# Patient Record
Sex: MALE | Race: BLACK OR AFRICAN AMERICAN | NOT HISPANIC OR LATINO | Employment: OTHER | URBAN - METROPOLITAN AREA
[De-identification: names, ages, dates, MRNs, and addresses within clinical notes are randomized per-mention and may not be internally consistent; named-entity substitution may affect disease eponyms.]

---

## 2017-01-27 ENCOUNTER — HOSPITAL ENCOUNTER (EMERGENCY)
Facility: HOSPITAL | Age: 62
Discharge: HOME | End: 2017-01-27
Attending: EMERGENCY MEDICINE
Payer: COMMERCIAL

## 2017-01-27 VITALS
HEIGHT: 71 IN | HEART RATE: 73 BPM | WEIGHT: 185 LBS | RESPIRATION RATE: 17 BRPM | DIASTOLIC BLOOD PRESSURE: 87 MMHG | TEMPERATURE: 99 F | SYSTOLIC BLOOD PRESSURE: 124 MMHG | OXYGEN SATURATION: 97 % | BODY MASS INDEX: 25.9 KG/M2

## 2017-01-27 DIAGNOSIS — M25.562 PAIN IN BOTH KNEES, UNSPECIFIED CHRONICITY: Primary | ICD-10-CM

## 2017-01-27 DIAGNOSIS — M25.561 PAIN IN BOTH KNEES, UNSPECIFIED CHRONICITY: Primary | ICD-10-CM

## 2017-01-27 PROCEDURE — 25000003 PHARM REV CODE 250: Performed by: NURSE PRACTITIONER

## 2017-01-27 PROCEDURE — 99283 EMERGENCY DEPT VISIT LOW MDM: CPT

## 2017-01-27 RX ORDER — MELOXICAM 7.5 MG/1
7.5 TABLET ORAL DAILY
Status: DISCONTINUED | OUTPATIENT
Start: 2017-01-27 | End: 2017-01-27 | Stop reason: HOSPADM

## 2017-01-27 RX ORDER — MELOXICAM 7.5 MG/1
7.5 TABLET ORAL DAILY
Qty: 5 TABLET | Refills: 0 | Status: SHIPPED | OUTPATIENT
Start: 2017-01-27 | End: 2018-01-24

## 2017-01-27 RX ADMIN — MELOXICAM 7.5 MG: 7.5 TABLET ORAL at 12:01

## 2017-01-27 NOTE — ED AVS SNAPSHOT
[facility]  [place] [address]               Carlos A Ruff   2017 12:18 PM   ED    Description:  Male : 1955   Department:  [facility]           Your Care was Coordinated By:     Provider Role From To    Speedy Simpson MD Attending Provider 17 1222 --    JUVE Chavez Nurse Practitioner 17 1222 --      Reason for Visit     Knee Pain           Diagnoses this Visit        Comments    Pain in both knees, unspecified chronicity    -  Primary       ED Disposition     ED Disposition Condition Comment    Discharge             To Do List           Follow-up Information     Schedule an appointment as soon as possible for a visit with Debi Solis MD.    Specialty:  Family Medicine    Why:  Next Week, For Follow-Up    Contact information:    [facility] [place] [address]  [phone]          Go to [facility].    Specialty:  Emergency Medicine    Why:  If symptoms worsen    Contact information:    [address] [place] [id number] [phone]       These Medications        Disp Refills Start End    meloxicam (MOBIC) 7.5 MG tablet 5 tablet 0 2017     Take 1 tablet (7.5 mg total) by mouth once daily. - Oral      [facility]     [facility] Care [facility] -  Assistance  Registered nurses in the [facility] provide clinical advisement, health education, appointment booking, and other advisory services.  Call for this free service at [phone].             Medications           Message regarding Medications     Verify the changes and/or additions to your medication regime listed below are the same as discussed with your clinician today.  If any of these changes or additions are incorrect, please notify your healthcare provider.        START taking these NEW medications        Refills    meloxicam (MOBIC) 7.5 MG tablet 0    Sig: Take 1 tablet  "(7.5 mg total) by mouth once daily.    Class: Print    Route: Oral      These medications were administered today        Dose Freq    meloxicam tablet 7.5 mg 7.5 mg Daily    Sig: Take 1 tablet (7.5 mg total) by mouth once daily.    Class: Normal    Route: Oral           Verify that the below list of medications is an accurate representation of the medications you are currently taking.  If none reported, the list may be blank. If incorrect, please contact your healthcare provider. Carry this list with you in case of emergency.           Current Medications     meloxicam (MOBIC) 7.5 MG tablet Take 1 tablet (7.5 mg total) by mouth once daily.    meloxicam tablet 7.5 mg Take 1 tablet (7.5 mg total) by mouth once daily.           Clinical Reference Information           Your Vitals Were     BP Pulse Temp Resp Height Weight    124/87 (BP Location: Right arm, Patient Position: Sitting) 73 99 °F (37.2 °C) (Oral) 17 5' 11" (1.803 m) 83.9 kg (185 lb)    SpO2 BMI             97% 25.8 kg/m2         Allergies as of 1/27/2017     No Known Allergies      Immunizations Administered on Date of Encounter - 1/27/2017     None      ED Micro, Lab, POCT     None      ED Imaging Orders     None        Discharge Instructions       Please return to the Emergency Department for any new or worsening symptoms including: worsening knee pain, swelling in the knee, redness in the knee, fever, chest pain, shortness of breath, loss of consciousness, dizziness, weakness, or any other concerns. Please follow up with your Primary Care Provider within in the week. If you do not have a Primary Care Provider, you may contact the one listed on your discharge paperwork or you may also call the [facility] Appointment Desk at [phone] to schedule an appointment with a Primary Care Provider.     Please take all medication as prescribed. You have been prescribed Naproxen for pain. This is an Non-Steroidal Anti-Inflammatory (NSAID) Medication. Please " do not take any additional NSAIDs while you are taking this medication including (Advil, Aleve, Motrin, Ibuprofen, Mobic\meloxicam, Naprosyn, etc.). Please stop taking this medication if you experience: weakness, itching, yellow skin or eyes, joint pains, vomiting blood, blood or black stools, unusual weight gain, or swelling in your arms, legs, hands, or feet.     Discharge References/Attachments     KNEE PAIN (ENGLISH)      [facility] Sign-Up     Activating your [facility] account is as easy as 1-2-3!     1) Visit [email or web address], select Sign Up Now, enter this activation code and your date of birth, then select Next.  [email or web address]  Expires: 3/13/2017 12:28 PM      2) Create a username and password to use when you visit [facility] in the future and select a security question in case you lose your password and select Next.    3) Enter your e-mail address and click Sign Up!    Additional Information  If you have questions, please e-mail [email or web address] or call [phone] to talk to our [facility] staff. Remember, [facility] is NOT to be used for urgent needs. For medical emergencies, dial 911.         Smoking Cessation     If you would like to quit smoking:   You may be eligible for free services if you are a [place] resident and started smoking cigarettes before September 1, 1988.  Call the Smoking Cessation Trust ([facility]) toll free at ([phone] [phone] or [phone] [phone].   Call -800-QUIT-NOW if you do not meet the above criteria.             [facility] complies with applicable Federal civil rights laws and does not discriminate on the basis of race, color, national origin, age, disability, or sex.        Language Assistance Services     ATTENTION: Language assistance services are available, free of charge. Please call [phone].      ATENCIÓN: Si habla español, tiene a ervin disposición servicios gratuitos de asistencia lingüística. Llame al [phone].     CHÚ Ý: N?u b?n nói Ti?ng  Vi?t, có các d?ch v? h? tr? ngôn ng? mi?n phí dành cho b?n. G?i s? [phone].

## 2017-01-27 NOTE — DISCHARGE INSTRUCTIONS
Please return to the Emergency Department for any new or worsening symptoms including: worsening knee pain, swelling in the knee, redness in the knee, fever, chest pain, shortness of breath, loss of consciousness, dizziness, weakness, or any other concerns. Please follow up with your Primary Care Provider within in the week. If you do not have a Primary Care Provider, you may contact the one listed on your discharge paperwork or you may also call the [facility] Appointment Desk at [phone] to schedule an appointment with a Primary Care Provider.     Please take all medication as prescribed. You have been prescribed Naproxen for pain. This is an Non-Steroidal Anti-Inflammatory (NSAID) Medication. Please do not take any additional NSAIDs while you are taking this medication including (Advil, Aleve, Motrin, Ibuprofen, Mobic\meloxicam, Naprosyn, etc.). Please stop taking this medication if you experience: weakness, itching, yellow skin or eyes, joint pains, vomiting blood, blood or black stools, unusual weight gain, or swelling in your arms, legs, hands, or feet.

## 2017-01-27 NOTE — ED PROVIDER NOTES
"Encounter Date: 1/27/2017    SCRIBE #1 NOTE: I, Dana Valle, am scribing for, and in the presence of,  ISHA Wheatley. I have scribed the following portions of the note - Other sections scribed: HPI/ROS.       History     Chief Complaint   Patient presents with    Knee Pain     Pt. presents with knee pain that has been ongoing for the past month. Pt. reports sensation of "pop" in both knees that will come and go.     Review of patient's allergies indicates:  No Known Allergies  HPI Comments: CC: Knee Pain    HPI: 61 y.o. M with a PMHx of CAD w/ cardiac stents and blood clots presents to the ED c/o moderate, "pressure-like" bilateral knee pain (R>L) with swelling to the R knee. Symptoms have been ongoing for the past 1 month and worsening. Pain only comes with walking. Pt reports of the intermittent sensation of a "pop" in the knees. No prior tx attempted. Pt does not have established care with a PCP. Pt works as a . Pt denies trauma, hx of arthritis, and other symptoms.      The history is provided by the patient.     Past Medical History   Diagnosis Date    Coronary artery disease      No past medical history pertinent negatives.  Past Surgical History   Procedure Laterality Date    Cardiac stents      Blood clots       History reviewed. No pertinent family history.  Social History   Substance Use Topics    Smoking status: Current Some Day Smoker    Smokeless tobacco: None    Alcohol use Yes     Review of Systems   Constitutional: Negative for chills and fever.   HENT: Negative for ear pain, nosebleeds, rhinorrhea and sore throat.    Eyes: Negative for photophobia, pain and visual disturbance.   Respiratory: Negative for cough, chest tightness, shortness of breath and wheezing.    Cardiovascular: Negative for chest pain, palpitations and leg swelling.   Gastrointestinal: Negative for abdominal pain, nausea and vomiting.   Endocrine: Negative for polydipsia and polyuria.   Genitourinary: Negative " for dysuria and frequency.   Musculoskeletal: Positive for arthralgias (bilateral knees, only when walking) and joint swelling (Right). Negative for back pain, myalgias and neck pain.   Skin: Negative for rash and wound.   Neurological: Negative for dizziness, syncope, weakness and numbness.   Psychiatric/Behavioral: Negative for confusion. The patient is not nervous/anxious.        Physical Exam   Initial Vitals   BP Pulse Resp Temp SpO2   01/27/17 1156 01/27/17 1156 01/27/17 1156 01/27/17 1156 01/27/17 1156   124/87 73 17 99 °F (37.2 °C) 97 %     Physical Exam    Nursing note and vitals reviewed.  Constitutional: He appears well-developed and well-nourished. He is not diaphoretic. He is cooperative.  Non-toxic appearance. No distress.   HENT:   Head: Normocephalic and atraumatic.   Mouth/Throat: Oropharynx is clear and moist.   Eyes: Conjunctivae and EOM are normal.   Neck: Normal range of motion.   Cardiovascular: Normal rate, regular rhythm, normal heart sounds and intact distal pulses. Exam reveals no friction rub.    No murmur heard.  Pulmonary/Chest: Breath sounds normal. No respiratory distress. He has no wheezes. He has no rhonchi. He has no rales.   Abdominal: Soft. Bowel sounds are normal. He exhibits no distension and no mass. There is no tenderness. There is no rebound and no guarding.   Musculoskeletal:        Right hip: Normal.        Left hip: Normal.        Right knee: He exhibits swelling (mild). He exhibits normal range of motion, no ecchymosis, no deformity, no laceration, no erythema, normal alignment, no LCL laxity, normal patellar mobility, no bony tenderness and no MCL laxity. No tenderness found.        Left knee: He exhibits normal range of motion, no swelling, no ecchymosis, no deformity, no laceration, no erythema, normal alignment, no LCL laxity, normal patellar mobility, no bony tenderness and no MCL laxity. No tenderness found.        Right ankle: Normal.        Left ankle: Normal.    Palpation over the left or right bony knee.  Full range of motion of bilateral knees.  No wound drainage, erythema, or area of increased warmth over the knees.  Mild swelling and the right knee compared to left.  No crepitus with range of motion.  No tenderness palpation over the proximal leg or distal leg.  Calf compartment soft.  No popliteal tenderness bilaterally.   Neurological: He is alert and oriented to person, place, and time. He has normal strength. No sensory deficit. GCS eye subscore is 4. GCS verbal subscore is 5. GCS motor subscore is 6.   Skin: Skin is warm and dry. No rash noted.   Psychiatric: He has a normal mood and affect. His behavior is normal. Judgment and thought content normal.         ED Course   Procedures  Labs Reviewed - No data to display                APC / Resident Notes:   This is an evaluation of a 61-year-old male who presents emergency department for bilateral knee pain.  He denies any known traumatic event.  He works as a .  He only reports pain with ambulation.  No pain at rest. The patient is a non-toxic, afebrile, and well appearing male. On physical exam, his breath sounds are clear and equal to auscultation bilaterally.  Heart regular rhythm with a rate of 70 bpm.  He was also extremities without difficulty.  Equal motor strength to upper and lower extremities bilaterally.  He ambulates with a steady and even gait.  There is no tenderness palpation over the bilateral knees.  There is mild swelling in the right compared to left.  There is no joint laxity.  There is no surrounding erythema, area of increased warmth, or bruising noted over bilateral knees.  There are no open wounds.  Calf compartments are soft.  Vital Signs Are Reassuring.    Given the above findings, my overall impression is knee pain-likely related to arthritic changes. Given the above findings, I do not think the patient has fracture, dislocation, septic joint, cellulitis.    ED Treatments: Mobic.  D/C Meds: Mobic. The diagnosis, treatment plan, instructions for follow-up and reevaluation with a PCP as well as ED return precautions have been discussed and he has verbalized an understanding of the information. All questions or concerns have been addressed. This case was discussed with and the patient has been examined by Dr. Simpson who is in agreement with my assessment and plan. ALEKSEY Tillman, BLAYNEP-C       Scribe Attestation:   Scribe #1: I performed the above scribed service and the documentation accurately describes the services I performed. I attest to the accuracy of the note.    Attending Attestation:     Physician Attestation Statement for NP/PA:   I have conducted a face to face encounter with this patient in addition to the NP/PA, due to Medical Complexity    Other NP/PA Attestation Additions:    History of Present Illness: Bilateral knee pain    Medical Decision Making: Agree with assessment and management of osteoarthritis.       Physician Attestation for Scribe:  Physician Attestation Statement for Scribe #1: I, Kevin Cohen, NP-C, reviewed documentation, as scribed by Dana Valle in my presence, and it is both accurate and complete.                 ED Course     Clinical Impression:   The encounter diagnosis was Pain in both knees, unspecified chronicity.    Disposition:   Disposition: Discharged  Condition: Stable       JUVE Chavez  01/27/17 1857       Speedy Simpson MD  01/27/17 7045

## 2017-04-26 ENCOUNTER — HOSPITAL ENCOUNTER (EMERGENCY)
Facility: HOSPITAL | Age: 62
Discharge: HOME | End: 2017-04-26
Attending: EMERGENCY MEDICINE
Payer: COMMERCIAL

## 2017-04-26 VITALS
HEART RATE: 64 BPM | SYSTOLIC BLOOD PRESSURE: 110 MMHG | WEIGHT: 185 LBS | TEMPERATURE: 98 F | OXYGEN SATURATION: 100 % | HEIGHT: 71 IN | DIASTOLIC BLOOD PRESSURE: 76 MMHG | BODY MASS INDEX: 25.9 KG/M2 | RESPIRATION RATE: 18 BRPM

## 2017-04-26 DIAGNOSIS — R19.7 DIARRHEA, UNSPECIFIED TYPE: Primary | ICD-10-CM

## 2017-04-26 LAB
ALBUMIN SERPL BCP-MCNC: 4.3 G/DL
ALP SERPL-CCNC: 73 U/L
ALT SERPL W/O P-5'-P-CCNC: 37 U/L
ANION GAP SERPL CALC-SCNC: 11 MMOL/L
AST SERPL-CCNC: 41 U/L
BASOPHILS # BLD AUTO: 0.03 K/UL
BASOPHILS NFR BLD: 0.5 %
BILIRUB SERPL-MCNC: 0.6 MG/DL
BILIRUB UR QL STRIP: NEGATIVE
BUN SERPL-MCNC: 13 MG/DL
CALCIUM SERPL-MCNC: 9.3 MG/DL
CHLORIDE SERPL-SCNC: 109 MMOL/L
CLARITY UR: CLEAR
CO2 SERPL-SCNC: 19 MMOL/L
COLOR UR: YELLOW
CREAT SERPL-MCNC: 1 MG/DL
DIFFERENTIAL METHOD: NORMAL
EOSINOPHIL # BLD AUTO: 0 K/UL
EOSINOPHIL NFR BLD: 0.7 %
ERYTHROCYTE [DISTWIDTH] IN BLOOD BY AUTOMATED COUNT: 13.5 %
EST. GFR  (AFRICAN AMERICAN): >60 ML/MIN/1.73 M^2
EST. GFR  (NON AFRICAN AMERICAN): >60 ML/MIN/1.73 M^2
GLUCOSE SERPL-MCNC: 92 MG/DL
GLUCOSE UR QL STRIP: NEGATIVE
HCT VFR BLD AUTO: 46.3 %
HGB BLD-MCNC: 15.4 G/DL
HGB UR QL STRIP: NEGATIVE
KETONES UR QL STRIP: NEGATIVE
LEUKOCYTE ESTERASE UR QL STRIP: NEGATIVE
LIPASE SERPL-CCNC: 66 U/L
LYMPHOCYTES # BLD AUTO: 1.1 K/UL
LYMPHOCYTES NFR BLD: 19.1 %
MCH RBC QN AUTO: 31 PG
MCHC RBC AUTO-ENTMCNC: 33.3 %
MCV RBC AUTO: 93 FL
MICROSCOPIC COMMENT: NORMAL
MONOCYTES # BLD AUTO: 0.7 K/UL
MONOCYTES NFR BLD: 11.9 %
NEUTROPHILS # BLD AUTO: 3.8 K/UL
NEUTROPHILS NFR BLD: 67.6 %
NITRITE UR QL STRIP: NEGATIVE
PH UR STRIP: 6 [PH] (ref 5–8)
PLATELET # BLD AUTO: 224 K/UL
PMV BLD AUTO: 10.9 FL
POTASSIUM SERPL-SCNC: 3.9 MMOL/L
PROT SERPL-MCNC: 8.1 G/DL
PROT UR QL STRIP: NEGATIVE
RBC # BLD AUTO: 4.97 M/UL
SODIUM SERPL-SCNC: 139 MMOL/L
SP GR UR STRIP: 1.02 (ref 1–1.03)
URN SPEC COLLECT METH UR: NORMAL
UROBILINOGEN UR STRIP-ACNC: NEGATIVE EU/DL
WBC # BLD AUTO: 5.56 K/UL
WBC #/AREA URNS HPF: 1 /HPF (ref 0–5)

## 2017-04-26 PROCEDURE — 96375 TX/PRO/DX INJ NEW DRUG ADDON: CPT

## 2017-04-26 PROCEDURE — 99284 EMERGENCY DEPT VISIT MOD MDM: CPT | Mod: 25

## 2017-04-26 PROCEDURE — 85025 COMPLETE CBC W/AUTO DIFF WBC: CPT

## 2017-04-26 PROCEDURE — 96374 THER/PROPH/DIAG INJ IV PUSH: CPT

## 2017-04-26 PROCEDURE — 63600175 PHARM REV CODE 636 W HCPCS: Performed by: NURSE PRACTITIONER

## 2017-04-26 PROCEDURE — 96372 THER/PROPH/DIAG INJ SC/IM: CPT

## 2017-04-26 PROCEDURE — 96361 HYDRATE IV INFUSION ADD-ON: CPT

## 2017-04-26 PROCEDURE — 81000 URINALYSIS NONAUTO W/SCOPE: CPT

## 2017-04-26 PROCEDURE — 25000003 PHARM REV CODE 250: Performed by: NURSE PRACTITIONER

## 2017-04-26 PROCEDURE — 80053 COMPREHEN METABOLIC PANEL: CPT

## 2017-04-26 PROCEDURE — 83690 ASSAY OF LIPASE: CPT

## 2017-04-26 RX ORDER — DICYCLOMINE HYDROCHLORIDE 20 MG/1
20 TABLET ORAL 3 TIMES DAILY
Qty: 20 TABLET | Refills: 0 | Status: SHIPPED | OUTPATIENT
Start: 2017-04-26 | End: 2017-05-26

## 2017-04-26 RX ORDER — CLOPIDOGREL BISULFATE 75 MG/1
75 TABLET ORAL DAILY
COMMUNITY
End: 2017-12-11 | Stop reason: SDUPTHER

## 2017-04-26 RX ORDER — PANTOPRAZOLE SODIUM 20 MG/1
20 TABLET, DELAYED RELEASE ORAL
Qty: 30 TABLET | Refills: 0 | Status: SHIPPED | OUTPATIENT
Start: 2017-04-26 | End: 2018-01-24

## 2017-04-26 RX ORDER — ONDANSETRON 2 MG/ML
4 INJECTION INTRAMUSCULAR; INTRAVENOUS
Status: COMPLETED | OUTPATIENT
Start: 2017-04-26 | End: 2017-04-26

## 2017-04-26 RX ORDER — DICYCLOMINE HYDROCHLORIDE 10 MG/ML
20 INJECTION INTRAMUSCULAR
Status: COMPLETED | OUTPATIENT
Start: 2017-04-26 | End: 2017-04-26

## 2017-04-26 RX ORDER — FAMOTIDINE 10 MG/ML
20 INJECTION INTRAVENOUS
Status: COMPLETED | OUTPATIENT
Start: 2017-04-26 | End: 2017-04-26

## 2017-04-26 RX ORDER — ACETAMINOPHEN 500 MG
1000 TABLET ORAL
Status: COMPLETED | OUTPATIENT
Start: 2017-04-26 | End: 2017-04-26

## 2017-04-26 RX ADMIN — DICYCLOMINE HYDROCHLORIDE 20 MG: 10 INJECTION INTRAMUSCULAR at 11:04

## 2017-04-26 RX ADMIN — ACETAMINOPHEN 1000 MG: 500 TABLET ORAL at 11:04

## 2017-04-26 RX ADMIN — FAMOTIDINE 20 MG: 10 INJECTION, SOLUTION INTRAVENOUS at 11:04

## 2017-04-26 RX ADMIN — SODIUM CHLORIDE 1000 ML: 0.9 INJECTION, SOLUTION INTRAVENOUS at 11:04

## 2017-04-26 RX ADMIN — ONDANSETRON 4 MG: 2 INJECTION INTRAMUSCULAR; INTRAVENOUS at 11:04

## 2017-04-26 NOTE — ED TRIAGE NOTES
Nausea and diarrhea since yesterday with periumbilical pain also pressure to top of head felt weak

## 2017-04-26 NOTE — DISCHARGE INSTRUCTIONS
Please return to the ED for any new or worsening symptoms: severe abdominal pain, chest pain, shortness of breath, loss of consciousness or any other concerns. Please follow up with primary care within in the week. You may also call [phone] for the [facility] same day appointment line.

## 2017-04-26 NOTE — ED PROVIDER NOTES
"Encounter Date: 4/26/2017    SCRIBE #1 NOTE: I, Lucie Barton, am scribing for, and in the presence of,  Frieda Hernandez NP. I have scribed the following portions of the note - Other sections scribed: HPI, ROS.       History     Chief Complaint   Patient presents with    Abdominal Pain     jalen-umbilical pain and NVD since yesterday     Review of patient's allergies indicates:  No Known Allergies  HPI Comments: CC: Abdominal Pain    HPI: 61 year old male, smoker with a PMHx of coronary artery disease, cardiac stents, blood clots, and hypertension presents to the ED c/o acute, severe (8/10), "cramping" periumbilical abdominal pain. Patient states 2 nights ago he had 1 beer and some egg rolls, and overnight felt like the "food was choking me". Patient reports associated chills, generalized body weakness, nausea, and 10-20 watery diarrhea episodes in 24 hours. Patient states he is most concerned about his current headache due to his hypertension. Patient reports treating with Pepto and Priya [place] with temporary relief. Patient does not have a PCP. Patient otherwise denies vomiting, blood in stool, fever, chest pain, shortness of breath, dysuria, syncope and other symptoms.          The history is provided by the patient. No  was used.     Past Medical History:   Diagnosis Date    Coronary artery disease      Past Surgical History:   Procedure Laterality Date    blood clots      cardiac stents       History reviewed. No pertinent family history.  Social History   Substance Use Topics    Smoking status: Current Some Day Smoker    Smokeless tobacco: None    Alcohol use Yes     Review of Systems   Constitutional: Positive for chills. Negative for fever.        (+) Generalized Body Weakness   HENT: Negative for ear pain, rhinorrhea and sore throat.    Eyes: Negative for pain.   Respiratory: Negative for cough and shortness of breath.    Cardiovascular: Negative for chest pain. "   Gastrointestinal: Positive for abdominal pain (periumbilical), diarrhea and nausea. Negative for blood in stool and vomiting.   Genitourinary: Negative for dysuria.   Musculoskeletal: Negative for back pain and neck pain.   Skin: Negative for rash.   Neurological: Positive for headaches. Negative for syncope.       Physical Exam   Initial Vitals   BP Pulse Resp Temp SpO2   04/26/17 0954 04/26/17 0954 04/26/17 0954 04/26/17 0954 04/26/17 0954   131/75 80 19 98.4 °F (36.9 °C) 96 %     Physical Exam    Constitutional: Vital signs are normal. He appears well-developed and well-nourished.  Non-toxic appearance.   Eyes: EOM are normal.   Neck: Full passive range of motion without pain. Neck supple. No rigidity.   Cardiovascular: Normal rate, S1 normal, S2 normal and normal heart sounds. Exam reveals no gallop.    No murmur heard.  Pulmonary/Chest: Effort normal and breath sounds normal. No tachypnea. He has no decreased breath sounds. He has no wheezes. He has no rhonchi. He has no rales.   Abdominal: Soft. Normal appearance. There is no tenderness. There is no CVA tenderness, no tenderness at McBurney's point and negative Saenz's sign.   Neurological: He is alert and oriented to person, place, and time. GCS eye subscore is 4. GCS verbal subscore is 5. GCS motor subscore is 6.   Skin: Skin is warm, dry and intact. No rash noted.   Psychiatric: He has a normal mood and affect.         ED Course   Procedures  Labs Reviewed   COMPREHENSIVE METABOLIC PANEL - Abnormal; Notable for the following:        Result Value    CO2 19 (*)     AST 41 (*)     All other components within normal limits   LIPASE - Abnormal; Notable for the following:     Lipase 66 (*)     All other components within normal limits   CBC W/ AUTO DIFFERENTIAL   URINALYSIS   URINALYSIS MICROSCOPIC             Medical Decision Making:   ED Management:  This is a 61-year-old male who presents to the ED with complaints of crampy abdominal pain, diarrhea and a  headache.  He is afebrile and well-appearing.  On exam, his abdomen is soft and nontender.  His vital signs normal.  Laboratory evaluation and urinalysis grossly unremarkable.  Lipase is slightly elevated.  After receiving IV fluids, Zofran, Pepcid, Bentyl and Tylenol he reports feeling much better.  I considered but suspect a low probability for acute pancreatitis, cholecystitis, bowel obstruction, acute appendicitis. Discharged home with prescriptions for Bentyl and Zofran.  Instructions given for supportive care and follow-up.  Return precautions given.  Patient's case was discussed with Dr. Valenzuela, who agrees with his plan of care.            Scribe Attestation:   Scribe #1: I performed the above scribed service and the documentation accurately describes the services I performed. I attest to the accuracy of the note.    Attending Attestation:           Physician Attestation for Scribe:  Physician Attestation Statement for Scribe #1: I, Frieda Hernandez NP, reviewed documentation, as scribed by Lucie Barton in my presence, and it is both accurate and complete.                 ED Course     Clinical Impression:   The encounter diagnosis was Diarrhea, unspecified type.    Disposition:   Disposition: Discharged  Condition: Stable       Frieda Hernandez NP  04/26/17 1956

## 2017-04-26 NOTE — ED AVS SNAPSHOT
[facility]  [address] [place] [address]               Carlos A Ruff   2017 10:12 AM   ED    Description:  Male : 1955   Department:  [facility]           Your Care was Coordinated By:     Provider Role From To    Roberto Carlos Valenzuela MD Attending Provider 17 1039 --    Frieda Hernandez NP Nurse Practitioner 17 1017 --      Reason for Visit     Abdominal Pain           Diagnoses this Visit        Comments    Diarrhea, unspecified type    -  Primary       ED Disposition     None           To Do List           Follow-up Information     Schedule an appointment as soon as possible for a visit with Ene Sumner MD.    Specialty:  Internal Medicine    Why:  Primary Care    Contact information:    [address] [place] [address]  [phone]         These Medications        Disp Refills Start End    dicyclomine (BENTYL) 20 mg tablet 20 tablet 0 2017    Take 1 tablet (20 mg total) by mouth 3 (three) times daily. - Oral    pantoprazole (PROTONIX) 20 MG tablet 30 tablet 0 2017    Take 1 tablet (20 mg total) by mouth 2 (two) times daily before meals. - Oral      [facility]     [facility] Care [facility] -  Assistance  Unless otherwise directed by your provider, please contact [facility], our nurse care line that is available for  assistance.     Registered nurses in the [facility] provide: appointment scheduling, clinical advisement, health education, and other advisory services.  Call: [phone] (toll free)               Medications           Message regarding Medications     Verify the changes and/or additions to your medication regime listed below are the same as discussed with your clinician today.  If any of these changes or additions are incorrect, please notify your healthcare provider.        START taking these NEW medications        Refills     "dicyclomine (BENTYL) 20 mg tablet 0    Sig: Take 1 tablet (20 mg total) by mouth 3 (three) times daily.    Class: Print    Route: Oral    pantoprazole (PROTONIX) 20 MG tablet 0    Sig: Take 1 tablet (20 mg total) by mouth 2 (two) times daily before meals.    Class: Print    Route: Oral      These medications were administered today        Dose Freq    sodium chloride 0.9% bolus 1,000 mL 1,000 mL Once    Sig: Inject 1,000 mLs into the vein once.    Class: Normal    Route: Intravenous    ondansetron injection 4 mg 4 mg ED 1 Time    Sig: Inject 4 mg into the vein ED 1 Time.    Class: Normal    Route: Intravenous    famotidine (PF) 20 mg/2 mL injection 20 mg 20 mg ED 1 Time    Sig: Inject 2 mLs (20 mg total) into the vein ED 1 Time.    Class: Normal    Route: Intravenous    dicyclomine injection 20 mg 20 mg ED 1 Time    Sig: Inject 2 mLs (20 mg total) into the muscle ED 1 Time.    Class: Normal    Route: Intramuscular    acetaminophen tablet 1,000 mg 1,000 mg ED 1 Time    Sig: Take 2 tablets (1,000 mg total) by mouth ED 1 Time.    Class: Normal    Route: Oral           Verify that the below list of medications is an accurate representation of the medications you are currently taking.  If none reported, the list may be blank. If incorrect, please contact your healthcare provider. Carry this list with you in case of emergency.           Current Medications     clopidogrel (PLAVIX) 75 mg tablet Take 75 mg by mouth once daily.    dicyclomine (BENTYL) 20 mg tablet Take 1 tablet (20 mg total) by mouth 3 (three) times daily.    meloxicam (MOBIC) 7.5 MG tablet Take 1 tablet (7.5 mg total) by mouth once daily.    pantoprazole (PROTONIX) 20 MG tablet Take 1 tablet (20 mg total) by mouth 2 (two) times daily before meals.           Clinical Reference Information           Your Vitals Were     BP Pulse Temp Resp Height Weight    105/72 (BP Location: Left arm, BP Method: Automatic) 60 98.7 °F (37.1 °C) (Oral) 20 5' 11" (1.803 m) 83.9 " kg (185 lb)    SpO2 BMI             99% 25.8 kg/m2         Allergies as of 4/26/2017     No Known Allergies      Immunizations Administered on Date of Encounter - 4/26/2017     None      ED Micro, Lab, POCT     Start Ordered       Status Ordering Provider    04/26/17 1038 04/26/17 1038  CBC W/ AUTO DIFFERENTIAL  Once      Final result     04/26/17 1038 04/26/17 1038  Comp. Metabolic Panel  STAT      Final result     04/26/17 1038 04/26/17 1038  Lipase  STAT      Final result     04/26/17 1038 04/26/17 1038  Urinalysis - Clean Catch  STAT      In process     04/26/17 1038 04/26/17 1038  Urinalysis Microscopic  Once      In process       ED Imaging Orders     None        Discharge Instructions       Please return to the ED for any new or worsening symptoms: severe abdominal pain, chest pain, shortness of breath, loss of consciousness or any other concerns. Please follow up with primary care within in the week. You may also call [phone] for the [facility] same day appointment line.    Discharge References/Attachments     VOMITING OR DIARRHEA (ADULT), DIET FOR (ENGLISH)      [facility] Sign-Up     Activating your [facility] account is as easy as 1-2-3!     1) Visit [email or web address], select Sign Up Now, enter this activation code and your date of birth, then select Next.  [email or web address]  Expires: 6/10/2017 12:24 PM      2) Create a username and password to use when you visit [facility] in the future and select a security question in case you lose your password and select Next.    3) Enter your e-mail address and click Sign Up!    Additional Information  If you have questions, please e-mail [email or web address] or call [phone] to talk to our [facility] staff. Remember, [facility] is NOT to be used for urgent needs. For medical emergencies, dial 911.         Smoking Cessation     If you would like to quit smoking:   You may be eligible for free services if you are a [place] resident and started smoking  cigarettes before September 1, 1988.  Call the Smoking Cessation Trust (SCT) toll free at (198) 464-0945 or (610) 593-2962.   Call 1-800-QUIT-NOW if you do not meet the above criteria.   Contact us via email: [email or web address]   View our website for more information: [email or web address]         [facility] complies with applicable Federal civil rights laws and does not discriminate on the basis of race, color, national origin, age, disability, or sex.        Language Assistance Services     ATTENTION: Language assistance services are available, free of charge. Please call [phone].      ATENCIÓN: Si habla español, tiene a ervin disposición servicios gratuitos de asistencia lingüística. Llame al [phone].     CHÚ Ý: N?u b?n nói Ti?ng Vi?t, có các d?ch v? h? tr? ngôn ng? mi?n phí dành cho b?n. G?i s? [phone].

## 2017-11-29 ENCOUNTER — LAB VISIT (OUTPATIENT)
Dept: LAB | Facility: HOSPITAL | Age: 62
End: 2017-11-29
Attending: FAMILY MEDICINE
Payer: COMMERCIAL

## 2017-11-29 ENCOUNTER — OFFICE VISIT (OUTPATIENT)
Dept: FAMILY MEDICINE | Facility: CLINIC | Age: 62
End: 2017-11-29
Payer: COMMERCIAL

## 2017-11-29 VITALS
SYSTOLIC BLOOD PRESSURE: 110 MMHG | DIASTOLIC BLOOD PRESSURE: 72 MMHG | WEIGHT: 176.38 LBS | TEMPERATURE: 98 F | OXYGEN SATURATION: 96 % | HEART RATE: 65 BPM | BODY MASS INDEX: 24.69 KG/M2 | RESPIRATION RATE: 14 BRPM | HEIGHT: 71 IN

## 2017-11-29 DIAGNOSIS — Z00.00 VISIT FOR WELL MAN HEALTH CHECK: ICD-10-CM

## 2017-11-29 DIAGNOSIS — Z00.00 VISIT FOR WELL MAN HEALTH CHECK: Primary | ICD-10-CM

## 2017-11-29 DIAGNOSIS — R22.42 MASS OF LEFT FOOT: ICD-10-CM

## 2017-11-29 DIAGNOSIS — Z86.718 HISTORY OF DEEP VEIN THROMBOSIS (DVT) OF LOWER EXTREMITY: ICD-10-CM

## 2017-11-29 DIAGNOSIS — I25.10 CORONARY ARTERY DISEASE INVOLVING NATIVE CORONARY ARTERY OF NATIVE HEART WITHOUT ANGINA PECTORIS: ICD-10-CM

## 2017-11-29 DIAGNOSIS — Z72.0 TOBACCO ABUSE: ICD-10-CM

## 2017-11-29 LAB
ALBUMIN SERPL BCP-MCNC: 3.8 G/DL
ALP SERPL-CCNC: 69 U/L
ALT SERPL W/O P-5'-P-CCNC: 29 U/L
ANION GAP SERPL CALC-SCNC: 6 MMOL/L
AST SERPL-CCNC: 36 U/L
BASOPHILS # BLD AUTO: 0.09 K/UL
BASOPHILS NFR BLD: 1.2 %
BILIRUB SERPL-MCNC: 0.4 MG/DL
BUN SERPL-MCNC: 18 MG/DL
CALCIUM SERPL-MCNC: 9.4 MG/DL
CHLORIDE SERPL-SCNC: 109 MMOL/L
CHOLEST SERPL-MCNC: 197 MG/DL
CHOLEST/HDLC SERPL: 2.4 {RATIO}
CO2 SERPL-SCNC: 28 MMOL/L
COMPLEXED PSA SERPL-MCNC: 0.67 NG/ML
CREAT SERPL-MCNC: 1 MG/DL
DIFFERENTIAL METHOD: ABNORMAL
EOSINOPHIL # BLD AUTO: 0.3 K/UL
EOSINOPHIL NFR BLD: 3.7 %
ERYTHROCYTE [DISTWIDTH] IN BLOOD BY AUTOMATED COUNT: 13.4 %
EST. GFR  (AFRICAN AMERICAN): >60 ML/MIN/1.73 M^2
EST. GFR  (NON AFRICAN AMERICAN): >60 ML/MIN/1.73 M^2
ESTIMATED AVG GLUCOSE: 100 MG/DL
GLUCOSE SERPL-MCNC: 96 MG/DL
HBA1C MFR BLD HPLC: 5.1 %
HCT VFR BLD AUTO: 42.3 %
HDLC SERPL-MCNC: 82 MG/DL
HDLC SERPL: 41.6 %
HGB BLD-MCNC: 14.1 G/DL
LDLC SERPL CALC-MCNC: 106.6 MG/DL
LYMPHOCYTES # BLD AUTO: 1.7 K/UL
LYMPHOCYTES NFR BLD: 23 %
MCH RBC QN AUTO: 32 PG
MCHC RBC AUTO-ENTMCNC: 33.3 G/DL
MCV RBC AUTO: 96 FL
MONOCYTES # BLD AUTO: 0.6 K/UL
MONOCYTES NFR BLD: 7.3 %
NEUTROPHILS # BLD AUTO: 4.9 K/UL
NEUTROPHILS NFR BLD: 64.8 %
NONHDLC SERPL-MCNC: 115 MG/DL
PLATELET # BLD AUTO: 257 K/UL
PMV BLD AUTO: 10.9 FL
POTASSIUM SERPL-SCNC: 4.2 MMOL/L
PROT SERPL-MCNC: 7.5 G/DL
RBC # BLD AUTO: 4.41 M/UL
SODIUM SERPL-SCNC: 143 MMOL/L
TRIGL SERPL-MCNC: 42 MG/DL
WBC # BLD AUTO: 7.56 K/UL

## 2017-11-29 PROCEDURE — 99386 PREV VISIT NEW AGE 40-64: CPT | Mod: 25,S$GLB,, | Performed by: FAMILY MEDICINE

## 2017-11-29 PROCEDURE — 80053 COMPREHEN METABOLIC PANEL: CPT

## 2017-11-29 PROCEDURE — 84153 ASSAY OF PSA TOTAL: CPT

## 2017-11-29 PROCEDURE — 83036 HEMOGLOBIN GLYCOSYLATED A1C: CPT

## 2017-11-29 PROCEDURE — 80061 LIPID PANEL: CPT

## 2017-11-29 PROCEDURE — 90471 IMMUNIZATION ADMIN: CPT | Mod: S$GLB,,, | Performed by: FAMILY MEDICINE

## 2017-11-29 PROCEDURE — 99999 PR PBB SHADOW E&M-EST. PATIENT-LVL IV: CPT | Mod: PBBFAC,,, | Performed by: FAMILY MEDICINE

## 2017-11-29 PROCEDURE — 85025 COMPLETE CBC W/AUTO DIFF WBC: CPT

## 2017-11-29 PROCEDURE — 90686 IIV4 VACC NO PRSV 0.5 ML IM: CPT | Mod: S$GLB,,, | Performed by: FAMILY MEDICINE

## 2017-11-29 PROCEDURE — 36415 COLL VENOUS BLD VENIPUNCTURE: CPT | Mod: PN

## 2017-11-29 RX ORDER — ATORVASTATIN CALCIUM 40 MG/1
40 TABLET, FILM COATED ORAL DAILY
COMMUNITY
End: 2017-12-11 | Stop reason: SDUPTHER

## 2017-11-29 RX ORDER — ERGOCALCIFEROL 1.25 MG/1
50000 CAPSULE ORAL
COMMUNITY

## 2017-11-29 RX ORDER — MULTIVITAMIN
1 TABLET ORAL DAILY
COMMUNITY
End: 2018-08-28

## 2017-11-29 NOTE — PROGRESS NOTES
(9:20 AM) - Influenza vaccine was given IM in the left deltoid. Tolerated well. Instructed to remain in the clinic for 15 mins after admin for observation.

## 2017-11-29 NOTE — PROGRESS NOTES
"Well Man VISIT      CHIEF COMPLAINT  Chief Complaint   Patient presents with    Annual Exam     physical        HPI  Carlos A Ruff is a 62 y.o. male who presents for physical.     Social Factors  Tobacco use: Yes only at night  Ready to Quit: Yes   Alcohol: Yes on weekends  Intimate partner violence screening  "Do you feel safe in your current relationship?" Yes   "Have you ever been in a relationship in which your partner frightened you or hurt you?" No  Living Will/POA: No  Regular Exercise: No    Depression  Over the past two weeks, have you felt down, depressed, or hopeless? No  Over the past two weeks, have you felt little interest or pleasure in doing things? No    Reproductive Health  STD screening in last year: decliend  HIV screening: declined    Screen for Chronic Disease  CHD Risk Factors: male gender and smoking/ tobacco exposure  Estimated body mass index is 24.6 kg/m² as calculated from the following:    Height as of this encounter: 5' 11" (1.803 m).    Weight as of this encounter: 80 kg (176 lb 5.9 oz).  Dyslipidemia screening needed: order 11/29/17  T2DM screening needed: order 11/29/17  Colonoscopy needed: order 11/29/17  PSA needed: order 11/29/17  Screen men 35 years and older, and men 20 to 34 years of age who have cardiovascular risk factors for dyslipidemia  Begin screening colonoscopies at 50 years of age in men of average risk, and continue until 75 years of age; offer fecal occult blood testing every year, flexible sigmoidoscopy every five years combined with fecal occult blood testing every three years, or colonoscopy every 10 years   The American Urological Association recommends offering PSA testing and digital rectal examination to well-informed men beginning at 40 years of age and continuing until life expectancy is less than 10 years  Screen once with ultrasonography in men 65 to 75 years of age if they have a family history or have smoked at [id number] cigarettes in their " "lifetime  Screen men with a sustained blood pressure greater than 135/80 mm Hg for T2DM      Immunizations  delayed    ALLERGIES and MEDS were verified.   PMHx, PSHx, FHx, SOCIALHx were updated as pertinent.    REVIEW OF SYSTEMS  Review of Systems   Constitutional: Negative.    HENT: Negative.    Eyes: Negative.    Respiratory: Negative.    Cardiovascular: Negative.    Gastrointestinal: Negative.    Genitourinary: Negative.    Skin: Negative.    Neurological: Negative.          PHYSICAL EXAM  VITAL SIGNS: /72 (BP Location: Right arm, Patient Position: Sitting, BP Method: Medium (Manual))   Pulse 65   Temp 98.1 °F (36.7 °C) (Oral)   Resp 14   Ht 5' 11" (1.803 m)   Wt 80 kg (176 lb 5.9 oz)   SpO2 96%   BMI 24.60 kg/m²   GEN: Well developed, Well nourished, No acute distress.  HENT: Normocephalic, Atraumatic, Bilateral external ears normal, Nose normal, Oropharynx moist, No oral exudates.   Eyes: PERRLA, EOMI, Conjunctiva normal, No discharge.   Neck: Supple, No tenderness.  Lymphatic: No cervical or supraclavicular lymphadenopathy noted.   Cardiovascular: Normal heart rate, Normal rhythm, No murmurs, No rubs, No gallops.   Thorax & Lungs: Normal breath sounds, No respiratory distress, No wheezing.  Abdomen: Soft, No tenderness, Bowel sounds normal.  Genital: deferred  Skin: Warm, Dry, No erythema, No rash.   Extremities: No edema, No tenderness.       ASSESSMENT/PLAN    Carlos A was seen today for annual exam.    Diagnoses and all orders for this visit:    Visit for [facility] health check  -     Comprehensive metabolic panel; Future  -     Lipid panel; Future  -     CBC auto differential; Future  -     Hemoglobin A1c; Future  -     PSA, Screening; Future  -     Urinalysis; Future  -     Influenza - Quadrivalent (3 years & older) (PF)  -     Case request GI: COLONOSCOPY    Coronary artery disease involving native coronary artery of native heart without angina pectoris    History of deep vein thrombosis (DVT) of " lower extremity    Mass of left foot  -     Ambulatory referral to Podiatry    Tobacco abuse         FOLLOW UP: 3 months or sooner if needed    Khalif Hassan MD

## 2017-11-30 ENCOUNTER — TELEPHONE (OUTPATIENT)
Dept: FAMILY MEDICINE | Facility: CLINIC | Age: 62
End: 2017-11-30

## 2017-12-01 ENCOUNTER — TELEPHONE (OUTPATIENT)
Dept: FAMILY MEDICINE | Facility: CLINIC | Age: 62
End: 2017-12-01

## 2017-12-01 NOTE — TELEPHONE ENCOUNTER
Informed pt that results are normal. Pt verbally understood    ----- Message from Khalif Hassan MD sent at 11/30/2017  7:53 AM CST -----  Please let patient know that all of his labs are back and all of them look great.      Thanks,  Dr. Hassan

## 2017-12-11 RX ORDER — ATORVASTATIN CALCIUM 40 MG/1
40 TABLET, FILM COATED ORAL DAILY
Qty: 90 TABLET | Refills: 1 | Status: SHIPPED | OUTPATIENT
Start: 2017-12-11 | End: 2018-11-19

## 2017-12-11 RX ORDER — CLOPIDOGREL BISULFATE 75 MG/1
75 TABLET ORAL DAILY
Qty: 90 TABLET | Refills: 1 | Status: SHIPPED | OUTPATIENT
Start: 2017-12-11 | End: 2017-12-12 | Stop reason: SDUPTHER

## 2017-12-12 RX ORDER — CLOPIDOGREL BISULFATE 75 MG/1
75 TABLET ORAL DAILY
Qty: 90 TABLET | Refills: 1 | Status: SHIPPED | OUTPATIENT
Start: 2017-12-12 | End: 2018-08-28 | Stop reason: ALTCHOICE

## 2017-12-12 NOTE — TELEPHONE ENCOUNTER
----- Message from Andres Shah sent at 12/12/2017  2:20 PM CST -----  Contact: PT /[phone]  Calling to get refill on Plavix script. [facility]  [address]

## 2017-12-13 ENCOUNTER — OFFICE VISIT (OUTPATIENT)
Dept: PODIATRY | Facility: CLINIC | Age: 62
End: 2017-12-13
Payer: COMMERCIAL

## 2017-12-13 VITALS
DIASTOLIC BLOOD PRESSURE: 71 MMHG | SYSTOLIC BLOOD PRESSURE: 123 MMHG | WEIGHT: 176 LBS | BODY MASS INDEX: 24.64 KG/M2 | HEIGHT: 71 IN | HEART RATE: 54 BPM

## 2017-12-13 DIAGNOSIS — M20.41 HAMMER TOES OF BOTH FEET: ICD-10-CM

## 2017-12-13 DIAGNOSIS — L84 CORN OR CALLUS: ICD-10-CM

## 2017-12-13 DIAGNOSIS — M20.42 HAMMER TOES OF BOTH FEET: ICD-10-CM

## 2017-12-13 DIAGNOSIS — M79.672 FOOT PAIN, LEFT: Primary | ICD-10-CM

## 2017-12-13 DIAGNOSIS — M20.5X2 HALLUX LIMITUS, ACQUIRED, LEFT: ICD-10-CM

## 2017-12-13 DIAGNOSIS — M20.5X1 HALLUX LIMITUS, ACQUIRED, RIGHT: ICD-10-CM

## 2017-12-13 PROCEDURE — 99999 PR PBB SHADOW E&M-EST. PATIENT-LVL III: CPT | Mod: PBBFAC,,, | Performed by: PODIATRIST

## 2017-12-13 PROCEDURE — 99203 OFFICE O/P NEW LOW 30 MIN: CPT | Mod: S$GLB,,, | Performed by: PODIATRIST

## 2017-12-13 NOTE — PATIENT INSTRUCTIONS
Recommend lotions: eucerin, aquaphor, A&D ointment, gold bond for diabetics, sween; urea 40 with aloe (found on amazon.com)    Shoe recommendations: (try 6pm.com, zappos.com , nordstromrack.PingStamp, or shoes.com for discounted prices) you can visit DSW shoes in [place] as well    Asics (GT 2000 or gel foundations), new balance, saucony (stabil c3),  Garcia (GTS or Beast or transcend), vionic, propet (tennis shoe)    sofft brand, clarks, crocs, aerosoles, naturalizers, SAS, ecco, sera, josafat moyer, rockports (dress shoes)    Vionic, burkenstocks, fitflops, propet (sandals)    Nike comfort thong sandals, crocs, propet (house shoes)    Nail Home remedy:  Vicks Vapor rub to nails for easier managability    Occasional soaks for 15-20 mins in luke warm water with 1 cup of listerine and 1 cup of apple cider vinegar are ok You may add several drops of oil of oregano or tea tree oil as well            What Are Corns and Calluses?    Corns and calluses are your bodys response to friction or pressure against the skin. If your foot rubs the inside of your shoe, the affected area of skin thickens. Or, if a bone is not in the normal position, skin caught between bone and shoe or bone and ground builds up. In either case, the outer layer of skin thickens to protect the foot from unusual pressure. In many cases, corns and calluses look bad but are not harmful. However, more severe corns and calluses may become infected, destroy healthy tissue, or affect foot movement.    Corns  Corns usually grow on top of the foot, often at the toe joint. Corns can range from a slight thickening of skin to a painful soft or hard bump. They often form on top of buckled toe joints (hammer toes). If your toes curl under, corns may grow on the tips of the toes. You may also get a corn on the end of a toe if it rubs against your shoe. Corns can also grow between toes, often between the first and second toes.    Calluses  Calluses grow on the bottom of the  foot or on the outer edge of a toe or heel. A callus may spread across the ball of your foot. This type of callus is usually due to a problem with a metatarsal (the long bone at the base of a toe, near the ball of the foot). A pinch callus may grow along the outer edge of the heel or the big toe. Some calluses press up into the foot instead of spreading on the outside. A callus may form a central core or plug of tissue where pressure is greatest.  Date Last Reviewed: 9/21/2015 © 2000-2017 [employer]. [address] [place] [id number]. All rights reserved. This information is not intended as a substitute for professional medical care. Always follow your healthcare professional's instructions.        Treating Corns and Calluses  If your corns or calluses are mild, reducing friction may help. Different shoes, moleskin patches, or soft pads may be all the treatment you need. In more severe cases, treating tissue buildup may require your doctors care. Sometimes custom-made shoe inserts (orthotics) or special pads are prescribed to reduce friction and pressure.    Change shoes  If you have corns, your doctor may suggest wearing shoes that have more toe room. This way, buckled joints are less likely to be pinched against the top of the shoe. If you have calluses, wearing a cushioned insole, arch support, or heel counter can help reduce friction.  Visit your doctor  In some cases, your doctor may trim away the outer layers of skin that make up the corn or callus. For a painful corn, medicine may be injected beneath the built-up tissue.  Wear orthotics  Orthotics are specially made to meet the needs of your feet. They cushion calluses or divert pressure away from these problem areas. Worn as directed, orthotics help limit existing problems and prevent new ones from forming.  If you need surgery  If a bone or joint is out of place, certain parts of your foot may be under too much pressure. This can  cause severe corns and calluses. In such cases, surgery may be the best way to correct the problem.  Outpatient procedures  In most cases, surgery to improve bone position is an outpatient procedure. Your doctor may cut away excess bone, reposition prominent bones, or even fuse joints. Sometimes tendons or ligaments are cut to reduce tension on a bone or joint. Your doctor will talk with you about the procedure that is best suited to your needs.  Date Last Reviewed: 7/1/2016 © 2000-2017 [employer]. [address] [place] [address]. All rights reserved. This information is not intended as a substitute for professional medical care. Always follow your healthcare professional's instructions.

## 2017-12-13 NOTE — LETTER
December 18, 2017      Khalif Hassan MD  [address]  [place] [address]           [facility]  [address]  [place] [address]  Phone: [phone]          Patient: Carlos A Ruff   MR Number: [id number]   YOB: 1955   Date of Visit: 12/13/2017       Dear Dr. Khalif RIVERA Page:    Thank you for referring Carlos A Ruff to me for evaluation. Attached you will find relevant portions of my assessment and plan of care.    If you have questions, please do not hesitate to call me. I look forward to following Carlos A Ruff along with you.    Sincerely,    Alycia Wallace, [place]    Enclosure  CC:  No Recipients    If you would like to receive this communication electronically, please contact [email or web address] or [phone] to request more information on [facility] Link access.    For providers and/or their staff who would like to refer a patient to [facility], please contact us through our one-stop-shop provider referral line, Annalise Torrez, at [phone].    If you feel you have received this communication in error or would no longer like to receive these types of communications, please e-mail [email or web address]

## 2017-12-13 NOTE — PROGRESS NOTES
Subjective:      Patient ID: Carlos A Ruff is a 62 y.o. male.    Chief Complaint: Foot Problem (left foot pcp Dr. Hassan 11/29/17)    Carlos A is a 62 y.o. male who presents to the podiatry clinic  with complaint of  left foot pain. Onset of the symptoms was several months ago. Precipitating event: none known. Current symptoms include: ability to bear weight, but with some pain. Aggravating factors: standing and walking. Symptoms have progressed to a point and plateaued. Patient has had prior foot problems. Evaluation to date: none. Treatment to date: none. Patients rates pain 4/10 on pain scale.    Current shoe gear:  Tennis shoes      Patient Active Problem List   Diagnosis    Coronary artery disease involving native coronary artery of native heart without angina pectoris    History of deep vein thrombosis (DVT) of lower extremity    Mass of left foot    Tobacco abuse       Current Outpatient Prescriptions on File Prior to Visit   Medication Sig Dispense Refill    ascorbic acid, vitamin C, (VITAMIN C) 100 MG tablet Take 100 mg by mouth once daily.      atorvastatin (LIPITOR) 40 MG tablet Take 1 tablet (40 mg total) by mouth once daily. 90 tablet 1    clopidogrel (PLAVIX) 75 mg tablet Take 1 tablet (75 mg total) by mouth once daily. 90 tablet 1    ergocalciferol (VITAMIN D2) 50,000 unit Cap Take 50,000 Units by mouth every 7 days.      meloxicam (MOBIC) 7.5 MG tablet Take 1 tablet (7.5 mg total) by mouth once daily. 5 tablet 0    multivitamin (THERAGRAN) per tablet Take 1 tablet by mouth once daily.      pantoprazole (PROTONIX) 20 MG tablet Take 1 tablet (20 mg total) by mouth 2 (two) times daily before meals. 30 tablet 0     No current facility-administered medications on file prior to visit.        Review of patient's allergies indicates:  No Known Allergies    Past Surgical History:   Procedure Laterality Date    blood clots      cardiac stents         History reviewed. No pertinent family history.    Social  "History     Social History    Marital status: Single     Spouse name: N/A    Number of children: N/A    Years of education: N/A     Occupational History    Not on file.     Social History Main Topics    Smoking status: Current Some Day Smoker    Smokeless tobacco: Current User    Alcohol use Yes    Drug use: No    Sexual activity: Not on file     Other Topics Concern    Not on file     Social History Narrative    No narrative on file       Review of Systems   Constitution: Negative for chills, fever and weakness.   Cardiovascular: Positive for leg swelling. Negative for claudication.   Respiratory: Negative for cough and shortness of breath.    Skin: Positive for dry skin and nail changes. Negative for itching and rash.   Musculoskeletal: Positive for back pain. Negative for falls, joint pain, joint swelling and muscle weakness.   Gastrointestinal: Negative for diarrhea, nausea and vomiting.   Neurological: Negative for numbness, paresthesias and tremors.   Psychiatric/Behavioral: Negative for altered mental status and hallucinations.           Objective:      Vitals:    12/13/17 0721   BP: 123/71   Pulse: (!) 54   Weight: 79.8 kg (176 lb)   Height: 5' 11" (1.803 m)   PainSc: 0-No pain       Physical Exam   Constitutional:  Non-toxic appearance. He does not have a sickly appearance. No distress.   Cardiovascular:   Pulses:       Dorsalis pedis pulses are 2+ on the right side, and 2+ on the left side.        Posterior tibial pulses are 2+ on the right side, and 2+ on the left side.   Pulmonary/Chest: No respiratory distress.   Musculoskeletal:        Right ankle: He exhibits decreased range of motion. No tenderness. No lateral malleolus, no medial malleolus, no AITFL, no CF ligament and no posterior TFL tenderness found. Achilles tendon exhibits no pain, no defect and normal Franco's test results.        Left ankle: He exhibits decreased range of motion. No tenderness. No lateral malleolus, no medial " malleolus, no AITFL, no CF ligament and no posterior TFL tenderness found. Achilles tendon exhibits no pain, no defect and normal Franco's test results.        Right foot: There is no bony tenderness.        Left foot: There is tenderness. There is no bony tenderness.   Biomechanical exam: Pain on palpation left plantar callus.  There is equinus deformity bilateral with decreased dorsiflexion at the ankle joint bilateral.    Decreased stride, station of gait.  apropulsive toe off.  Increased angle and base of gait.    Patient has hammertoes of digits 2-5 bilateral partially reducible without symptom today.    Decreased first MPJ range of motion both weightbearing and nonweightbearing, no crepitus observed the first MP joint, + dorsal flag sign.Mild  bunion deformity is observed .     Skin: Skin is warm, dry and intact. No abrasion, no bruising, no burn, no ecchymosis, no laceration and no rash noted. He is not diaphoretic. No cyanosis or erythema. No pallor. Nails show no clubbing.   Focal hyperkeratotic lesion with painful central core consisting entirely of hyperkeratotic tissue without open skin, drainage, pus, fluctuance, malodor, or signs of infection:  sub 3rd MTPJ of the left foot        Psychiatric: His mood appears not anxious. His affect is not inappropriate. His speech is not slurred. He is not combative. He is communicative. He is attentive.   Nursing note reviewed.            Assessment:       Encounter Diagnoses   Name Primary?    Foot pain, left Yes    Corn or callus     Hammer toes of both feet     Hallux limitus, acquired, right     Hallux limitus, acquired, left          Plan:       Carlos A was seen today for foot problem.    Diagnoses and all orders for this visit:    Foot pain, left    Corn or callus    Hammer toes of both feet    Hallux limitus, acquired, right    Hallux limitus, acquired, left      I counseled the patient on his conditions, their implications and medical management.    --  Conservative treatments for hammer digit discussed include padding, hammertoe crest  Pads, extra-depth shoes; Surgical treatments discussed, including hammertoe correction and generic post-op course. Patient opting to begin with conservative options first.     -- Patient was advised on supportive shoe gear, offloading the area in shoe gear, use of a pumice stone, and skin emollients to slow the progression of callus formation.     -- I did  the patient in detail regarding surgical and conservative treatment measures for hallux limitus. I informed the  patient that the majority of pain is secondary to an arthritic joint with decreased joint spaces. Informed patient that outside of surgical intervention the main goal of therapy is to decreased the  range of motion at the first MPJ joint. This can be done so by utilizing either and extremely hard soled nonflexible shoe or a rocker bottom shoe such as a sketchers shape up     I gave written and verbal instructions on stretching exercises. Patient expressed understanding. Discussed icing the affected area as needed and also wearing appropriate shoe gear and avoiding flats, slippers, sandals, and going barefoot. My recommendation for OTC supports is [employer]. Patient instructed on adequate icing techniques. Patient should ice the affected area at least once per day x 10 minutes for 10 days I advised the patient that extra icing would also be beneficial to ensure adequate anti inflammatory effect. \    RTC PRN

## 2018-01-24 ENCOUNTER — HOSPITAL ENCOUNTER (OUTPATIENT)
Facility: HOSPITAL | Age: 63
Discharge: HOME | End: 2018-01-25
Attending: EMERGENCY MEDICINE | Admitting: EMERGENCY MEDICINE
Payer: COMMERCIAL

## 2018-01-24 DIAGNOSIS — I25.10 CORONARY ARTERY DISEASE INVOLVING NATIVE CORONARY ARTERY OF NATIVE HEART WITHOUT ANGINA PECTORIS: ICD-10-CM

## 2018-01-24 DIAGNOSIS — Z72.0 TOBACCO ABUSE: ICD-10-CM

## 2018-01-24 DIAGNOSIS — R79.89 ELEVATED TROPONIN: Primary | ICD-10-CM

## 2018-01-24 DIAGNOSIS — R07.9 CHEST PAIN: ICD-10-CM

## 2018-01-24 DIAGNOSIS — I25.110 CORONARY ARTERY DISEASE INVOLVING NATIVE HEART WITH UNSTABLE ANGINA PECTORIS, UNSPECIFIED VESSEL OR LESION TYPE: ICD-10-CM

## 2018-01-24 LAB
ALBUMIN SERPL BCP-MCNC: 4.1 G/DL
ALP SERPL-CCNC: 59 U/L
ALT SERPL W/O P-5'-P-CCNC: 33 U/L
ANION GAP SERPL CALC-SCNC: 11 MMOL/L
AST SERPL-CCNC: 38 U/L
BASOPHILS # BLD AUTO: 0.12 K/UL
BASOPHILS NFR BLD: 1.7 %
BILIRUB SERPL-MCNC: 0.6 MG/DL
BNP SERPL-MCNC: 37 PG/ML
BUN SERPL-MCNC: 13 MG/DL
CALCIUM SERPL-MCNC: 9 MG/DL
CHLORIDE SERPL-SCNC: 113 MMOL/L
CHOLEST SERPL-MCNC: 173 MG/DL
CHOLEST/HDLC SERPL: 2 {RATIO}
CO2 SERPL-SCNC: 22 MMOL/L
CREAT SERPL-MCNC: 0.8 MG/DL
D DIMER PPP IA.FEU-MCNC: 0.73 MG/L FEU
DIASTOLIC DYSFUNCTION: NO
DIFFERENTIAL METHOD: ABNORMAL
EOSINOPHIL # BLD AUTO: 0.3 K/UL
EOSINOPHIL NFR BLD: 3.6 %
ERYTHROCYTE [DISTWIDTH] IN BLOOD BY AUTOMATED COUNT: 13.2 %
EST. GFR  (AFRICAN AMERICAN): >60 ML/MIN/1.73 M^2
EST. GFR  (NON AFRICAN AMERICAN): >60 ML/MIN/1.73 M^2
ESTIMATED PA SYSTOLIC PRESSURE: 19.65
GLUCOSE SERPL-MCNC: 55 MG/DL
HCT VFR BLD AUTO: 42.2 %
HDLC SERPL-MCNC: 88 MG/DL
HDLC SERPL: 50.9 %
HGB BLD-MCNC: 14.1 G/DL
LDLC SERPL CALC-MCNC: 66.4 MG/DL
LYMPHOCYTES # BLD AUTO: 2.4 K/UL
LYMPHOCYTES NFR BLD: 35.6 %
MCH RBC QN AUTO: 31.8 PG
MCHC RBC AUTO-ENTMCNC: 33.4 G/DL
MCV RBC AUTO: 95 FL
MITRAL VALVE REGURGITATION: NORMAL
MONOCYTES # BLD AUTO: 0.3 K/UL
MONOCYTES NFR BLD: 4.4 %
NEUTROPHILS # BLD AUTO: 3.7 K/UL
NEUTROPHILS NFR BLD: 54.6 %
NONHDLC SERPL-MCNC: 85 MG/DL
PLATELET # BLD AUTO: 208 K/UL
PMV BLD AUTO: 10.9 FL
POCT GLUCOSE: 151 MG/DL (ref 70–110)
POTASSIUM SERPL-SCNC: 3.8 MMOL/L
PROT SERPL-MCNC: 7.1 G/DL
RBC # BLD AUTO: 4.44 M/UL
RETIRED EF AND QEF - SEE NOTES: 60 (ref 55–65)
SODIUM SERPL-SCNC: 146 MMOL/L
TRICUSPID VALVE REGURGITATION: NORMAL
TRIGL SERPL-MCNC: 93 MG/DL
TROPONIN I SERPL DL<=0.01 NG/ML-MCNC: 0.05 NG/ML
TROPONIN I SERPL DL<=0.01 NG/ML-MCNC: 0.05 NG/ML
TROPONIN I SERPL DL<=0.01 NG/ML-MCNC: 0.06 NG/ML
TROPONIN I SERPL DL<=0.01 NG/ML-MCNC: 0.08 NG/ML
TSH SERPL DL<=0.005 MIU/L-ACNC: 1.15 UIU/ML
WBC # BLD AUTO: 6.86 K/UL

## 2018-01-24 PROCEDURE — 93005 ELECTROCARDIOGRAM TRACING: CPT

## 2018-01-24 PROCEDURE — 80061 LIPID PANEL: CPT

## 2018-01-24 PROCEDURE — 63600175 PHARM REV CODE 636 W HCPCS: Performed by: EMERGENCY MEDICINE

## 2018-01-24 PROCEDURE — 25500020 PHARM REV CODE 255: Performed by: EMERGENCY MEDICINE

## 2018-01-24 PROCEDURE — S4991 NICOTINE PATCH NONLEGEND: HCPCS | Performed by: PHYSICIAN ASSISTANT

## 2018-01-24 PROCEDURE — G0378 HOSPITAL OBSERVATION PER HR: HCPCS

## 2018-01-24 PROCEDURE — 85379 FIBRIN DEGRADATION QUANT: CPT

## 2018-01-24 PROCEDURE — 82962 GLUCOSE BLOOD TEST: CPT

## 2018-01-24 PROCEDURE — 93306 TTE W/DOPPLER COMPLETE: CPT | Mod: 26,,, | Performed by: INTERNAL MEDICINE

## 2018-01-24 PROCEDURE — 83880 ASSAY OF NATRIURETIC PEPTIDE: CPT

## 2018-01-24 PROCEDURE — 96372 THER/PROPH/DIAG INJ SC/IM: CPT

## 2018-01-24 PROCEDURE — 25000003 PHARM REV CODE 250: Performed by: PHYSICIAN ASSISTANT

## 2018-01-24 PROCEDURE — 84443 ASSAY THYROID STIM HORMONE: CPT

## 2018-01-24 PROCEDURE — 99285 EMERGENCY DEPT VISIT HI MDM: CPT | Mod: 25

## 2018-01-24 PROCEDURE — 36415 COLL VENOUS BLD VENIPUNCTURE: CPT

## 2018-01-24 PROCEDURE — 84484 ASSAY OF TROPONIN QUANT: CPT | Mod: 91

## 2018-01-24 PROCEDURE — 80053 COMPREHEN METABOLIC PANEL: CPT

## 2018-01-24 PROCEDURE — 85025 COMPLETE CBC W/AUTO DIFF WBC: CPT

## 2018-01-24 PROCEDURE — 93306 TTE W/DOPPLER COMPLETE: CPT

## 2018-01-24 PROCEDURE — 99204 OFFICE O/P NEW MOD 45 MIN: CPT | Mod: ,,, | Performed by: INTERNAL MEDICINE

## 2018-01-24 PROCEDURE — 93010 ELECTROCARDIOGRAM REPORT: CPT | Mod: ,,, | Performed by: INTERNAL MEDICINE

## 2018-01-24 PROCEDURE — 25000003 PHARM REV CODE 250: Performed by: EMERGENCY MEDICINE

## 2018-01-24 RX ORDER — CLOPIDOGREL BISULFATE 75 MG/1
75 TABLET ORAL DAILY
Status: DISCONTINUED | OUTPATIENT
Start: 2018-01-25 | End: 2018-01-25 | Stop reason: HOSPADM

## 2018-01-24 RX ORDER — ASPIRIN 325 MG
325 TABLET ORAL
Status: COMPLETED | OUTPATIENT
Start: 2018-01-24 | End: 2018-01-24

## 2018-01-24 RX ORDER — ACETAMINOPHEN 325 MG/1
650 TABLET ORAL EVERY 4 HOURS PRN
Status: DISCONTINUED | OUTPATIENT
Start: 2018-01-24 | End: 2018-01-25 | Stop reason: HOSPADM

## 2018-01-24 RX ORDER — ATORVASTATIN CALCIUM 40 MG/1
40 TABLET, FILM COATED ORAL DAILY
Status: DISCONTINUED | OUTPATIENT
Start: 2018-01-25 | End: 2018-01-25 | Stop reason: HOSPADM

## 2018-01-24 RX ORDER — MORPHINE SULFATE 10 MG/ML
2 INJECTION, SOLUTION INTRAMUSCULAR; INTRAVENOUS EVERY 4 HOURS PRN
Status: DISCONTINUED | OUTPATIENT
Start: 2018-01-24 | End: 2018-01-24

## 2018-01-24 RX ORDER — HYDROMORPHONE HYDROCHLORIDE 2 MG/ML
0.25 INJECTION, SOLUTION INTRAMUSCULAR; INTRAVENOUS; SUBCUTANEOUS EVERY 6 HOURS PRN
Status: DISCONTINUED | OUTPATIENT
Start: 2018-01-24 | End: 2018-01-25

## 2018-01-24 RX ORDER — ENOXAPARIN SODIUM 100 MG/ML
1 INJECTION SUBCUTANEOUS ONCE
Status: COMPLETED | OUTPATIENT
Start: 2018-01-24 | End: 2018-01-24

## 2018-01-24 RX ORDER — SODIUM CHLORIDE 0.9 % (FLUSH) 0.9 %
5 SYRINGE (ML) INJECTION
Status: DISCONTINUED | OUTPATIENT
Start: 2018-01-24 | End: 2018-01-25 | Stop reason: HOSPADM

## 2018-01-24 RX ORDER — IBUPROFEN 200 MG
1 TABLET ORAL DAILY
Status: DISCONTINUED | OUTPATIENT
Start: 2018-01-24 | End: 2018-01-25 | Stop reason: HOSPADM

## 2018-01-24 RX ORDER — ENOXAPARIN SODIUM 100 MG/ML
40 INJECTION SUBCUTANEOUS EVERY 24 HOURS
Status: DISCONTINUED | OUTPATIENT
Start: 2018-01-25 | End: 2018-01-25 | Stop reason: HOSPADM

## 2018-01-24 RX ADMIN — ASPIRIN 325 MG ORAL TABLET 325 MG: 325 PILL ORAL at 11:01

## 2018-01-24 RX ADMIN — IOHEXOL 75 ML: 350 INJECTION, SOLUTION INTRAVENOUS at 12:01

## 2018-01-24 RX ADMIN — ENOXAPARIN SODIUM 90 MG: 100 INJECTION SUBCUTANEOUS at 02:01

## 2018-01-24 RX ADMIN — NICOTINE 1 PATCH: 21 PATCH, EXTENDED RELEASE TRANSDERMAL at 05:01

## 2018-01-24 NOTE — SUBJECTIVE & OBJECTIVE
Interval History:     Review of Systems   Constitution: Negative for decreased appetite.   HENT: Negative for ear discharge.    Eyes: Negative for blurred vision.   Endocrine: Negative for polyphagia.   Skin: Negative for nail changes.   Neurological: Negative for aphonia.   Psychiatric/Behavioral: Negative for hallucinations.     Objective:     Vital Signs (Most Recent):  Temp: 98.3 °F (36.8 °C) (01/24/18 1002)  Pulse: 74 (01/24/18 1429)  Resp: 16 (01/24/18 1429)  BP: (!) 155/80 (01/24/18 1429)  SpO2: 98 % (01/24/18 1429) Vital Signs (24h Range):  Temp:  [98.3 °F (36.8 °C)] 98.3 °F (36.8 °C)  Pulse:  [52-74] 74  Resp:  [14-19] 16  SpO2:  [97 %-100 %] 98 %  BP: (120-155)/(65-89) 155/80     Weight: 88.5 kg (195 lb)  Body mass index is 27.2 kg/m².     SpO2: 98 %  O2 Device (Oxygen Therapy): room air    No intake or output data in the 24 hours ending 01/24/18 1529    Lines/Drains/Airways     Peripheral Intravenous Line                 Peripheral IV - Single Lumen 01/24/18 1050 Right Antecubital less than 1 day                Physical Exam   Constitutional: He is oriented to person, place, and time. He appears well-developed and well-nourished.   HENT:   Head: Normocephalic and atraumatic.   Eyes: Conjunctivae are normal. Pupils are equal, round, and reactive to light.   Neck: Normal range of motion. Neck supple.   Cardiovascular: Normal rate, normal heart sounds and intact distal pulses.    Pulmonary/Chest: Effort normal and breath sounds normal.   Abdominal: Soft. Bowel sounds are normal.   Musculoskeletal: Normal range of motion.   Neurological: He is alert and oriented to person, place, and time.   Skin: Skin is warm and dry.       Significant Labs: All pertinent lab results from the last 24 hours have been reviewed.    Significant Imaging: X-Ray: CXR: X-Ray Chest 1 View (CXR): No results found for this visit on 01/24/18.

## 2018-01-24 NOTE — ED TRIAGE NOTES
Pt arrived to ED via personal transport with c/o of left sided chest pain x 2 weeks. Pt describes the pain as constant, with no alleviating or aggravating factors. Pt denies SOB, N/V/D, he is AAO x 4, in no apparent distress at this time.

## 2018-01-24 NOTE — ED NOTES
Vital signs stable, no pain at this time.  Medicated as ordered.  All blood sent to lab as ordered by Lucille SHELTON

## 2018-01-24 NOTE — SUBJECTIVE & OBJECTIVE
Past Medical History:   Diagnosis Date    Coronary artery disease     Hypercholesteremia        Past Surgical History:   Procedure Laterality Date    blood clots      cardiac stents         Review of patient's allergies indicates:  No Known Allergies    No current facility-administered medications on file prior to encounter.      Current Outpatient Prescriptions on File Prior to Encounter   Medication Sig    ascorbic acid, vitamin C, (VITAMIN C) 100 MG tablet Take 100 mg by mouth once daily.    atorvastatin (LIPITOR) 40 MG tablet Take 1 tablet (40 mg total) by mouth once daily.    clopidogrel (PLAVIX) 75 mg tablet Take 1 tablet (75 mg total) by mouth once daily.    ergocalciferol (VITAMIN D2) 50,000 unit Cap Take 50,000 Units by mouth every 7 days.    multivitamin (THERAGRAN) per tablet Take 1 tablet by mouth once daily.    [DISCONTINUED] meloxicam (MOBIC) 7.5 MG tablet Take 1 tablet (7.5 mg total) by mouth once daily.    [DISCONTINUED] pantoprazole (PROTONIX) 20 MG tablet Take 1 tablet (20 mg total) by mouth 2 (two) times daily before meals.     Family History     None        Social History Main Topics    Smoking status: Current Some Day Smoker    Smokeless tobacco: Current User    Alcohol use Yes      Comment: occasionally     Drug use: No    Sexual activity: Not on file     Review of Systems   Constitutional: Negative for chills and fever.   HENT: Negative for congestion and sore throat.    Respiratory: Negative for cough and shortness of breath.    Cardiovascular: Positive for chest pain. Negative for palpitations.        L neck and arm pain   Gastrointestinal: Negative for abdominal pain and nausea.   Endocrine: Negative for cold intolerance and heat intolerance.   Genitourinary: Negative for dysuria and frequency.   Musculoskeletal: Negative for arthralgias and myalgias.   Skin: Negative for color change and rash.   Neurological: Negative for dizziness and headaches.   Psychiatric/Behavioral:  Negative for behavioral problems and confusion.     Objective:     Vital Signs (Most Recent):  Temp: 98.3 °F (36.8 °C) (01/24/18 1002)  Pulse: 74 (01/24/18 1429)  Resp: 16 (01/24/18 1429)  BP: (!) 155/80 (01/24/18 1429)  SpO2: 98 % (01/24/18 1429) Vital Signs (24h Range):  Temp:  [98.3 °F (36.8 °C)] 98.3 °F (36.8 °C)  Pulse:  [52-74] 74  Resp:  [14-19] 16  SpO2:  [97 %-100 %] 98 %  BP: (120-155)/(65-89) 155/80     Weight: 80.5 kg (177 lb 7.5 oz)  Body mass index is 24.75 kg/m².    Physical Exam   Constitutional: He is oriented to person, place, and time. He appears well-developed and well-nourished. No distress.   HENT:   Head: Normocephalic and atraumatic.   Eyes: EOM are normal. Pupils are equal, round, and reactive to light.   Neck: Normal range of motion. Neck supple.   Cardiovascular: Normal rate and regular rhythm.    No murmur heard.  Pulmonary/Chest: Effort normal and breath sounds normal.   Abdominal: Soft. Bowel sounds are normal. He exhibits no distension.   Musculoskeletal: Normal range of motion.   Neurological: He is alert and oriented to person, place, and time.   Skin: Skin is warm and dry. No rash noted.   Psychiatric: He has a normal mood and affect. His behavior is normal.         CRANIAL NERVES     CN III, IV, VI   Pupils are equal, round, and reactive to light.  Extraocular motions are normal.        Significant Labs:   CBC:   Recent Labs  Lab 01/24/18  1057   WBC 6.86   HGB 14.1   HCT 42.2        CMP:   Recent Labs  Lab 01/24/18  1057   *   K 3.8   *   CO2 22*   GLU 55*   BUN 13   CREATININE 0.8   CALCIUM 9.0   PROT 7.1   ALBUMIN 4.1   BILITOT 0.6   ALKPHOS 59   AST 38   ALT 33   ANIONGAP 11   EGFRNONAA >60     Troponin:   Recent Labs  Lab 01/24/18  1057 01/24/18  1504   TROPONINI 0.050* 0.046*       Significant Imaging:   Imaging Results          CTA Chest Non-Coronary (PE Study) (Final result)  Result time 01/24/18 12:37:55    Final result by Rudy Lund MD (01/24/18  12:37:55)                 Impression:        1. No definite signs for pulmonary embolism.  2. Calcinosis of the coronary arteries especially involving the LAD.  3. Healing left eighth rib fracture.  4. No acute pulmonary processes.      Electronically signed by: BIA SANTA MD  Date:     01/24/18  Time:    12:37              Narrative:    History: Chest pain.    Procedure: Axial CT scans of the chest are performed using PE protocol.    Findings:    No filling defects in the pulmonary arteries bilaterally to suggest pulmonary embolism. There are flow artifacts in the superior vena cava.    There is no aneurysmal dilatation or dissection  involving the ascending aorta or the descending thoracic aorta. The origins of the great vessels of the aorta arch are widely patent without stenotic disease or dissections.    There is no axillary, cervical or or mediastinal or hilar adenopathy. There is coronary artery calcifications from atherosclerosis. Heart size is within normal limits the    The visualized lung parenchyma demonstrates no pulmonary consolidations or thorax or pleural effusion or pulmonary nodules or atelectasis. There is no effusions. The visualized trachea and the bronchi are unremarkable.    No gross areas of the bony thorax. There are no acute rib fractures. A healing left eighth rib fracture laterally is noted.                             X-Ray Chest PA And Lateral (Final result)  Result time 01/24/18 11:25:55    Final result by Debby Davis MD (01/24/18 11:25:55)                 Impression:     No evidence acute cardiac or pulmonary disease.      Electronically signed by: DEBBY DAVIS MD  Date:     01/24/18  Time:    11:25              Narrative:    Views: PA and lateral    There is no pneumothorax, pleural effusion or focal consolidation.  The cardiac silhouette is within normal limits. The trachea is midline.  The osseous structures are unremarkable.

## 2018-01-24 NOTE — HPI
HPI: This 62 y.o. male with a past medical history of CAD; MI, stent placement; Hypercholesteremia, presents to the ED complaining of an acute onset of intermittent non radiating sharp chest pain for last 2 weeks. Pain has progressively worsened in last couple of days. He rates his current chest pain as 7/10. No exacerbating or alleviating factors. Denies N/V/D, SOB, abdominal pain or any other associated sx. He denies any new leg or joint swelling. He is a current smoker. Occasional alcohol use. No prior medical intervention. No family hx of heart problems at young age.      EKG Sinus bradycardia - No acute STT changes

## 2018-01-24 NOTE — H&P
"[facility] Medicine  History & Physical    Patient Name: Carlos A Ruff  MRN: [id number]  Admission Date: 1/24/2018  Attending Physician: Lucie Gotti MD   Primary Care Provider: Khalif Hassan MD         Patient information was obtained from patient and ER records.     Subjective:     Principal Problem:Elevated troponin    Chief Complaint:   Chief Complaint   Patient presents with    Chest Pain     left sided chest pain that come and go (x2 weeks) but has been more constant x 2 days.  denies n/v/d or fever.  +stent placement x 7 years.        HPI: Patient is 61 yo male smoker with HTN, HLD, CAD s/p PCI who presents to ED with complaint of CP. Per patient has been with intermittent chest pain over the last 2 weeks mostly with exertion but occasionally at rest. He describes pain as "tight, stabbing" and reports lasts about a minute or so before resolving. He endorses associated diaphoresis and radiation to L arm and L neck. He reports pain worse this am thus causing presentation. Had cardiac stent placed about 2 years ago and no issues since then. He has been with a cold recently but otherwise denies fever/chills, SOB, abdominal pain, N/V, LE swelling and pain. On presentation patient with mildly elevated troponin at .05. Non ischemic EKG and CXR unremarkable. CTA obtained to r/o PE in ED which noted calcinosis to LAD. Placed in observation for ACS r/o.     Past Medical History:   Diagnosis Date    Coronary artery disease     Hypercholesteremia        Past Surgical History:   Procedure Laterality Date    blood clots      cardiac stents         Review of patient's allergies indicates:  No Known Allergies    No current facility-administered medications on file prior to encounter.      Current Outpatient Prescriptions on File Prior to Encounter   Medication Sig    ascorbic acid, vitamin C, (VITAMIN C) 100 MG tablet Take 100 mg by mouth once daily.    atorvastatin (LIPITOR) 40 MG " tablet Take 1 tablet (40 mg total) by mouth once daily.    clopidogrel (PLAVIX) 75 mg tablet Take 1 tablet (75 mg total) by mouth once daily.    ergocalciferol (VITAMIN D2) 50,000 unit Cap Take 50,000 Units by mouth every 7 days.    multivitamin (THERAGRAN) per tablet Take 1 tablet by mouth once daily.    [DISCONTINUED] meloxicam (MOBIC) 7.5 MG tablet Take 1 tablet (7.5 mg total) by mouth once daily.    [DISCONTINUED] pantoprazole (PROTONIX) 20 MG tablet Take 1 tablet (20 mg total) by mouth 2 (two) times daily before meals.     Family History     None        Social History Main Topics    Smoking status: Current Some Day Smoker    Smokeless tobacco: Current User    Alcohol use Yes      Comment: occasionally     Drug use: No    Sexual activity: Not on file     Review of Systems   Constitutional: Negative for chills and fever.   HENT: Negative for congestion and sore throat.    Respiratory: Negative for cough and shortness of breath.    Cardiovascular: Positive for chest pain. Negative for palpitations.        L neck and arm pain   Gastrointestinal: Negative for abdominal pain and nausea.   Endocrine: Negative for cold intolerance and heat intolerance.   Genitourinary: Negative for dysuria and frequency.   Musculoskeletal: Negative for arthralgias and myalgias.   Skin: Negative for color change and rash.   Neurological: Negative for dizziness and headaches.   Psychiatric/Behavioral: Negative for behavioral problems and confusion.     Objective:     Vital Signs (Most Recent):  Temp: 98.3 °F (36.8 °C) (01/24/18 1002)  Pulse: 74 (01/24/18 1429)  Resp: 16 (01/24/18 1429)  BP: (!) 155/80 (01/24/18 1429)  SpO2: 98 % (01/24/18 1429) Vital Signs (24h Range):  Temp:  [98.3 °F (36.8 °C)] 98.3 °F (36.8 °C)  Pulse:  [52-74] 74  Resp:  [14-19] 16  SpO2:  [97 %-100 %] 98 %  BP: (120-155)/(65-89) 155/80     Weight: 80.5 kg (177 lb 7.5 oz)  Body mass index is 24.75 kg/m².    Physical Exam   Constitutional: He is oriented to  person, place, and time. He appears well-developed and well-nourished. No distress.   HENT:   Head: Normocephalic and atraumatic.   Eyes: EOM are normal. Pupils are equal, round, and reactive to light.   Neck: Normal range of motion. Neck supple.   Cardiovascular: Normal rate and regular rhythm.    No murmur heard.  Pulmonary/Chest: Effort normal and breath sounds normal.   Abdominal: Soft. Bowel sounds are normal. He exhibits no distension.   Musculoskeletal: Normal range of motion.   Neurological: He is alert and oriented to person, place, and time.   Skin: Skin is warm and dry. No rash noted.   Psychiatric: He has a normal mood and affect. His behavior is normal.         CRANIAL NERVES     CN III, IV, VI   Pupils are equal, round, and reactive to light.  Extraocular motions are normal.        Significant Labs:   CBC:   Recent Labs  Lab 01/24/18  1057   WBC 6.86   HGB 14.1   HCT 42.2        CMP:   Recent Labs  Lab 01/24/18  1057   *   K 3.8   *   CO2 22*   GLU 55*   BUN 13   CREATININE 0.8   CALCIUM 9.0   PROT 7.1   ALBUMIN 4.1   BILITOT 0.6   ALKPHOS 59   AST 38   ALT 33   ANIONGAP 11   EGFRNONAA >60     Troponin:   Recent Labs  Lab 01/24/18  1057 01/24/18  1504   TROPONINI 0.050* 0.046*       Significant Imaging:   Imaging Results          CTA Chest Non-Coronary (PE Study) (Final result)  Result time 01/24/18 12:37:55    Final result by Rudy Santa MD (01/24/18 12:37:55)                 Impression:        1. No definite signs for pulmonary embolism.  2. Calcinosis of the coronary arteries especially involving the LAD.  3. Healing left eighth rib fracture.  4. No acute pulmonary processes.      Electronically signed by: RUDY SANTA MD  Date:     01/24/18  Time:    12:37              Narrative:    History: Chest pain.    Procedure: Axial CT scans of the chest are performed using PE protocol.    Findings:    No filling defects in the pulmonary arteries bilaterally to suggest pulmonary  embolism. There are flow artifacts in the superior vena cava.    There is no aneurysmal dilatation or dissection  involving the ascending aorta or the descending thoracic aorta. The origins of the great vessels of the aorta arch are widely patent without stenotic disease or dissections.    There is no axillary, cervical or or mediastinal or hilar adenopathy. There is coronary artery calcifications from atherosclerosis. Heart size is within normal limits the    The visualized lung parenchyma demonstrates no pulmonary consolidations or thorax or pleural effusion or pulmonary nodules or atelectasis. There is no effusions. The visualized trachea and the bronchi are unremarkable.    No gross areas of the bony thorax. There are no acute rib fractures. A healing left eighth rib fracture laterally is noted.                             X-Ray Chest PA And Lateral (Final result)  Result time 01/24/18 11:25:55    Final result by Debby Davis MD (01/24/18 11:25:55)                 Impression:     No evidence acute cardiac or pulmonary disease.      Electronically signed by: DEBBY DAVIS MD  Date:     01/24/18  Time:    11:25              Narrative:    Views: PA and lateral    There is no pneumothorax, pleural effusion or focal consolidation.  The cardiac silhouette is within normal limits. The trachea is midline.  The osseous structures are unremarkable.                                Assessment/Plan:     * Elevated troponin    History of CAD and recurrent CP recently. Elevated trop at .05. CTA was obtained to r/o PE and subsequently noted calcinosis of LAD.   -cardiac monitoring  -serial troponins  -ASA along with PRN NG  -2D echo  -TSH and lipid panel  -cardiology consult              Tobacco abuse    Patient counseled on health risks associated with tobacco use and urged toward cessation. Will provide nicotine patch while in hospital. Further counseling upon discharge.             Coronary artery disease involving  native coronary artery of native heart without angina pectoris    Currently ruling out for ACS as above.            VTE Risk Mitigation         Ordered     enoxaparin injection 40 mg  Daily     Route:  Subcutaneous        01/24/18 7239             Cherry Helm PA-C  Hospitalist-Department of Hospital Medicine  [address] [place] [address]  Office [phone] Pager [phone]

## 2018-01-24 NOTE — CONSULTS
[facility]  Cardiology  Consult Note    Patient Name: Carlos A Ruff  MRN: [id number]  Admission Date: 1/24/2018  Hospital Length of Stay: 0 days  Code Status: No Order   Attending Provider: Lucie Gotti MD   Consulting Provider: Alfred Cordero MD  Primary Care Physician: Khalif Hassan MD  Principal Problem:Elevated troponin    Patient information was obtained from patient and ER records.     Consults  Subjective:     Chief Complaint:  CP     HPI:   HPI: This 62 y.o. male with a past medical history of CAD; MI, stent placement; Hypercholesteremia, presents to the ED complaining of an acute onset of intermittent non radiating sharp chest pain for last 2 weeks. Pain has progressively worsened in last couple of days. He rates his current chest pain as 7/10. No exacerbating or alleviating factors. Denies N/V/D, SOB, abdominal pain or any other associated sx. He denies any new leg or joint swelling. He is a current smoker. Occasional alcohol use. No prior medical intervention. No family hx of heart problems at young age.      EKG Sinus bradycardia - No acute STT changes    Interval History:     Review of Systems   Constitution: Negative for decreased appetite.   HENT: Negative for ear discharge.    Eyes: Negative for blurred vision.   Endocrine: Negative for polyphagia.   Skin: Negative for nail changes.   Neurological: Negative for aphonia.   Psychiatric/Behavioral: Negative for hallucinations.     Objective:     Vital Signs (Most Recent):  Temp: 98.3 °F (36.8 °C) (01/24/18 1002)  Pulse: 74 (01/24/18 1429)  Resp: 16 (01/24/18 1429)  BP: (!) 155/80 (01/24/18 1429)  SpO2: 98 % (01/24/18 1429) Vital Signs (24h Range):  Temp:  [98.3 °F (36.8 °C)] 98.3 °F (36.8 °C)  Pulse:  [52-74] 74  Resp:  [14-19] 16  SpO2:  [97 %-100 %] 98 %  BP: (120-155)/(65-89) 155/80     Weight: 88.5 kg (195 lb)  Body mass index is 27.2 kg/m².     SpO2: 98 %  O2 Device (Oxygen Therapy): room air    No intake or output data in  the 24 hours ending 01/24/18 1529    Lines/Drains/Airways     Peripheral Intravenous Line                 Peripheral IV - Single Lumen 01/24/18 1050 Right Antecubital less than 1 day                Physical Exam   Constitutional: He is oriented to person, place, and time. He appears well-developed and well-nourished.   HENT:   Head: Normocephalic and atraumatic.   Eyes: Conjunctivae are normal. Pupils are equal, round, and reactive to light.   Neck: Normal range of motion. Neck supple.   Cardiovascular: Normal rate, normal heart sounds and intact distal pulses.    Pulmonary/Chest: Effort normal and breath sounds normal.   Abdominal: Soft. Bowel sounds are normal.   Musculoskeletal: Normal range of motion.   Neurological: He is alert and oriented to person, place, and time.   Skin: Skin is warm and dry.       Significant Labs: All pertinent lab results from the last 24 hours have been reviewed.    Significant Imaging: X-Ray: CXR: X-Ray Chest 1 View (CXR): No results found for this visit on 01/24/18.    Assessment and Plan:     Tobacco abuse             Coronary artery disease involving native coronary artery of native heart without angina pectoris    Ruling out for MI. Echo and stress test in AM            VTE Risk Mitigation     None          Thank you for your consult. I will follow-up with patient. Please contact us if you have any additional questions.    Alfred Cordero MD  Cardiology   [facility]

## 2018-01-24 NOTE — HPI
"Patient is 63 yo male smoker with HTN, HLD, CAD s/p PCI who presents to ED with complaint of CP. Per patient has been with intermittent chest pain over the last 2 weeks mostly with exertion but occasionally at rest. He describes pain as "tight, stabbing" and reports lasts about a minute or so before resolving. He endorses associated diaphoresis and radiation to L arm and L neck. He reports pain worse this am thus causing presentation. Had cardiac stent placed about 2 years ago and no issues since then. He has been with a cold recently but otherwise denies fever/chills, SOB, abdominal pain, N/V, LE swelling and pain. On presentation patient with mildly elevated troponin at .05. Non ischemic EKG and CXR unremarkable. CTA obtained to r/o PE in ED which noted calcinosis to LAD. Placed in observation for ACS r/o.   "

## 2018-01-24 NOTE — ED PROVIDER NOTES
Encounter Date: 1/24/2018    SCRIBE #1 NOTE: I, Yudi Castro, am scribing for, and in the presence of,  Jonathon Alvarado MD. I have scribed the following portions of the note - Other sections scribed: ROS and HPI.       History     Chief Complaint   Patient presents with    Chest Pain     left sided chest pain that come and go (x2 weeks) but has been more constant x 2 days.  denies n/v/d or fever.  +stent placement x 7 years.      CC: Chest Pain  HPI: This 62 y.o. male with a past medical history of CAD; MI, stent placement; Hypercholesteremia, presents to the ED complaining of an acute onset of intermittent non radiating sharp chest pain for last 2 weeks. Pain has progressively worsened in last couple of days. He rates his current chest pain as 7/10. No exacerbating or alleviating factors. Denies N/V/D, SOB, abdominal pain or any other associated sx. He denies any new leg or joint swelling. He is a current smoker. Occasional alcohol use. No prior medical intervention. No family hx of heart problems at young age.        The history is provided by the patient. No  was used.     Review of patient's allergies indicates:  No Known Allergies  Past Medical History:   Diagnosis Date    Coronary artery disease     Hypercholesteremia      Past Surgical History:   Procedure Laterality Date    blood clots      cardiac stents       History reviewed. No pertinent family history.  Social History   Substance Use Topics    Smoking status: Current Some Day Smoker    Smokeless tobacco: Current User    Alcohol use Yes      Comment: occasionally      Review of Systems   Constitutional: Negative for chills and fever.   HENT: Negative for rhinorrhea and sore throat.    Eyes: Negative for redness.   Respiratory: Negative for cough.    Cardiovascular: Positive for chest pain (left sided, non radiating).   Gastrointestinal: Negative for abdominal pain, diarrhea, nausea and vomiting.   Genitourinary: Negative for  dysuria.   Musculoskeletal: Negative for back pain.   Skin: Negative for color change.   Neurological: Negative for syncope and weakness.   Psychiatric/Behavioral: The patient is not nervous/anxious.        Physical Exam     Initial Vitals [01/24/18 1002]   BP Pulse Resp Temp SpO2   (!) 153/89 (!) 53 14 98.3 °F (36.8 °C) 97 %      MAP       110.33         Physical Exam    Nursing note and vitals reviewed.  Constitutional: He appears well-developed and well-nourished. He is not diaphoretic. No distress.   HENT:   Head: Normocephalic and atraumatic.   Eyes: Conjunctivae and EOM are normal. Pupils are equal, round, and reactive to light. No scleral icterus.   Neck: Normal range of motion. Neck supple.   Cardiovascular: Normal rate, regular rhythm, normal heart sounds and intact distal pulses. Exam reveals no gallop and no friction rub.    No murmur heard.  Pulmonary/Chest: Breath sounds normal. No stridor. No respiratory distress. He has no wheezes. He has no rhonchi. He has no rales.   Abdominal: Soft. Bowel sounds are normal. He exhibits no distension. There is no tenderness. There is no rebound and no guarding.   Musculoskeletal: Normal range of motion. He exhibits no edema or tenderness.   Neurological: He is alert and oriented to person, place, and time. He has normal strength. No cranial nerve deficit.   Skin: Skin is warm and dry. No rash noted.   Psychiatric: He has a normal mood and affect. His behavior is normal.         ED Course   Procedures  Labs Reviewed   CBC W/ AUTO DIFFERENTIAL - Abnormal; Notable for the following:        Result Value    RBC 4.44 (*)     MCH 31.8 (*)     All other components within normal limits   COMPREHENSIVE METABOLIC PANEL - Abnormal; Notable for the following:     Sodium 146 (*)     Chloride 113 (*)     CO2 22 (*)     Glucose 55 (*)     All other components within normal limits   TROPONIN I - Abnormal; Notable for the following:     Troponin I 0.050 (*)     All other components  within normal limits   TROPONIN I - Abnormal; Notable for the following:     Troponin I 0.046 (*)     All other components within normal limits   D DIMER, QUANTITATIVE - Abnormal; Notable for the following:     D-Dimer 0.73 (*)     All other components within normal limits   POCT GLUCOSE - Abnormal; Notable for the following:     POCT Glucose 151 (*)     All other components within normal limits   B-TYPE NATRIURETIC PEPTIDE   TSH             Medical Decision Making:   Initial Assessment:   62-year-old male with a history of MI and stent placement presenting with intermittent left-sided chest pain for the last 2 weeks, worsening the last couple days.  Mild shortness of breath, no nausea or vomiting or diaphoresis.  States it has similar qualities to his prior heart attack.  No palpitations dizziness.  Has a history of blood clot in his right leg.  No lower extremity swelling aside from chronic knee swelling and pain that is unchanged recently.  No hemoptysis.  Exam normal, vitals within normal limits besides a mild bradycardia in the 50s.  EKG shows sinus bradycardia with flattened T waves in 1 and aVL but without significant ischemic pattern.  Differential Diagnosis:   ACS, PE, pneumonia.  Low concern for dissection, unlikely pneumothorax  ED Management:  Given aspirin.  Drawing labs.  No prior EKGs to compare to.  Dimer elevated.  CTA chest without significant PE, though they do note significant calcinosis in LAD.  Initial troponin elevated.  We'll admit for further evaluation and ACS rule out.  History with elevated troponin somewhat concerning.  May benefit from stress test in the house.  Discussed with observation service.  Aspirin and Lovenox ordered.            Scribe Attestation:   Scribe #1: I performed the above scribed service and the documentation accurately describes the services I performed. I attest to the accuracy of the note.    Attending Attestation:           Physician Attestation for  Scribe:  Physician Attestation Statement for Scribe #1: I, Jonathon Alvarado MD, reviewed documentation, as scribed by Yudi Castro in my presence, and it is both accurate and complete.                    Clinical Impression:   The primary encounter diagnosis was Elevated troponin. Diagnoses of Chest pain, Coronary artery disease involving native heart with unstable angina pectoris, unspecified vessel or lesion type, Coronary artery disease involving native coronary artery of native heart without angina pectoris, and Tobacco abuse were also pertinent to this visit.    Disposition:   Disposition: Placed in Observation  Condition: Stable                        Jonathon Alvarado MD  01/24/18 1477       Jonathon Alvarado MD  02/24/18 6434

## 2018-01-24 NOTE — ASSESSMENT & PLAN NOTE
History of CAD and recurrent CP recently. Elevated trop at .05. CTA was obtained to r/o PE and subsequently noted calcinosis of LAD.   -cardiac monitoring  -serial troponins  -ASA along with PRN NG  -2D echo  -TSH and lipid panel  -cardiology consult

## 2018-01-24 NOTE — ASSESSMENT & PLAN NOTE
Patient counseled on health risks associated with tobacco use and urged toward cessation. Will provide nicotine patch while in hospital. Further counseling upon discharge.

## 2018-01-25 VITALS
DIASTOLIC BLOOD PRESSURE: 90 MMHG | OXYGEN SATURATION: 98 % | WEIGHT: 175.06 LBS | BODY MASS INDEX: 24.51 KG/M2 | SYSTOLIC BLOOD PRESSURE: 152 MMHG | RESPIRATION RATE: 20 BRPM | TEMPERATURE: 97 F | HEART RATE: 53 BPM | HEIGHT: 71 IN

## 2018-01-25 LAB
ALBUMIN SERPL BCP-MCNC: 3.5 G/DL
ALP SERPL-CCNC: 57 U/L
ALT SERPL W/O P-5'-P-CCNC: 31 U/L
ANION GAP SERPL CALC-SCNC: 5 MMOL/L
AST SERPL-CCNC: 33 U/L
BASOPHILS # BLD AUTO: 0.08 K/UL
BASOPHILS NFR BLD: 1.2 %
BILIRUB SERPL-MCNC: 0.7 MG/DL
BUN SERPL-MCNC: 13 MG/DL
CALCIUM SERPL-MCNC: 8.8 MG/DL
CHLORIDE SERPL-SCNC: 109 MMOL/L
CO2 SERPL-SCNC: 27 MMOL/L
CREAT SERPL-MCNC: 0.9 MG/DL
DIASTOLIC DYSFUNCTION: NO
DIFFERENTIAL METHOD: ABNORMAL
EOSINOPHIL # BLD AUTO: 0.2 K/UL
EOSINOPHIL NFR BLD: 3.7 %
ERYTHROCYTE [DISTWIDTH] IN BLOOD BY AUTOMATED COUNT: 12.8 %
EST. GFR  (AFRICAN AMERICAN): >60 ML/MIN/1.73 M^2
EST. GFR  (NON AFRICAN AMERICAN): >60 ML/MIN/1.73 M^2
GLUCOSE SERPL-MCNC: 93 MG/DL
HCT VFR BLD AUTO: 38.8 %
HGB BLD-MCNC: 13.2 G/DL
LYMPHOCYTES # BLD AUTO: 2.1 K/UL
LYMPHOCYTES NFR BLD: 31.3 %
MCH RBC QN AUTO: 32.2 PG
MCHC RBC AUTO-ENTMCNC: 34 G/DL
MCV RBC AUTO: 95 FL
MONOCYTES # BLD AUTO: 0.5 K/UL
MONOCYTES NFR BLD: 8.1 %
NEUTROPHILS # BLD AUTO: 3.6 K/UL
NEUTROPHILS NFR BLD: 55.4 %
PLATELET # BLD AUTO: 212 K/UL
PMV BLD AUTO: 11.2 FL
POTASSIUM SERPL-SCNC: 3.9 MMOL/L
PROT SERPL-MCNC: 6 G/DL
RBC # BLD AUTO: 4.1 M/UL
SODIUM SERPL-SCNC: 141 MMOL/L
TROPONIN I SERPL DL<=0.01 NG/ML-MCNC: 0.06 NG/ML
WBC # BLD AUTO: 6.54 K/UL

## 2018-01-25 PROCEDURE — 93016 CV STRESS TEST SUPVJ ONLY: CPT | Mod: ,,, | Performed by: INTERNAL MEDICINE

## 2018-01-25 PROCEDURE — 85025 COMPLETE CBC W/AUTO DIFF WBC: CPT

## 2018-01-25 PROCEDURE — 36415 COLL VENOUS BLD VENIPUNCTURE: CPT

## 2018-01-25 PROCEDURE — 93018 CV STRESS TEST I&R ONLY: CPT | Mod: ,,, | Performed by: INTERNAL MEDICINE

## 2018-01-25 PROCEDURE — 93017 CV STRESS TEST TRACING ONLY: CPT

## 2018-01-25 PROCEDURE — 84484 ASSAY OF TROPONIN QUANT: CPT

## 2018-01-25 PROCEDURE — G0378 HOSPITAL OBSERVATION PER HR: HCPCS

## 2018-01-25 PROCEDURE — 63600175 PHARM REV CODE 636 W HCPCS

## 2018-01-25 PROCEDURE — 99213 OFFICE O/P EST LOW 20 MIN: CPT | Mod: 25,,, | Performed by: INTERNAL MEDICINE

## 2018-01-25 PROCEDURE — 25000003 PHARM REV CODE 250: Performed by: EMERGENCY MEDICINE

## 2018-01-25 PROCEDURE — S4991 NICOTINE PATCH NONLEGEND: HCPCS | Performed by: PHYSICIAN ASSISTANT

## 2018-01-25 PROCEDURE — 25000003 PHARM REV CODE 250: Performed by: PHYSICIAN ASSISTANT

## 2018-01-25 PROCEDURE — 78452 HT MUSCLE IMAGE SPECT MULT: CPT | Mod: 26,,, | Performed by: INTERNAL MEDICINE

## 2018-01-25 PROCEDURE — 80053 COMPREHEN METABOLIC PANEL: CPT

## 2018-01-25 RX ORDER — REGADENOSON 0.08 MG/ML
INJECTION, SOLUTION INTRAVENOUS
Status: DISCONTINUED
Start: 2018-01-25 | End: 2018-01-25 | Stop reason: HOSPADM

## 2018-01-25 RX ADMIN — NICOTINE 1 PATCH: 21 PATCH, EXTENDED RELEASE TRANSDERMAL at 08:01

## 2018-01-25 RX ADMIN — ATORVASTATIN CALCIUM 40 MG: 40 TABLET, FILM COATED ORAL at 08:01

## 2018-01-25 RX ADMIN — CLOPIDOGREL BISULFATE 75 MG: 75 TABLET ORAL at 08:01

## 2018-01-25 NOTE — PROGRESS NOTES
Patient returned to unit from scheduled testing. Patient awake, alert, oriented. Patient without c/o discomfort. Tele monitoring in progress. No apparent distress noted.

## 2018-01-25 NOTE — PLAN OF CARE
"TN completed discharge needs assessment. TN provided and reviewed with patient "Blue My Health Packet" , "Help At Home" and "Discharge Planning Begins on Admission" handouts. TN discussed with patient the things the patient is responsible for to manage patient's  healthcare at home. Patient verbalized understanding & teachback implemented. Patient prefers morning doctor appointments.     01/25/18 0916   Discharge Assessment   Assessment Type Discharge Planning Assessment   Confirmed/corrected address and phone number on facesheet? Yes   Assessment information obtained from? Patient   Communicated expected length of stay with patient/caregiver no   Prior to hospitilization cognitive status: Alert/Oriented;No Deficits   Prior to hospitalization functional status: Independent   Current cognitive status: Alert/Oriented;No Deficits   Current Functional Status: Independent   Lives With child(jeniffer), adult   Able to Return to Prior Arrangements yes   Is patient able to care for self after discharge? Yes   Who are your caregiver(s) and their phone number(s)? (DtBonnie villarreal [phone])   Patient's perception of discharge disposition home or selfcare   Readmission Within The Last 30 Days no previous admission in last 30 days   Patient currently being followed by outpatient case management? No   Patient currently receives any other outside agency services? No   Equipment Currently Used at Home none   Do you have any problems affording any of your prescribed medications? No   Is the patient taking medications as prescribed? yes   Does the patient have transportation home? Yes   Transportation Available car;family or friend will provide   Does the patient receive services at the [facility]? No   Discharge Plan A Home with family   Discharge Plan B Home with family   Patient/Family In Agreement With Plan yes   Help at home from Carlos A jorgensen Jr.  [phone].    [address]  [address] [facility] [address]  [place] [address]  Phone: [phone] Fax: [phone]

## 2018-01-25 NOTE — HOSPITAL COURSE
1/25 Denies CP or SOB    Echo 1/24/18    1 - Normal left ventricular systolic function (EF 60-65%).     2 - Concentric hypertrophy.     3 - Trivial tricuspid regurgitation.     4 - Trivial to mild mitral regurgitation.

## 2018-01-25 NOTE — PROGRESS NOTES
[facility]  Cardiology  Progress Note    Patient Name: Carlos A Ruff  MRN: [id number]  Admission Date: 1/24/2018  Hospital Length of Stay: 0 days  Code Status: Full Code   Attending Physician: Lucie Gotti MD   Primary Care Physician: Khalif Hassan MD  Expected Discharge Date:   Principal Problem:Elevated troponin    Subjective:     Hospital Course:   1/25 Denies CP or SOB    Echo 1/24/18    1 - Normal left ventricular systolic function (EF 60-65%).     2 - Concentric hypertrophy.     3 - Trivial tricuspid regurgitation.     4 - Trivial to mild mitral regurgitation.     Interval History:     Review of Systems   Constitution: Negative for decreased appetite.   HENT: Negative for ear discharge.    Eyes: Negative for blurred vision.   Endocrine: Negative for polyphagia.   Skin: Negative for nail changes.   Neurological: Negative for aphonia.   Psychiatric/Behavioral: Negative for hallucinations.     Objective:     Vital Signs (Most Recent):  Temp: 97.4 °F (36.3 °C) (01/25/18 0728)  Pulse: (!) 53 (01/25/18 0728)  Resp: 20 (01/25/18 0728)  BP: (!) 152/90 (01/25/18 0728)  SpO2: 98 % (01/25/18 0728) Vital Signs (24h Range):  Temp:  [97.4 °F (36.3 °C)-98.9 °F (37.2 °C)] 97.4 °F (36.3 °C)  Pulse:  [52-74] 53  Resp:  [14-20] 20  SpO2:  [97 %-100 %] 98 %  BP: (120-155)/(65-90) 152/90     Weight: 79.4 kg (175 lb 0.7 oz)  Body mass index is 24.41 kg/m².     SpO2: 98 %  O2 Device (Oxygen Therapy): room air    No intake or output data in the 24 hours ending 01/25/18 1006    Lines/Drains/Airways     Peripheral Intravenous Line                 Peripheral IV - Single Lumen 01/24/18 1050 Right Antecubital less than 1 day                Physical Exam   Constitutional: He is oriented to person, place, and time. He appears well-developed and well-nourished.   HENT:   Head: Normocephalic and atraumatic.   Eyes: Conjunctivae are normal. Pupils are equal, round, and reactive to light.   Neck: Normal range of motion.  Neck supple.   Cardiovascular: Normal rate, normal heart sounds and intact distal pulses.    Pulmonary/Chest: Effort normal and breath sounds normal.   Abdominal: Soft. Bowel sounds are normal.   Musculoskeletal: Normal range of motion.   Neurological: He is alert and oriented to person, place, and time.   Skin: Skin is warm and dry.       Significant Labs: All pertinent lab results from the last 24 hours have been reviewed.    Significant Imaging: Echocardiogram:   2D echo with color flow doppler:   Results for orders placed or performed during the hospital encounter of 01/24/18   2D echo with color flow doppler   Result Value Ref Range    EF 60 55 - 65    Mitral Valve Regurgitation TRIVIAL TO MILD     Diastolic Dysfunction No     Est. PA Systolic Pressure 19.65     Tricuspid Valve Regurgitation TRIVIAL      Assessment and Plan:     Brief HPI:     Tobacco abuse             Coronary artery disease involving native coronary artery of native heart without angina pectoris    Ruling out for MI. Echo with good EF. Stress test today            VTE Risk Mitigation         Ordered     enoxaparin injection 40 mg  Daily     Route:  Subcutaneous        01/24/18 3813          Alfred Cordero MD  Cardiology  [facility]

## 2018-01-25 NOTE — PROGRESS NOTES
WRITTEN DISCHARGE INFORMATION:     Follow-up Information     hKalif Hassan MD On 1/31/2018.    Specialty:  Family Medicine  Why:  out patient services: 8:00 a.m. follow up from the hospital  Contact information:  [address] [place] [address]  [phone]             Alfred Cordero MD On 2/8/2018.    Specialty:  Cardiology  Why:  out patient services:  follow up from the hospital at 10:15 AM  Contact information:  [address]  [address]  [place] [address]  [phone]                 Things that YOU are responsible for to Manage Your Care At Home:  1. Getting your prescriptions filled.  2. Taking you medications as directed. DO NOT MISS ANY DOSES!  3. Going to your follow-up doctor appointments. This is important because it allows the doctor to monitor your progress and to determine if any changes need to be made to your treatment plan.                                                         Help at Home  After discharge for assistance [facility] 24/7 assistance  [phone]   Thank you for choosing [facility] for your care.  Please answer any calls you may receive from [facility] we want to continue to support you as you manage your healthcare needs.  Sincerely, Your [facility] Healthcare Manager is,  Deepika Coleman RN [facility] [phone]

## 2018-01-25 NOTE — SUBJECTIVE & OBJECTIVE
Interval History:     Review of Systems   Constitution: Negative for decreased appetite.   HENT: Negative for ear discharge.    Eyes: Negative for blurred vision.   Endocrine: Negative for polyphagia.   Skin: Negative for nail changes.   Neurological: Negative for aphonia.   Psychiatric/Behavioral: Negative for hallucinations.     Objective:     Vital Signs (Most Recent):  Temp: 97.4 °F (36.3 °C) (01/25/18 0728)  Pulse: (!) 53 (01/25/18 0728)  Resp: 20 (01/25/18 0728)  BP: (!) 152/90 (01/25/18 0728)  SpO2: 98 % (01/25/18 0728) Vital Signs (24h Range):  Temp:  [97.4 °F (36.3 °C)-98.9 °F (37.2 °C)] 97.4 °F (36.3 °C)  Pulse:  [52-74] 53  Resp:  [14-20] 20  SpO2:  [97 %-100 %] 98 %  BP: (120-155)/(65-90) 152/90     Weight: 79.4 kg (175 lb 0.7 oz)  Body mass index is 24.41 kg/m².     SpO2: 98 %  O2 Device (Oxygen Therapy): room air    No intake or output data in the 24 hours ending 01/25/18 1006    Lines/Drains/Airways     Peripheral Intravenous Line                 Peripheral IV - Single Lumen 01/24/18 1050 Right Antecubital less than 1 day                Physical Exam   Constitutional: He is oriented to person, place, and time. He appears well-developed and well-nourished.   HENT:   Head: Normocephalic and atraumatic.   Eyes: Conjunctivae are normal. Pupils are equal, round, and reactive to light.   Neck: Normal range of motion. Neck supple.   Cardiovascular: Normal rate, normal heart sounds and intact distal pulses.    Pulmonary/Chest: Effort normal and breath sounds normal.   Abdominal: Soft. Bowel sounds are normal.   Musculoskeletal: Normal range of motion.   Neurological: He is alert and oriented to person, place, and time.   Skin: Skin is warm and dry.       Significant Labs: All pertinent lab results from the last 24 hours have been reviewed.    Significant Imaging: Echocardiogram:   2D echo with color flow doppler:   Results for orders placed or performed during the hospital encounter of 01/24/18   2D echo with  color flow doppler   Result Value Ref Range    EF 60 55 - 65    Mitral Valve Regurgitation TRIVIAL TO MILD     Diastolic Dysfunction No     Est. PA Systolic Pressure 19.65     Tricuspid Valve Regurgitation TRIVIAL

## 2018-01-25 NOTE — PLAN OF CARE
Problem: Fall Risk (Adult)  Goal: Identify Related Risk Factors and Signs and Symptoms  Related risk factors and signs and symptoms are identified upon initiation of Human Response Clinical Practice Guideline (CPG)   Outcome: Ongoing (interventions implemented as appropriate)   01/24/18 2358   Fall Risk   Related Risk Factors (Fall Risk) environment unfamiliar   Signs and Symptoms (Fall Risk) presence of risk factors     Goal: Absence of Falls  Patient will demonstrate the desired outcomes by discharge/transition of care.   Outcome: Ongoing (interventions implemented as appropriate)   01/24/18 2358   Fall Risk (Adult)   Absence of Falls making progress toward outcome       Problem: Patient Care Overview  Goal: Plan of Care Review  Outcome: Ongoing (interventions implemented as appropriate)   01/24/18 2358   Coping/Psychosocial   Plan Of Care Reviewed With patient       Problem: Cardiac: ACS (Acute Coronary Syndrome) (Adult)  Goal: Signs and Symptoms of Listed Potential Problems Will be Absent, Minimized or Managed (Cardiac: ACS)  Signs and symptoms of listed potential problems will be absent, minimized or managed by discharge/transition of care (reference Cardiac: ACS (Acute Coronary Syndrome) (Adult) CPG).   Outcome: Ongoing (interventions implemented as appropriate)   01/24/18 2358   Cardiac: ACS (Acute Coronary Syndrome)   Problems Assessed (Acute Coronary Syndrome) all   Problems Present (Acute Coronary Syndrome) none

## 2018-01-25 NOTE — PLAN OF CARE
Problem: Fall Risk (Adult)  Goal: Absence of Falls  Patient will demonstrate the desired outcomes by discharge/transition of care.   Outcome: Ongoing (interventions implemented as appropriate)   01/24/18 1814   Fall Risk (Adult)   Absence of Falls making progress toward outcome       Problem: Patient Care Overview  Goal: Plan of Care Review  Outcome: Ongoing (interventions implemented as appropriate)   01/24/18 1814   Coping/Psychosocial   Plan Of Care Reviewed With patient;family       Problem: Cardiac: ACS (Acute Coronary Syndrome) (Adult)  Goal: Signs and Symptoms of Listed Potential Problems Will be Absent, Minimized or Managed (Cardiac: ACS)  Signs and symptoms of listed potential problems will be absent, minimized or managed by discharge/transition of care (reference Cardiac: ACS (Acute Coronary Syndrome) (Adult) CPG).  Outcome: Ongoing (interventions implemented as appropriate)   01/24/18 1814   Cardiac: ACS (Acute Coronary Syndrome)   Problems Assessed (Acute Coronary Syndrome) all   Problems Present (Acute Coronary Syndrome) none

## 2018-02-02 ENCOUNTER — OFFICE VISIT (OUTPATIENT)
Dept: FAMILY MEDICINE | Facility: CLINIC | Age: 63
End: 2018-02-02
Payer: COMMERCIAL

## 2018-02-02 VITALS
DIASTOLIC BLOOD PRESSURE: 84 MMHG | OXYGEN SATURATION: 98 % | SYSTOLIC BLOOD PRESSURE: 130 MMHG | TEMPERATURE: 98 F | BODY MASS INDEX: 25 KG/M2 | RESPIRATION RATE: 12 BRPM | HEIGHT: 71 IN | HEART RATE: 58 BPM | WEIGHT: 178.56 LBS

## 2018-02-02 DIAGNOSIS — R07.89 CHEST WALL PAIN: Primary | ICD-10-CM

## 2018-02-02 DIAGNOSIS — Z72.0 TOBACCO ABUSE: ICD-10-CM

## 2018-02-02 PROCEDURE — 3008F BODY MASS INDEX DOCD: CPT | Mod: S$GLB,,, | Performed by: FAMILY MEDICINE

## 2018-02-02 PROCEDURE — 99999 PR PBB SHADOW E&M-EST. PATIENT-LVL III: CPT | Mod: PBBFAC,,, | Performed by: FAMILY MEDICINE

## 2018-02-02 PROCEDURE — 99214 OFFICE O/P EST MOD 30 MIN: CPT | Mod: S$GLB,,, | Performed by: FAMILY MEDICINE

## 2018-02-02 RX ORDER — TIZANIDINE 4 MG/1
4 TABLET ORAL NIGHTLY
Qty: 30 TABLET | Refills: 0 | Status: SHIPPED | OUTPATIENT
Start: 2018-02-02 | End: 2018-02-12

## 2018-02-02 NOTE — PROGRESS NOTES
Routine Office Visit    Patient Name: Carlos A Ruff    : 1955  MRN: [id number]    Subjective:  Carlos A is a 62 y.o. male who presents today for:    1. Chest pain  Patient presenting today for left sided chest pain that he was admitted for last week.  He describes the pain as a pinching feeling.  It comes and goes with no known exacerbating or modifying factors.   He has a history of tobacco abuse and CAD.  He has had a stent placed in the past.  Patient wants to know if Nugenix would help boost his immune system to help keep things like this from happening.  He denies any shortness of breath or chest pressure.     Past Medical History  Past Medical History:   Diagnosis Date    Coronary artery disease     Hypercholesteremia        Past Surgical History  Past Surgical History:   Procedure Laterality Date    blood clots      cardiac stents         Family History  History reviewed. No pertinent family history.    Social History  Social History     Social History    Marital status: Single     Spouse name: N/A    Number of children: N/A    Years of education: N/A     Occupational History    Not on file.     Social History Main Topics    Smoking status: Current Some Day Smoker    Smokeless tobacco: Current User    Alcohol use Yes      Comment: occasionally     Drug use: No    Sexual activity: Not on file     Other Topics Concern    Not on file     Social History Narrative    No narrative on file       Current Medications  Current Outpatient Prescriptions on File Prior to Visit   Medication Sig Dispense Refill    ascorbic acid, vitamin C, (VITAMIN C) 100 MG tablet Take 100 mg by mouth once daily.      atorvastatin (LIPITOR) 40 MG tablet Take 1 tablet (40 mg total) by mouth once daily. 90 tablet 1    clopidogrel (PLAVIX) 75 mg tablet Take 1 tablet (75 mg total) by mouth once daily. 90 tablet 1    ergocalciferol (VITAMIN D2) 50,000 unit Cap Take 50,000 Units by mouth every 7 days.      multivitamin  "(THERAGRAN) per tablet Take 1 tablet by mouth once daily.       No current facility-administered medications on file prior to visit.        Allergies   Review of patient's allergies indicates:  No Known Allergies    Review of Systems (Pertinent positives)   Review of Systems   Constitutional: Negative.    HENT: Negative.    Eyes: Negative.    Respiratory: Negative.    Cardiovascular: Positive for chest pain.   Gastrointestinal: Negative.    Musculoskeletal: Positive for myalgias.   Skin: Negative.    Neurological: Negative.          /84 (BP Location: Left arm, Patient Position: Sitting, BP Method: Medium (Manual))   Pulse (!) 58   Temp 98.1 °F (36.7 °C) (Oral)   Resp 12   Ht 5' 11" (1.803 m)   Wt 81 kg (178 lb 9.2 oz)   SpO2 98%   BMI 24.91 kg/m²     GENERAL APPEARANCE: in no apparent distress and well developed and well nourished  HEENT: PERRL, EOMI, Sclera clear, anicteric, Oropharynx clear, no lesions, Neck supple with midline trachea  NECK: normal, supple, no adenopathy, thyroid normal in size  RESPIRATORY: appears well, vitals normal, no respiratory distress, acyanotic, normal RR, chest clear, no wheezing, crepitations, rhonchi, normal symmetric air entry  HEART: regular rate and rhythm, S1, S2 normal, no murmur, click, rub or gallop.    ABDOMEN: abdomen is soft without tenderness, no masses, no hernias, no organomegaly, no rebound, no guarding. Suprapubic tenderness absent. No CVA tenderness.  MS:  Pain on palpation of left pectoral muscle  SKIN: no rashes, no wounds, no other lesions  PSYCH: Alert, oriented x 3, thought content appropriate, speech normal, pleasant and cooperative, good eye contact, well groomed    Assessment/Plan:  Carlo sA Ruff is a 62 y.o. male who presents today for :    Carlos A was seen today for hospital followup.    Diagnoses and all orders for this visit:    Chest wall pain  -     tiZANidine (ZANAFLEX) 4 MG tablet; Take 1 tablet (4 mg total) by mouth every evening.    Tobacco " abuse      1.  zanaflex for chest wall muscle spasm  2.  Follow up as needed  3.  He was encouraged to quit smoking  4.  Call with any concerns  5.  He is to go to the ED for any chest pressure, diaphoresis, weakness, or numbness      Khalif Hassan MD

## 2018-02-06 NOTE — DISCHARGE SUMMARY
"[facility] Hospital Medicine  Discharge Summary      Patient Name: Carlos A Ruff  MRN: [id number]  Admission Date: 1/24/2018  Hospital Length of Stay: 0 days  Discharge Date and Time: 1/25/2018  3:37 PM  Attending Physician: No att. providers found   Discharging Provider: JUVE Crockett  Primary Care Provider: Khalif Hassan MD      HPI:   Patient is 63 yo male smoker with HTN, HLD, CAD s/p PCI who presents to ED with complaint of CP. Per patient has been with intermittent chest pain over the last 2 weeks mostly with exertion but occasionally at rest. He describes pain as "tight, stabbing" and reports lasts about a minute or so before resolving. He endorses associated diaphoresis and radiation to L arm and L neck. He reports pain worse this am thus causing presentation. Had cardiac stent placed about 2 years ago and no issues since then. He has been with a cold recently but otherwise denies fever/chills, SOB, abdominal pain, N/V, LE swelling and pain. On presentation patient with mildly elevated troponin at .05. Non ischemic EKG and CXR unremarkable. CTA obtained to r/o PE in ED which noted calcinosis to LAD. Placed in observation for ACS r/o.     * No surgery found *      Hospital Course:   Patient admitted with chest pain and ruled out for acute coronary syndrome. EKG is non ischemic. Serial troponin levels mildly elevated but flat with downward trend. BNP normal. CXR without acute cardiothoracic processes. Echocardiogram performed and did not show evidence of systolic/diastolic/or valvular dysfunction. NST performed and is without evidence of myocardial ischemia. Cardiology consulted and after evaluation has cleared the patient for discharge from a CV standpoint. Chest pain likely atypical in nature. Patient is stable and will discharge home at this time with instructions to follow up with PCP and cardiology in one week.        Consults:     No new Assessment & Plan notes have been " filed under this hospital service since the last note was generated.  Service: Hospital Medicine    Final Active Diagnoses:    Diagnosis Date Noted POA    PRINCIPAL PROBLEM:  Elevated troponin [R74.8] 01/24/2018 Yes    Coronary artery disease involving native coronary artery of native heart without angina pectoris [I25.10] 11/29/2017 Yes    Tobacco abuse [Z72.0] 11/29/2017 Yes      Problems Resolved During this Admission:    Diagnosis Date Noted Date Resolved POA       Discharged Condition: good    Disposition: Home or Self Care    Follow Up:  Follow-up Information     Khalif Hassan MD On 1/31/2018.    Specialty:  Family Medicine  Why:  out patient services: 8:00 a.m. follow up from the hospital  Contact information:  [address]  [place] [address]  [phone]             Alfred Cordero MD On 2/8/2018.    Specialty:  Cardiology  Why:  out patient services:  follow up from the hospital at 10:15 AM  Contact information:  [address]  [address]  [place] [address]  [phone]                 Patient Instructions:     Diet diabetic     Diet Cardiac     Activity as tolerated     Notify your health care provider if you experience any of the following:  persistent nausea and vomiting or diarrhea     Notify your health care provider if you experience any of the following:  difficulty breathing or increased cough     Notify your health care provider if you experience any of the following:  persistent dizziness, light-headedness, or visual disturbances     Notify your health care provider if you experience any of the following:  increased confusion or weakness     Notify your health care provider if you experience any of the following:  severe persistent headache         Significant Diagnostic Studies: Labs: All labs within the past 24 hours have been reviewed    Pending Diagnostic Studies:     Procedure Component Value Units Date/Time    NM Myocardial Perfusion Spect Multi Pharmacologic [[id number]] Resulted:   01/25/18 0918    Order Status:  Sent Lab Status:  In process Updated:  01/25/18 1221         Medications:  Reconciled Home Medications:   Discharge Medication List as of 1/25/2018  3:18 PM      CONTINUE these medications which have NOT CHANGED    Details   ascorbic acid, vitamin C, (VITAMIN C) 100 MG tablet Take 100 mg by mouth once daily., Historical Med      atorvastatin (LIPITOR) 40 MG tablet Take 1 tablet (40 mg total) by mouth once daily., Starting Mon 12/11/2017, Normal      clopidogrel (PLAVIX) 75 mg tablet Take 1 tablet (75 mg total) by mouth once daily., Starting Tue 12/12/2017, Normal      ergocalciferol (VITAMIN D2) 50,000 unit Cap Take 50,000 Units by mouth every 7 days., Historical Med      multivitamin (THERAGRAN) per tablet Take 1 tablet by mouth once daily., Historical Med         STOP taking these medications       meloxicam (MOBIC) 7.5 MG tablet Comments:   Reason for Stopping:         pantoprazole (PROTONIX) 20 MG tablet Comments:   Reason for Stopping:               Indwelling Lines/Drains at time of discharge:   Lines/Drains/Airways          No matching active lines, drains, or airways          Time spent on the discharge of patient: 30 minutes  Patient was seen and examined on the date of discharge and determined to be suitable for discharge.         JUVE Crockett  Department of Hospital Medicine  [facility]

## 2018-02-06 NOTE — HOSPITAL COURSE
Patient admitted with chest pain and ruled out for acute coronary syndrome. EKG is non ischemic. Serial troponin levels mildly elevated but flat with downward trend. BNP normal. CXR without acute cardiothoracic processes. Echocardiogram performed and did not show evidence of systolic/diastolic/or valvular dysfunction. NST performed and is without evidence of myocardial ischemia. Cardiology consulted and after evaluation has cleared the patient for discharge from a CV standpoint. Chest pain likely atypical in nature. Patient is stable and will discharge home at this time with instructions to follow up with PCP and cardiology in one week.

## 2018-02-12 ENCOUNTER — OFFICE VISIT (OUTPATIENT)
Dept: CARDIOLOGY | Facility: CLINIC | Age: 63
End: 2018-02-12
Payer: COMMERCIAL

## 2018-02-12 VITALS
BODY MASS INDEX: 25.28 KG/M2 | HEIGHT: 71 IN | HEART RATE: 62 BPM | DIASTOLIC BLOOD PRESSURE: 79 MMHG | SYSTOLIC BLOOD PRESSURE: 145 MMHG | WEIGHT: 180.56 LBS | OXYGEN SATURATION: 98 %

## 2018-02-12 DIAGNOSIS — I25.10 CORONARY ARTERY DISEASE INVOLVING NATIVE CORONARY ARTERY OF NATIVE HEART WITHOUT ANGINA PECTORIS: Primary | ICD-10-CM

## 2018-02-12 DIAGNOSIS — R79.89 ELEVATED TROPONIN: ICD-10-CM

## 2018-02-12 PROCEDURE — 99999 PR PBB SHADOW E&M-EST. PATIENT-LVL III: CPT | Mod: PBBFAC,,, | Performed by: INTERNAL MEDICINE

## 2018-02-12 PROCEDURE — 3008F BODY MASS INDEX DOCD: CPT | Mod: S$GLB,,, | Performed by: INTERNAL MEDICINE

## 2018-02-12 PROCEDURE — 99214 OFFICE O/P EST MOD 30 MIN: CPT | Mod: S$GLB,,, | Performed by: INTERNAL MEDICINE

## 2018-02-12 NOTE — PROGRESS NOTES
"Subjective:    Patient ID:  Carlos A Ruff is a 62 y.o. male who presents for follow-up of Chest Pain      HPI     Admitted 1/24/18  Patient is 63 yo male smoker with HTN, HLD, CAD s/p PCI who presents to ED with complaint of CP. Per patient has been with intermittent chest pain over the last 2 weeks mostly with exertion but occasionally at rest. He describes pain as "tight, stabbing" and reports lasts about a minute or so before resolving. He endorses associated diaphoresis and radiation to L arm and L neck. He reports pain worse this am thus causing presentation. Had cardiac stent placed about 2 years ago and no issues since then. He has been with a cold recently but otherwise denies fever/chills, SOB, abdominal pain, N/V, LE swelling and pain. On presentation patient with mildly elevated troponin at .05. Non ischemic EKG and CXR unremarkable. CTA obtained to r/o PE in ED which noted calcinosis to LAD. Placed in observation for ACS r/o.      * No surgery found *       Hospital Course:   Patient admitted with chest pain and ruled out for acute coronary syndrome. EKG is non ischemic. Serial troponin levels mildly elevated but flat with downward trend. BNP normal. CXR without acute cardiothoracic processes. Echocardiogram performed and did not show evidence of systolic/diastolic/or valvular dysfunction. NST performed and is without evidence of myocardial ischemia. Cardiology consulted and after evaluation has cleared the patient for discharge from a CV standpoint. Chest pain likely atypical in nature. Patient is stable and will discharge home at this time with instructions to follow up with PCP and cardiology in one week.      Echo 1/24/18    1 - Normal left ventricular systolic function (EF 60-65%).     2 - Concentric hypertrophy.     3 - Trivial tricuspid regurgitation.     4 - Trivial to mild mitral regurgitation.     Stress test 1/25/18  LVEF: 50 %  Impression: NORMAL MYOCARDIAL PERFUSION  1. The perfusion scan is " free of evidence for myocardial ischemia or injury.   2. There is a mild intensity fixed defect in the inferior wall of the left ventricle, secondary to diaphragm attenuation.   3. Resting wall motion is physiologic.   4. Resting LV function is normal.     Since discharge he has had some sharp left sided CP - feels like a pinch - brief - different from previous angina    Review of Systems   Constitution: Negative for decreased appetite.   HENT: Negative for ear discharge.    Eyes: Negative for blurred vision.   Endocrine: Negative for polyphagia.   Skin: Negative for nail changes.   Neurological: Negative for aphonia.   Psychiatric/Behavioral: Negative for hallucinations.        Objective:    Physical Exam   Constitutional: He is oriented to person, place, and time. He appears well-developed and well-nourished.   HENT:   Head: Normocephalic and atraumatic.   Eyes: Conjunctivae are normal. Pupils are equal, round, and reactive to light.   Neck: Normal range of motion. Neck supple.   Cardiovascular: Normal rate, normal heart sounds and intact distal pulses.    Pulmonary/Chest: Effort normal and breath sounds normal.   Abdominal: Soft. Bowel sounds are normal.   Musculoskeletal: Normal range of motion.   Neurological: He is alert and oriented to person, place, and time.   Skin: Skin is warm and dry.         Assessment:       1. Coronary artery disease involving native coronary artery of native heart without angina pectoris    2. Elevated troponin         Plan:       CP is atypical but known CAD  Not on a B-blocker due to bradycardia  OV 2 weeks - will discuss LHC if symptoms progress

## 2018-03-23 ENCOUNTER — HOSPITAL ENCOUNTER (EMERGENCY)
Facility: HOSPITAL | Age: 63
Discharge: HOME | End: 2018-03-23
Attending: EMERGENCY MEDICINE
Payer: COMMERCIAL

## 2018-03-23 ENCOUNTER — OFFICE VISIT (OUTPATIENT)
Dept: FAMILY MEDICINE | Facility: CLINIC | Age: 63
End: 2018-03-23
Payer: COMMERCIAL

## 2018-03-23 VITALS
WEIGHT: 180.56 LBS | SYSTOLIC BLOOD PRESSURE: 170 MMHG | BODY MASS INDEX: 25.28 KG/M2 | DIASTOLIC BLOOD PRESSURE: 102 MMHG | RESPIRATION RATE: 14 BRPM | OXYGEN SATURATION: 99 % | HEART RATE: 59 BPM | HEIGHT: 71 IN | TEMPERATURE: 98 F

## 2018-03-23 VITALS
OXYGEN SATURATION: 99 % | HEART RATE: 54 BPM | RESPIRATION RATE: 18 BRPM | SYSTOLIC BLOOD PRESSURE: 152 MMHG | BODY MASS INDEX: 25.9 KG/M2 | DIASTOLIC BLOOD PRESSURE: 98 MMHG | WEIGHT: 185 LBS | TEMPERATURE: 98 F | HEIGHT: 71 IN

## 2018-03-23 DIAGNOSIS — I16.1 HYPERTENSIVE EMERGENCY: Primary | ICD-10-CM

## 2018-03-23 DIAGNOSIS — R07.9 LEFT SIDED CHEST PAIN: ICD-10-CM

## 2018-03-23 DIAGNOSIS — R07.89 CHEST PAIN, NON-CARDIAC: Primary | ICD-10-CM

## 2018-03-23 LAB
ALBUMIN SERPL BCP-MCNC: 4 G/DL
ALP SERPL-CCNC: 64 U/L
ALT SERPL W/O P-5'-P-CCNC: 28 U/L
ANION GAP SERPL CALC-SCNC: 7 MMOL/L
APTT BLDCRRT: 26.2 SEC
AST SERPL-CCNC: 28 U/L
BASOPHILS # BLD AUTO: 0.09 K/UL
BASOPHILS NFR BLD: 1.4 %
BILIRUB SERPL-MCNC: 0.8 MG/DL
BNP SERPL-MCNC: 55 PG/ML
BUN SERPL-MCNC: 14 MG/DL
CALCIUM SERPL-MCNC: 9 MG/DL
CHLORIDE SERPL-SCNC: 111 MMOL/L
CO2 SERPL-SCNC: 23 MMOL/L
CREAT SERPL-MCNC: 0.8 MG/DL
DIFFERENTIAL METHOD: ABNORMAL
EOSINOPHIL # BLD AUTO: 0.2 K/UL
EOSINOPHIL NFR BLD: 2.3 %
ERYTHROCYTE [DISTWIDTH] IN BLOOD BY AUTOMATED COUNT: 12.9 %
EST. GFR  (AFRICAN AMERICAN): >60 ML/MIN/1.73 M^2
EST. GFR  (NON AFRICAN AMERICAN): >60 ML/MIN/1.73 M^2
GLUCOSE SERPL-MCNC: 95 MG/DL
HCT VFR BLD AUTO: 39.9 %
HGB BLD-MCNC: 13.5 G/DL
INR PPP: 1
LYMPHOCYTES # BLD AUTO: 1.7 K/UL
LYMPHOCYTES NFR BLD: 26.1 %
MAGNESIUM SERPL-MCNC: 1.7 MG/DL
MCH RBC QN AUTO: 32.5 PG
MCHC RBC AUTO-ENTMCNC: 33.8 G/DL
MCV RBC AUTO: 96 FL
MONOCYTES # BLD AUTO: 0.5 K/UL
MONOCYTES NFR BLD: 7.7 %
NEUTROPHILS # BLD AUTO: 4.1 K/UL
NEUTROPHILS NFR BLD: 62.2 %
PLATELET # BLD AUTO: 210 K/UL
PMV BLD AUTO: 11.1 FL
POTASSIUM SERPL-SCNC: 4 MMOL/L
PROT SERPL-MCNC: 6.7 G/DL
PROTHROMBIN TIME: 10.3 SEC
RBC # BLD AUTO: 4.16 M/UL
SODIUM SERPL-SCNC: 141 MMOL/L
TROPONIN I SERPL DL<=0.01 NG/ML-MCNC: 0.04 NG/ML
WBC # BLD AUTO: 6.52 K/UL

## 2018-03-23 PROCEDURE — 99999 PR PBB SHADOW E&M-EST. PATIENT-LVL III: CPT | Mod: PBBFAC,,, | Performed by: FAMILY MEDICINE

## 2018-03-23 PROCEDURE — 83735 ASSAY OF MAGNESIUM: CPT

## 2018-03-23 PROCEDURE — 93005 ELECTROCARDIOGRAM TRACING: CPT

## 2018-03-23 PROCEDURE — 85025 COMPLETE CBC W/AUTO DIFF WBC: CPT

## 2018-03-23 PROCEDURE — 85610 PROTHROMBIN TIME: CPT

## 2018-03-23 PROCEDURE — 99215 OFFICE O/P EST HI 40 MIN: CPT | Mod: S$GLB,,, | Performed by: FAMILY MEDICINE

## 2018-03-23 PROCEDURE — 3080F DIAST BP >= 90 MM HG: CPT | Mod: CPTII,S$GLB,, | Performed by: FAMILY MEDICINE

## 2018-03-23 PROCEDURE — 85730 THROMBOPLASTIN TIME PARTIAL: CPT

## 2018-03-23 PROCEDURE — 3077F SYST BP >= 140 MM HG: CPT | Mod: CPTII,S$GLB,, | Performed by: FAMILY MEDICINE

## 2018-03-23 PROCEDURE — 93010 ELECTROCARDIOGRAM REPORT: CPT | Mod: S$GLB,,, | Performed by: INTERNAL MEDICINE

## 2018-03-23 PROCEDURE — 84484 ASSAY OF TROPONIN QUANT: CPT

## 2018-03-23 PROCEDURE — 99284 EMERGENCY DEPT VISIT MOD MDM: CPT

## 2018-03-23 PROCEDURE — 83880 ASSAY OF NATRIURETIC PEPTIDE: CPT

## 2018-03-23 PROCEDURE — 93010 ELECTROCARDIOGRAM REPORT: CPT | Mod: ,,, | Performed by: INTERNAL MEDICINE

## 2018-03-23 PROCEDURE — 80053 COMPREHEN METABOLIC PANEL: CPT

## 2018-03-23 RX ORDER — LOSARTAN POTASSIUM AND HYDROCHLOROTHIAZIDE 12.5; 5 MG/1; MG/1
1 TABLET ORAL DAILY
Qty: 90 TABLET | Refills: 3 | Status: SHIPPED | OUTPATIENT
Start: 2018-03-23 | End: 2018-11-19

## 2018-03-23 RX ORDER — ASPIRIN 81 MG/1
81 TABLET ORAL DAILY
COMMUNITY
End: 2018-08-28

## 2018-03-23 RX ORDER — METHOCARBAMOL 500 MG/1
1000 TABLET, FILM COATED ORAL 3 TIMES DAILY
Qty: 30 TABLET | Refills: 0 | Status: SHIPPED | OUTPATIENT
Start: 2018-03-23 | End: 2018-03-28

## 2018-03-23 NOTE — PROGRESS NOTES
Routine Office Visit    Patient Name: Carlos A Ruff    : 1955  MRN: [id number]    Subjective:  Carlos A is a 62 y.o. male who presents today for:    1. Headaches and chest pain  Patient presenting today with recurring left sided chest pain and headaches.  States he told his cardiologist about this pain and due to him having a childhood history of chest wall spasm, was told this likely wasn't cardiac in nature.  He states that [facility] took him off of blood pressure medications, so he has been off for months.  He recently consumed spam meat and since then has noticed edema in bilateral lower extremities, headaches, and increased frequency of left sided chest pain.  There has been no shortness of breath, weakness, numbness or blurred vision.  He had a stress test done 2 months ago that didn't show any new abnormalities.  He does have a history of CAD with stents and hyperlipidemia.  He does smoke daily.  Patient states he was told by Dr. Cordero to take Aleve for the pain and he has, but no relief.      Past Medical History  Past Medical History:   Diagnosis Date    Coronary artery disease     Hypercholesteremia        Past Surgical History  Past Surgical History:   Procedure Laterality Date    blood clots      cardiac stents         Family History  History reviewed. No pertinent family history.    Social History  Social History     Social History    Marital status: Single     Spouse name: N/A    Number of children: N/A    Years of education: N/A     Occupational History    Not on file.     Social History Main Topics    Smoking status: Current Some Day Smoker    Smokeless tobacco: Current User    Alcohol use Yes      Comment: occasionally     Drug use: No    Sexual activity: Not on file     Other Topics Concern    Not on file     Social History Narrative    No narrative on file       Current Medications  Current Outpatient Prescriptions on File Prior to Visit   Medication Sig Dispense Refill     "ascorbic acid, vitamin C, (VITAMIN C) 100 MG tablet Take 100 mg by mouth once daily.      atorvastatin (LIPITOR) 40 MG tablet Take 1 tablet (40 mg total) by mouth once daily. 90 tablet 1    clopidogrel (PLAVIX) 75 mg tablet Take 1 tablet (75 mg total) by mouth once daily. 90 tablet 1    ergocalciferol (VITAMIN D2) 50,000 unit Cap Take 50,000 Units by mouth every 7 days.      multivitamin (THERAGRAN) per tablet Take 1 tablet by mouth once daily.       No current facility-administered medications on file prior to visit.        Allergies   Review of patient's allergies indicates:  No Known Allergies    Review of Systems (Pertinent positives)  Review of Systems   Constitutional: Negative.    HENT: Negative.    Eyes: Negative for blurred vision.   Respiratory: Negative for shortness of breath.    Cardiovascular: Positive for chest pain and leg swelling. Negative for palpitations.   Gastrointestinal: Negative.    Genitourinary: Negative.    Musculoskeletal: Negative.    Skin: Negative.    Neurological: Positive for headaches. Negative for tingling, tremors and sensory change.         BP (!) 170/102 (BP Location: Left arm, Patient Position: Sitting, BP Method: Medium (Manual))   Pulse (!) 59   Temp 98.2 °F (36.8 °C) (Oral)   Resp 14   Ht 5' 11" (1.803 m)   Wt 81.9 kg (180 lb 8.9 oz)   SpO2 99%   BMI 25.18 kg/m²     GENERAL APPEARANCE: in no apparent distress and well developed and well nourished  HEENT: PERRL, EOMI, Sclera clear, anicteric, Oropharynx clear, no lesions, Neck supple with midline trachea  NECK: normal, supple, no adenopathy, thyroid normal in size  RESPIRATORY: appears well, vitals normal, no respiratory distress, acyanotic, normal RR, chest clear, no wheezing, crepitations, rhonchi, normal symmetric air entry  HEART: regular rate and rhythm, S1, S2 normal, no murmur, click, rub or gallop.    ABDOMEN: abdomen is soft without tenderness, no masses, no hernias, no organomegaly, no rebound, no " guarding. Suprapubic tenderness absent. No CVA tenderness.  NEUROLOGIC: normal without focal findings, CN II-XII are intact.    Extremities: warm/well perfused.  No abnormal hair patterns.  Bilateral knee effusions.    SKIN: no rashes, no wounds, no other lesions  PSYCH: Alert, oriented x 3, thought content appropriate, speech normal, pleasant and cooperative, good eye contact, well groomed    Assessment/Plan:  Carlos A Ruff is a 62 y.o. male who presents today for :    Diagnoses and all orders for this visit:    Hypertensive emergency  -     losartan-hydrochlorothiazide 50-12.5 mg (HYZAAR) 50-12.5 mg per tablet; Take 1 tablet by mouth once daily.    Left sided chest pain  -     EKG 12-lead      1.  EKG was attempted in office, but machine malfunctioned and unable get  2.  Patients blood pressure elevated with increased frequency of left sided chest pain and now headaches, so called ED and sent patient for eval given history of CAD with stents  3.  He is to follow up with cardiology ASAP  4.  Will need to start back on ARB and add diuretic  5.  Spoke with Dr. Raygoza and notified him patient is on his way to the ED      Khalif Hassan MD

## 2018-03-23 NOTE — ED TRIAGE NOTES
Pt arrived via personal transportation with c/o cp that resides on the left side and does not radiate; pt denies sob, n/v, fever, chills, diarrhea at this time; pt states the cp comes and goes and that he has a history of MI and stent placement

## 2018-03-23 NOTE — ED PROVIDER NOTES
"Encounter Date: 3/23/2018    SCRIBE #1 NOTE: I, Daryl Zepeda, am scribing for, and in the presence of,  Quinten Raygoza MD. I have scribed the following portions of the note - Other sections scribed: HPI and ROS.       History     Chief Complaint   Patient presents with    Chest Pain     intermittent left side stabbing/shooting pain x 2 wks; states was on BP meds a yr ago but they took me off it     CC: Chest Pain     HPI: This 62 y.o M smoker with CAD, hypercholesteremia and MI presents to the ED c/o acute onset of intermittent and moderate (7/10) "sharp' non-radiating left pectoral chest pain x2 weeks. The pt states his episodes of chest pain occur every 1.5 hours, lasting 8-10 seconds. The pt also reports bilateral knee swelling which he noticed last week. Pt's knee swelling is worse with walking and standing, but resolves after elevation. The pt also reports a "light" frontal headache over the R eye which began after eating spam yesterday. The pt was seen in this ED 1/24/18 and admitted for elevated troponin. Additionally, the pt reports intermittent difficulty beginning his urine stream and pressure when urinating. The pt denies fever, chills, headaches, eye pain, otalgia, sore throat, cough, abdominal pain and N/V/D. No drug use. The pt's BP medications were discontinued by his PCP.     Cardiologist- Alfred Cordero MD    Sx: cardiac stents and blood clots.            Review of patient's allergies indicates:  No Known Allergies  Past Medical History:   Diagnosis Date    Coronary artery disease     Hypercholesteremia     MI (myocardial infarction)      Past Surgical History:   Procedure Laterality Date    blood clots      cardiac stents       History reviewed. No pertinent family history.  Social History   Substance Use Topics    Smoking status: Current Some Day Smoker     Types: Cigarettes    Smokeless tobacco: Current User    Alcohol use Yes      Comment: occasionally      Review of Systems "   Constitutional: Negative for chills and fever.   HENT: Negative for congestion, ear pain, rhinorrhea and sore throat.    Eyes: Negative for pain and visual disturbance.   Respiratory: Negative for cough and shortness of breath.    Cardiovascular: Positive for chest pain (left pectoral) and leg swelling (bilateral knees).   Gastrointestinal: Negative for abdominal pain, diarrhea, nausea and vomiting.   Genitourinary: Positive for difficulty urinating. Negative for dysuria.   Musculoskeletal: Negative for back pain and neck pain.   Skin: Negative for rash.   Neurological: Positive for headaches.       Physical Exam     Initial Vitals [03/23/18 1053]   BP Pulse Resp Temp SpO2   (!) 160/98 (!) 56 20 98.9 °F (37.2 °C) 98 %      MAP       118.67         Physical Exam  The patient was examined specifically for the following:   General:No significant distress, Good color, Warm and dry. Head and neck:Scalp atraumatic, Neck supple. Neurological:Appropriate conversation, Gross motor deficits. Eyes:Conjugate gaze, Clear corneas. ENT: No epistaxis. Cardiac: Regular rate and rhythm, Grossly normal heart tones. Pulmonary: Wheezing, Rales. Gastrointestinal: Abdominal tenderness, Abdominal distention. Musculoskeletal: Extremity deformity, Apparent pain with range of motion of the joints. Skin: Rash.   The findings on examination were normal.  The lungs are clear.  The heart tones are normal.  The neck is supple.  Mental status examination, cranial nerves, motor and sensory examination are normal.  The patient has no significant swelling or tenderness of his knees.  There is no pedal edema.  ED Course   Procedures  Labs Reviewed   COMPREHENSIVE METABOLIC PANEL - Abnormal; Notable for the following:        Result Value    Chloride 111 (*)     Anion Gap 7 (*)     All other components within normal limits   CBC W/ AUTO DIFFERENTIAL - Abnormal; Notable for the following:     RBC 4.16 (*)     Hemoglobin 13.5 (*)     Hematocrit 39.9 (*)      MCH 32.5 (*)     All other components within normal limits   TROPONIN I - Abnormal; Notable for the following:     Troponin I 0.036 (*)     All other components within normal limits   PROTIME-INR   APTT   MAGNESIUM   B-TYPE NATRIURETIC PEPTIDE     EKG Readings: (Independently Interpreted)   This patient is in a sinus bradycardia with a heart rate of 50.  The TX QRS and QT intervals are normal.  There are nonspecific ST segment and T-wave changes.  This EKG is stable from a previous study was done 25 January of this year.       X-Rays:   Independently Interpreted Readings:   Other Readings:  Chest x-ray fails to reveal pneumothorax pneumonia pleural effusion    Medical decision making: Given the above, this patient presents with left pectoral chest pain that is sharp and comes and goes in episodes lasting 810 seconds over the course the last 2 weeks.  The resolves spontaneously.  It is very brief.  I reviewed the case with Dr. Cordero he believes the patient be discharged outpatient evaluation and treatment in the office.  The EKG fails to reveal any significant changes the troponin is stable but slightly elevated.  Chest x-ray is normal.  I doubt pneumothorax pneumonia pleural effusion pulmonary embolus.  I will discharge this patient outpatient evaluation and treatment as directed by cardiology.               Scribe Attestation:   Scribe #1: I performed the above scribed service and the documentation accurately describes the services I performed. I attest to the accuracy of the note.    Attending Attestation:           Physician Attestation for Scribe:  Physician Attestation Statement for Scribe #1: I, Quinten Raygoza MD, reviewed documentation, as scribed by Daryl Zepeda in my presence, and it is both accurate and complete.                    Clinical Impression:   The encounter diagnosis was Chest pain, non-cardiac.                           Quinten Raygoza MD  03/23/18 [id number]

## 2018-03-23 NOTE — DISCHARGE INSTRUCTIONS
Please follow-up with your cardiologist next week.  Robaxin as directed.  Return immediately if you get worse or if new problems develop.  Rest.

## 2018-04-11 ENCOUNTER — OFFICE VISIT (OUTPATIENT)
Dept: CARDIOLOGY | Facility: CLINIC | Age: 63
End: 2018-04-11
Payer: COMMERCIAL

## 2018-04-11 VITALS
BODY MASS INDEX: 25.16 KG/M2 | OXYGEN SATURATION: 99 % | HEIGHT: 71 IN | RESPIRATION RATE: 15 BRPM | SYSTOLIC BLOOD PRESSURE: 129 MMHG | HEART RATE: 64 BPM | DIASTOLIC BLOOD PRESSURE: 85 MMHG | WEIGHT: 179.69 LBS

## 2018-04-11 DIAGNOSIS — R07.89 CHEST PAIN, NON-CARDIAC: ICD-10-CM

## 2018-04-11 DIAGNOSIS — I25.10 CORONARY ARTERY DISEASE INVOLVING NATIVE CORONARY ARTERY OF NATIVE HEART WITHOUT ANGINA PECTORIS: Primary | ICD-10-CM

## 2018-04-11 DIAGNOSIS — Z86.718 HISTORY OF DEEP VEIN THROMBOSIS (DVT) OF LOWER EXTREMITY: ICD-10-CM

## 2018-04-11 PROCEDURE — 99214 OFFICE O/P EST MOD 30 MIN: CPT | Mod: S$GLB,,, | Performed by: INTERNAL MEDICINE

## 2018-04-11 PROCEDURE — 3074F SYST BP LT 130 MM HG: CPT | Mod: CPTII,S$GLB,, | Performed by: INTERNAL MEDICINE

## 2018-04-11 PROCEDURE — 3079F DIAST BP 80-89 MM HG: CPT | Mod: CPTII,S$GLB,, | Performed by: INTERNAL MEDICINE

## 2018-04-11 PROCEDURE — 99999 PR PBB SHADOW E&M-EST. PATIENT-LVL III: CPT | Mod: PBBFAC,,, | Performed by: INTERNAL MEDICINE

## 2018-04-11 RX ORDER — METOPROLOL SUCCINATE 25 MG/1
25 TABLET, EXTENDED RELEASE ORAL DAILY
Qty: 30 TABLET | Refills: 11 | Status: SHIPPED | OUTPATIENT
Start: 2018-04-11 | End: 2018-08-28 | Stop reason: ALTCHOICE

## 2018-04-11 RX ORDER — ISOSORBIDE MONONITRATE 30 MG/1
30 TABLET, EXTENDED RELEASE ORAL DAILY
Qty: 30 TABLET | Refills: 11 | Status: SHIPPED | OUTPATIENT
Start: 2018-04-11 | End: 2019-06-10 | Stop reason: SDUPTHER

## 2018-04-11 NOTE — PROGRESS NOTES
"Subjective:    Patient ID:  Carlos A Ruff is a 62 y.o. male who presents for follow-up of Chest Pain      HPI     Admitted 1/24/18  Patient is 63 yo male smoker with HTN, HLD, CAD s/p PCI who presents to ED with complaint of CP. Per patient has been with intermittent chest pain over the last 2 weeks mostly with exertion but occasionally at rest. He describes pain as "tight, stabbing" and reports lasts about a minute or so before resolving. He endorses associated diaphoresis and radiation to L arm and L neck. He reports pain worse this am thus causing presentation. Had cardiac stent placed about 2 years ago and no issues since then. He has been with a cold recently but otherwise denies fever/chills, SOB, abdominal pain, N/V, LE swelling and pain. On presentation patient with mildly elevated troponin at .05. Non ischemic EKG and CXR unremarkable. CTA obtained to r/o PE in ED which noted calcinosis to LAD. Placed in observation for ACS r/o.      * No surgery found *       Hospital Course:   Patient admitted with chest pain and ruled out for acute coronary syndrome. EKG is non ischemic. Serial troponin levels mildly elevated but flat with downward trend. BNP normal. CXR without acute cardiothoracic processes. Echocardiogram performed and did not show evidence of systolic/diastolic/or valvular dysfunction. NST performed and is without evidence of myocardial ischemia. Cardiology consulted and after evaluation has cleared the patient for discharge from a CV standpoint. Chest pain likely atypical in nature. Patient is stable and will discharge home at this time with instructions to follow up with PCP and cardiology in one week.      Echo 1/24/18    1 - Normal left ventricular systolic function (EF 60-65%).     2 - Concentric hypertrophy.     3 - Trivial tricuspid regurgitation.     4 - Trivial to mild mitral regurgitation.      Stress test 1/25/18  LVEF: 50 %  Impression: NORMAL MYOCARDIAL PERFUSION  1. The perfusion scan is " "free of evidence for myocardial ischemia or injury.   2. There is a mild intensity fixed defect in the inferior wall of the left ventricle, secondary to diaphragm attenuation.   3. Resting wall motion is physiologic.   4. Resting LV function is normal.     Went to the ER 3/23/18     HPI: This 62 y.o M smoker with CAD, hypercholesteremia and MI presents to the ED c/o acute onset of intermittent and moderate (7/10) "sharp' non-radiating left pectoral chest pain x2 weeks. The pt states his episodes of chest pain occur every 1.5 hours, lasting 8-10 seconds. The pt also reports bilateral knee swelling which he noticed last week. Pt's knee swelling is worse with walking and standing, but resolves after elevation. The pt also reports a "light" frontal headache over the R eye which began after eating spam yesterday. The pt was seen in this ED 1/24/18 and admitted for elevated troponin. Additionally, the pt reports intermittent difficulty beginning his urine stream and pressure when urinating. The pt denies fever, chills, headaches, eye pain, otalgia, sore throat, cough, abdominal pain and N/V/D. No drug use. The pt's BP medications were discontinued by his PCP.     He continues to get brief sharp CP       Review of Systems   Constitution: Negative for decreased appetite.   HENT: Negative for ear discharge.    Eyes: Negative for blurred vision.   Endocrine: Negative for polyphagia.   Skin: Negative for nail changes.   Neurological: Negative for aphonia.   Psychiatric/Behavioral: Negative for hallucinations.        Objective:    Physical Exam   Constitutional: He is oriented to person, place, and time. He appears well-developed and well-nourished.   HENT:   Head: Normocephalic and atraumatic.   Eyes: Conjunctivae are normal. Pupils are equal, round, and reactive to light.   Neck: Normal range of motion. Neck supple.   Cardiovascular: Normal rate, normal heart sounds and intact distal pulses.    Pulmonary/Chest: Effort normal " and breath sounds normal.   Abdominal: Soft. Bowel sounds are normal.   Musculoskeletal: Normal range of motion.   Neurological: He is alert and oriented to person, place, and time.   Skin: Skin is warm and dry.         Assessment:       1. Coronary artery disease involving native coronary artery of native heart without angina pectoris    2. History of deep vein thrombosis (DVT) of lower extremity    3. Chest pain, non-cardiac         Plan:       I discussed a [facility] for recurrent CP - he is agreeable but wants to wait until school breaks for the summer  Will add toprol XL 25 qd and imdur 30 qd  OV 1 month to schedule [facility]

## 2018-05-14 ENCOUNTER — OFFICE VISIT (OUTPATIENT)
Dept: CARDIOLOGY | Facility: CLINIC | Age: 63
End: 2018-05-14
Payer: COMMERCIAL

## 2018-05-14 VITALS
HEART RATE: 68 BPM | DIASTOLIC BLOOD PRESSURE: 82 MMHG | HEIGHT: 71 IN | BODY MASS INDEX: 25.4 KG/M2 | WEIGHT: 181.44 LBS | OXYGEN SATURATION: 98 % | SYSTOLIC BLOOD PRESSURE: 139 MMHG

## 2018-05-14 DIAGNOSIS — R79.89 ELEVATED TROPONIN: ICD-10-CM

## 2018-05-14 DIAGNOSIS — I25.10 CORONARY ARTERY DISEASE INVOLVING NATIVE CORONARY ARTERY OF NATIVE HEART WITHOUT ANGINA PECTORIS: ICD-10-CM

## 2018-05-14 DIAGNOSIS — R07.89 CHEST PAIN, NON-CARDIAC: Primary | ICD-10-CM

## 2018-05-14 DIAGNOSIS — Z86.718 HISTORY OF DEEP VEIN THROMBOSIS (DVT) OF LOWER EXTREMITY: ICD-10-CM

## 2018-05-14 PROCEDURE — 3075F SYST BP GE 130 - 139MM HG: CPT | Mod: CPTII,S$GLB,, | Performed by: INTERNAL MEDICINE

## 2018-05-14 PROCEDURE — 99999 PR PBB SHADOW E&M-EST. PATIENT-LVL IV: CPT | Mod: PBBFAC,,, | Performed by: INTERNAL MEDICINE

## 2018-05-14 PROCEDURE — 3079F DIAST BP 80-89 MM HG: CPT | Mod: CPTII,S$GLB,, | Performed by: INTERNAL MEDICINE

## 2018-05-14 PROCEDURE — 3008F BODY MASS INDEX DOCD: CPT | Mod: CPTII,S$GLB,, | Performed by: INTERNAL MEDICINE

## 2018-05-14 PROCEDURE — 99214 OFFICE O/P EST MOD 30 MIN: CPT | Mod: S$GLB,,, | Performed by: INTERNAL MEDICINE

## 2018-05-14 RX ORDER — SODIUM CHLORIDE 9 MG/ML
INJECTION, SOLUTION INTRAVENOUS CONTINUOUS
Status: CANCELLED | OUTPATIENT
Start: 2018-05-14

## 2018-05-14 NOTE — H&P
"[facility] - Cardiology  History & Physical    Subjective:      Chief Complaint/Reason for Admission: [facility]    Carlos A Ruff is a 63 y.o. male.    Admitted 1/24/18  Patient is 63 yo male smoker with HTN, HLD, CAD s/p PCI who presents to ED with complaint of CP. Per patient has been with intermittent chest pain over the last 2 weeks mostly with exertion but occasionally at rest. He describes pain as "tight, stabbing" and reports lasts about a minute or so before resolving. He endorses associated diaphoresis and radiation to L arm and L neck. He reports pain worse this am thus causing presentation. Had cardiac stent placed about 2 years ago and no issues since then. He has been with a cold recently but otherwise denies fever/chills, SOB, abdominal pain, N/V, LE swelling and pain. On presentation patient with mildly elevated troponin at .05. Non ischemic EKG and CXR unremarkable. CTA obtained to r/o PE in ED which noted calcinosis to LAD. Placed in observation for ACS r/o.         Hospital Course:   Patient admitted with chest pain and ruled out for acute coronary syndrome. EKG is non ischemic. Serial troponin levels mildly elevated but flat with downward trend. BNP normal. CXR without acute cardiothoracic processes. Echocardiogram performed and did not show evidence of systolic/diastolic/or valvular dysfunction. NST performed and is without evidence of myocardial ischemia. Cardiology consulted and after evaluation has cleared the patient for discharge from a CV standpoint. Chest pain likely atypical in nature. Patient is stable and will discharge home at this time with instructions to follow up with PCP and cardiology in one week.      Echo 1/24/18    1 - Normal left ventricular systolic function (EF 60-65%).     2 - Concentric hypertrophy.     3 - Trivial tricuspid regurgitation.     4 - Trivial to mild mitral regurgitation.      Stress test 1/25/18  LVEF: 50 %  Impression: NORMAL MYOCARDIAL PERFUSION  1. The perfusion " "scan is free of evidence for myocardial ischemia or injury.   2. There is a mild intensity fixed defect in the inferior wall of the left ventricle, secondary to diaphragm attenuation.   3. Resting wall motion is physiologic.   4. Resting LV function is normal.      Went to the ER 3/23/18     HPI: This 62 y.o M smoker with CAD, hypercholesteremia and MI presents to the ED c/o acute onset of intermittent and moderate (7/10) "sharp' non-radiating left pectoral chest pain x2 weeks. The pt states his episodes of chest pain occur every 1.5 hours, lasting 8-10 seconds. The pt also reports bilateral knee swelling which he noticed last week. Pt's knee swelling is worse with walking and standing, but resolves after elevation. The pt also reports a "light" frontal headache over the R eye which began after eating spam yesterday. The pt was seen in this ED 1/24/18 and admitted for elevated troponin. Additionally, the pt reports intermittent difficulty beginning his urine stream and pressure when urinating. The pt denies fever, chills, headaches, eye pain, otalgia, sore throat, cough, abdominal pain and N/V/D. No drug use. The pt's BP medications were discontinued by his PCP.      He continues to get brief sharp CP    4/11/18  I discussed a [facility] for recurrent CP - he is agreeable but wants to wait until school breaks for the summer  Will add toprol XL 25 qd and imdur 30 qd    5/14/18  Still with CP - now agreeable to C      Past Medical History:   Diagnosis Date    Coronary artery disease     Hypercholesteremia     MI (myocardial infarction)      Past Surgical History:   Procedure Laterality Date    blood clots      cardiac stents       No family history on file.  Social History   Substance Use Topics    Smoking status: Current Some Day Smoker     Types: Cigarettes    Smokeless tobacco: Current User    Alcohol use Yes      Comment: occasionally          (Not in a hospital admission)  Review of patient's allergies " indicates:  No Known Allergies     Review of Systems   All other systems reviewed and are negative.      Objective:      Vital Signs (Most Recent)  Pulse: 68 (05/14/18 0926)  BP: 139/82 (05/14/18 0926)  SpO2: 98 % (05/14/18 0926)    Vital Signs Range (Last 24H):  [unfilled]    Physical Exam   Constitutional: He is oriented to person, place, and time. He appears well-developed and well-nourished.   HENT:   Head: Normocephalic and atraumatic.   Eyes: EOM are normal. Pupils are equal, round, and reactive to light.   Neck: Normal range of motion. Neck supple.   Cardiovascular: Normal rate and regular rhythm.    Pulmonary/Chest: Effort normal and breath sounds normal.   Abdominal: Soft. Bowel sounds are normal.   Musculoskeletal: Normal range of motion.   Neurological: He is alert and oriented to person, place, and time.   Skin: Skin is warm and dry.       Data Review:    CBC:   Lab Results   Component Value Date    WBC 6.52 03/23/2018    RBC 4.16 (L) 03/23/2018    HGB 13.5 (L) 03/23/2018    HCT 39.9 (L) 03/23/2018     03/23/2018     BMP:   Lab Results   Component Value Date    GLU 95 03/23/2018     03/23/2018    K 4.0 03/23/2018     (H) 03/23/2018    CO2 23 03/23/2018    BUN 14 03/23/2018    CREATININE 0.8 03/23/2018    CALCIUM 9.0 03/23/2018      ECG: NSR NSSTT changes.     Assessment:      There are no hospital problems to display for this patient.    CP - on ASA/plavix and 2 anti-anginal medications    Plan:      [facility]

## 2018-05-14 NOTE — PROGRESS NOTES
"Subjective:    Patient ID:  Carlos A Ruff is a 63 y.o. male who presents for follow-up of Coronary Artery Disease      HPI     Admitted 1/24/18  Patient is 61 yo male smoker with HTN, HLD, CAD s/p PCI who presents to ED with complaint of CP. Per patient has been with intermittent chest pain over the last 2 weeks mostly with exertion but occasionally at rest. He describes pain as "tight, stabbing" and reports lasts about a minute or so before resolving. He endorses associated diaphoresis and radiation to L arm and L neck. He reports pain worse this am thus causing presentation. Had cardiac stent placed about 2 years ago and no issues since then. He has been with a cold recently but otherwise denies fever/chills, SOB, abdominal pain, N/V, LE swelling and pain. On presentation patient with mildly elevated troponin at .05. Non ischemic EKG and CXR unremarkable. CTA obtained to r/o PE in ED which noted calcinosis to LAD. Placed in observation for ACS r/o.         Hospital Course:   Patient admitted with chest pain and ruled out for acute coronary syndrome. EKG is non ischemic. Serial troponin levels mildly elevated but flat with downward trend. BNP normal. CXR without acute cardiothoracic processes. Echocardiogram performed and did not show evidence of systolic/diastolic/or valvular dysfunction. NST performed and is without evidence of myocardial ischemia. Cardiology consulted and after evaluation has cleared the patient for discharge from a CV standpoint. Chest pain likely atypical in nature. Patient is stable and will discharge home at this time with instructions to follow up with PCP and cardiology in one week.      Echo 1/24/18    1 - Normal left ventricular systolic function (EF 60-65%).     2 - Concentric hypertrophy.     3 - Trivial tricuspid regurgitation.     4 - Trivial to mild mitral regurgitation.      Stress test 1/25/18  LVEF: 50 %  Impression: NORMAL MYOCARDIAL PERFUSION  1. The perfusion scan is free of " "evidence for myocardial ischemia or injury.   2. There is a mild intensity fixed defect in the inferior wall of the left ventricle, secondary to diaphragm attenuation.   3. Resting wall motion is physiologic.   4. Resting LV function is normal.      Went to the ER 3/23/18     HPI: This 62 y.o M smoker with CAD, hypercholesteremia and MI presents to the ED c/o acute onset of intermittent and moderate (7/10) "sharp' non-radiating left pectoral chest pain x2 weeks. The pt states his episodes of chest pain occur every 1.5 hours, lasting 8-10 seconds. The pt also reports bilateral knee swelling which he noticed last week. Pt's knee swelling is worse with walking and standing, but resolves after elevation. The pt also reports a "light" frontal headache over the R eye which began after eating spam yesterday. The pt was seen in this ED 1/24/18 and admitted for elevated troponin. Additionally, the pt reports intermittent difficulty beginning his urine stream and pressure when urinating. The pt denies fever, chills, headaches, eye pain, otalgia, sore throat, cough, abdominal pain and N/V/D. No drug use. The pt's BP medications were discontinued by his PCP.      He continues to get brief sharp CP    4/11/18  I discussed a [facility] for recurrent CP - he is agreeable but wants to wait until school breaks for the summer  Will add toprol XL 25 qd and imdur 30 qd    5/14/18  Still with CP - now agreeable to C    Review of Systems   Constitution: Negative for decreased appetite.   HENT: Negative for ear discharge.    Eyes: Negative for blurred vision.   Endocrine: Negative for polyphagia.   Skin: Negative for nail changes.   Neurological: Negative for aphonia.   Psychiatric/Behavioral: Negative for hallucinations.        Objective:    Physical Exam   Constitutional: He is oriented to person, place, and time. He appears well-developed and well-nourished.   HENT:   Head: Normocephalic and atraumatic.   Eyes: Conjunctivae are normal. " Pupils are equal, round, and reactive to light.   Neck: Normal range of motion. Neck supple.   Cardiovascular: Normal rate, normal heart sounds and intact distal pulses.    Pulmonary/Chest: Effort normal and breath sounds normal.   Abdominal: Soft. Bowel sounds are normal.   Musculoskeletal: Normal range of motion.   Neurological: He is alert and oriented to person, place, and time.   Skin: Skin is warm and dry.         Assessment:       1. Chest pain, non-cardiac    2. Coronary artery disease involving native coronary artery of native heart without angina pectoris    3. Elevated troponin    4. History of deep vein thrombosis (DVT) of lower extremity         Plan:       [facility] - risks/benefits explained and he agrees to proceed

## 2018-05-16 ENCOUNTER — HOSPITAL ENCOUNTER (OUTPATIENT)
Dept: PREADMISSION TESTING | Facility: HOSPITAL | Age: 63
Discharge: HOME | End: 2018-05-16
Attending: INTERNAL MEDICINE
Payer: COMMERCIAL

## 2018-05-16 VITALS
HEIGHT: 71 IN | OXYGEN SATURATION: 97 % | BODY MASS INDEX: 25.58 KG/M2 | WEIGHT: 182.75 LBS | DIASTOLIC BLOOD PRESSURE: 79 MMHG | HEART RATE: 55 BPM | SYSTOLIC BLOOD PRESSURE: 126 MMHG | TEMPERATURE: 98 F | RESPIRATION RATE: 16 BRPM

## 2018-05-16 DIAGNOSIS — I25.10 CORONARY ARTERY DISEASE INVOLVING NATIVE CORONARY ARTERY OF NATIVE HEART WITHOUT ANGINA PECTORIS: ICD-10-CM

## 2018-05-16 DIAGNOSIS — R79.89 ELEVATED TROPONIN: ICD-10-CM

## 2018-05-16 DIAGNOSIS — Z86.718 HISTORY OF DEEP VEIN THROMBOSIS (DVT) OF LOWER EXTREMITY: ICD-10-CM

## 2018-05-16 DIAGNOSIS — R07.89 CHEST PAIN, NON-CARDIAC: ICD-10-CM

## 2018-05-16 LAB
ANION GAP SERPL CALC-SCNC: 8 MMOL/L
APTT BLDCRRT: 23.6 SEC
BUN SERPL-MCNC: 14 MG/DL
CALCIUM SERPL-MCNC: 9.3 MG/DL
CHLORIDE SERPL-SCNC: 112 MMOL/L
CO2 SERPL-SCNC: 24 MMOL/L
CREAT SERPL-MCNC: 0.8 MG/DL
ERYTHROCYTE [DISTWIDTH] IN BLOOD BY AUTOMATED COUNT: 13 %
EST. GFR  (AFRICAN AMERICAN): >60 ML/MIN/1.73 M^2
EST. GFR  (NON AFRICAN AMERICAN): >60 ML/MIN/1.73 M^2
GLUCOSE SERPL-MCNC: 85 MG/DL
HCT VFR BLD AUTO: 39 %
HGB BLD-MCNC: 12.9 G/DL
INR PPP: 0.9
MCH RBC QN AUTO: 31.5 PG
MCHC RBC AUTO-ENTMCNC: 33.1 G/DL
MCV RBC AUTO: 95 FL
PLATELET # BLD AUTO: 241 K/UL
PMV BLD AUTO: 11 FL
POTASSIUM SERPL-SCNC: 3.8 MMOL/L
PROTHROMBIN TIME: 9.4 SEC
RBC # BLD AUTO: 4.09 M/UL
SODIUM SERPL-SCNC: 144 MMOL/L
WBC # BLD AUTO: 5.94 K/UL

## 2018-05-16 PROCEDURE — 85610 PROTHROMBIN TIME: CPT

## 2018-05-16 PROCEDURE — 85730 THROMBOPLASTIN TIME PARTIAL: CPT

## 2018-05-16 PROCEDURE — 36415 COLL VENOUS BLD VENIPUNCTURE: CPT

## 2018-05-16 PROCEDURE — 80048 BASIC METABOLIC PNL TOTAL CA: CPT

## 2018-05-16 PROCEDURE — 85027 COMPLETE CBC AUTOMATED: CPT

## 2018-05-16 NOTE — DISCHARGE INSTRUCTIONS
Your surgery is scheduled for _Thuraday May 17, 2018___________________.      Please report to SAME DAY SURGERY UNIT on the 2nd FLOOR at _6:00_ a.m.  Use front door entrance. The doors open at 0530 am.          INSTRUCTIONS IMPORTANT!!!  ¨ Do not eat or drink after 12 midnight-including water. OK to brush teeth, no   gum, candy or mints!    ¨ Take only these medicines with a small swallow of water-morning of surgery.  Take Imdur, Losartan, Losartan, Metoprolol, Aspirin, and Plavix Thursday with swallow of water.      _x___  Prep instructions:    SHOWER     _x___  Please shower using Hibiclens soap the night before AND  the morning of your surgery/procedure. Do not use Hibiclens on your face or genitals                . Rinse hibiclens off completely.  _x___  No shaving of procedural area at least 4-5 days before surgery due to increased risk of skin irritation and/or possible infection.  ____  Do not wear makeup, including mascara. WEARING EYE MAKEUP MAY LEAD TO SERIOUS EYE INJURY during surgery.  _x___  No powder, lotions or creams to your body.  _x___  You may wear only deodorant on the day of surgery.  _x___  Please remove all jewelry, including piercings and leave at home.  ____  No money or valuables needed. Please leave at home.  You may bring your  cell phone.  ____  Please bring any documents given by your doctor.  _x___  If going home the same day, arrange for a ride home. You will not be able to   drive if Anesthesia was used.  ____  Children under 18 years require a parent / guardian present the entire time   they are in surgery / recovery.  _x___  Wear loose fitting clothing. Allow for dressings, bandages.  ____  Stop Aspirin, Ibuprofen, Motrin and Aleve at least 3-5 days before  surgery, unless otherwise instructed by your doctor, or the nurse.              You MAY use Tylenol/acetaminophen until day of surgery.  ____  If you take diabetic medication, do not take am of surgery unless instructed by  Doctor.  _x___  Call MD for temperature above 101 degrees.        ____ Stop taking any Fish Oil supplement or any Vitamins that contain Vitamin  E at least 5 days prior to surgery.              I have read or had read and explained to me, and understand the above information.  Additional comments or instructions:Please call   [phone] if you have any questions regarding the instructions above.

## 2018-05-16 NOTE — PRE-PROCEDURE INSTRUCTIONS
Pre-operative instructions, medication directives and pain scales reviewed with patient.  Instructed to wash with Hibiclens prior to procedure.  All questions the patient had  were answered. Re-assurance about surgical procedure and day of surgery routine given as needed. The patient verbalized understanding of the pre-op instructions.

## 2018-05-17 ENCOUNTER — SURGERY (OUTPATIENT)
Age: 63
End: 2018-05-17

## 2018-05-17 ENCOUNTER — HOSPITAL ENCOUNTER (OUTPATIENT)
Facility: HOSPITAL | Age: 63
Discharge: HOME | End: 2018-05-17
Attending: INTERNAL MEDICINE | Admitting: INTERNAL MEDICINE
Payer: COMMERCIAL

## 2018-05-17 VITALS
BODY MASS INDEX: 25.58 KG/M2 | RESPIRATION RATE: 20 BRPM | TEMPERATURE: 99 F | HEIGHT: 71 IN | OXYGEN SATURATION: 100 % | SYSTOLIC BLOOD PRESSURE: 133 MMHG | DIASTOLIC BLOOD PRESSURE: 81 MMHG | WEIGHT: 182.75 LBS | HEART RATE: 53 BPM

## 2018-05-17 DIAGNOSIS — Z86.718 HISTORY OF DEEP VEIN THROMBOSIS (DVT) OF LOWER EXTREMITY: ICD-10-CM

## 2018-05-17 DIAGNOSIS — R79.89 ELEVATED TROPONIN: ICD-10-CM

## 2018-05-17 DIAGNOSIS — R22.42 MASS OF LEFT FOOT: Primary | ICD-10-CM

## 2018-05-17 DIAGNOSIS — R07.89 CHEST PAIN, NON-CARDIAC: ICD-10-CM

## 2018-05-17 DIAGNOSIS — Z72.0 TOBACCO ABUSE: ICD-10-CM

## 2018-05-17 DIAGNOSIS — I25.10 CORONARY ARTERY DISEASE INVOLVING NATIVE CORONARY ARTERY OF NATIVE HEART WITHOUT ANGINA PECTORIS: ICD-10-CM

## 2018-05-17 PROCEDURE — 63600175 PHARM REV CODE 636 W HCPCS

## 2018-05-17 PROCEDURE — 25000003 PHARM REV CODE 250

## 2018-05-17 PROCEDURE — 93458 L HRT ARTERY/VENTRICLE ANGIO: CPT | Mod: 26,,, | Performed by: INTERNAL MEDICINE

## 2018-05-17 PROCEDURE — 93005 ELECTROCARDIOGRAM TRACING: CPT

## 2018-05-17 PROCEDURE — 93458 L HRT ARTERY/VENTRICLE ANGIO: CPT

## 2018-05-17 PROCEDURE — 93010 ELECTROCARDIOGRAM REPORT: CPT | Mod: ,,, | Performed by: INTERNAL MEDICINE

## 2018-05-17 PROCEDURE — 99152 MOD SED SAME PHYS/QHP 5/>YRS: CPT

## 2018-05-17 PROCEDURE — 99152 MOD SED SAME PHYS/QHP 5/>YRS: CPT | Mod: ,,, | Performed by: INTERNAL MEDICINE

## 2018-05-17 RX ORDER — SODIUM CHLORIDE 9 MG/ML
INJECTION, SOLUTION INTRAVENOUS CONTINUOUS
Status: DISCONTINUED | OUTPATIENT
Start: 2018-05-17 | End: 2018-05-17 | Stop reason: HOSPADM

## 2018-05-17 RX ORDER — HYDROCODONE BITARTRATE AND ACETAMINOPHEN 5; 325 MG/1; MG/1
1 TABLET ORAL EVERY 4 HOURS PRN
Status: DISCONTINUED | OUTPATIENT
Start: 2018-05-17 | End: 2018-05-17 | Stop reason: HOSPADM

## 2018-05-17 RX ORDER — NITROGLYCERIN 0.4 MG/1
0.4 TABLET SUBLINGUAL EVERY 5 MIN PRN
COMMUNITY

## 2018-05-17 RX ORDER — NITROGLYCERIN 0.4 MG/1
0.4 TABLET SUBLINGUAL EVERY 5 MIN PRN
Status: DISCONTINUED | OUTPATIENT
Start: 2018-05-17 | End: 2018-05-17 | Stop reason: HOSPADM

## 2018-05-17 RX ORDER — SODIUM CHLORIDE 9 MG/ML
INJECTION, SOLUTION INTRAVENOUS CONTINUOUS
Status: ACTIVE | OUTPATIENT
Start: 2018-05-17 | End: 2018-05-17

## 2018-05-17 RX ORDER — ACETAMINOPHEN 325 MG/1
650 TABLET ORAL EVERY 4 HOURS PRN
Status: DISCONTINUED | OUTPATIENT
Start: 2018-05-17 | End: 2018-05-17 | Stop reason: HOSPADM

## 2018-05-17 RX ADMIN — SODIUM CHLORIDE: 9 INJECTION, SOLUTION INTRAVENOUS at 06:05

## 2018-05-17 NOTE — PROCEDURES
"Carlos A Ruff is a 63 y.o. male patient.    Temp: 97.8 °F (36.6 °C) (05/17/18 0637)  Pulse: (!) 57 (05/17/18 0637)  Resp: 20 (05/17/18 0637)  BP: 131/85 (05/17/18 0637)  Weight: 82.9 kg (182 lb 12 oz) (05/16/18 1310)  Height: 5' 11" (180.3 cm) (05/16/18 1310)       Procedures     [facility]   Dr Cordero  Pre-op Dx CAD  Post-op Dx same  Specimen none  EBL < 50 cc    5/17/18 [facility] - EDP 15, LAD stent widely patent, normal vessels otherwise    Medical Rx  Home today    Alfred Cordero  5/17/2018  "

## 2018-05-17 NOTE — HOSPITAL COURSE
5/17/18 [facility] - EDP 15, LAD stent widely patent, normal vessels otherwise    Medical Rx  Home today

## 2018-05-17 NOTE — PROCEDURES
Procedures     Pre-sedation Assessment:    1. ASA Score: ASA 2 - Patient with mild systemic disease with no functional limitations  2. Mallampati Class: II (hard and soft palate, upper portion of tonsils anduvula visible)  3. Patient or family history of any reaction to anesthesia or sedation: No  4. Plan for Sedation: Moderate  5. H&P within 30 days of the procedure and updated within 24 hrs of admission or registration: Yes

## 2018-05-17 NOTE — DISCHARGE SUMMARY
"  [facility]  Cardiology  Discharge Summary      Patient Name: Carlos A Ruff  MRN: [id number]  Admission Date: 5/17/2018  Hospital Length of Stay: 0 days  Discharge Date and Time:  05/17/2018 7:59 AM  Attending Physician: Alfred Cordero MD    Discharging Provider: Alfred Cordero MD  Primary Care Physician: Khalif Hassan MD    HPI:   Admitted 1/24/18  Patient is 61 yo male smoker with HTN, HLD, CAD s/p PCI who presents to ED with complaint of CP. Per patient has been with intermittent chest pain over the last 2 weeks mostly with exertion but occasionally at rest. He describes pain as "tight, stabbing" and reports lasts about a minute or so before resolving. He endorses associated diaphoresis and radiation to L arm and L neck. He reports pain worse this am thus causing presentation. Had cardiac stent placed about 2 years ago and no issues since then. He has been with a cold recently but otherwise denies fever/chills, SOB, abdominal pain, N/V, LE swelling and pain. On presentation patient with mildly elevated troponin at .05. Non ischemic EKG and CXR unremarkable. CTA obtained to r/o PE in ED which noted calcinosis to LAD. Placed in observation for ACS r/o.         Hospital Course:   Patient admitted with chest pain and ruled out for acute coronary syndrome. EKG is non ischemic. Serial troponin levels mildly elevated but flat with downward trend. BNP normal. CXR without acute cardiothoracic processes. Echocardiogram performed and did not show evidence of systolic/diastolic/or valvular dysfunction. NST performed and is without evidence of myocardial ischemia. Cardiology consulted and after evaluation has cleared the patient for discharge from a CV standpoint. Chest pain likely atypical in nature. Patient is stable and will discharge home at this time with instructions to follow up with PCP and cardiology in one week.      Echo 1/24/18    1 - Normal left ventricular systolic function (EF 60-65%). " "    2 - Concentric hypertrophy.     3 - Trivial tricuspid regurgitation.     4 - Trivial to mild mitral regurgitation.      Stress test 1/25/18  LVEF: 50 %  Impression: NORMAL MYOCARDIAL PERFUSION  1. The perfusion scan is free of evidence for myocardial ischemia or injury.   2. There is a mild intensity fixed defect in the inferior wall of the left ventricle, secondary to diaphragm attenuation.   3. Resting wall motion is physiologic.   4. Resting LV function is normal.      Went to the ER 3/23/18     HPI: This 62 y.o M smoker with CAD, hypercholesteremia and MI presents to the ED c/o acute onset of intermittent and moderate (7/10) "sharp' non-radiating left pectoral chest pain x2 weeks. The pt states his episodes of chest pain occur every 1.5 hours, lasting 8-10 seconds. The pt also reports bilateral knee swelling which he noticed last week. Pt's knee swelling is worse with walking and standing, but resolves after elevation. The pt also reports a "light" frontal headache over the R eye which began after eating spam yesterday. The pt was seen in this ED 1/24/18 and admitted for elevated troponin. Additionally, the pt reports intermittent difficulty beginning his urine stream and pressure when urinating. The pt denies fever, chills, headaches, eye pain, otalgia, sore throat, cough, abdominal pain and N/V/D. No drug use. The pt's BP medications were discontinued by his PCP.      He continues to get brief sharp CP     4/11/18  I discussed a [facility] for recurrent CP - he is agreeable but wants to wait until school breaks for the summer  Will add toprol XL 25 qd and imdur 30 qd     5/14/18  Still with CP - now agreeable to [facility]    Procedure(s) (LRB):  Left heart cath (Left)     Indwelling Lines/Drains at time of discharge:  Lines/Drains/Airways          No matching active lines, drains, or airways          Hospital Course:  5/17/18 [facility] - EDP 15, LAD stent widely patent, normal vessels otherwise    Medical Rx  Home " today    Consults:     Significant Diagnostic Studies: Labs:   BMP:   Recent Labs  Lab 05/16/18  1129   GLU 85      K 3.8   *   CO2 24   BUN 14   CREATININE 0.8   CALCIUM 9.3    and CMP   Recent Labs  Lab 05/16/18  1129      K 3.8   *   CO2 24   GLU 85   BUN 14   CREATININE 0.8   CALCIUM 9.3   ANIONGAP 8   ESTGFRAFRICA >60   EGFRNONAA >60       Pending Diagnostic Studies:     None          Final Active Diagnoses:    Diagnosis Date Noted POA    PRINCIPAL PROBLEM:  Chest pain, non-cardiac [R07.89]  Yes    Coronary artery disease involving native coronary artery of native heart without angina pectoris [I25.10] 11/29/2017 Yes    Tobacco abuse [Z72.0] 11/29/2017 Yes      Problems Resolved During this Admission:    Diagnosis Date Noted Date Resolved POA     Tobacco abuse    counseled        Coronary artery disease involving native coronary artery of native heart without angina pectoris    LAD stent patent - vessels otherwise normal            Discharged Condition: good    Disposition: Home    Follow Up: Dr Cordero 1 week    Patient Instructions:     Diet general     Call MD for:  temperature >100.4     Call MD for:  severe uncontrolled pain     Call MD for:  difficulty breathing, headache or visual disturbances     Call MD for:  redness, tenderness, or signs of infection (pain, swelling, redness, odor or green/yellow discharge around incision site)     Call MD for:  hives       Medications:  Reconciled Home Medications:      Medication List      CONTINUE taking these medications    aspirin 81 MG EC tablet  Commonly known as:  ECOTRIN  Take 81 mg by mouth once daily.     atorvastatin 40 MG tablet  Commonly known as:  LIPITOR  Take 1 tablet (40 mg total) by mouth once daily.     clopidogrel 75 mg tablet  Commonly known as:  PLAVIX  Take 1 tablet (75 mg total) by mouth once daily.     isosorbide mononitrate 30 MG 24 hr tablet  Commonly known as:  IMDUR  Take 1 tablet (30 mg total) by mouth once  daily.     losartan-hydrochlorothiazide 50-12.5 mg 50-12.5 mg per tablet  Commonly known as:  HYZAAR  Take 1 tablet by mouth once daily.     metoprolol succinate 25 MG 24 hr tablet  Commonly known as:  TOPROL-XL  Take 1 tablet (25 mg total) by mouth once daily.     multivitamin per tablet  Commonly known as:  THERAGRAN  Take 1 tablet by mouth once daily.     nitroGLYCERIN 0.4 MG SL tablet  Commonly known as:  NITROSTAT  Place 0.4 mg under the tongue every 5 (five) minutes as needed for Chest pain.     VITAMIN C 100 MG tablet  Generic drug:  ascorbic acid (vitamin C)  Take 100 mg by mouth once daily.     VITAMIN D2 50,000 unit Cap  Generic drug:  ergocalciferol  Take 50,000 Units by mouth every 7 days.            Time spent on the discharge of patient: 20 minutes    Alfred Cordero MD  Cardiology  [facility]

## 2018-05-17 NOTE — DISCHARGE INSTRUCTIONS
DIET: You may resume your home diet. If nausea is present, increase your diet gradually with fluids and bland foods    ACTIVITY LEVEL: You have received sedation or an anesthetic, you may feel sleepy for several hours. Rest until you are more awake. Gradually resume your normal activities    Medications: Pain medication should be taken only if needed and as directed. If antibiotics are prescribed, the medication should be taken until completed. You will be given an updated list of you medications.    No driving, alcoholic beverages or signing legal documents for next 24 hours or while taking pain medication.       CALL THE DOCTOR:    For any obvious bleeding (some dried blood over the incision is normal).      Redness, swelling, foul smell around incision or fever over 101.   Shortness of breath, Coughing up Bloody sputum, Pains or Swelling in your Calves .   Persistent pain or nausea not relieved by medication.    If any unusual problems or difficulties occur contact your doctor. If you cannot contact your doctor but feel your signs and symptoms warrant a physicians attention return to the emergency room.        Post angiogram     -Re apply a clean, dry band aid and every day for five days or until a scab has formed at the site.  Change the band aid as needed  -Keep the site dry and clean.  -You may shower 24 hours after the procedure, but do not bathe or use a pool until the wound had completely closed.  -Gently clean your puncture site with soap and warm water.  -After showering , gently pat-dry the site with a clean towel; then let the site air dry before covering with a band aid  -Limit tight fitting clothes or underwear that my irritate the puncture site until the site has healed.    Daily Activities    Do not drive, drink alcohol, or sign legal documents for 24 hours, or if taking narcotic pain medication.    Day of discharge  -No driving  Modify activity for 3-5 days  -No heavy lifting of anything over  5 pounds  (equivalent to a 1/2 gallon of milk)  -No pushing or pulling.  -No vigorous activity or straining  -Avoid stairs unless necessary : if necessary, take them slowly.  -Coughing, sneezing, or straining for a bowel movement:  Support your groin by pressing with your palm on top of the dressing/bandage.  -Sexual activity : check with your doctor  -No strenuous exercise.  -Avoid driving unless necessary.  Talk to your doctor about returning back to work, which depends on your type of work., your procedure and any medication you might be taking.    Fall Prevention  Millions of people fall every year and injure themselves. You may have had anesthesia or sedation which may increase your risk of falling. You may have health issues that put you at an increased risk of falling.     Here are ways to reduce your risk of falling.  ·   · Make your home safe by keeping walkways clear of objects you may trip over.  · Use non-slip pads under rugs. Do not use area rugs or small throw rugs.  · Use non-slip mats in bathtubs and showers.  · Install handrails and lights on staircases.  · Do not walk in poorly lit areas.  · Do not stand on chairs or wobbly ladders.  · Use caution when reaching overhead or looking upward. This position can cause a loss of balance.  · Be sure your shoes fit properly, have non-slip bottoms and are in good condition.   · Wear shoes both inside and out. Avoid going barefoot or wearing slippers.  · Be cautious when going up and down stairs, curbs, and when walking on uneven sidewalks.  · If your balance is poor, consider using a cane or walker.  · If your fall was related to alcohol use, stop or limit alcohol intake.   · If your fall was related to use of sleeping medicines, talk to your doctor about this. You may need to reduce your dosage at bedtime if you awaken during the night to go to the bathroom.    · To reduce the need for nighttime bathroom trips:  ¨ Avoid drinking fluids for several hours  before going to bed  ¨ Empty your bladder before going to bed  ¨ Men can keep a urinal at the bedside  · Stay as active as you can. Balance, flexibility, strength, and endurance all come from exercise. They all play a role in preventing falls. Ask your healthcare provider which types of activity are right for you.  · Get your vision checked on a regular basis.  · If you have pets, know where they are before you stand up or walk so you don't trip over them.  · Use night lights.

## 2018-05-17 NOTE — H&P
"[facility] - Cardiology  History & Physical    Subjective:      Chief Complaint/Reason for Admission: [facility]    Carlos A Ruff is a 63 y.o. male.    Admitted 1/24/18  Patient is 61 yo male smoker with HTN, HLD, CAD s/p PCI who presents to ED with complaint of CP. Per patient has been with intermittent chest pain over the last 2 weeks mostly with exertion but occasionally at rest. He describes pain as "tight, stabbing" and reports lasts about a minute or so before resolving. He endorses associated diaphoresis and radiation to L arm and L neck. He reports pain worse this am thus causing presentation. Had cardiac stent placed about 2 years ago and no issues since then. He has been with a cold recently but otherwise denies fever/chills, SOB, abdominal pain, N/V, LE swelling and pain. On presentation patient with mildly elevated troponin at .05. Non ischemic EKG and CXR unremarkable. CTA obtained to r/o PE in ED which noted calcinosis to LAD. Placed in observation for ACS r/o.         Hospital Course:   Patient admitted with chest pain and ruled out for acute coronary syndrome. EKG is non ischemic. Serial troponin levels mildly elevated but flat with downward trend. BNP normal. CXR without acute cardiothoracic processes. Echocardiogram performed and did not show evidence of systolic/diastolic/or valvular dysfunction. NST performed and is without evidence of myocardial ischemia. Cardiology consulted and after evaluation has cleared the patient for discharge from a CV standpoint. Chest pain likely atypical in nature. Patient is stable and will discharge home at this time with instructions to follow up with PCP and cardiology in one week.      Echo 1/24/18    1 - Normal left ventricular systolic function (EF 60-65%).     2 - Concentric hypertrophy.     3 - Trivial tricuspid regurgitation.     4 - Trivial to mild mitral regurgitation.      Stress test 1/25/18  LVEF: 50 %  Impression: NORMAL MYOCARDIAL PERFUSION  1. The perfusion " "scan is free of evidence for myocardial ischemia or injury.   2. There is a mild intensity fixed defect in the inferior wall of the left ventricle, secondary to diaphragm attenuation.   3. Resting wall motion is physiologic.   4. Resting LV function is normal.      Went to the ER 3/23/18     HPI: This 62 y.o M smoker with CAD, hypercholesteremia and MI presents to the ED c/o acute onset of intermittent and moderate (7/10) "sharp' non-radiating left pectoral chest pain x2 weeks. The pt states his episodes of chest pain occur every 1.5 hours, lasting 8-10 seconds. The pt also reports bilateral knee swelling which he noticed last week. Pt's knee swelling is worse with walking and standing, but resolves after elevation. The pt also reports a "light" frontal headache over the R eye which began after eating spam yesterday. The pt was seen in this ED 1/24/18 and admitted for elevated troponin. Additionally, the pt reports intermittent difficulty beginning his urine stream and pressure when urinating. The pt denies fever, chills, headaches, eye pain, otalgia, sore throat, cough, abdominal pain and N/V/D. No drug use. The pt's BP medications were discontinued by his PCP.      He continues to get brief sharp CP    4/11/18  I discussed a [facility] for recurrent CP - he is agreeable but wants to wait until school breaks for the summer  Will add toprol XL 25 qd and imdur 30 qd    5/14/18  Still with CP - now agreeable to C      Past Medical History:   Diagnosis Date    Anticoagulant long-term use     Coronary artery disease     Hypercholesteremia     Hypertension     MI (myocardial infarction)      Past Surgical History:   Procedure Laterality Date    blood clots      cardiac stents       No family history on file.  Social History   Substance Use Topics    Smoking status: Current Some Day Smoker     Packs/day: 0.25     Types: Cigarettes    Smokeless tobacco: Never Used    Alcohol use Yes      Comment: occasionally  "       PTA Medications   Medication Sig    ascorbic acid, vitamin C, (VITAMIN C) 100 MG tablet Take 100 mg by mouth once daily.    aspirin (ECOTRIN) 81 MG EC tablet Take 81 mg by mouth once daily.    atorvastatin (LIPITOR) 40 MG tablet Take 1 tablet (40 mg total) by mouth once daily.    clopidogrel (PLAVIX) 75 mg tablet Take 1 tablet (75 mg total) by mouth once daily.    ergocalciferol (VITAMIN D2) 50,000 unit Cap Take 50,000 Units by mouth every 7 days.    isosorbide mononitrate (IMDUR) 30 MG 24 hr tablet Take 1 tablet (30 mg total) by mouth once daily.    losartan-hydrochlorothiazide 50-12.5 mg (HYZAAR) 50-12.5 mg per tablet Take 1 tablet by mouth once daily.    metoprolol succinate (TOPROL-XL) 25 MG 24 hr tablet Take 1 tablet (25 mg total) by mouth once daily.    multivitamin (THERAGRAN) per tablet Take 1 tablet by mouth once daily.     Review of patient's allergies indicates:  No Known Allergies     Review of Systems   All other systems reviewed and are negative.      Objective:      Vital Signs (Most Recent)       Vital Signs Range (Last 24H):  [unfilled]    Physical Exam   Constitutional: He is oriented to person, place, and time. He appears well-developed and well-nourished.   HENT:   Head: Normocephalic and atraumatic.   Eyes: EOM are normal. Pupils are equal, round, and reactive to light.   Neck: Normal range of motion. Neck supple.   Cardiovascular: Normal rate and regular rhythm.    Pulmonary/Chest: Effort normal and breath sounds normal.   Abdominal: Soft. Bowel sounds are normal.   Musculoskeletal: Normal range of motion.   Neurological: He is alert and oriented to person, place, and time.   Skin: Skin is warm and dry.       Data Review:    CBC:   Lab Results   Component Value Date    WBC 5.94 05/16/2018    RBC 4.09 (L) 05/16/2018    HGB 12.9 (L) 05/16/2018    HCT 39.0 (L) 05/16/2018     05/16/2018     BMP:   Lab Results   Component Value Date    GLU 85 05/16/2018      05/16/2018    K 3.8 05/16/2018     (H) 05/16/2018    CO2 24 05/16/2018    BUN 14 05/16/2018    CREATININE 0.8 05/16/2018    CALCIUM 9.3 05/16/2018      ECG: NSR NSSTT changes.     Assessment:      There are no hospital problems to display for this patient.    CP - on ASA/plavix and 2 anti-anginal medications    Plan:      [facility]

## 2018-05-17 NOTE — HPI
"Admitted 1/24/18  Patient is 61 yo male smoker with HTN, HLD, CAD s/p PCI who presents to ED with complaint of CP. Per patient has been with intermittent chest pain over the last 2 weeks mostly with exertion but occasionally at rest. He describes pain as "tight, stabbing" and reports lasts about a minute or so before resolving. He endorses associated diaphoresis and radiation to L arm and L neck. He reports pain worse this am thus causing presentation. Had cardiac stent placed about 2 years ago and no issues since then. He has been with a cold recently but otherwise denies fever/chills, SOB, abdominal pain, N/V, LE swelling and pain. On presentation patient with mildly elevated troponin at .05. Non ischemic EKG and CXR unremarkable. CTA obtained to r/o PE in ED which noted calcinosis to LAD. Placed in observation for ACS r/o.         Hospital Course:   Patient admitted with chest pain and ruled out for acute coronary syndrome. EKG is non ischemic. Serial troponin levels mildly elevated but flat with downward trend. BNP normal. CXR without acute cardiothoracic processes. Echocardiogram performed and did not show evidence of systolic/diastolic/or valvular dysfunction. NST performed and is without evidence of myocardial ischemia. Cardiology consulted and after evaluation has cleared the patient for discharge from a CV standpoint. Chest pain likely atypical in nature. Patient is stable and will discharge home at this time with instructions to follow up with PCP and cardiology in one week.      Echo 1/24/18    1 - Normal left ventricular systolic function (EF 60-65%).     2 - Concentric hypertrophy.     3 - Trivial tricuspid regurgitation.     4 - Trivial to mild mitral regurgitation.      Stress test 1/25/18  LVEF: 50 %  Impression: NORMAL MYOCARDIAL PERFUSION  1. The perfusion scan is free of evidence for myocardial ischemia or injury.   2. There is a mild intensity fixed defect in the inferior wall of the left " "ventricle, secondary to diaphragm attenuation.   3. Resting wall motion is physiologic.   4. Resting LV function is normal.      Went to the ER 3/23/18     HPI: This 62 y.o M smoker with CAD, hypercholesteremia and MI presents to the ED c/o acute onset of intermittent and moderate (7/10) "sharp' non-radiating left pectoral chest pain x2 weeks. The pt states his episodes of chest pain occur every 1.5 hours, lasting 8-10 seconds. The pt also reports bilateral knee swelling which he noticed last week. Pt's knee swelling is worse with walking and standing, but resolves after elevation. The pt also reports a "light" frontal headache over the R eye which began after eating spam yesterday. The pt was seen in this ED 1/24/18 and admitted for elevated troponin. Additionally, the pt reports intermittent difficulty beginning his urine stream and pressure when urinating. The pt denies fever, chills, headaches, eye pain, otalgia, sore throat, cough, abdominal pain and N/V/D. No drug use. The pt's BP medications were discontinued by his PCP.      He continues to get brief sharp CP     4/11/18  I discussed a [facility] for recurrent CP - he is agreeable but wants to wait until school breaks for the summer  Will add toprol XL 25 qd and imdur 30 qd     5/14/18  Still with CP - now agreeable to LHC  "

## 2018-05-25 ENCOUNTER — OFFICE VISIT (OUTPATIENT)
Dept: FAMILY MEDICINE | Facility: CLINIC | Age: 63
End: 2018-05-25
Payer: COMMERCIAL

## 2018-05-25 VITALS
DIASTOLIC BLOOD PRESSURE: 82 MMHG | BODY MASS INDEX: 25.58 KG/M2 | HEART RATE: 84 BPM | HEIGHT: 71 IN | RESPIRATION RATE: 12 BRPM | TEMPERATURE: 98 F | OXYGEN SATURATION: 96 % | SYSTOLIC BLOOD PRESSURE: 132 MMHG | WEIGHT: 182.75 LBS

## 2018-05-25 DIAGNOSIS — M62.838 MUSCLE SPASM OF LEFT SHOULDER: ICD-10-CM

## 2018-05-25 DIAGNOSIS — R60.0 BILATERAL LEG EDEMA: ICD-10-CM

## 2018-05-25 DIAGNOSIS — R39.15 URINARY URGENCY: Primary | ICD-10-CM

## 2018-05-25 PROCEDURE — 3079F DIAST BP 80-89 MM HG: CPT | Mod: CPTII,S$GLB,, | Performed by: FAMILY MEDICINE

## 2018-05-25 PROCEDURE — 99999 PR PBB SHADOW E&M-EST. PATIENT-LVL IV: CPT | Mod: PBBFAC,,, | Performed by: FAMILY MEDICINE

## 2018-05-25 PROCEDURE — 99214 OFFICE O/P EST MOD 30 MIN: CPT | Mod: S$GLB,,, | Performed by: FAMILY MEDICINE

## 2018-05-25 PROCEDURE — 3075F SYST BP GE 130 - 139MM HG: CPT | Mod: CPTII,S$GLB,, | Performed by: FAMILY MEDICINE

## 2018-05-25 PROCEDURE — 3008F BODY MASS INDEX DOCD: CPT | Mod: CPTII,S$GLB,, | Performed by: FAMILY MEDICINE

## 2018-05-25 RX ORDER — TIZANIDINE 4 MG/1
4 TABLET ORAL EVERY 8 HOURS PRN
Qty: 30 TABLET | Refills: 0 | Status: SHIPPED | OUTPATIENT
Start: 2018-05-25 | End: 2018-06-04

## 2018-05-25 RX ORDER — TAMSULOSIN HYDROCHLORIDE 0.4 MG/1
0.4 CAPSULE ORAL DAILY
Qty: 30 CAPSULE | Refills: 11 | Status: SHIPPED | OUTPATIENT
Start: 2018-05-25 | End: 2018-12-14 | Stop reason: SDUPTHER

## 2018-05-25 NOTE — PROGRESS NOTES
"Routine Office Visit    Patient Name: Carlos A Ruff    : 1955  MRN: [id number]    Subjective:  Carlos A is a 63 y.o. male who presents today for:    1. Knee swelling  Patient presenting today with 2.5 months of swelling in knees and lower extremities after standing for prolonged periods of time and resolves with rest and elevation.  There is no redness with the swelling.  He states that it causes him to walk "weird".  He states the swelling resolves over night and then recurs by the end of the day.  There has been no shortness of breath.  He has been having left sided chest pain.  He had an angiogram last week (Thursday) and was told it was negative.  The pain resolved for 2 days and the returned.  The pain is mainly around the shoulder and upper back.  There is no numbness, tingling, or changes in vision.      2.  Urinary urgency  Patient presenting today with having to strain to urinate and having urgency.  He had PSA last November that was normal.  He denies dysuria or hematuria.      Past Medical History  Past Medical History:   Diagnosis Date    Anticoagulant long-term use     Coronary artery disease     Hypercholesteremia     Hypertension     MI (myocardial infarction)        Past Surgical History  Past Surgical History:   Procedure Laterality Date    blood clots      cardiac stents         Family History  History reviewed. No pertinent family history.    Social History  Social History     Social History    Marital status: Single     Spouse name: N/A    Number of children: N/A    Years of education: N/A     Occupational History    Not on file.     Social History Main Topics    Smoking status: Current Some Day Smoker     Packs/day: 0.25     Types: Cigarettes    Smokeless tobacco: Never Used    Alcohol use Yes      Comment: occasionally     Drug use: No    Sexual activity: Not Currently     Other Topics Concern    Not on file     Social History Narrative    No narrative on file       Current " "Medications  Current Outpatient Prescriptions on File Prior to Visit   Medication Sig Dispense Refill    ascorbic acid, vitamin C, (VITAMIN C) 100 MG tablet Take 100 mg by mouth once daily.      aspirin (ECOTRIN) 81 MG EC tablet Take 81 mg by mouth once daily.      atorvastatin (LIPITOR) 40 MG tablet Take 1 tablet (40 mg total) by mouth once daily. 90 tablet 1    clopidogrel (PLAVIX) 75 mg tablet Take 1 tablet (75 mg total) by mouth once daily. 90 tablet 1    ergocalciferol (VITAMIN D2) 50,000 unit Cap Take 50,000 Units by mouth every 7 days.      isosorbide mononitrate (IMDUR) 30 MG 24 hr tablet Take 1 tablet (30 mg total) by mouth once daily. 30 tablet 11    losartan-hydrochlorothiazide 50-12.5 mg (HYZAAR) 50-12.5 mg per tablet Take 1 tablet by mouth once daily. 90 tablet 3    metoprolol succinate (TOPROL-XL) 25 MG 24 hr tablet Take 1 tablet (25 mg total) by mouth once daily. 30 tablet 11    multivitamin (THERAGRAN) per tablet Take 1 tablet by mouth once daily.      nitroGLYCERIN (NITROSTAT) 0.4 MG SL tablet Place 0.4 mg under the tongue every 5 (five) minutes as needed for Chest pain.       No current facility-administered medications on file prior to visit.        Allergies   Review of patient's allergies indicates:  No Known Allergies    Review of Systems (Pertinent positives)  Review of Systems   Constitutional: Negative.    HENT: Negative.    Eyes: Negative.    Respiratory: Negative.    Cardiovascular: Positive for leg swelling.   Gastrointestinal: Negative.    Genitourinary: Positive for urgency.   Musculoskeletal: Positive for myalgias.   Skin: Negative.          /82 (BP Location: Left arm, Patient Position: Sitting, BP Method: Medium (Manual))   Pulse 84   Temp 98.2 °F (36.8 °C) (Oral)   Resp 12   Ht 5' 11" (1.803 m)   Wt 82.9 kg (182 lb 12.2 oz)   SpO2 96%   BMI 25.49 kg/m²     GENERAL APPEARANCE: in no apparent distress and well developed and well nourished  HEENT: PERRL, EOMI, " Sclera clear, anicteric, Oropharynx clear, no lesions, Neck supple with midline trachea  NECK: normal, supple, no adenopathy, thyroid normal in size  RESPIRATORY: appears well, vitals normal, no respiratory distress, acyanotic, normal RR, chest clear, no wheezing, crepitations, rhonchi, normal symmetric air entry  HEART: regular rate and rhythm, S1, S2 normal, no murmur, click, rub or gallop.    ABDOMEN: abdomen is soft without tenderness, no masses, no hernias, no organomegaly, no rebound, no guarding. Suprapubic tenderness absent. No CVA tenderness.  NEUROLOGIC: normal without focal findings, CN II-XII are intact.   Extremities: warm/well perfused.  No abnormal hair patterns.  No clubbing, cyanosis; 1+ edema  SKIN: no rashes, no wounds, no other lesions  PSYCH: Alert, oriented x 3, thought content appropriate, speech normal, pleasant and cooperative, good eye contact, well groomed,    Assessment/Plan:  aCrlos A Ruff is a 63 y.o. male who presents today for :    Carlos A was seen today for swelling in knees, urgency with urination and chest pain.    Diagnoses and all orders for this visit:    Urinary urgency  -     tamsulosin (FLOMAX) 0.4 mg Cp24; Take 1 capsule (0.4 mg total) by mouth once daily.  -     Ambulatory referral to Urology    Bilateral leg edema  -     US Lower Extremity Veins Bilateral Insufficiency; Future  -     US Lower Extrem Arteries Bilat with GEE (xpd); Future    Muscle spasm of left shoulder  -     tiZANidine (ZANAFLEX) 4 MG tablet; Take 1 tablet (4 mg total) by mouth every 8 (eight) hours as needed.    1.  flomax and referral to urology for eval of bph  2.  Ultrasounds for edema  3.  zanaflex for what appears to be MS pain  4.  Follow up with cardiology as scheduled  5.  He is to call with any concerns      Khalif Hassan MD

## 2018-05-31 ENCOUNTER — OFFICE VISIT (OUTPATIENT)
Dept: CARDIOLOGY | Facility: CLINIC | Age: 63
End: 2018-05-31
Payer: COMMERCIAL

## 2018-05-31 VITALS
BODY MASS INDEX: 25.4 KG/M2 | HEIGHT: 71 IN | DIASTOLIC BLOOD PRESSURE: 80 MMHG | HEART RATE: 82 BPM | SYSTOLIC BLOOD PRESSURE: 133 MMHG | WEIGHT: 181.44 LBS | OXYGEN SATURATION: 96 %

## 2018-05-31 DIAGNOSIS — I25.10 CORONARY ARTERY DISEASE INVOLVING NATIVE CORONARY ARTERY OF NATIVE HEART WITHOUT ANGINA PECTORIS: Primary | ICD-10-CM

## 2018-05-31 DIAGNOSIS — Z86.718 HISTORY OF DEEP VEIN THROMBOSIS (DVT) OF LOWER EXTREMITY: ICD-10-CM

## 2018-05-31 DIAGNOSIS — R07.89 CHEST PAIN, NON-CARDIAC: ICD-10-CM

## 2018-05-31 PROCEDURE — 3075F SYST BP GE 130 - 139MM HG: CPT | Mod: CPTII,S$GLB,, | Performed by: INTERNAL MEDICINE

## 2018-05-31 PROCEDURE — 99999 PR PBB SHADOW E&M-EST. PATIENT-LVL III: CPT | Mod: PBBFAC,,, | Performed by: INTERNAL MEDICINE

## 2018-05-31 PROCEDURE — 99213 OFFICE O/P EST LOW 20 MIN: CPT | Mod: S$GLB,,, | Performed by: INTERNAL MEDICINE

## 2018-05-31 PROCEDURE — 3079F DIAST BP 80-89 MM HG: CPT | Mod: CPTII,S$GLB,, | Performed by: INTERNAL MEDICINE

## 2018-05-31 PROCEDURE — 3008F BODY MASS INDEX DOCD: CPT | Mod: CPTII,S$GLB,, | Performed by: INTERNAL MEDICINE

## 2018-05-31 NOTE — PROGRESS NOTES
"Subjective:    Patient ID:  Carlos A Ruff is a 63 y.o. male who presents for follow-up of Post-op Evaluation      HPI   CAD - stent LAD, HTN, HLD    5/17/18 [facility] - EDP 15, LAD stent widely patent, normal vessels otherwise       Admitted 1/24/18  Patient is 63 yo male smoker with HTN, HLD, CAD s/p PCI who presents to ED with complaint of CP. Per patient has been with intermittent chest pain over the last 2 weeks mostly with exertion but occasionally at rest. He describes pain as "tight, stabbing" and reports lasts about a minute or so before resolving. He endorses associated diaphoresis and radiation to L arm and L neck. He reports pain worse this am thus causing presentation. Had cardiac stent placed about 2 years ago and no issues since then. He has been with a cold recently but otherwise denies fever/chills, SOB, abdominal pain, N/V, LE swelling and pain. On presentation patient with mildly elevated troponin at .05. Non ischemic EKG and CXR unremarkable. CTA obtained to r/o PE in ED which noted calcinosis to LAD. Placed in observation for ACS r/o.         Hospital Course:   Patient admitted with chest pain and ruled out for acute coronary syndrome. EKG is non ischemic. Serial troponin levels mildly elevated but flat with downward trend. BNP normal. CXR without acute cardiothoracic processes. Echocardiogram performed and did not show evidence of systolic/diastolic/or valvular dysfunction. NST performed and is without evidence of myocardial ischemia. Cardiology consulted and after evaluation has cleared the patient for discharge from a CV standpoint. Chest pain likely atypical in nature. Patient is stable and will discharge home at this time with instructions to follow up with PCP and cardiology in one week.      Echo 1/24/18    1 - Normal left ventricular systolic function (EF 60-65%).     2 - Concentric hypertrophy.     3 - Trivial tricuspid regurgitation.     4 - Trivial to mild mitral regurgitation.      Stress test " "1/25/18  LVEF: 50 %  Impression: NORMAL MYOCARDIAL PERFUSION  1. The perfusion scan is free of evidence for myocardial ischemia or injury.   2. There is a mild intensity fixed defect in the inferior wall of the left ventricle, secondary to diaphragm attenuation.   3. Resting wall motion is physiologic.   4. Resting LV function is normal.      Went to the ER 3/23/18     HPI: This 62 y.o M smoker with CAD, hypercholesteremia and MI presents to the ED c/o acute onset of intermittent and moderate (7/10) "sharp' non-radiating left pectoral chest pain x2 weeks. The pt states his episodes of chest pain occur every 1.5 hours, lasting 8-10 seconds. The pt also reports bilateral knee swelling which he noticed last week. Pt's knee swelling is worse with walking and standing, but resolves after elevation. The pt also reports a "light" frontal headache over the R eye which began after eating spam yesterday. The pt was seen in this ED 1/24/18 and admitted for elevated troponin. Additionally, the pt reports intermittent difficulty beginning his urine stream and pressure when urinating. The pt denies fever, chills, headaches, eye pain, otalgia, sore throat, cough, abdominal pain and N/V/D. No drug use. The pt's BP medications were discontinued by his PCP.      He continues to get brief sharp CP     4/11/18  I discussed a [facility] for recurrent CP - he is agreeable but wants to wait until school breaks for the summer  Will add toprol XL 25 qd and imdur 30 qd    Did well after LHC - still some atypical CP       Review of Systems   Constitution: Negative for decreased appetite.   HENT: Negative for ear discharge.    Eyes: Negative for blurred vision.   Endocrine: Negative for polyphagia.   Skin: Negative for nail changes.   Neurological: Negative for aphonia.   Psychiatric/Behavioral: Negative for hallucinations.        Objective:    Physical Exam   Constitutional: He is oriented to person, place, and time. He appears well-developed and " well-nourished.   HENT:   Head: Normocephalic and atraumatic.   Eyes: Conjunctivae are normal. Pupils are equal, round, and reactive to light.   Neck: Normal range of motion. Neck supple.   Cardiovascular: Normal rate, normal heart sounds and intact distal pulses.    Pulmonary/Chest: Effort normal and breath sounds normal.   Abdominal: Soft. Bowel sounds are normal.   Musculoskeletal: Normal range of motion.   Neurological: He is alert and oriented to person, place, and time.   Skin: Skin is warm and dry.         Assessment:       1. Coronary artery disease involving native coronary artery of native heart without angina pectoris    2. Chest pain, non-cardiac    3. History of deep vein thrombosis (DVT) of lower extremity         Plan:       Cardiac stable  OV 6 months

## 2018-06-05 ENCOUNTER — LAB VISIT (OUTPATIENT)
Dept: LAB | Facility: HOSPITAL | Age: 63
End: 2018-06-05
Attending: UROLOGY
Payer: COMMERCIAL

## 2018-06-05 ENCOUNTER — OFFICE VISIT (OUTPATIENT)
Dept: UROLOGY | Facility: CLINIC | Age: 63
End: 2018-06-05
Payer: COMMERCIAL

## 2018-06-05 VITALS
WEIGHT: 183.19 LBS | HEART RATE: 60 BPM | BODY MASS INDEX: 25.65 KG/M2 | SYSTOLIC BLOOD PRESSURE: 128 MMHG | HEIGHT: 71 IN | DIASTOLIC BLOOD PRESSURE: 77 MMHG

## 2018-06-05 DIAGNOSIS — N40.1 HYPERPLASIA OF PROSTATE WITH LOWER URINARY TRACT SYMPTOMS (LUTS): Primary | ICD-10-CM

## 2018-06-05 DIAGNOSIS — N40.1 HYPERPLASIA OF PROSTATE WITH LOWER URINARY TRACT SYMPTOMS (LUTS): ICD-10-CM

## 2018-06-05 LAB — COMPLEXED PSA SERPL-MCNC: 1.2 NG/ML

## 2018-06-05 PROCEDURE — 99999 PR PBB SHADOW E&M-EST. PATIENT-LVL III: CPT | Mod: PBBFAC,,, | Performed by: UROLOGY

## 2018-06-05 PROCEDURE — 3008F BODY MASS INDEX DOCD: CPT | Mod: CPTII,S$GLB,, | Performed by: UROLOGY

## 2018-06-05 PROCEDURE — 99204 OFFICE O/P NEW MOD 45 MIN: CPT | Mod: S$GLB,,, | Performed by: UROLOGY

## 2018-06-05 PROCEDURE — 3078F DIAST BP <80 MM HG: CPT | Mod: CPTII,S$GLB,, | Performed by: UROLOGY

## 2018-06-05 PROCEDURE — 84153 ASSAY OF PSA TOTAL: CPT

## 2018-06-05 PROCEDURE — 3074F SYST BP LT 130 MM HG: CPT | Mod: CPTII,S$GLB,, | Performed by: UROLOGY

## 2018-06-05 PROCEDURE — 36415 COLL VENOUS BLD VENIPUNCTURE: CPT | Mod: PO

## 2018-06-05 NOTE — LETTER
June 5, 2018      Khalif Hassan MD  [address]  [place] [address]           [facility]  [address] [address] [facility]  [place] [address]  Phone: [phone]  Fax: [phone]          Patient: Carlos A Ruff   MR Number: [id number]   YOB: 1955   Date of Visit: 6/5/2018       Dear Dr. Khalif RIVERA Page:    Thank you for referring Carlos A Ruff to me for evaluation. Attached you will find relevant portions of my assessment and plan of care.    If you have questions, please do not hesitate to call me. I look forward to following Carlos A Ruff along with you.    Sincerely,    Luis Arndt Jr., MD    Enclosure  CC:  No Recipients    If you would like to receive this communication electronically, please contact [email or web address] or [phone] to request more information on [facility] access.    For providers and/or their staff who would like to refer a patient to [facility], please contact us through our one-stop-shop provider referral line, [facility], at [phone].    If you feel you have received this communication in error or would no longer like to receive these types of communications, please e-mail [email or web address]

## 2018-06-14 DIAGNOSIS — Z11.59 NEED FOR HEPATITIS C SCREENING TEST: ICD-10-CM

## 2018-06-19 ENCOUNTER — HOSPITAL ENCOUNTER (OUTPATIENT)
Dept: RADIOLOGY | Facility: HOSPITAL | Age: 63
Discharge: HOME | End: 2018-06-19
Attending: UROLOGY
Payer: COMMERCIAL

## 2018-06-19 DIAGNOSIS — N40.1 HYPERPLASIA OF PROSTATE WITH LOWER URINARY TRACT SYMPTOMS (LUTS): ICD-10-CM

## 2018-06-19 PROCEDURE — 76770 US EXAM ABDO BACK WALL COMP: CPT | Mod: 26,,, | Performed by: RADIOLOGY

## 2018-06-19 PROCEDURE — 76770 US EXAM ABDO BACK WALL COMP: CPT | Mod: TC

## 2018-07-23 ENCOUNTER — HOSPITAL ENCOUNTER (OUTPATIENT)
Dept: UROLOGY | Facility: HOSPITAL | Age: 63
Discharge: HOME | End: 2018-07-23
Attending: UROLOGY
Payer: COMMERCIAL

## 2018-07-23 VITALS
RESPIRATION RATE: 18 BRPM | BODY MASS INDEX: 26.11 KG/M2 | WEIGHT: 186.5 LBS | HEART RATE: 59 BPM | DIASTOLIC BLOOD PRESSURE: 94 MMHG | TEMPERATURE: 99 F | SYSTOLIC BLOOD PRESSURE: 182 MMHG | HEIGHT: 71 IN

## 2018-07-23 DIAGNOSIS — N40.1 HYPERPLASIA OF PROSTATE WITH LOWER URINARY TRACT SYMPTOMS (LUTS): ICD-10-CM

## 2018-07-23 PROCEDURE — 52000 CYSTOURETHROSCOPY: CPT | Mod: ,,, | Performed by: UROLOGY

## 2018-07-23 PROCEDURE — 52000 CYSTOURETHROSCOPY: CPT

## 2018-07-23 RX ORDER — LIDOCAINE HYDROCHLORIDE 20 MG/ML
JELLY TOPICAL ONCE
Status: COMPLETED | OUTPATIENT
Start: 2018-07-23 | End: 2018-07-23

## 2018-07-23 RX ORDER — CIPROFLOXACIN 500 MG/1
500 TABLET ORAL ONCE
Status: COMPLETED | OUTPATIENT
Start: 2018-07-23 | End: 2018-07-23

## 2018-07-23 RX ADMIN — LIDOCAINE HYDROCHLORIDE: 20 JELLY TOPICAL at 02:07

## 2018-07-23 RX ADMIN — CIPROFLOXACIN 500 MG: 500 TABLET ORAL at 02:07

## 2018-07-23 NOTE — OP NOTE
DATE OF PROCEDURE:  07/23/2018    PREOPERATIVE DIAGNOSIS:  BPH with outlet obstruction.    POSTOPERATIVE DIAGNOSIS:  BPH with outlet obstruction.    OPERATION PERFORMED:  Flexible cystoscopy.    INDICATIONS FOR PROCEDURE:  This gentleman had lower urinary tract symptoms.  He   had been started on Flomax and his symptoms had improved somewhat.  The patient   underwent a renal ultrasound demonstrating normal upper tracts; however, his   postvoid residual was around 500 mL.  I am not sure if it was a true postvoid   residual.  At any rate, he does not wish to have a catheter nor learn CIC at   this time.    PROCEDURE IN DETAIL:  The patient was prepped and draped in supine position.    Xylocaine jelly was instilled per urethra.  The anterior urethra was normal.    Prostatic urethra was 4.5 cm with lateral lobe obstruction.  There was no   evidence of an enlarged median lobe.  There were grade III trabeculations.  Both   ureteral orifices were normal in size, shape and position with clear efflux.    There were no stones, tumors, foreign bodies or active infections noted.    IMPRESSION:  BPH with outlet obstruction.    RECOMMENDATIONS:  The patient's bladder was heavily trabeculated and I will get   a simple urodynamics test to check on bladder function.  If this does   demonstrate obstruction then the patient will need a RevoLix laser TURP versus   traditional TURP.  I will teach him CIC p.r.n. once we have these tests   available.      ANNA/IN  dd: 07/23/2018 14:34:02 (CDT)  td: 07/23/2018 15:29:49 (CDT)  Doc ID   #[id number]  Job ID #[id number]    CC:

## 2018-07-23 NOTE — PATIENT INSTRUCTIONS
What to Expect After a Cystoscopy  For the next 24-48 hours, you may feel a mild burning when you urinate. This burning is normal and expected. Drink plenty of water to dilute the urine to help relieve the burning sensation. You may also see a small amount of blood in your urine after the procedure.    Unless you are already taking antibiotics, you may be given an antibiotic after the test to prevent infection.    Signs and Symptoms to Report  Call the [facility] at [phone] if you develop any of the following:  · Fever of 101 degrees or higher  · Chills or persistent bleeding  · Inability to urinate

## 2018-08-20 ENCOUNTER — HOSPITAL ENCOUNTER (EMERGENCY)
Facility: HOSPITAL | Age: 63
Discharge: HOME | End: 2018-08-20
Attending: EMERGENCY MEDICINE
Payer: COMMERCIAL

## 2018-08-20 VITALS
BODY MASS INDEX: 25.9 KG/M2 | TEMPERATURE: 98 F | HEART RATE: 50 BPM | HEIGHT: 71 IN | OXYGEN SATURATION: 97 % | WEIGHT: 185 LBS | RESPIRATION RATE: 18 BRPM | SYSTOLIC BLOOD PRESSURE: 189 MMHG | DIASTOLIC BLOOD PRESSURE: 96 MMHG

## 2018-08-20 DIAGNOSIS — M79.89 RIGHT LEG SWELLING: ICD-10-CM

## 2018-08-20 DIAGNOSIS — I82.4Z1 DVT, LOWER EXTREMITY, DISTAL, ACUTE, RIGHT: Primary | ICD-10-CM

## 2018-08-20 DIAGNOSIS — Z86.718 HISTORY OF DEEP VENOUS THROMBOSIS (DVT) OF DISTAL VEIN OF RIGHT LOWER EXTREMITY: ICD-10-CM

## 2018-08-20 PROCEDURE — 99283 EMERGENCY DEPT VISIT LOW MDM: CPT

## 2018-08-20 PROCEDURE — 25000003 PHARM REV CODE 250: Performed by: PHYSICIAN ASSISTANT

## 2018-08-20 RX ADMIN — RIVAROXABAN 15 MG: 15 TABLET, FILM COATED ORAL at 12:08

## 2018-08-20 NOTE — ED PROVIDER NOTES
Encounter Date: 8/20/2018  63 y.o. male with right leg swelling. Hx of DVT. U/S ordered. Patient will be seen by another provider for further evaluation when an exam room is available. Dae JHA, 10:05 AM       History     Chief Complaint   Patient presents with    Leg Pain     Right lower leg and foot pain with swelling.  Denies injury.  PMx DVT.     62 yo F with Hx of MI (on Plavix for stent) and DVT in RLE (not on anticoagulent; no filter) presents to ED for gradual onset RLE pain that occurred approximately 7 days ago after driving to [place]. Symptoms worsened upon return from [place] 2 days ago and now noting swelling. Feels similar to past DVT to the RLE per patient. Denies numbness, fever, CP, and SOB. Ambulatory. No medication taken PTA. Pain primarily to dorsum of R foot and popliteal space. Denies trauma.           Review of patient's allergies indicates:  No Known Allergies  Past Medical History:   Diagnosis Date    Anticoagulant long-term use     Coronary artery disease     Hypercholesteremia     Hypertension     MI (myocardial infarction)      Past Surgical History:   Procedure Laterality Date    blood clots      cardiac stents       History reviewed. No pertinent family history.  Social History     Tobacco Use    Smoking status: Current Some Day Smoker     Packs/day: 0.25     Types: Cigarettes    Smokeless tobacco: Never Used   Substance Use Topics    Alcohol use: Yes     Comment: occasionally     Drug use: No     Review of Systems   Constitutional: Negative for fever.   Respiratory: Negative for shortness of breath.    Cardiovascular: Positive for leg swelling. Negative for chest pain and palpitations.   Gastrointestinal: Negative for abdominal pain, nausea and vomiting.   Musculoskeletal: Negative for back pain and gait problem.   Skin: Negative for color change and wound.   Neurological: Negative for numbness.   All other systems reviewed and are negative.      Physical Exam      Initial Vitals [08/20/18 1005]   BP Pulse Resp Temp SpO2   (!) 184/101 64 18 98.8 °F (37.1 °C) 98 %      MAP       --         Physical Exam    Nursing note and vitals reviewed.  Constitutional: He appears well-developed and well-nourished. He is not diaphoretic. No distress.   HENT:   Head: Normocephalic and atraumatic.   Nose: Nose normal.   Eyes: Conjunctivae and EOM are normal. Right eye exhibits no discharge. Left eye exhibits no discharge.   Neck: Normal range of motion. No tracheal deviation present. No JVD present.   Cardiovascular: Normal rate, regular rhythm and normal heart sounds. Exam reveals no friction rub.    No murmur heard.  Pulmonary/Chest: Breath sounds normal. No stridor. No respiratory distress. He has no wheezes. He has no rhonchi. He has no rales. He exhibits no tenderness.   Musculoskeletal: Normal range of motion.   RLE:  Mild TTP to popliteal space and dorsum of R foot. No hip, femoral/thigh, or calf TTP. Mild swelling of R calf and dorsum of R foot. Full ROM of joints. Pedal pulses 2+ and equal with good distal skin perfusion. Sensation intact and equal. Ambulatory throughout ED.    Neurological: He is alert and oriented to person, place, and time.   Skin: Skin is warm and dry. No rash and no abscess noted. No erythema. No pallor.         ED Course   Procedures  Labs Reviewed - No data to display       Imaging Results          US Lower Extremity Veins Right (Final result)     Abnormal  Result time 08/20/18 11:12:15    Final result by Maribeth Farley MD (08/20/18 11:12:15)                 Impression:      Near occlusive thrombus within the right mid to distal superficial femoral vein, popliteal vein, anterior and posterior tibial veins and peroneal vein.    This report was flagged in Epic as abnormal.      Electronically signed by: Maribeth Farley MD  Date:    08/20/2018  Time:    11:12             Narrative:    EXAMINATION:  US LOWER EXTREMITY VEINS RIGHT    CLINICAL  HISTORY:  Other specified soft tissue disorders    TECHNIQUE:  Duplex and color flow Doppler evaluation and graded compression of the right lower extremity veins was performed.    COMPARISON:  None    FINDINGS:  Right thigh veins: The right common femoral vein is patent.  Near complete occlusive thrombus noted within the mid and distal femoral vein, popliteal vein, posterior and anterior tibial vein and peroneal vein suggestive of an acute deep venous thrombosis.    The right greater saphenous vein is patent.                                 Medical Decision Making:   History:   Old Medical Records: I decided to obtain old medical records.  Initial Assessment:   62 yo M with RLE pain. Denies numbness and fever.   Clinical Tests:   Radiological Study: Ordered and Reviewed  ED Management:  Ultrasound of right lower extremity a concerning for acute distal DVT.  No ischemic limb.  No signs of acute bacterial etiology, including for septic joint and overlying cellulitis.  No history of trauma to suggest acute fracture.  Ambulatory.  No signs or symptoms concerning for acute PE at this time.    Started on Xarelto in the ED. Xarelto called into his pharmacy where I verify that it will be covered by his insurance.  Advising close follow-up with his PCP for further evaluation and to bridge his Xarelto dosing after 21 days of initial therapy. Strict return precautions discussed with patient. Patient agreeable to plan.   Other:   I have discussed this case with another health care provider.       <> Summary of the Discussion: Discussed with attending                      Clinical Impression:   The primary encounter diagnosis was DVT, lower extremity, distal, acute, right. Diagnoses of Right leg swelling and History of deep venous thrombosis (DVT) of distal vein of right lower extremity were also pertinent to this visit.      Disposition:   Disposition: Discharged  Condition: Stable                        Quincy Sims,  EUGENE  08/20/18 155

## 2018-08-20 NOTE — ED TRIAGE NOTES
Patient came in with c/o RIGHT sided foot pain and swelling. Patient states that he went a trip and has been having the pain ever since. Patient denies any SOB or CP at this time.

## 2018-08-20 NOTE — PROGRESS NOTES
SW provided patient with a list of community clinics for followup.  Patient stated that he would f/u at [facility] either at the [facility] location or on [facility].  SW advised patient that NP reported that his co-payment for Xarelto would be $30.  Patient stated that he would be able to afford.  Amee Sutherland LMSW, MILDRED-Geeta, CCM  8/20/18

## 2018-08-21 ENCOUNTER — PES CALL (OUTPATIENT)
Dept: ADMINISTRATIVE | Facility: CLINIC | Age: 63
End: 2018-08-21

## 2018-08-22 ENCOUNTER — OFFICE VISIT (OUTPATIENT)
Dept: FAMILY MEDICINE | Facility: CLINIC | Age: 63
End: 2018-08-22
Payer: COMMERCIAL

## 2018-08-22 VITALS
OXYGEN SATURATION: 98 % | DIASTOLIC BLOOD PRESSURE: 88 MMHG | WEIGHT: 181 LBS | SYSTOLIC BLOOD PRESSURE: 132 MMHG | BODY MASS INDEX: 25.34 KG/M2 | HEIGHT: 71 IN | HEART RATE: 82 BPM | TEMPERATURE: 98 F | RESPIRATION RATE: 12 BRPM

## 2018-08-22 DIAGNOSIS — Z86.718 HISTORY OF DEEP VEIN THROMBOSIS (DVT) OF LOWER EXTREMITY: ICD-10-CM

## 2018-08-22 DIAGNOSIS — I82.4Z1 ACUTE DEEP VEIN THROMBOSIS (DVT) OF DISTAL END OF RIGHT LOWER EXTREMITY: Primary | ICD-10-CM

## 2018-08-22 PROCEDURE — 99214 OFFICE O/P EST MOD 30 MIN: CPT | Mod: S$GLB,,, | Performed by: FAMILY MEDICINE

## 2018-08-22 PROCEDURE — 3075F SYST BP GE 130 - 139MM HG: CPT | Mod: CPTII,S$GLB,, | Performed by: FAMILY MEDICINE

## 2018-08-22 PROCEDURE — 99999 PR PBB SHADOW E&M-EST. PATIENT-LVL III: CPT | Mod: PBBFAC,,, | Performed by: FAMILY MEDICINE

## 2018-08-22 PROCEDURE — 3079F DIAST BP 80-89 MM HG: CPT | Mod: CPTII,S$GLB,, | Performed by: FAMILY MEDICINE

## 2018-08-22 PROCEDURE — 3008F BODY MASS INDEX DOCD: CPT | Mod: CPTII,S$GLB,, | Performed by: FAMILY MEDICINE

## 2018-08-22 NOTE — PROGRESS NOTES
Routine Office Visit    Patient Name: Carlos A Ruff    : 1955  MRN: [id number]    Subjective:  Carlos A is a 63 y.o. male who presents today for:    1. Right DVT  Patient presenting today for follow up after being started on Xarelto for acute right DVT.  He states that this is the second DVT he has had in the past 10-15 years.  He states that he had right lower leg pain and swelling that have both improved.  He has not had any chest pain or shortness of breath.  He is taking the xarelto as prescribed.     Past Medical History  Past Medical History:   Diagnosis Date    Anticoagulant long-term use     Coronary artery disease     Hypercholesteremia     Hypertension     MI (myocardial infarction)        Past Surgical History  Past Surgical History:   Procedure Laterality Date    blood clots      cardiac stents         Family History  History reviewed. No pertinent family history.    Social History  Social History     Socioeconomic History    Marital status: Single     Spouse name: Not on file    Number of children: Not on file    Years of education: Not on file    Highest education level: Not on file   Social Needs    Financial resource strain: Not on file    Food insecurity - worry: Not on file    Food insecurity - inability: Not on file    Transportation needs - medical: Not on file    Transportation needs - non-medical: Not on file   Occupational History    Not on file   Tobacco Use    Smoking status: Current Some Day Smoker     Packs/day: 0.25     Types: Cigarettes    Smokeless tobacco: Never Used   Substance and Sexual Activity    Alcohol use: Yes     Comment: occasionally     Drug use: No    Sexual activity: Not Currently   Other Topics Concern    Not on file   Social History Narrative    Not on file       Current Medications  Current Outpatient Medications on File Prior to Visit   Medication Sig Dispense Refill    ascorbic acid, vitamin C, (VITAMIN C) 100 MG tablet Take 100 mg by mouth once  "daily.      aspirin (ECOTRIN) 81 MG EC tablet Take 81 mg by mouth once daily.      atorvastatin (LIPITOR) 40 MG tablet Take 1 tablet (40 mg total) by mouth once daily. 90 tablet 1    ergocalciferol (VITAMIN D2) 50,000 unit Cap Take 50,000 Units by mouth every 7 days.      isosorbide mononitrate (IMDUR) 30 MG 24 hr tablet Take 1 tablet (30 mg total) by mouth once daily. 30 tablet 11    losartan-hydrochlorothiazide 50-12.5 mg (HYZAAR) 50-12.5 mg per tablet Take 1 tablet by mouth once daily. 90 tablet 3    metoprolol succinate (TOPROL-XL) 25 MG 24 hr tablet Take 1 tablet (25 mg total) by mouth once daily. 30 tablet 11    multivitamin (THERAGRAN) per tablet Take 1 tablet by mouth once daily.      nitroGLYCERIN (NITROSTAT) 0.4 MG SL tablet Place 0.4 mg under the tongue every 5 (five) minutes as needed for Chest pain.      tamsulosin (FLOMAX) 0.4 mg Cp24 Take 1 capsule (0.4 mg total) by mouth once daily. 30 capsule 11    clopidogrel (PLAVIX) 75 mg tablet Take 1 tablet (75 mg total) by mouth once daily. 90 tablet 1     No current facility-administered medications on file prior to visit.        Allergies   Review of patient's allergies indicates:  No Known Allergies    Review of Systems (Pertinent positives)  Review of Systems   Constitutional: Negative.    HENT: Negative.    Eyes: Negative.    Respiratory: Negative.    Cardiovascular: Negative.    Gastrointestinal: Negative.    Musculoskeletal: Positive for myalgias.   Skin: Negative.          /88 (BP Location: Left arm, Patient Position: Sitting, BP Method: Medium (Manual))   Pulse 82   Temp 98.4 °F (36.9 °C) (Oral)   Resp 12   Ht 5' 11" (1.803 m)   Wt 82.1 kg (180 lb 16 oz)   SpO2 98%   BMI 25.24 kg/m²     GENERAL APPEARANCE: in no apparent distress and well developed and well nourished  HEENT: PERRL, EOMI, Sclera clear, anicteric, Oropharynx clear, no lesions, Neck supple with midline trachea  NECK: normal, supple, no adenopathy, thyroid normal in " size  RESPIRATORY: appears well, vitals normal, no respiratory distress, acyanotic, normal RR, chest clear, no wheezing, crepitations, rhonchi, normal symmetric air entry  HEART: regular rate and rhythm, S1, S2 normal, no murmur, click, rub or gallop.    ABDOMEN: abdomen is soft without tenderness, no masses, no hernias, no organomegaly, no rebound, no guarding. Suprapubic tenderness absent. No CVA tenderness.  Extremities: warm/well perfused.  No abnormal hair patterns.  No clubbing, 1+ edema in bilateral lower extremities  SKIN: no rashes, no wounds, no other lesions  PSYCH: Alert, oriented x 3, thought content appropriate, speech normal, pleasant and cooperative, good eye contact, well groomed    Assessment/Plan:  Carlos A Ruff is a 63 y.o. male who presents today for :    Carlos A was seen today for hospital f/u.    Diagnoses and all orders for this visit:    Acute deep vein thrombosis (DVT) of distal end of right lower extremity  -     Ambulatory referral to Hematology / Oncology    History of deep vein thrombosis (DVT) of lower extremity  -     Ambulatory referral to Hematology / Oncology      1.  Given this is his second DVT will refer to hematology for eval  2.  Follow up with me in 4-6 weeks  3.  Call with any concerns    Khalif Hassan MD

## 2018-08-27 NOTE — PROGRESS NOTES
Chief Complaint :   DVT    Hx of Present illness :  Patient is a 63 y.o. year old male who presents to the clinic today for   Oncology evaluation. No preceeding trauma, surgery  No inactivity.  Had DVT involving lower extremity about ten years ago. Took Blood thinner for about a year. Recently travelled by car to [place]. That is when he noticed Leg swelling and discomfort      Allergies :    Review of patient's allergies indicates:  No Known Allergies    Occupation :   [employer]. Retired. Has worked at [employer].    Transfusion :  None.     Menstrual & obstetric Hx :  N/A      Present Meds :   Medication List with Changes/Refills   Current Medications    ASCORBIC ACID, VITAMIN C, (VITAMIN C) 100 MG TABLET    Take 100 mg by mouth once daily.    ASPIRIN (ECOTRIN) 81 MG EC TABLET    Take 81 mg by mouth once daily.    ATORVASTATIN (LIPITOR) 40 MG TABLET    Take 1 tablet (40 mg total) by mouth once daily.    CLOPIDOGREL (PLAVIX) 75 MG TABLET    Take 1 tablet (75 mg total) by mouth once daily.    ERGOCALCIFEROL (VITAMIN D2) 50,000 UNIT CAP    Take 50,000 Units by mouth every 7 days.    ISOSORBIDE MONONITRATE (IMDUR) 30 MG 24 HR TABLET    Take 1 tablet (30 mg total) by mouth once daily.    LOSARTAN-HYDROCHLOROTHIAZIDE 50-12.5 MG (HYZAAR) 50-12.5 MG PER TABLET    Take 1 tablet by mouth once daily.    METOPROLOL SUCCINATE (TOPROL-XL) 25 MG 24 HR TABLET    Take 1 tablet (25 mg total) by mouth once daily.    MULTIVITAMIN (THERAGRAN) PER TABLET    Take 1 tablet by mouth once daily.    NITROGLYCERIN (NITROSTAT) 0.4 MG SL TABLET    Place 0.4 mg under the tongue every 5 (five) minutes as needed for Chest pain.    TAMSULOSIN (FLOMAX) 0.4 MG CP24    Take 1 capsule (0.4 mg total) by mouth once daily.       Past Medical Hx :  DVT; Hyperlipidemia, Hypertension. Coronary artery disease. Long term  Anticoagulant therapy.    Past Medical Hx :  Past Medical History:   Diagnosis Date    Anticoagulant long-term use     Coronary  artery disease     Hypercholesteremia     Hypertension     MI (myocardial infarction)        Travel Hx :   N/A    Immunization :  Immunization History   Administered Date(s) Administered    Influenza - Quadrivalent - PF 11/29/2017       Family Hx :  No family history on file.    Social Hx :  Social History     Socioeconomic History    Marital status: Single     Spouse name: Not on file    Number of children: Not on file    Years of education: Not on file    Highest education level: Not on file   Social Needs    Financial resource strain: Not on file    Food insecurity - worry: Not on file    Food insecurity - inability: Not on file    Transportation needs - medical: Not on file    Transportation needs - non-medical: Not on file   Occupational History    Not on file   Tobacco Use    Smoking status: Current Some Day Smoker     Packs/day: 0.25     Types: Cigarettes    Smokeless tobacco: Never Used   Substance and Sexual Activity    Alcohol use: Yes     Comment: occasionally     Drug use: No    Sexual activity: Not Currently   Other Topics Concern    Not on file   Social History Narrative    Not on file       Surgery :   Coronary artery stent.   Has not had colonoscopy    Symptoms :    Review of Systems   Constitutional: Negative for chills, diaphoresis, fever, malaise/fatigue and weight loss.   HENT: Negative for congestion, ear discharge, ear pain, hearing loss, nosebleeds, sinus pain, sore throat and tinnitus.    Eyes: Negative for blurred vision, double vision, photophobia, pain, discharge and redness.   Respiratory: Negative for cough, hemoptysis, sputum production, shortness of breath, wheezing and stridor.    Cardiovascular: Negative for chest pain, palpitations, orthopnea, claudication, leg swelling and PND.   Gastrointestinal: Negative for abdominal pain, blood in stool, constipation, diarrhea, heartburn, melena, nausea and vomiting.   Genitourinary: Positive for frequency. Negative for  dysuria, flank pain, hematuria and urgency.        BPH   Musculoskeletal: Positive for back pain. Negative for falls, joint pain, myalgias and neck pain.   Skin: Negative for itching and rash.   Neurological: Positive for headaches. Negative for dizziness, tingling, tremors, sensory change, speech change, focal weakness, seizures, loss of consciousness and weakness.   Endo/Heme/Allergies: Negative for environmental allergies and polydipsia. Does not bruise/bleed easily.   Psychiatric/Behavioral: Negative for depression, hallucinations, memory loss, substance abuse and suicidal ideas. The patient is not nervous/anxious and does not have insomnia.        Physical Exam :   Physical Exam   Constitutional: He is oriented to person, place, and time and well-developed, well-nourished, and in no distress. Vital signs are normal. No distress.   HENT:   Head: Normocephalic and atraumatic.   Right Ear: External ear normal.   Left Ear: External ear normal.   Nose: Nose normal.   Mouth/Throat: Oropharynx is clear and moist. No oropharyngeal exudate.   Eyes: Conjunctivae, EOM and lids are normal. Pupils are equal, round, and reactive to light. Lids are everted and swept, no foreign bodies found. Right eye exhibits no discharge. Left eye exhibits no discharge. No scleral icterus.   Neck: Trachea normal, normal range of motion and full passive range of motion without pain. Neck supple. Normal carotid pulses, no hepatojugular reflux and no JVD present. No tracheal tenderness present. Carotid bruit is not present. No tracheal deviation present. No thyroid mass and no thyromegaly present.   Cardiovascular: Normal rate, regular rhythm, normal heart sounds, intact distal pulses and normal pulses. PMI is not displaced. Exam reveals no gallop and no friction rub.   No murmur heard.  Pulmonary/Chest: Effort normal and breath sounds normal. No stridor. No apnea. No respiratory distress. He has no wheezes. He has no rales. He exhibits no  tenderness.   Abdominal: Soft. Normal appearance, normal aorta and bowel sounds are normal. He exhibits no distension. There is no hepatosplenomegaly. There is no tenderness. There is no rebound, no guarding and no CVA tenderness. No hernia.   Genitourinary:   Genitourinary Comments: Not examined   Musculoskeletal: Normal range of motion. He exhibits no edema, tenderness or deformity.   Lymphadenopathy:        Head (right side): No submental, no submandibular, no tonsillar, no preauricular, no posterior auricular and no occipital adenopathy present.        Head (left side): No submental, no submandibular, no tonsillar, no preauricular, no posterior auricular and no occipital adenopathy present.     He has no cervical adenopathy.     He has no axillary adenopathy.        Right: No inguinal, no supraclavicular and no epitrochlear adenopathy present.        Left: No inguinal, no supraclavicular and no epitrochlear adenopathy present.   Neurological: He is alert and oriented to person, place, and time. He has normal motor skills, normal sensation, normal strength, normal reflexes and intact cranial nerves. He displays normal reflexes. No cranial nerve deficit. He exhibits normal muscle tone. Gait normal. Coordination normal. GCS score is 15.   Skin: Skin is warm, dry and intact. No rash noted. He is not diaphoretic. No cyanosis or erythema. No pallor. Nails show no clubbing.   Psychiatric: Memory, affect and judgment normal.   Nursing note and vitals reviewed.        Labs & Imaging :  08/20/18 : U/S Lower extremity : Near Occlusive thrombus  Right mid to dital Femoral vein, popliteal vein, anterior & posterior tibial veins and and peroneal veins.  U/S Abdomen in June 2018 : unremarkable.    Dx :  Acute DVT right lower extremity.      Assessment & Plan:  Discussed with patient. This is a second episode involving same lower extremity. Will get Coaulopaty labs. Re evaluate with results. Patient understands and  verbalised.

## 2018-08-28 ENCOUNTER — INITIAL CONSULT (OUTPATIENT)
Dept: HEMATOLOGY/ONCOLOGY | Facility: CLINIC | Age: 63
End: 2018-08-28
Payer: COMMERCIAL

## 2018-08-28 VITALS
BODY MASS INDEX: 27.05 KG/M2 | HEIGHT: 69 IN | OXYGEN SATURATION: 97 % | SYSTOLIC BLOOD PRESSURE: 117 MMHG | WEIGHT: 182.63 LBS | DIASTOLIC BLOOD PRESSURE: 70 MMHG | TEMPERATURE: 98 F | HEART RATE: 63 BPM

## 2018-08-28 DIAGNOSIS — I82.401 RECURRENT ACUTE DEEP VEIN THROMBOSIS (DVT) OF RIGHT LOWER EXTREMITY: Primary | ICD-10-CM

## 2018-08-28 PROCEDURE — 3078F DIAST BP <80 MM HG: CPT | Mod: CPTII,S$GLB,, | Performed by: INTERNAL MEDICINE

## 2018-08-28 PROCEDURE — 3008F BODY MASS INDEX DOCD: CPT | Mod: CPTII,S$GLB,, | Performed by: INTERNAL MEDICINE

## 2018-08-28 PROCEDURE — 99205 OFFICE O/P NEW HI 60 MIN: CPT | Mod: S$GLB,,, | Performed by: INTERNAL MEDICINE

## 2018-08-28 PROCEDURE — 3074F SYST BP LT 130 MM HG: CPT | Mod: CPTII,S$GLB,, | Performed by: INTERNAL MEDICINE

## 2018-08-28 PROCEDURE — 99999 PR PBB SHADOW E&M-EST. PATIENT-LVL III: CPT | Mod: PBBFAC,,, | Performed by: INTERNAL MEDICINE

## 2018-08-28 RX ORDER — RIVAROXABAN 15 MG/1
TABLET, FILM COATED ORAL
COMMUNITY
Start: 2018-08-20 | End: 2018-10-05 | Stop reason: SDUPTHER

## 2018-08-28 NOTE — LETTER
August 28, 2018      Khalif Hassan MD  [address] [facility]  [place] [address]           [facility]  [address] [facility] [address]  Phone: [phone]          Patient: Carlos A Ruff   MR Number: [id number]   YOB: 1955   Date of Visit: 8/28/2018       Dear Dr. Khalif RIVERA Page:    Thank you for referring Carlos A Ruff to me for evaluation. Attached you will find relevant portions of my assessment and plan of care.    If you have questions, please do not hesitate to call me. I look forward to following Carlos A Ruff along with you.    Sincerely,    Peyman Mills MD    Enclosure  CC:  No Recipients    If you would like to receive this communication electronically, please contact [email or web address] or [phone] to request more information on [employer] Link access.    For providers and/or their staff who would like to refer a patient to [facility], please contact us through our one-stop-shop provider referral line, Annalise Torrez, at [phone].    If you feel you have received this communication in error or would no longer like to receive these types of communications, please e-mail [email or web address]

## 2018-09-10 ENCOUNTER — LAB VISIT (OUTPATIENT)
Dept: LAB | Facility: HOSPITAL | Age: 63
End: 2018-09-10
Attending: INTERNAL MEDICINE
Payer: COMMERCIAL

## 2018-09-10 ENCOUNTER — HOSPITAL ENCOUNTER (EMERGENCY)
Facility: HOSPITAL | Age: 63
Discharge: HOME | End: 2018-09-10
Attending: EMERGENCY MEDICINE
Payer: MEDICAID

## 2018-09-10 VITALS
HEIGHT: 69 IN | WEIGHT: 185 LBS | DIASTOLIC BLOOD PRESSURE: 86 MMHG | TEMPERATURE: 98 F | SYSTOLIC BLOOD PRESSURE: 147 MMHG | RESPIRATION RATE: 18 BRPM | OXYGEN SATURATION: 95 % | BODY MASS INDEX: 27.4 KG/M2 | HEART RATE: 57 BPM

## 2018-09-10 DIAGNOSIS — I82.401 RECURRENT ACUTE DEEP VEIN THROMBOSIS (DVT) OF RIGHT LOWER EXTREMITY: ICD-10-CM

## 2018-09-10 DIAGNOSIS — R55 VASOVAGAL REACTION: ICD-10-CM

## 2018-09-10 DIAGNOSIS — R42 DIZZY: Primary | ICD-10-CM

## 2018-09-10 LAB
FIBRINOGEN PPP-MCNC: 344 MG/DL
GLUCOSE SERPL-MCNC: 149 MG/DL (ref 70–110)
HCYS SERPL-SCNC: 12.3 UMOL/L

## 2018-09-10 PROCEDURE — 85300 ANTITHROMBIN III ACTIVITY: CPT

## 2018-09-10 PROCEDURE — 25000003 PHARM REV CODE 250: Performed by: PHYSICIAN ASSISTANT

## 2018-09-10 PROCEDURE — 81241 F5 GENE: CPT

## 2018-09-10 PROCEDURE — 85613 RUSSELL VIPER VENOM DILUTED: CPT

## 2018-09-10 PROCEDURE — 85305 CLOT INHIBIT PROT S TOTAL: CPT

## 2018-09-10 PROCEDURE — 82962 GLUCOSE BLOOD TEST: CPT

## 2018-09-10 PROCEDURE — 81240 F2 GENE: CPT

## 2018-09-10 PROCEDURE — 83090 ASSAY OF HOMOCYSTEINE: CPT

## 2018-09-10 PROCEDURE — 85598 HEXAGNAL PHOSPH PLTLT NEUTRL: CPT

## 2018-09-10 PROCEDURE — 85303 CLOT INHIBIT PROT C ACTIVITY: CPT

## 2018-09-10 PROCEDURE — 85384 FIBRINOGEN ACTIVITY: CPT

## 2018-09-10 PROCEDURE — 36415 COLL VENOUS BLD VENIPUNCTURE: CPT

## 2018-09-10 PROCEDURE — 93005 ELECTROCARDIOGRAM TRACING: CPT

## 2018-09-10 PROCEDURE — 99283 EMERGENCY DEPT VISIT LOW MDM: CPT

## 2018-09-10 PROCEDURE — 93010 ELECTROCARDIOGRAM REPORT: CPT | Mod: ,,, | Performed by: INTERNAL MEDICINE

## 2018-09-10 PROCEDURE — 86147 CARDIOLIPIN ANTIBODY EA IG: CPT | Mod: 59

## 2018-09-10 RX ADMIN — SODIUM CHLORIDE 1000 ML: 0.9 INJECTION, SOLUTION INTRAVENOUS at 02:09

## 2018-09-10 NOTE — ED PROVIDER NOTES
"Encounter Date: 9/10/2018    SORT:  63 y.o. male presenting to ED after becoming dizzy for 7-8 minutes while having routine outpatient labs drawn in this hospital. Currently asymptomatic. Limited triage exam:  Non-toxic. If orders were placed, they are pending.     Quincy Sims PA-C  09/10/2018  11:54 AM   SCRIBE #1 NOTE: I, Jamie Workman, am scribing for, and in the presence of,  Lucie Rubio PA-C. I have scribed the following portions of the note - Other sections scribed: HPI, ROS.       History     Chief Complaint   Patient presents with    Dizziness     pt states "I was getting blood drawn today and I started feeling dizzy and started sweating after about 7 tubes drawn" pt also states "My vision went out for a minute"     CC: Dizziness    HPI: This is a 63 y.o. M who has CAD, Hypercholesteremia, HTN, Anticoagulant long-term use, and Hx of MI who presents to the ED for emergent evaluation of acute and transient episode of dizziness with associated diaphoresis, generalized weakness, and blurry vision that occurred while having 10 vials of blood drawn 40 minutes PTA. Pt states that the symptoms spontaneously resolved. He was not administered any medications by staff in lab. He does not have a Hx of similar problem. He ate breakfast this morning. He consumes an adequate amount of water daily. Pt states that he was having labs drawn for DVT. He currently takes Xarelto. He is compliant with daily medications. He has never been told that he has Bradycardia. He had a stent placed for prior MI. He is followed by Cardiology and next appointment is 1-2 months away. Pt denies nausea, syncope, SOB, fever, or abdominal pain.       The history is provided by the patient. The history is limited by a language barrier. No  was used.     Review of patient's allergies indicates:  No Known Allergies  Past Medical History:   Diagnosis Date    Anticoagulant long-term use     Coronary artery disease     " Hypercholesteremia     Hypertension     MI (myocardial infarction)      Past Surgical History:   Procedure Laterality Date    blood clots      cardiac stents      Left heart cath Left 5/17/2018    Performed by Alfred Cordero MD at [facility] CATH LAB     No family history on file.  Social History     Tobacco Use    Smoking status: Current Some Day Smoker     Packs/day: 0.25     Types: Cigarettes    Smokeless tobacco: Never Used   Substance Use Topics    Alcohol use: Yes     Comment: occasionally     Drug use: No     Review of Systems   Constitutional: Positive for diaphoresis (improved). Negative for chills and fever.   HENT: Negative for congestion, ear pain and sore throat.    Eyes: Positive for visual disturbance (improved). Negative for pain.   Respiratory: Negative for cough and shortness of breath.    Cardiovascular: Negative for chest pain.   Gastrointestinal: Negative for abdominal pain, constipation, diarrhea, nausea and vomiting.   Genitourinary: Negative for decreased urine volume and dysuria.   Musculoskeletal: Negative for back pain and neck pain.   Skin: Negative for rash.   Neurological: Positive for dizziness (improved) and weakness (generalized, improved). Negative for syncope and headaches.   Psychiatric/Behavioral: Negative for confusion.       Physical Exam     Initial Vitals [09/10/18 1154]   BP Pulse Resp Temp SpO2   116/71 (!) 56 18 97.9 °F (36.6 °C) 97 %      MAP       --         Physical Exam    Nursing note and vitals reviewed.  Constitutional: Vital signs are normal. He appears well-developed and well-nourished. He is not diaphoretic. He is cooperative.  Non-toxic appearance. He does not have a sickly appearance. He does not appear ill. No distress.   HENT:   Head: Normocephalic and atraumatic.   Right Ear: Tympanic membrane, external ear and ear canal normal.   Left Ear: Tympanic membrane, external ear and ear canal normal.   Nose: Nose normal.   Mouth/Throat: Uvula is midline,  oropharynx is clear and moist and mucous membranes are normal.   Eyes: Conjunctivae, EOM and lids are normal. Pupils are equal, round, and reactive to light.   Neck: Trachea normal, normal range of motion, full passive range of motion without pain and phonation normal. Neck supple.   Cardiovascular: Normal rate, regular rhythm, normal heart sounds and intact distal pulses. Exam reveals no gallop and no friction rub.    No murmur heard.  Pulmonary/Chest: Effort normal and breath sounds normal. No respiratory distress. He has no decreased breath sounds. He has no wheezes. He has no rhonchi. He has no rales.   Abdominal: Soft. Normal appearance and bowel sounds are normal. He exhibits no distension. There is no tenderness. There is no rigidity, no rebound, no guarding and no CVA tenderness.   Musculoskeletal: Normal range of motion.   Neurological: He is alert and oriented to person, place, and time. He has normal strength. No cranial nerve deficit or sensory deficit. He exhibits normal muscle tone. Coordination and gait normal.   Skin: Skin is warm and dry. Capillary refill takes less than 2 seconds. No rash noted.   Psychiatric: He has a normal mood and affect. His speech is normal and behavior is normal. Judgment and thought content normal. Cognition and memory are normal.         ED Course   Procedures  Labs Reviewed - No data to display       Imaging Results    None                APC / Resident Notes:   This is an evaluation of a 63 y.o. male that presents to the Emergency Department for dizziness. Patient reports dizziness with diaphoresis, generalized weakness, and blurry vision while having 10 vials of labs drawn at PCP 40 minutes PTA. He denies syncope, LOC, head injury or further symptoms. He denies current symptoms. He denies chest pain, SOB, nystagmus, tinnitus, trouble with balance, confusion, slurred speech or further symptoms.     Physical Exam shows a non-toxic, afebrile, and well appearing male.   Patient is ambulatory.  Steady gait.  No coordination abnormality.  No focal deficits.  There is no nystagmus.  Mucous membranes are moist. RRR, no murmurs, gallops or rubs. Lungs CTA bilaterally.     Vital Signs Are Reassuring. If available, previous records reviewed.   RESULTS:   EKG normal sinus rhythm; no ischemia or arrhythmia.   Glucose 149.    My overall impression isvasovagal reaction given setting.   DDx: dizziness, vasovagal reaction, dehydration, hypoglycemia, other    ED Course: IV fluid rehydration. Patient is asymptomatic and requests discharge. I feel this patient is stable for discharge. I will recommend increased fluid intake and rest. The diagnosis, treatment plan, instructions for follow-up and reevaluation with PCP and cardiology, as well as ED return precautions were discussed and understanding was verbalized. All questions or concerns have been addressed. Patient was discharged home with an instructional sheet which gave not only information regarding the most likely diagnoses but also information regarding when to return to the emergency department for alarming symptoms and when to seek further care.      This case was discussed with Dr. Raygoza who is in agreement with my assessment and plan.     Lucie Rubio PA-C         Scribe Attestation:   Scribe #1: I performed the above scribed service and the documentation accurately describes the services I performed. I attest to the accuracy of the note.    Attending Attestation:           Physician Attestation for Scribe:  Physician Attestation Statement for Scribe #1: I, Lucie Rubio PA-C, reviewed documentation, as scribed by Jamie Workman in my presence, and it is both accurate and complete.                    Clinical Impression:   The primary encounter diagnosis was Dizzy. A diagnosis of Vasovagal reaction was also pertinent to this visit.      Disposition:   Disposition: Discharged  Condition: Stable                                Lucie Rubio PA-C  09/10/18 2002

## 2018-09-10 NOTE — DISCHARGE INSTRUCTIONS
Make sure you are drinking plenty of fluids.    Please follow-up with primary care and cardiology if your symptoms return or continue.    Return to the ER for any concerns.

## 2018-09-11 LAB
CARDIOLIPIN IGG SER IA-ACNC: <9.4 GPL
CARDIOLIPIN IGM SER IA-ACNC: <9.4 MPL
POCT GLUCOSE: 149 MG/DL (ref 70–110)

## 2018-09-13 LAB
APTT HEX PL PPP: NEGATIVE S
APTT PROTEIN C ACTIVATOR+FV DP/APTT PPP: 115 %
AT III ACT/NOR PPP CHRO: 124 %
PROT S ACT/NOR PPP: >130 %

## 2018-09-14 ENCOUNTER — OFFICE VISIT (OUTPATIENT)
Dept: FAMILY MEDICINE | Facility: CLINIC | Age: 63
End: 2018-09-14
Payer: MEDICAID

## 2018-09-14 VITALS
HEART RATE: 64 BPM | WEIGHT: 189.38 LBS | OXYGEN SATURATION: 97 % | BODY MASS INDEX: 28.05 KG/M2 | TEMPERATURE: 98 F | DIASTOLIC BLOOD PRESSURE: 76 MMHG | HEIGHT: 69 IN | SYSTOLIC BLOOD PRESSURE: 120 MMHG | RESPIRATION RATE: 12 BRPM

## 2018-09-14 DIAGNOSIS — M25.561 CHRONIC PAIN OF RIGHT KNEE: ICD-10-CM

## 2018-09-14 DIAGNOSIS — G89.29 CHRONIC PAIN OF RIGHT KNEE: ICD-10-CM

## 2018-09-14 DIAGNOSIS — R55 VASOVAGAL EPISODE: Primary | ICD-10-CM

## 2018-09-14 LAB
F2 GENE MUT ANL BLD/T: ABNORMAL
F5 P.R506Q BLD/T QL: NORMAL
LA PPP-IMP: NORMAL

## 2018-09-14 PROCEDURE — 99214 OFFICE O/P EST MOD 30 MIN: CPT | Mod: S$PBB,,, | Performed by: FAMILY MEDICINE

## 2018-09-14 PROCEDURE — 99999 PR PBB SHADOW E&M-EST. PATIENT-LVL III: CPT | Mod: PBBFAC,,, | Performed by: FAMILY MEDICINE

## 2018-09-14 PROCEDURE — 99213 OFFICE O/P EST LOW 20 MIN: CPT | Mod: PBBFAC,25,PN | Performed by: FAMILY MEDICINE

## 2018-09-14 NOTE — PROGRESS NOTES
Routine Office Visit    Patient Name: Carlos A Ruff    : 1955  MRN: [id number]    Subjective:  Carlos A is a 63 y.o. male who presents today for:    1. ER follow up   Patient presenting today for follow up after he was seen in the ED for dizziness post having blood drawn.  He states that symptoms have resolved and no recurrences.  He states that at the time he said he didn't need to go to the ED, but was told he had to.  He has no recurring history of dizziness or LOC    2.  Right knee pain  Patient has been having recurring right knee pain for several years.  He states that he has not had any localized trauma to the knee.  There is no redness or recurring swelling.  He would like to get xrays to see if he has arthritis in his knee.  He has tried NSAID therapy with some relief of the pain    Past Medical History  Past Medical History:   Diagnosis Date    Anticoagulant long-term use     Coronary artery disease     Hypercholesteremia     Hypertension     MI (myocardial infarction)        Past Surgical History  Past Surgical History:   Procedure Laterality Date    blood clots      cardiac stents      Left heart cath Left 2018    Performed by Alfred Cordero MD at [facility] CATH LAB       Family History  History reviewed. No pertinent family history.    Social History  Social History     Socioeconomic History    Marital status: Single     Spouse name: Not on file    Number of children: Not on file    Years of education: Not on file    Highest education level: Not on file   Social Needs    Financial resource strain: Not on file    Food insecurity - worry: Not on file    Food insecurity - inability: Not on file    Transportation needs - medical: Not on file    Transportation needs - non-medical: Not on file   Occupational History    Not on file   Tobacco Use    Smoking status: Current Some Day Smoker     Packs/day: 0.25     Types: Cigarettes    Smokeless tobacco: Never Used   Substance and Sexual  "Activity    Alcohol use: Yes     Comment: occasionally     Drug use: No    Sexual activity: Not Currently   Other Topics Concern    Not on file   Social History Narrative    Not on file       Current Medications  Current Outpatient Medications on File Prior to Visit   Medication Sig Dispense Refill    ascorbic acid, vitamin C, (VITAMIN C) 100 MG tablet Take 100 mg by mouth once daily.      atorvastatin (LIPITOR) 40 MG tablet Take 1 tablet (40 mg total) by mouth once daily. 90 tablet 1    ergocalciferol (VITAMIN D2) 50,000 unit Cap Take 50,000 Units by mouth every 7 days.      isosorbide mononitrate (IMDUR) 30 MG 24 hr tablet Take 1 tablet (30 mg total) by mouth once daily. 30 tablet 11    losartan-hydrochlorothiazide 50-12.5 mg (HYZAAR) 50-12.5 mg per tablet Take 1 tablet by mouth once daily. 90 tablet 3    nitroGLYCERIN (NITROSTAT) 0.4 MG SL tablet Place 0.4 mg under the tongue every 5 (five) minutes as needed for Chest pain.      tamsulosin (FLOMAX) 0.4 mg Cp24 Take 1 capsule (0.4 mg total) by mouth once daily. 30 capsule 11    XARELTO 15 mg Tab        No current facility-administered medications on file prior to visit.        Allergies   Review of patient's allergies indicates:  No Known Allergies    Review of Systems (Pertinent positives)  Review of Systems   Constitutional: Negative.    HENT: Negative.    Eyes: Negative.    Respiratory: Negative.    Cardiovascular: Negative.    Gastrointestinal: Negative.    Genitourinary: Negative.    Musculoskeletal: Positive for joint pain.   Skin: Negative.    Neurological: Negative.          /76 (BP Location: Right arm, Patient Position: Sitting, BP Method: Medium (Manual))   Pulse 64   Temp 98.3 °F (36.8 °C) (Oral)   Resp 12   Ht 5' 9" (1.753 m)   Wt 85.9 kg (189 lb 6 oz)   SpO2 97%   BMI 27.97 kg/m²     GENERAL APPEARANCE: in no apparent distress and well developed and well nourished  HEENT: PERRL, EOMI, Sclera clear, anicteric, Oropharynx " clear, no lesions, Neck supple with midline trachea  NECK: normal, supple, no adenopathy, thyroid normal in size  RESPIRATORY: appears well, vitals normal, no respiratory distress, acyanotic, normal RR, chest clear, no wheezing, crepitations, rhonchi, normal symmetric air entry  HEART: regular rate and rhythm, S1, S2 normal, no murmur, click, rub or gallop.    ABDOMEN: abdomen is soft without tenderness, no masses, no hernias, no organomegaly, no rebound, no guarding. Suprapubic tenderness absent. No CVA tenderness.  NEUROLOGIC: normal without focal findings, CN II-XII are intact.  r  SKIN: no rashes, no wounds, no other lesions  PSYCH: Alert, oriented x 3, thought content appropriate, speech normal, pleasant and cooperative, good eye contact, well groomed    Assessment/Plan:  Carlos A Ruff is a 63 y.o. male who presents today for :    Carlos A was seen today for dizziness.    Diagnoses and all orders for this visit:    Vasovagal episode    Chronic pain of right knee  -     X-Ray Knee 3 View Right; Future      1.  Vasovagal epsiode resolved.  Told to hydrate before getting blood work done in the future  2.  xrays ordered  3.  He is to call should knee pain persist  4.  Follow up 6 months or sooner if needed      Khalif Hassan MD

## 2018-09-24 ENCOUNTER — TELEPHONE (OUTPATIENT)
Dept: FAMILY MEDICINE | Facility: CLINIC | Age: 63
End: 2018-09-24

## 2018-09-24 ENCOUNTER — TELEPHONE (OUTPATIENT)
Dept: HEMATOLOGY/ONCOLOGY | Facility: CLINIC | Age: 63
End: 2018-09-24

## 2018-09-24 NOTE — TELEPHONE ENCOUNTER
I called the pt to reschedule his missed appointment. The pt did not answer and was un able to L/M

## 2018-09-24 NOTE — TELEPHONE ENCOUNTER
----- Message from Khalif Hassan MD sent at 9/17/2018  1:15 PM CDT -----  Please let patient know that he has mild arthritis in his knee, but nothing else showed up on xrays    Thanks,  Dr. Hassan

## 2018-09-26 ENCOUNTER — OFFICE VISIT (OUTPATIENT)
Dept: HEMATOLOGY/ONCOLOGY | Facility: CLINIC | Age: 63
End: 2018-09-26
Payer: COMMERCIAL

## 2018-09-26 VITALS
WEIGHT: 186.63 LBS | BODY MASS INDEX: 27.64 KG/M2 | OXYGEN SATURATION: 97 % | TEMPERATURE: 99 F | DIASTOLIC BLOOD PRESSURE: 90 MMHG | HEIGHT: 69 IN | HEART RATE: 62 BPM | SYSTOLIC BLOOD PRESSURE: 153 MMHG

## 2018-09-26 DIAGNOSIS — R76.0 LUPUS ANTICOAGULANT POSITIVE: ICD-10-CM

## 2018-09-26 DIAGNOSIS — D68.52 PROTHROMBIN GENE MUTATION: ICD-10-CM

## 2018-09-26 DIAGNOSIS — I82.401 RECURRENT ACUTE DEEP VEIN THROMBOSIS (DVT) OF RIGHT LOWER EXTREMITY: Primary | ICD-10-CM

## 2018-09-26 PROCEDURE — 99999 PR PBB SHADOW E&M-EST. PATIENT-LVL III: CPT | Mod: PBBFAC,,, | Performed by: INTERNAL MEDICINE

## 2018-09-26 PROCEDURE — 99213 OFFICE O/P EST LOW 20 MIN: CPT | Mod: PBBFAC | Performed by: INTERNAL MEDICINE

## 2018-09-26 PROCEDURE — 99213 OFFICE O/P EST LOW 20 MIN: CPT | Mod: S$GLB,,, | Performed by: INTERNAL MEDICINE

## 2018-09-26 NOTE — PROGRESS NOTES
Chief Complaint :   DVT    Hx of Present illness :  Patient is a 63 y.o. year old male who presents to the clinic today for   Oncology evaluation. No preceeding trauma, surgery  No inactivity.  Had DVT involving lower extremity about ten years ago. Took Blood thinner for about a year. Recently travelled by car to [place]. That is when he noticed Leg swelling and discomfort      Allergies :    Review of patient's allergies indicates:  No Known Allergies    Occupation :   [email or web address] Retired. Has worked at [employer].    Transfusion :  None.     Menstrual & obstetric Hx :  N/A      Present Meds :      Medication List           Accurate as of 9/26/18 10:26 AM. If you have any questions, ask your nurse or doctor.               CONTINUE taking these medications    atorvastatin 40 MG tablet  Commonly known as:  LIPITOR  Take 1 tablet (40 mg total) by mouth once daily.     isosorbide mononitrate 30 MG 24 hr tablet  Commonly known as:  IMDUR  Take 1 tablet (30 mg total) by mouth once daily.     losartan-hydrochlorothiazide 50-12.5 mg 50-12.5 mg per tablet  Commonly known as:  HYZAAR  Take 1 tablet by mouth once daily.     nitroGLYCERIN 0.4 MG SL tablet  Commonly known as:  NITROSTAT     tamsulosin 0.4 mg Cap  Commonly known as:  FLOMAX  Take 1 capsule (0.4 mg total) by mouth once daily.     VITAMIN C 100 MG tablet  Generic drug:  ascorbic acid (vitamin C)     VITAMIN D2 50,000 unit Cap  Generic drug:  ergocalciferol     XARELTO 15 mg Tab  Generic drug:  rivaroxaban            Past Medical Hx :  DVT; Hyperlipidemia, Hypertension. Coronary artery disease. Long term  Anticoagulant therapy.    Past Medical Hx :  Past Medical History:   Diagnosis Date    Anticoagulant long-term use     Coronary artery disease     Hypercholesteremia     Hypertension     MI (myocardial infarction)        Travel Hx :   N/A    Immunization :  Immunization History   Administered Date(s) Administered    Influenza - Quadrivalent - PF  11/29/2017       Family Hx :  No family history on file.    Social Hx :  Social History     Socioeconomic History    Marital status: Single     Spouse name: Not on file    Number of children: Not on file    Years of education: Not on file    Highest education level: Not on file   Social Needs    Financial resource strain: Not on file    Food insecurity - worry: Not on file    Food insecurity - inability: Not on file    Transportation needs - medical: Not on file    Transportation needs - non-medical: Not on file   Occupational History    Not on file   Tobacco Use    Smoking status: Current Some Day Smoker     Packs/day: 0.25     Types: Cigarettes    Smokeless tobacco: Never Used   Substance and Sexual Activity    Alcohol use: Yes     Comment: occasionally     Drug use: No    Sexual activity: Not Currently   Other Topics Concern    Not on file   Social History Narrative    Not on file       Surgery :   Coronary artery stent.   Has not had colonoscopy    Symptoms :    Review of Systems   Constitutional: Negative for chills, diaphoresis, fever, malaise/fatigue and weight loss.   HENT: Negative for congestion, ear discharge, ear pain, hearing loss, nosebleeds, sinus pain, sore throat and tinnitus.    Eyes: Negative for blurred vision, double vision, photophobia, pain, discharge and redness.   Respiratory: Negative for cough, hemoptysis, sputum production, shortness of breath, wheezing and stridor.    Cardiovascular: Negative for chest pain, palpitations, orthopnea, claudication, leg swelling and PND.   Gastrointestinal: Negative for abdominal pain, blood in stool, constipation, diarrhea, heartburn, melena, nausea and vomiting.   Genitourinary: Positive for frequency. Negative for dysuria, flank pain, hematuria and urgency.        BPH   Musculoskeletal: Positive for back pain. Negative for falls, joint pain, myalgias and neck pain.   Skin: Negative for itching and rash.   Neurological: Positive for  headaches. Negative for dizziness, tingling, tremors, sensory change, speech change, focal weakness, seizures, loss of consciousness and weakness.   Endo/Heme/Allergies: Negative for environmental allergies and polydipsia. Does not bruise/bleed easily.   Psychiatric/Behavioral: Negative for depression, hallucinations, memory loss, substance abuse and suicidal ideas. The patient is not nervous/anxious and does not have insomnia.        Physical Exam :   Physical Exam   Constitutional: He is oriented to person, place, and time and well-developed, well-nourished, and in no distress. Vital signs are normal. No distress.   HENT:   Head: Normocephalic and atraumatic.   Right Ear: External ear normal.   Left Ear: External ear normal.   Nose: Nose normal.   Mouth/Throat: Oropharynx is clear and moist. No oropharyngeal exudate.   Eyes: Conjunctivae, EOM and lids are normal. Pupils are equal, round, and reactive to light. Lids are everted and swept, no foreign bodies found. Right eye exhibits no discharge. Left eye exhibits no discharge. No scleral icterus.   Neck: Trachea normal, normal range of motion and full passive range of motion without pain. Neck supple. Normal carotid pulses, no hepatojugular reflux and no JVD present. No tracheal tenderness present. Carotid bruit is not present. No tracheal deviation present. No thyroid mass and no thyromegaly present.   Cardiovascular: Normal rate, regular rhythm, normal heart sounds, intact distal pulses and normal pulses. PMI is not displaced. Exam reveals no gallop and no friction rub.   No murmur heard.  Pulmonary/Chest: Effort normal and breath sounds normal. No stridor. No apnea. No respiratory distress. He has no wheezes. He has no rales. He exhibits no tenderness.   Abdominal: Soft. Normal appearance, normal aorta and bowel sounds are normal. He exhibits no distension. There is no hepatosplenomegaly. There is no tenderness. There is no rebound, no guarding and no CVA  tenderness. No hernia.   Genitourinary:   Genitourinary Comments: Not examined   Musculoskeletal: Normal range of motion. He exhibits no edema, tenderness or deformity.   Lymphadenopathy:        Head (right side): No submental, no submandibular, no tonsillar, no preauricular, no posterior auricular and no occipital adenopathy present.        Head (left side): No submental, no submandibular, no tonsillar, no preauricular, no posterior auricular and no occipital adenopathy present.     He has no cervical adenopathy.     He has no axillary adenopathy.        Right: No inguinal, no supraclavicular and no epitrochlear adenopathy present.        Left: No inguinal, no supraclavicular and no epitrochlear adenopathy present.   Neurological: He is alert and oriented to person, place, and time. He has normal motor skills, normal sensation, normal strength, normal reflexes and intact cranial nerves. No cranial nerve deficit. He exhibits normal muscle tone. Gait normal. Coordination normal. GCS score is 15.   Skin: Skin is warm, dry and intact. No rash noted. He is not diaphoretic. No cyanosis or erythema. No pallor. Nails show no clubbing.   Psychiatric: Memory, affect and judgment normal.   Nursing note and vitals reviewed.        Labs & Imaging :  08/20/18 : U/S Lower extremity : Near Occlusive thrombus  Right mid to dital Femoral vein, popliteal vein, anterior & posterior tibial veins and and peroneal veins.  U/S Abdomen in June 2018 : unremarkable.  09/10/18 b: Lupus anticoagulant positive; Hexagonal Phospholipid Neutralisation Negative. Leiden factor 5 normal Genotype. Prothrombin 2 L: heterozygous. Cardiolipin antibody and homocysteine normal. Protein C and protein s funcyional activity Normal.  Antithrombin 3 and fibrinogen Normal.    Dx :  Acute DVT right lower extremity. Positive lupus anticoagulant. Prothrombin 2 Gene : Heterozygous.      Assessment & Plan:  Discussed with patient. This is a second episode involving  same lower extremity.  Continue anticoagulation. Recheck Lupus anti coagulant & prothrombin gene mutation I 12 weeks. Re evaluate with results. Patient understands and verbalised.

## 2018-10-05 RX ORDER — RIVAROXABAN 15 MG/1
15 TABLET, FILM COATED ORAL 2 TIMES DAILY WITH MEALS
Qty: 60 TABLET | Refills: 3 | Status: SHIPPED | OUTPATIENT
Start: 2018-10-05 | End: 2018-10-10

## 2018-10-05 NOTE — TELEPHONE ENCOUNTER
----- Message from Jenna Caraballo sent at 10/5/2018  1:27 PM CDT -----  Contact: [phone]  Pt is checking on the status on a refill requesting for Xarelto. [facility] call...    [address] [address]  Phone: [phone] Fax: [phone]    Thanks.....Tatum

## 2018-10-09 ENCOUNTER — TELEPHONE (OUTPATIENT)
Dept: FAMILY MEDICINE | Facility: CLINIC | Age: 63
End: 2018-10-09

## 2018-10-09 NOTE — TELEPHONE ENCOUNTER
Spoke with pharmacy staff regarding rx Xarelto 15mg and needing to know how to take meds. Informed them that rx sent on 10/5/18 is for the pt to take 15mg twice (2) times daily with meals. They stated that pt said rx should be for 20mg. Reviewed providers note from last OV and no changes on meds.

## 2018-10-09 NOTE — TELEPHONE ENCOUNTER
----- Message from Jackie Nicolas sent at 10/9/2018 10:12 AM CDT -----  Contact: [facility] Pharmacist  Please verify dosage regarding XARELTO 15 mg Tab. Contact pharmacy at [phone]

## 2018-10-10 ENCOUNTER — TELEPHONE (OUTPATIENT)
Dept: FAMILY MEDICINE | Facility: CLINIC | Age: 63
End: 2018-10-10

## 2018-10-10 DIAGNOSIS — I21.9 MYOCARDIAL INFARCTION, UNSPECIFIED MI TYPE, UNSPECIFIED ARTERY: Primary | ICD-10-CM

## 2018-10-10 NOTE — TELEPHONE ENCOUNTER
----- Message from Cass Bah sent at 10/9/2018  3:09 PM CDT -----  Contact: [facility]  Is calling to speak with staff to go over pts notes regarding mediations . Please call [phone].

## 2018-10-10 NOTE — TELEPHONE ENCOUNTER
Spoke with Fidel re: refill on Xarelto. Directions are 15 mg 2x's a day with meals.     Denisr stated patient was started on Xarelto when in Aug while in the hospital. Usual dosage would be 20mg daily.  Fidel wants to know if Dr. Hassan would like to change the directions or keep it as is.

## 2018-10-16 ENCOUNTER — OFFICE VISIT (OUTPATIENT)
Dept: FAMILY MEDICINE | Facility: CLINIC | Age: 63
End: 2018-10-16
Payer: MEDICAID

## 2018-10-16 VITALS
RESPIRATION RATE: 17 BRPM | HEIGHT: 69 IN | WEIGHT: 185.63 LBS | BODY MASS INDEX: 27.49 KG/M2 | SYSTOLIC BLOOD PRESSURE: 134 MMHG | OXYGEN SATURATION: 97 % | HEART RATE: 69 BPM | TEMPERATURE: 99 F | DIASTOLIC BLOOD PRESSURE: 78 MMHG

## 2018-10-16 DIAGNOSIS — I82.4Z1 ACUTE DEEP VEIN THROMBOSIS (DVT) OF DISTAL VEIN OF RIGHT LOWER EXTREMITY: ICD-10-CM

## 2018-10-16 DIAGNOSIS — Z72.0 TOBACCO ABUSE: ICD-10-CM

## 2018-10-16 DIAGNOSIS — M79.89 FOOT SWELLING: Primary | ICD-10-CM

## 2018-10-16 PROCEDURE — 99213 OFFICE O/P EST LOW 20 MIN: CPT | Mod: S$PBB,,, | Performed by: INTERNAL MEDICINE

## 2018-10-16 PROCEDURE — 99999 PR PBB SHADOW E&M-EST. PATIENT-LVL IV: CPT | Mod: PBBFAC,,, | Performed by: INTERNAL MEDICINE

## 2018-10-16 PROCEDURE — 99214 OFFICE O/P EST MOD 30 MIN: CPT | Mod: PBBFAC,PN | Performed by: INTERNAL MEDICINE

## 2018-10-16 NOTE — PROGRESS NOTES
HISTORY OF PRESENT ILLNESS:  Carlos A Ruff is a 63 y.o. male who presents to the clinic today for Ankle Pain (right)    Here with swelling to left medial ankle developing over the last week and appears to be increasing.  Painful at times and currently 7/10 intensity.  No prior h/o gout.  No trauma or fall.    Patient smokes 1 pack over 4 days, smoked for 20 years.    Has been taking Xarelto 20 mg daily and denies missing any doses.    PAST MEDICAL HISTORY:  Past Medical History:   Diagnosis Date    Anticoagulant long-term use     Coronary artery disease     Hypercholesteremia     Hypertension     MI (myocardial infarction)        PAST SURGICAL HISTORY:  Past Surgical History:   Procedure Laterality Date    blood clots      cardiac stents      Left heart cath Left 5/17/2018    Performed by Alfred Cordero MD at [facility] CATH LAB       SOCIAL HISTORY:  Social History     Socioeconomic History    Marital status: Single     Spouse name: Not on file    Number of children: Not on file    Years of education: Not on file    Highest education level: Not on file   Social Needs    Financial resource strain: Not on file    Food insecurity - worry: Not on file    Food insecurity - inability: Not on file    Transportation needs - medical: Not on file    Transportation needs - non-medical: Not on file   Occupational History    Not on file   Tobacco Use    Smoking status: Current Some Day Smoker     Packs/day: 0.25     Types: Cigarettes    Smokeless tobacco: Never Used   Substance and Sexual Activity    Alcohol use: Yes     Comment: occasionally     Drug use: No    Sexual activity: Not Currently   Other Topics Concern    Not on file   Social History Narrative    Not on file       FAMILY HISTORY:  No family history on file.    ALLERGIES AND MEDICATIONS: updated and reviewed.  Review of patient's allergies indicates:  No Known Allergies     Medication List           Accurate as of 10/16/18  8:04 AM. If you have any  questions, ask your nurse or doctor.               CONTINUE taking these medications    atorvastatin 40 MG tablet  Commonly known as:  LIPITOR  Take 1 tablet (40 mg total) by mouth once daily.     isosorbide mononitrate 30 MG 24 hr tablet  Commonly known as:  IMDUR  Take 1 tablet (30 mg total) by mouth once daily.     losartan-hydrochlorothiazide 50-12.5 mg 50-12.5 mg per tablet  Commonly known as:  HYZAAR  Take 1 tablet by mouth once daily.     nitroGLYCERIN 0.4 MG SL tablet  Commonly known as:  NITROSTAT     rivaroxaban 20 mg Tab  Commonly known as:  XARELTO  Take 1 tablet (20 mg total) by mouth daily with dinner or evening meal.     tamsulosin 0.4 mg Cap  Commonly known as:  FLOMAX  Take 1 capsule (0.4 mg total) by mouth once daily.     VITAMIN C 100 MG tablet  Generic drug:  ascorbic acid (vitamin C)     VITAMIN D2 50,000 unit Cap  Generic drug:  ergocalciferol               CARE TEAM:  Patient Care Team:  Khalif Hassan MD as PCP - General (Family Medicine)         REVIEW OF SYSTEMS:  Review of Systems   Constitutional: Negative for chills, fatigue and fever.   Respiratory: Negative for cough, shortness of breath and wheezing.    Cardiovascular: Negative for chest pain and palpitations.   Gastrointestinal: Negative for nausea and vomiting.   Musculoskeletal: Arthralgias: right ankle. Joint swelling: right ankle swelling.   Skin: Negative for rash and wound.   Neurological: Negative for dizziness, syncope and light-headedness.     PHYSICAL EXAM:  Vitals:    10/16/18 1033   BP: 134/78   Pulse: 69   Resp: 17   Temp: 98.7 °F (37.1 °C)             Body mass index is 27.41 kg/m².    Physical Examination: General appearance - alert, well appearing, and in no distress and normal appearing weight  Mental status - normal mood, behavior, speech, dress, motor activity, and thought processes  Eyes - pupils equal and reactive, extraocular eye movements intact, sclera anicteric  Chest - clear to auscultation, no wheezes,  rales or rhonchi, symmetric air entry  Heart - normal rate and regular rhythm  Musculoskeletal - right ankle noted to be swollen, extension into foot; slightly reduced extension and plantar flexion of right ankle; no MTP swelling; bilateral DP and PT pulses intact; slight swelling of right calf compared to left calf.  Extremities - intact peripheral pulses     ASSESSMENT AND PLAN:  1. Acute deep vein thrombosis (DVT) of distal vein of right lower extremity  - Continuing Xarelto and reports compliance, follows with Dr. Layton.    2. Foot swelling  - PRN ibuprofen or Tylenol for pain  - X-Ray Ankle Complete Right; Future  - US Lower Extremity Veins Right; Future    3. Tobacco abuse  - Ambulatory referral to Smoking Cessation Program       Report to ED for any severe worsening of symptoms.  Follow up one month or sooner as needed.

## 2018-10-17 ENCOUNTER — HOSPITAL ENCOUNTER (OUTPATIENT)
Dept: RADIOLOGY | Facility: HOSPITAL | Age: 63
Discharge: HOME | End: 2018-10-17
Attending: INTERNAL MEDICINE
Payer: MEDICAID

## 2018-10-17 DIAGNOSIS — M79.89 FOOT SWELLING: ICD-10-CM

## 2018-10-17 PROCEDURE — 93971 EXTREMITY STUDY: CPT | Mod: 26,,, | Performed by: RADIOLOGY

## 2018-10-17 PROCEDURE — 93971 EXTREMITY STUDY: CPT | Mod: TC

## 2018-10-19 ENCOUNTER — TELEPHONE (OUTPATIENT)
Dept: FAMILY MEDICINE | Facility: CLINIC | Age: 63
End: 2018-10-19

## 2018-10-19 ENCOUNTER — APPOINTMENT (OUTPATIENT)
Dept: RADIOLOGY | Facility: HOSPITAL | Age: 63
End: 2018-10-19
Attending: INTERNAL MEDICINE
Payer: MEDICAID

## 2018-10-19 DIAGNOSIS — M79.89 FOOT SWELLING: ICD-10-CM

## 2018-10-19 PROCEDURE — 73610 X-RAY EXAM OF ANKLE: CPT | Mod: 26,RT,, | Performed by: RADIOLOGY

## 2018-10-19 PROCEDURE — 73610 X-RAY EXAM OF ANKLE: CPT | Mod: TC,FY,PN,RT

## 2018-10-19 NOTE — TELEPHONE ENCOUNTER
MD BRYSON Self Staff             Please call the patient regarding his abnormal result.  DVT still present though slightly less than prior imaging based on radiologist review.  Ankle Xray was ordered and not done - please schedule him for it.  If symptoms worsen, report to ED.

## 2018-10-22 ENCOUNTER — TELEPHONE (OUTPATIENT)
Dept: FAMILY MEDICINE | Facility: CLINIC | Age: 63
End: 2018-10-22

## 2018-10-22 NOTE — TELEPHONE ENCOUNTER
----- Message from Jose Bui MD sent at 10/22/2018  8:18 AM CDT -----  Please call the patient regarding his result.  No fracture or dislocation.

## 2018-10-24 ENCOUNTER — TELEPHONE (OUTPATIENT)
Dept: SMOKING CESSATION | Facility: CLINIC | Age: 63
End: 2018-10-24

## 2018-11-05 ENCOUNTER — PATIENT OUTREACH (OUTPATIENT)
Dept: ADMINISTRATIVE | Facility: HOSPITAL | Age: 63
End: 2018-11-05

## 2018-11-19 ENCOUNTER — OFFICE VISIT (OUTPATIENT)
Dept: FAMILY MEDICINE | Facility: CLINIC | Age: 63
End: 2018-11-19
Payer: MEDICAID

## 2018-11-19 VITALS
WEIGHT: 198.44 LBS | HEART RATE: 59 BPM | SYSTOLIC BLOOD PRESSURE: 138 MMHG | TEMPERATURE: 98 F | DIASTOLIC BLOOD PRESSURE: 86 MMHG | HEIGHT: 69 IN | RESPIRATION RATE: 12 BRPM | BODY MASS INDEX: 29.39 KG/M2 | OXYGEN SATURATION: 96 %

## 2018-11-19 DIAGNOSIS — I25.10 CORONARY ARTERY DISEASE INVOLVING NATIVE CORONARY ARTERY OF NATIVE HEART WITHOUT ANGINA PECTORIS: ICD-10-CM

## 2018-11-19 DIAGNOSIS — Z86.718 HISTORY OF DEEP VEIN THROMBOSIS (DVT) OF LOWER EXTREMITY: ICD-10-CM

## 2018-11-19 DIAGNOSIS — R60.0 EDEMA OF RIGHT LOWER EXTREMITY: Primary | ICD-10-CM

## 2018-11-19 PROCEDURE — 99214 OFFICE O/P EST MOD 30 MIN: CPT | Mod: PBBFAC,PN | Performed by: FAMILY MEDICINE

## 2018-11-19 PROCEDURE — 99999 PR PBB SHADOW E&M-EST. PATIENT-LVL IV: CPT | Mod: PBBFAC,,, | Performed by: FAMILY MEDICINE

## 2018-11-19 PROCEDURE — 99214 OFFICE O/P EST MOD 30 MIN: CPT | Mod: S$PBB,,, | Performed by: FAMILY MEDICINE

## 2018-11-19 RX ORDER — ATORVASTATIN CALCIUM 40 MG/1
40 TABLET, FILM COATED ORAL DAILY
COMMUNITY
Start: 2017-03-16 | End: 2020-07-10 | Stop reason: SDUPTHER

## 2018-11-19 RX ORDER — METOPROLOL SUCCINATE 25 MG/1
25 TABLET, EXTENDED RELEASE ORAL
COMMUNITY
Start: 2017-03-16 | End: 2019-02-13 | Stop reason: SDUPTHER

## 2018-11-19 RX ORDER — ASPIRIN 81 MG/1
81 TABLET ORAL DAILY
COMMUNITY
Start: 2017-03-16 | End: 2019-08-22

## 2018-11-19 RX ORDER — CLOPIDOGREL BISULFATE 75 MG/1
75 TABLET ORAL
Status: ON HOLD | COMMUNITY
Start: 2017-03-16 | End: 2019-04-26 | Stop reason: HOSPADM

## 2018-11-19 RX ORDER — LOSARTAN POTASSIUM AND HYDROCHLOROTHIAZIDE 12.5; 5 MG/1; MG/1
1 TABLET ORAL DAILY
Qty: 90 TABLET | Refills: 3 | Status: SHIPPED | OUTPATIENT
Start: 2018-11-19 | End: 2020-07-10 | Stop reason: SDUPTHER

## 2018-11-19 NOTE — PROGRESS NOTES
"Routine Office Visit    Patient Name: Carlos A Ruff    : 1955  MRN: [id number]    Subjective:  Carlos A is a 63 y.o. male who presents today for:    1. Right leg swelling  Patient presenting today with recurring RLE swelling.  There is no pain associated with the swelling.  He is concerned because of a history of blood clots.  No joint pain or erythema.  He is currently on Xarelto for DVT treatment after being diagnosed in August of this year.  At that time he was having a lot of pain behind the knee and on the back of the leg as well as swelling.  Now, no pain.  He states he was taken off Losartan/HCTZ by physician at [place] because his blood pressure was "fine".  He does continue to smoke, but has cut back.  He denies any chest pain or shortness of breath    Past Medical History  Past Medical History:   Diagnosis Date    Anticoagulant long-term use     Coronary artery disease     Hypercholesteremia     Hypertension     MI (myocardial infarction)        Past Surgical History  Past Surgical History:   Procedure Laterality Date    blood clots      cardiac stents      Left heart cath Left 2018    Performed by Alfred Cordero MD at [facility] CATH LAB       Family History  History reviewed. No pertinent family history.    Social History  Social History     Socioeconomic History    Marital status: Single     Spouse name: Not on file    Number of children: Not on file    Years of education: Not on file    Highest education level: Not on file   Social Needs    Financial resource strain: Not on file    Food insecurity - worry: Not on file    Food insecurity - inability: Not on file    Transportation needs - medical: Not on file    Transportation needs - non-medical: Not on file   Occupational History    Not on file   Tobacco Use    Smoking status: Current Some Day Smoker     Packs/day: 0.25     Types: Cigarettes    Smokeless tobacco: Never Used   Substance and Sexual Activity    Alcohol use: Yes     " Comment: occasionally     Drug use: No    Sexual activity: Not Currently   Other Topics Concern    Not on file   Social History Narrative    Not on file       Current Medications  Current Outpatient Medications on File Prior to Visit   Medication Sig Dispense Refill    ascorbic acid, vitamin C, (VITAMIN C) 100 MG tablet Take 100 mg by mouth once daily.      aspirin (ECOTRIN) 81 MG EC tablet Take 81 mg by mouth.      atorvastatin (LIPITOR) 40 MG tablet Take 40 mg by mouth.      clopidogrel (PLAVIX) 75 mg tablet Take 75 mg by mouth.      ergocalciferol (VITAMIN D2) 50,000 unit Cap Take 50,000 Units by mouth every 7 days.      isosorbide mononitrate (IMDUR) 30 MG 24 hr tablet Take 1 tablet (30 mg total) by mouth once daily. 30 tablet 11    metoprolol succinate (TOPROL-XL) 25 MG 24 hr tablet Take 25 mg by mouth.      nitroGLYCERIN (NITROSTAT) 0.4 MG SL tablet Place 0.4 mg under the tongue every 5 (five) minutes as needed for Chest pain.      rivaroxaban (XARELTO) 20 mg Tab Take 1 tablet (20 mg total) by mouth daily with dinner or evening meal. 30 tablet 3    tamsulosin (FLOMAX) 0.4 mg Cp24 Take 1 capsule (0.4 mg total) by mouth once daily. 30 capsule 11    [DISCONTINUED] losartan-hydrochlorothiazide 50-12.5 mg (HYZAAR) 50-12.5 mg per tablet Take 1 tablet by mouth once daily. 90 tablet 3    [DISCONTINUED] atorvastatin (LIPITOR) 40 MG tablet Take 1 tablet (40 mg total) by mouth once daily. 90 tablet 1     No current facility-administered medications on file prior to visit.        Allergies   Review of patient's allergies indicates:  No Known Allergies    Review of Systems (Pertinent positives)  Review of Systems   Constitutional: Negative.    HENT: Negative.    Eyes: Negative.    Respiratory: Negative for shortness of breath.    Cardiovascular: Positive for leg swelling. Negative for chest pain.   Gastrointestinal: Negative.    Musculoskeletal: Negative for back pain, myalgias and neck pain.   Skin:  "Negative.          /86   Pulse (!) 59   Temp 98.4 °F (36.9 °C) (Oral)   Resp 12   Ht 5' 9" (1.753 m)   Wt 90 kg (198 lb 6.6 oz)   SpO2 96%   BMI 29.30 kg/m²     GENERAL APPEARANCE: in no apparent distress and well developed and well nourished  HEENT: PERRLA, EOMI, Sclera clear, anicteric, Oropharynx clear, no lesions, Neck supple with midline trachea  NECK: normal, supple, no adenopathy, thyroid normal in size  RESPIRATORY: appears well, vitals normal, no respiratory distress, acyanotic, normal RR, chest clear, no wheezing, crepitations, rhonchi, normal symmetric air entry  HEART: regular rate and rhythm, S1, S2 normal, no murmur, click, rub or gallop.    ABDOMEN: abdomen is soft without tenderness, no masses, no hernias, no organomegaly, no rebound, no guarding. Suprapubic tenderness absent. No CVA tenderness.  NEUROLOGIC: normal without focal findings, CN II-XII are intact.   Extremities: warm/well perfused.  No abnormal hair patterns.  No clubbing, cyanosis; 2+ edema in RLE.  no muscular tenderness noted, full range of motion without pain.  no joint tenderness, deformity or swelling noted.   SKIN: no rashes, no wounds, no other lesions  PSYCH: Alert, oriented x 3, thought content appropriate, speech normal, pleasant and cooperative, good eye contact, well groomed    Assessment/Plan:  Carlos A Ruff is a 63 y.o. male who presents today for :    Carlos A was seen today for foot swelling.    Diagnoses and all orders for this visit:    Edema of right lower extremity  -     US Lower Extremity Veins Right; Future    Coronary artery disease involving native coronary artery of native heart without angina pectoris  -     losartan-hydrochlorothiazide 50-12.5 mg (HYZAAR) 50-12.5 mg per tablet; Take 1 tablet by mouth once daily.    History of deep vein thrombosis (DVT) of lower extremity  -     US Lower Extremity Veins Right; Future      1.  Resume losartan/hctz  2.  US reordered  3. Continue xarelto  4.  He is to go to " the ED for any pain in leg or increased swelling or any shortness of breath  5.  Patient to follow up in 4 weeks or sooner if needed      Khalif Hassan MD

## 2018-11-20 ENCOUNTER — HOSPITAL ENCOUNTER (OUTPATIENT)
Dept: RADIOLOGY | Facility: HOSPITAL | Age: 63
Discharge: HOME | End: 2018-11-20
Attending: FAMILY MEDICINE
Payer: MEDICAID

## 2018-11-20 DIAGNOSIS — Z86.718 HISTORY OF DEEP VEIN THROMBOSIS (DVT) OF LOWER EXTREMITY: ICD-10-CM

## 2018-11-20 DIAGNOSIS — R60.0 EDEMA OF RIGHT LOWER EXTREMITY: ICD-10-CM

## 2018-11-20 PROCEDURE — 93971 EXTREMITY STUDY: CPT | Mod: 26,,, | Performed by: RADIOLOGY

## 2018-11-20 PROCEDURE — 93971 EXTREMITY STUDY: CPT | Mod: TC

## 2018-11-21 DIAGNOSIS — I21.9 MYOCARDIAL INFARCTION, UNSPECIFIED MI TYPE, UNSPECIFIED ARTERY: ICD-10-CM

## 2018-11-21 NOTE — TELEPHONE ENCOUNTER
Notified patient, per Dr. Hassan, 20mg Xarelto is the recommended dose for him.     Pt. Needs refill on Xarelto

## 2018-11-21 NOTE — TELEPHONE ENCOUNTER
----- Message from Khalif Hassan MD sent at 11/21/2018  7:56 AM CST -----  Contact: self - [phone]  Please find out who Dr. Miranda is    Thanks  Dr. Hassan     ----- Message -----  From: Amber Benson MA  Sent: 11/20/2018   2:56 PM  To: Khalif Hassan MD        ----- Message -----  From: Deepika Jain  Sent: 11/20/2018   2:26 PM  To: Sonya Cuadra Staff    Pt states Dr. Miranda is suggesting that --- rivaroxaban (XARELTO) 20 mg Tab--- is increased to help his swelling go down. He is asking for a call back to discuss.

## 2018-12-14 DIAGNOSIS — R39.15 URINARY URGENCY: ICD-10-CM

## 2018-12-14 RX ORDER — TAMSULOSIN HYDROCHLORIDE 0.4 MG/1
0.4 CAPSULE ORAL DAILY
Qty: 30 CAPSULE | Refills: 11 | Status: SHIPPED | OUTPATIENT
Start: 2018-12-14 | End: 2019-02-06 | Stop reason: SDUPTHER

## 2018-12-14 NOTE — TELEPHONE ENCOUNTER
----- Message from Ambika Jackman sent at 12/14/2018  1:12 PM CST -----  Contact: Ar [phone]  REFILL: tamsulosin (FLOMAX) 0.4 mg Cp24    PHARMACY: [address] [facility]

## 2018-12-19 ENCOUNTER — OFFICE VISIT (OUTPATIENT)
Dept: FAMILY MEDICINE | Facility: CLINIC | Age: 63
End: 2018-12-19
Payer: MEDICAID

## 2018-12-19 VITALS
OXYGEN SATURATION: 98 % | BODY MASS INDEX: 28.15 KG/M2 | RESPIRATION RATE: 16 BRPM | SYSTOLIC BLOOD PRESSURE: 132 MMHG | DIASTOLIC BLOOD PRESSURE: 82 MMHG | HEIGHT: 69 IN | HEART RATE: 58 BPM | WEIGHT: 190.06 LBS | TEMPERATURE: 98 F

## 2018-12-19 DIAGNOSIS — Z23 IMMUNIZATION DUE: ICD-10-CM

## 2018-12-19 DIAGNOSIS — Z12.11 COLON CANCER SCREENING: ICD-10-CM

## 2018-12-19 DIAGNOSIS — R07.89 CHEST PAIN, NON-CARDIAC: Primary | ICD-10-CM

## 2018-12-19 DIAGNOSIS — Z11.59 ENCOUNTER FOR HEPATITIS C SCREENING TEST FOR LOW RISK PATIENT: ICD-10-CM

## 2018-12-19 PROCEDURE — 90471 IMMUNIZATION ADMIN: CPT | Mod: PBBFAC,PN

## 2018-12-19 PROCEDURE — 99213 OFFICE O/P EST LOW 20 MIN: CPT | Mod: PBBFAC,PN,25 | Performed by: FAMILY MEDICINE

## 2018-12-19 PROCEDURE — 90732 PPSV23 VACC 2 YRS+ SUBQ/IM: CPT | Mod: PBBFAC,PN

## 2018-12-19 PROCEDURE — 99999 PR PBB SHADOW E&M-EST. PATIENT-LVL III: CPT | Mod: PBBFAC,,, | Performed by: FAMILY MEDICINE

## 2018-12-19 PROCEDURE — 99214 OFFICE O/P EST MOD 30 MIN: CPT | Mod: S$PBB,,, | Performed by: FAMILY MEDICINE

## 2018-12-19 NOTE — PROGRESS NOTES
Patient tolerated vaccination (s) well. VIS given to patient. Patient instructed to wait 15 min before leaving. Patient verbalized understanding.

## 2018-12-26 ENCOUNTER — TELEPHONE (OUTPATIENT)
Dept: HEMATOLOGY/ONCOLOGY | Facility: CLINIC | Age: 63
End: 2018-12-26

## 2018-12-26 NOTE — TELEPHONE ENCOUNTER
Called the patient about his missed lab work and his follow up appointment. The patient did not answer and I was unable to L/M

## 2018-12-28 NOTE — PROGRESS NOTES
Routine Office Visit    Patient Name: Carlos A Ruff    : 1955  MRN: [id number]    Subjective:  Carlos A is a 63 y.o. male who presents today for:    1. Chest wall pain  Patient presenting today with recurring left sided chest pain that is worsened with movement.  He describes the pain as a muscle spasm and is worsened when pressure is applied.  No shortness of breath, weakness, or sweating.  He is followed by cardiology for CAD/HTN and hx of MI.  He states that he wanted to make sure nothing else was going on.  He was seen for this before and diagnosed with chest wall pain.     Past Medical History  Past Medical History:   Diagnosis Date    Anticoagulant long-term use     Coronary artery disease     Hypercholesteremia     Hypertension     MI (myocardial infarction)        Past Surgical History  Past Surgical History:   Procedure Laterality Date    blood clots      cardiac stents      Left heart cath Left 2018    Performed by Alfred Cordero MD at [facility] CATH LAB       Family History  History reviewed. No pertinent family history.    Social History  Social History     Socioeconomic History    Marital status: Single     Spouse name: Not on file    Number of children: Not on file    Years of education: Not on file    Highest education level: Not on file   Social Needs    Financial resource strain: Not on file    Food insecurity - worry: Not on file    Food insecurity - inability: Not on file    Transportation needs - medical: Not on file    Transportation needs - non-medical: Not on file   Occupational History    Not on file   Tobacco Use    Smoking status: Current Some Day Smoker     Packs/day: 0.25     Types: Cigarettes    Smokeless tobacco: Never Used   Substance and Sexual Activity    Alcohol use: Yes     Comment: occasionally     Drug use: No    Sexual activity: Not Currently   Other Topics Concern    Not on file   Social History Narrative    Not on file       Current  Medications  Current Outpatient Medications on File Prior to Visit   Medication Sig Dispense Refill    ascorbic acid, vitamin C, (VITAMIN C) 100 MG tablet Take 100 mg by mouth once daily.      aspirin (ECOTRIN) 81 MG EC tablet Take 81 mg by mouth.      atorvastatin (LIPITOR) 40 MG tablet Take 40 mg by mouth.      ergocalciferol (VITAMIN D2) 50,000 unit Cap Take 50,000 Units by mouth every 7 days.      isosorbide mononitrate (IMDUR) 30 MG 24 hr tablet Take 1 tablet (30 mg total) by mouth once daily. 30 tablet 11    losartan-hydrochlorothiazide 50-12.5 mg (HYZAAR) 50-12.5 mg per tablet Take 1 tablet by mouth once daily. 90 tablet 3    metoprolol succinate (TOPROL-XL) 25 MG 24 hr tablet Take 25 mg by mouth.      nitroGLYCERIN (NITROSTAT) 0.4 MG SL tablet Place 0.4 mg under the tongue every 5 (five) minutes as needed for Chest pain.      rivaroxaban (XARELTO) 20 mg Tab Take 1 tablet (20 mg total) by mouth daily with dinner or evening meal. 30 tablet 3    tamsulosin (FLOMAX) 0.4 mg Cap Take 1 capsule (0.4 mg total) by mouth once daily. 30 capsule 11    clopidogrel (PLAVIX) 75 mg tablet Take 75 mg by mouth.       No current facility-administered medications on file prior to visit.        Allergies   Review of patient's allergies indicates:  No Known Allergies    Review of Systems (Pertinent positives)  Review of Systems   Constitutional: Negative for chills, fever and weight loss.   HENT: Negative for congestion and sore throat.    Eyes: Negative for blurred vision and double vision.   Respiratory: Negative for cough and shortness of breath.    Cardiovascular: Positive for chest pain. Negative for palpitations, claudication and leg swelling.   Gastrointestinal: Negative for nausea and vomiting.   Musculoskeletal: Positive for myalgias.   Skin: Negative for itching and rash.         /82 (BP Location: Left arm, Patient Position: Sitting, BP Method: Medium (Manual))   Pulse (!) 58   Temp 98.3 °F (36.8 °C)  "(Oral)   Resp 16   Ht 5' 9" (1.753 m)   Wt 86.2 kg (190 lb 0.6 oz)   SpO2 98%   BMI 28.06 kg/m²     GENERAL APPEARANCE: in no apparent distress and well developed and well nourished  HEENT: PERRL, EOMI, Sclera clear, anicteric, Oropharynx clear, no lesions, Neck supple with midline trachea  NECK: normal, supple, no adenopathy, thyroid normal in size  RESPIRATORY: appears well, vitals normal, no respiratory distress, acyanotic, normal RR, chest clear, no wheezing, crepitations, rhonchi, normal symmetric air entry  HEART: regular rate and rhythm, S1, S2 normal, no murmur, click, rub or gallop.    ABDOMEN: abdomen is soft without tenderness, no masses, no hernias, no organomegaly, no rebound, no guarding. Suprapubic tenderness absent. No CVA tenderness.  MS:  Pain on palpation of left chest wall  SKIN: no rashes, no wounds, no other lesions  PSYCH: Alert, oriented x 3, thought content appropriate, speech normal, pleasant and cooperative, good eye contact, well groomed    Assessment/Plan:  Carlos A Ruff is a 63 y.o. male who presents today for :    Carlos A was seen today for chest pains.    Diagnoses and all orders for this visit:    Chest pain, non-cardiac    Immunization due  -     Influenza - Quadrivalent (3 years & older) (PF)  -     (In Office Administered) Pneumococcal Polysaccharide Vaccine (23 Valent) (SQ/IM)    Encounter for hepatitis C screening test for low risk patient  -     Hepatitis C antibody; Future    Colon cancer screening  -     Case request GI: COLONOSCOPY      1.  Chest pain reproducible, so not cardiac  2.  Health maintenance reviewed and he is to have labs and colonosocpy done ASAp  3.  Follow up with cardiology as scheduled  4.  Call with any concerns  5.  He is to go to the ED for any concerning chest pain      Khalif Hassan MD    "

## 2019-01-31 ENCOUNTER — TELEPHONE (OUTPATIENT)
Dept: FAMILY MEDICINE | Facility: CLINIC | Age: 64
End: 2019-01-31

## 2019-01-31 DIAGNOSIS — Z12.11 COLON CANCER SCREENING: Primary | ICD-10-CM

## 2019-01-31 NOTE — TELEPHONE ENCOUNTER
Pt calls for advice. He is scheduled for colonoscopy tomorrow and his prep kit has not been called into his pharmacy. Advised pt to contact the gastroenterologist office and have them send in the Rx. Pt verbalizes understanding.

## 2019-02-01 ENCOUNTER — ANESTHESIA (OUTPATIENT)
Dept: ENDOSCOPY | Facility: HOSPITAL | Age: 64
End: 2019-02-01
Payer: MEDICAID

## 2019-02-01 ENCOUNTER — ANESTHESIA EVENT (OUTPATIENT)
Dept: ENDOSCOPY | Facility: HOSPITAL | Age: 64
End: 2019-02-01
Payer: MEDICAID

## 2019-02-01 ENCOUNTER — HOSPITAL ENCOUNTER (OUTPATIENT)
Facility: HOSPITAL | Age: 64
Discharge: HOME | End: 2019-02-01
Attending: INTERNAL MEDICINE | Admitting: INTERNAL MEDICINE
Payer: MEDICAID

## 2019-02-01 VITALS
SYSTOLIC BLOOD PRESSURE: 161 MMHG | DIASTOLIC BLOOD PRESSURE: 84 MMHG | HEART RATE: 70 BPM | BODY MASS INDEX: 25.48 KG/M2 | TEMPERATURE: 98 F | OXYGEN SATURATION: 100 % | WEIGHT: 182 LBS | HEIGHT: 71 IN | RESPIRATION RATE: 18 BRPM

## 2019-02-01 DIAGNOSIS — Z12.11 SCREEN FOR COLON CANCER: ICD-10-CM

## 2019-02-01 PROCEDURE — 27201089 HC SNARE, DISP (ANY): Performed by: INTERNAL MEDICINE

## 2019-02-01 PROCEDURE — D9220A PRA ANESTHESIA: Mod: ANES,,, | Performed by: ANESTHESIOLOGY

## 2019-02-01 PROCEDURE — D9220A PRA ANESTHESIA: Mod: CRNA,,, | Performed by: REGISTERED NURSE

## 2019-02-01 PROCEDURE — 88305 TISSUE EXAM BY PATHOLOGIST: CPT | Performed by: PATHOLOGY

## 2019-02-01 PROCEDURE — 45385 COLONOSCOPY W/LESION REMOVAL: CPT | Mod: ,,, | Performed by: INTERNAL MEDICINE

## 2019-02-01 PROCEDURE — 45385 COLONOSCOPY W/LESION REMOVAL: CPT | Performed by: INTERNAL MEDICINE

## 2019-02-01 PROCEDURE — 37000009 HC ANESTHESIA EA ADD 15 MINS: Performed by: INTERNAL MEDICINE

## 2019-02-01 PROCEDURE — D9220A PRA ANESTHESIA: ICD-10-PCS | Mod: CRNA,,, | Performed by: REGISTERED NURSE

## 2019-02-01 PROCEDURE — 45385 PR COLONOSCOPY,REMV LESN,SNARE: ICD-10-PCS | Mod: ,,, | Performed by: INTERNAL MEDICINE

## 2019-02-01 PROCEDURE — 88305 TISSUE EXAM BY PATHOLOGIST: CPT | Mod: 26,,, | Performed by: PATHOLOGY

## 2019-02-01 PROCEDURE — D9220A PRA ANESTHESIA: ICD-10-PCS | Mod: ANES,,, | Performed by: ANESTHESIOLOGY

## 2019-02-01 PROCEDURE — 63600175 PHARM REV CODE 636 W HCPCS: Performed by: REGISTERED NURSE

## 2019-02-01 PROCEDURE — 88305 TISSUE SPECIMEN TO PATHOLOGY - SURGERY: ICD-10-PCS | Mod: 26,,, | Performed by: PATHOLOGY

## 2019-02-01 PROCEDURE — 25000003 PHARM REV CODE 250: Performed by: ANESTHESIOLOGY

## 2019-02-01 PROCEDURE — 00811 ANES LWR INTST NDSC NOS: CPT | Performed by: INTERNAL MEDICINE

## 2019-02-01 PROCEDURE — 37000008 HC ANESTHESIA 1ST 15 MINUTES: Performed by: INTERNAL MEDICINE

## 2019-02-01 RX ORDER — SODIUM CHLORIDE 9 MG/ML
INJECTION, SOLUTION INTRAVENOUS CONTINUOUS
Status: DISCONTINUED | OUTPATIENT
Start: 2019-02-01 | End: 2019-02-01 | Stop reason: HOSPADM

## 2019-02-01 RX ORDER — PROPOFOL 10 MG/ML
VIAL (ML) INTRAVENOUS
Status: COMPLETED
Start: 2019-02-01 | End: 2019-02-01

## 2019-02-01 RX ORDER — LIDOCAINE HYDROCHLORIDE 20 MG/ML
INJECTION, SOLUTION EPIDURAL; INFILTRATION; INTRACAUDAL; PERINEURAL
Status: DISCONTINUED
Start: 2019-02-01 | End: 2019-02-01 | Stop reason: HOSPADM

## 2019-02-01 RX ORDER — PROPOFOL 10 MG/ML
VIAL (ML) INTRAVENOUS
Status: DISCONTINUED | OUTPATIENT
Start: 2019-02-01 | End: 2019-02-01

## 2019-02-01 RX ORDER — LIDOCAINE HCL/PF 100 MG/5ML
SYRINGE (ML) INTRAVENOUS
Status: DISCONTINUED | OUTPATIENT
Start: 2019-02-01 | End: 2019-02-01

## 2019-02-01 RX ADMIN — PROPOFOL 20 MG: 10 INJECTION, EMULSION INTRAVENOUS at 09:02

## 2019-02-01 RX ADMIN — PROPOFOL 40 MG: 10 INJECTION, EMULSION INTRAVENOUS at 09:02

## 2019-02-01 RX ADMIN — SODIUM CHLORIDE: 0.9 INJECTION, SOLUTION INTRAVENOUS at 08:02

## 2019-02-01 RX ADMIN — PROPOFOL 30 MG: 10 INJECTION, EMULSION INTRAVENOUS at 09:02

## 2019-02-01 RX ADMIN — PROPOFOL 50 MG: 10 INJECTION, EMULSION INTRAVENOUS at 09:02

## 2019-02-01 RX ADMIN — LIDOCAINE HYDROCHLORIDE 100 MG: 20 INJECTION, SOLUTION INTRAVENOUS at 08:02

## 2019-02-01 RX ADMIN — PROPOFOL 50 MG: 10 INJECTION, EMULSION INTRAVENOUS at 08:02

## 2019-02-01 NOTE — ANESTHESIA POSTPROCEDURE EVALUATION
"Anesthesia Post Evaluation    Patient: Carlos A Ruff    Procedure(s) Performed: Procedure(s) (LRB):  COLONOSCOPY (N/A)    Final Anesthesia Type: general  Patient location during evaluation: PACU  Patient participation: Yes- Able to Participate  Level of consciousness: awake and alert and oriented  Post-procedure vital signs: reviewed and stable  Pain management: adequate  Airway patency: patent  PONV status at discharge: No PONV  Anesthetic complications: no      Cardiovascular status: blood pressure returned to baseline  Respiratory status: unassisted  Hydration status: euvolemic  Follow-up not needed.        Visit Vitals  BP (!) 161/84 (BP Location: Left arm, Patient Position: Lying)   Pulse 70   Temp 36.7 °C (98 °F) (Oral)   Resp 18   Ht 5' 11" (1.803 m)   Wt 82.6 kg (182 lb)   SpO2 100%   BMI 25.38 kg/m²       Pain/Erin Score: Erin Score: 10 (2/1/2019  9:47 AM)        "

## 2019-02-01 NOTE — ANESTHESIA PREPROCEDURE EVALUATION
02/01/2019  Carlos A Ruff is a 63 y.o., male.    Anesthesia Evaluation    I have reviewed the Patient Summary Reports.    I have reviewed the Nursing Notes.   I have reviewed the Medications.     Review of Systems  Anesthesia Hx:  History of prior surgery of interest to airway management or planning: Denies Family Hx of Anesthesia complications.   Denies Personal Hx of Anesthesia complications.   Hematology/Oncology:  Hematology Normal   Oncology Normal     EENT/Dental:EENT/Dental Normal   Cardiovascular:   Hypertension Past MI CAD      Pulmonary:  Pulmonary Normal    Renal/:  Renal/ Normal     Hepatic/GI:  Hepatic/GI Normal    Musculoskeletal:  Musculoskeletal Normal    Neurological:  Neurology Normal    Endocrine:  Endocrine Normal    Dermatological:  Skin Normal    Psych:  Psychiatric Normal           Physical Exam  General:  Well nourished    Airway/Jaw/Neck:  Airway Findings: Mouth Opening: Normal Tongue: Normal  General Airway Assessment: Adult  Mallampati: III  Improves to II with phonation.  TM Distance: Normal, at least 6 cm     Eyes/Ears/Nose:  EYES/EARS/NOSE FINDINGS: Normal    Chest/Lungs:  Chest/Lungs Clear    Heart/Vascular:  Heart Findings: Normal       Mental Status:  Mental Status Findings: Normal        Anesthesia Plan  Type of Anesthesia, risks & benefits discussed:  Anesthesia Type:  general  Patient's Preference:   Intra-op Monitoring Plan: standard ASA monitors  Intra-op Monitoring Plan Comments:   Post Op Pain Control Plan: multimodal analgesia and per primary service following discharge from PACU  Post Op Pain Control Plan Comments:   Induction:   IV  Beta Blocker:  Patient is on a Beta-Blocker and has received one dose within the past 24 hours (No further documentation required).       Informed Consent: Patient understands risks and agrees with Anesthesia plan.  Questions answered.  Anesthesia consent signed with patient.  ASA Score: 3     Day of Surgery Review of History & Physical:            Ready For Surgery From Anesthesia Perspective.

## 2019-02-01 NOTE — PROVATION PATIENT INSTRUCTIONS
Discharge Summary/Instructions after an Endoscopic Procedure  Patient Name: Carlos A Ruff  Patient MRN: [id number]  Patient YOB: 1955 Friday, February 01, 2019  Blanca Fenton MD  RESTRICTIONS:  During your procedure today, you received medications for sedation.  These   medications may affect your judgment, balance and coordination.  Therefore,   for 24 hours, you have the following restrictions:   - DO NOT drive a car, operate machinery, make legal/financial decisions,   sign important papers or drink alcohol.    ACTIVITY:  Today: no heavy lifting, straining or running due to procedural   sedation/anesthesia.  The following day: return to full activity including work.  DIET:  Eat and drink normally unless instructed otherwise.     TREATMENT FOR COMMON SIDE EFFECTS:  - Mild abdominal pain, nausea, belching, bloating or excessive gas:  rest,   eat lightly and use a heating pad.  - Sore Throat: treat with throat lozenges and/or gargle with warm salt   water.  - Because air was used during the procedure, expelling large amounts of air   from your rectum or belching is normal.  - If a bowel prep was taken, you may not have a bowel movement for 1-3 days.    This is normal.  SYMPTOMS TO WATCH FOR AND REPORT TO YOUR PHYSICIAN:  1. Abdominal pain or bloating, other than gas cramps.  2. Chest pain.  3. Back pain.  4. Signs of infection such as: chills or fever occurring within 24 hours   after the procedure.  5. Rectal bleeding, which would show as bright red, maroon, or black stools.   (A tablespoon of blood from the rectum is not serious, especially if   hemorrhoids are present.)  6. Vomiting.  7. Weakness or dizziness.  GO DIRECTLY TO THE NEAREST EMERGENCY ROOM IF YOU HAVE ANY OF THE FOLLOWING:      Difficulty breathing              Chills and/or fever over 101 F   Persistent vomiting and/or vomiting blood   Severe abdominal pain   Severe chest pain   Black, tarry stools   Bleeding- more than one tablespoon   Any  other symptom or condition that you feel may need urgent attention  Your doctor recommends these additional instructions:  If any biopsies were taken, your doctors clinic will contact you in 1 to 2   weeks with any results.  - Patient has a contact number available for emergencies.  The signs and   symptoms of potential delayed complications were discussed with the   patient.  Return to normal activities tomorrow.  Written discharge   instructions were provided to the patient.   - Discharge patient to home.   - Resume previous diet today.   - Continue present medications.   - Await pathology results.   - Repeat colonoscopy in 5 years for surveillance.   - Return to primary care physician in 1 month.   - The findings and recommendations were discussed with the patient and their   family.   - Resume Xarelto (rivaroxaban) at prior dose today.  For questions, problems or results please call your physician - Blanca Fenton MD at Work:  [phone].  [facility] can be reached at [phone]     IF A COMPLICATION OR EMERGENCY SITUATION ARISES AND YOU ARE UNABLE TO REACH   YOUR PHYSICIAN - GO DIRECTLY TO THE EMERGENCY ROOM.  Blanca Fenton MD  2/1/2019 9:29:44 AM  This report has been verified and signed electronically.  [employer]ATION

## 2019-02-01 NOTE — H&P
Endoscopy Pre-Procedure H&P    Reason for Procedure: screening colonoscopy    HPI:  Pt is a 63 y.o. male who presents for screening colonoscopy.  No family history and no GI symptoms.    Past Medical History:   Diagnosis Date    Anticoagulant long-term use     Coronary artery disease     Hypercholesteremia     Hypertension     MI (myocardial infarction)        Past Surgical History:   Procedure Laterality Date    blood clots      cardiac stents      Left heart cath Left 5/17/2018    Performed by Alfred Cordero MD at [facility] CATH LAB       History reviewed. No pertinent family history.    Social History     Socioeconomic History    Marital status: Single     Spouse name: Not on file    Number of children: Not on file    Years of education: Not on file    Highest education level: Not on file   Social Needs    Financial resource strain: Not on file    Food insecurity - worry: Not on file    Food insecurity - inability: Not on file    Transportation needs - medical: Not on file    Transportation needs - non-medical: Not on file   Occupational History    Not on file   Tobacco Use    Smoking status: Current Some Day Smoker     Packs/day: 0.25     Types: Cigarettes    Smokeless tobacco: Never Used   Substance and Sexual Activity    Alcohol use: Yes     Comment: occasionally     Drug use: No    Sexual activity: Not Currently   Other Topics Concern    Not on file   Social History Narrative    Not on file       Review of patient's allergies indicates:  No Known Allergies    No current facility-administered medications on file prior to encounter.      Current Outpatient Medications on File Prior to Encounter   Medication Sig Dispense Refill    ascorbic acid, vitamin C, (VITAMIN C) 100 MG tablet Take 100 mg by mouth once daily.      aspirin (ECOTRIN) 81 MG EC tablet Take 81 mg by mouth.      atorvastatin (LIPITOR) 40 MG tablet Take 40 mg by mouth.      ergocalciferol (VITAMIN D2) 50,000 unit Cap Take  "50,000 Units by mouth every 7 days.      isosorbide mononitrate (IMDUR) 30 MG 24 hr tablet Take 1 tablet (30 mg total) by mouth once daily. 30 tablet 11    losartan-hydrochlorothiazide 50-12.5 mg (HYZAAR) 50-12.5 mg per tablet Take 1 tablet by mouth once daily. 90 tablet 3    metoprolol succinate (TOPROL-XL) 25 MG 24 hr tablet Take 25 mg by mouth.      tamsulosin (FLOMAX) 0.4 mg Cap Take 1 capsule (0.4 mg total) by mouth once daily. 30 capsule 11    clopidogrel (PLAVIX) 75 mg tablet Take 75 mg by mouth.      nitroGLYCERIN (NITROSTAT) 0.4 MG SL tablet Place 0.4 mg under the tongue every 5 (five) minutes as needed for Chest pain.      rivaroxaban (XARELTO) 20 mg Tab Take 1 tablet (20 mg total) by mouth daily with dinner or evening meal. 30 tablet 3       ROS: Negative x 10    Patient Vitals for the past 24 hrs:   BP Temp Temp src Pulse Resp SpO2 Height Weight   02/01/19 0842 (!) 154/84 98.3 °F (36.8 °C) Oral 61 18 97 % 5' 11" (1.803 m) 82.6 kg (182 lb)     Gen: Well developed, well nourished, no apparent distress  HEENT: Anicteric, PERRL, MMM  CV: Regular rate and rhythm, no murmurs   Lungs: CTAB, no increased work of breathing  Abd: Soft, NT, ND, normal BS, no HSM  Ext: No C/C/E  Neuro: Alert and oriented to person, place, time; no weakness  Skin: No rashes/lesions  Psych: Normal mood and affect    Assessment:  Carlos A Ruff is a 63 y.o. male who presents for first screening colonoscopy.    Recommendations:  1. Colonoscopy  2. F/U with PCP     Blanca Fenton MD    "

## 2019-02-01 NOTE — TRANSFER OF CARE
"Anesthesia Transfer of Care Note    Patient: Carlos A Ruff    Procedure(s) Performed: Procedure(s) (LRB):  COLONOSCOPY (N/A)    Patient location: GI    Anesthesia Type: general    Transport from OR: Transported from OR on room air with adequate spontaneous ventilation    Post pain: adequate analgesia    Post assessment: no apparent anesthetic complications and tolerated procedure well    Post vital signs: stable    Level of consciousness: awake and alert    Nausea/Vomiting: no nausea/vomiting    Complications: none    Transfer of care protocol was followed      Last vitals:   Visit Vitals  /82   Pulse 74   Temp 36.7 °C (98.1 °F) (Oral)   Resp 20   Ht 5' 11" (1.803 m)   Wt 82.6 kg (182 lb)   SpO2 98%   BMI 25.38 kg/m²     "

## 2019-02-06 DIAGNOSIS — I21.9 MYOCARDIAL INFARCTION, UNSPECIFIED MI TYPE, UNSPECIFIED ARTERY: ICD-10-CM

## 2019-02-06 DIAGNOSIS — R39.15 URINARY URGENCY: ICD-10-CM

## 2019-02-06 RX ORDER — TAMSULOSIN HYDROCHLORIDE 0.4 MG/1
0.4 CAPSULE ORAL DAILY
Qty: 30 CAPSULE | Refills: 11 | Status: SHIPPED | OUTPATIENT
Start: 2019-02-06 | End: 2019-04-09 | Stop reason: SDUPTHER

## 2019-02-06 NOTE — TELEPHONE ENCOUNTER
----- Message from Sharee Mcleod sent at 2/5/2019  4:15 PM CST -----  Contact: pt  Can the clinic reply in [facility]:       Please refill the medication(s) listed below. The patient can be reached at this phone number (_[phone]____) once it is called into the pharmacy.      Medication #1tamsulosin (FLOMAX) 0.4 mg Cap       Medication #2rivaroxaban (XARELTO) 20 mg Tab       Pref[facility]

## 2019-02-07 ENCOUNTER — HOSPITAL ENCOUNTER (EMERGENCY)
Facility: HOSPITAL | Age: 64
Discharge: HOME | End: 2019-02-07
Attending: EMERGENCY MEDICINE
Payer: MEDICAID

## 2019-02-07 VITALS
RESPIRATION RATE: 19 BRPM | HEIGHT: 71 IN | HEART RATE: 58 BPM | DIASTOLIC BLOOD PRESSURE: 87 MMHG | OXYGEN SATURATION: 97 % | BODY MASS INDEX: 25.9 KG/M2 | SYSTOLIC BLOOD PRESSURE: 138 MMHG | WEIGHT: 185 LBS

## 2019-02-07 DIAGNOSIS — R07.9 CHEST PAIN: ICD-10-CM

## 2019-02-07 DIAGNOSIS — R07.89 CHEST PAIN, NON-CARDIAC: Primary | ICD-10-CM

## 2019-02-07 LAB
ALBUMIN SERPL BCP-MCNC: 4.2 G/DL
ALP SERPL-CCNC: 69 U/L
ALT SERPL W/O P-5'-P-CCNC: 17 U/L
ANION GAP SERPL CALC-SCNC: 9 MMOL/L
AST SERPL-CCNC: 23 U/L
BASOPHILS # BLD AUTO: 0.09 K/UL
BASOPHILS NFR BLD: 1.5 %
BILIRUB SERPL-MCNC: 0.8 MG/DL
BNP SERPL-MCNC: 17 PG/ML
BUN SERPL-MCNC: 14 MG/DL
CALCIUM SERPL-MCNC: 10 MG/DL
CHLORIDE SERPL-SCNC: 106 MMOL/L
CO2 SERPL-SCNC: 27 MMOL/L
CREAT SERPL-MCNC: 1.1 MG/DL
DIFFERENTIAL METHOD: ABNORMAL
EOSINOPHIL # BLD AUTO: 0.2 K/UL
EOSINOPHIL NFR BLD: 3.5 %
ERYTHROCYTE [DISTWIDTH] IN BLOOD BY AUTOMATED COUNT: 13.1 %
EST. GFR  (AFRICAN AMERICAN): >60 ML/MIN/1.73 M^2
EST. GFR  (NON AFRICAN AMERICAN): >60 ML/MIN/1.73 M^2
GLUCOSE SERPL-MCNC: 94 MG/DL
HCT VFR BLD AUTO: 42.1 %
HGB BLD-MCNC: 13.9 G/DL
LYMPHOCYTES # BLD AUTO: 1.9 K/UL
LYMPHOCYTES NFR BLD: 30.8 %
MCH RBC QN AUTO: 31.7 PG
MCHC RBC AUTO-ENTMCNC: 33 G/DL
MCV RBC AUTO: 96 FL
MONOCYTES # BLD AUTO: 0.5 K/UL
MONOCYTES NFR BLD: 7.9 %
NEUTROPHILS # BLD AUTO: 3.4 K/UL
NEUTROPHILS NFR BLD: 56.3 %
PLATELET # BLD AUTO: 268 K/UL
PMV BLD AUTO: 10.8 FL
POTASSIUM SERPL-SCNC: 3.5 MMOL/L
PROT SERPL-MCNC: 7.3 G/DL
RBC # BLD AUTO: 4.39 M/UL
SODIUM SERPL-SCNC: 142 MMOL/L
TROPONIN I SERPL DL<=0.01 NG/ML-MCNC: 0.06 NG/ML
TROPONIN I SERPL DL<=0.01 NG/ML-MCNC: 0.07 NG/ML
WBC # BLD AUTO: 6.07 K/UL

## 2019-02-07 PROCEDURE — 93010 EKG 12-LEAD: ICD-10-PCS | Mod: ,,, | Performed by: INTERNAL MEDICINE

## 2019-02-07 PROCEDURE — 83880 ASSAY OF NATRIURETIC PEPTIDE: CPT

## 2019-02-07 PROCEDURE — 99285 EMERGENCY DEPT VISIT HI MDM: CPT

## 2019-02-07 PROCEDURE — 85025 COMPLETE CBC W/AUTO DIFF WBC: CPT

## 2019-02-07 PROCEDURE — 84484 ASSAY OF TROPONIN QUANT: CPT | Mod: 91

## 2019-02-07 PROCEDURE — 93010 ELECTROCARDIOGRAM REPORT: CPT | Mod: ,,, | Performed by: INTERNAL MEDICINE

## 2019-02-07 PROCEDURE — 80053 COMPREHEN METABOLIC PANEL: CPT

## 2019-02-07 PROCEDURE — 93005 ELECTROCARDIOGRAM TRACING: CPT

## 2019-02-07 RX ORDER — CYCLOBENZAPRINE HCL 10 MG
10 TABLET ORAL 3 TIMES DAILY PRN
Qty: 15 TABLET | Refills: 0 | Status: SHIPPED | OUTPATIENT
Start: 2019-02-07 | End: 2019-02-12

## 2019-02-07 NOTE — ED TRIAGE NOTES
Pt arrives to er via personal vehicle with son kirit. No distress noted. Pt  has been having left chest pain 7/10 pinching pain, left arm tingling, HA 7/10 for past few days, denies dizziness.  has had the chest pain before and was told by pcp to take tylenol.   was washing his truck this morning and was sob, Denies sob now.

## 2019-02-07 NOTE — ED PROVIDER NOTES
Encounter Date: 2/7/2019    SCRIBE #1 NOTE: I, Yudi Castro, am scribing for, and in the presence of,  Quinten Raygoza MD. I have scribed the following portions of the note - Other sections scribed: ROS and HPI.       History     Chief Complaint   Patient presents with    Chest Pain     stabbing intermittent midsternal chest pain since Tuesday; colonoscopy last Friday and meds were suspended; constant headache     CC: Chest Pain    HPI: This 63 y.o. male with a past medical history of Anticoagulant long-term use, Coronary artery disease, Hypercholesteremia, Hypertension, and MI, presents to the ED complaining of an acute onset of L pectoral chest pain that is well localized for last 2 days. He was initially getting this pain every hr lasting for 30-40 seconds at a time, which became more frequent yesterday every 30 minutes now. Pain is sharp and stabbing. Pain is not worse with deep breathing. He had similar chest pain 6-8 months ago and he was diagnosed with muscle spasms to his chest. He had colonoscopy done last Friday that went well. He reports no relief in his chest pain with Tylenol. Current tobacco use.      The history is provided by the patient. No  was used.     Review of patient's allergies indicates:  No Known Allergies  Past Medical History:   Diagnosis Date    Anticoagulant long-term use     Coronary artery disease     Hypercholesteremia     Hypertension     MI (myocardial infarction)      Past Surgical History:   Procedure Laterality Date    blood clots      cardiac stents      COLONOSCOPY N/A 2/1/2019    Performed by Blanca Fenton MD at [facility] ENDO    Left heart cath Left 5/17/2018    Performed by Alfred Cordero MD at [facility] CATH LAB     History reviewed. No pertinent family history.  Social History     Tobacco Use    Smoking status: Current Some Day Smoker     Packs/day: 0.25     Types: Cigarettes    Smokeless tobacco: Never Used   Substance Use Topics    Alcohol use: Yes      Comment: occasionally     Drug use: No     Review of Systems   Constitutional: Negative for chills and fever.   HENT: Negative for congestion, ear pain, rhinorrhea and sore throat.    Eyes: Negative for pain and visual disturbance.   Respiratory: Negative for cough and shortness of breath.    Cardiovascular: Positive for chest pain (localized L pectoral).   Gastrointestinal: Negative for abdominal pain, diarrhea, nausea and vomiting.   Genitourinary: Negative for dysuria.   Musculoskeletal: Negative for back pain and neck pain.   Skin: Negative for rash.   Neurological: Negative for headaches.       Physical Exam     Initial Vitals [02/07/19 1343]   BP Pulse Resp Temp SpO2   120/74 (!) 116 20 -- 98 %      MAP       --         Physical Exam  The patient was examined specifically for the following:   General:No significant distress, Good color, Warm and dry. Head and neck:Scalp atraumatic, Neck supple. Neurological:Appropriate conversation, Gross motor deficits. Eyes:Conjugate gaze, Clear corneas. ENT: No epistaxis. Cardiac: Regular rate and rhythm, Grossly normal heart tones. Pulmonary: Wheezing, Rales. Gastrointestinal: Abdominal tenderness, Abdominal distention. Musculoskeletal: Extremity deformity, Apparent pain with range of motion of the joints. Skin: Rash.   The findings on examination were normal except for the following:  The patient has exquisite tenderness of the left pectoral chest in a small 3 cm area.  Palpation there reproduces the pain of the chief complaint.  Posterior distraction of the left arm with the arm over the head also reproduces the chief complaint.  The lungs are clear with equal breath sounds bilaterally.  The patient's heart rate on arrival was 116.  During the physical examination the patient's heart rate was 60.  Patient's blood pressure is 128/73.  The lungs are clear.  The heart tones are normal.  The abdomen is soft.  The extremities are nontender. There is no pain with range of  motion of any joints.  ED Course   Procedures  Labs Reviewed   CBC W/ AUTO DIFFERENTIAL - Abnormal; Notable for the following components:       Result Value    RBC 4.39 (*)     Hemoglobin 13.9 (*)     MCH 31.7 (*)     All other components within normal limits   TROPONIN I - Abnormal; Notable for the following components:    Troponin I 0.065 (*)     All other components within normal limits   TROPONIN I - Abnormal; Notable for the following components:    Troponin I 0.057 (*)     All other components within normal limits   COMPREHENSIVE METABOLIC PANEL   B-TYPE NATRIURETIC PEPTIDE     EKG Readings: (Independently Interpreted)   This patient is in a sinus tachycardia with a heart rate of 108.  The p.r. QRS and QT intervals are normal.  There is no definite evidence of acute myocardial infarction or malignant arrhythmia.  There is poor R-wave progression across precordium.  The patient has a normal axis.     ECG Results          EKG 12-lead (In process)  Result time 02/08/19 07:25:34    In process by Interface, Lab In [facility] (02/08/19 07:25:34)                 Narrative:    Test Reason : R07.9,    Vent. Rate : 108 BPM     Atrial Rate : 108 BPM     P-R Int : 150 ms          QRS Dur : 072 ms      QT Int : 346 ms       P-R-T Axes : 050 021 074 degrees     QTc Int : 463 ms    Sinus tachycardia  Possible Left atrial enlargement  Borderline Abnormal ECG  When compared with ECG of 10-SEP-2018 12:57,  Significant changes have occurred    Referred By: [email or web address]   SELF           Confirmed By:                   In process by Interface, Lab In [facility] (02/08/19 07:13:24)                 Narrative:    Test Reason : R07.9,    Vent. Rate : 108 BPM     Atrial Rate : 108 BPM     P-R Int : 150 ms          QRS Dur : 072 ms      QT Int : 346 ms       P-R-T Axes : 050 021 074 degrees     QTc Int : 463 ms    Sinus tachycardia  Possible Left atrial enlargement  Borderline Abnormal ECG  When compared with ECG of 10-SEP-2018  12:57,  Significant changes have occurred    Referred By: [email or web address]   SELF           Confirmed By:                             Imaging Results          X-Ray Chest PA And Lateral (Final result)  Result time 02/07/19 14:20:45    Final result by Jonathan Mott DO (02/07/19 14:20:45)                 Impression:      See above      Electronically signed by: Jonathan Mott DO  Date:    02/07/2019  Time:    14:20             Narrative:    EXAMINATION:  XR CHEST PA AND LATERAL    CLINICAL HISTORY:  Chest Pain;    TECHNIQUE:  PA and lateral views of the chest were performed.    COMPARISON:  03/28/2018    FINDINGS:  No significant interval change from prior.  There is no lung consolidation.  There is no pleural effusion or pneumothorax.  Tortuous atherosclerotic aorta.  Heart size within normal limits.  Visualized osseous structures grossly intact.                              Medical decision making:  Given the above, this patient presents to the emergency room with well localized sharp stabbing pain in the left pectoral chest, coming and going in very brief episodes, all less than a minute. The pain is sharp stabbing.  It occurs, and then your stops, and then recurs.  It does not radiate.  The patient is not short of breath. The patient arrived with a tachycardia but resolved.  During the physical examination is heart rate was 60.  I doubt pulmonary embolus.  The patient has a history of coronary artery disease and has a stent.  His troponin was slightly elevated.  Consultation was obtained with Cardiology, Dr. Cordero, who asked that a 2nd troponin be performed.  If the 2nd troponin was substantially higher, the patient will require admission.  Sec troponin was lower.  I will discharge this patient outpatient evaluation and treatment.  Clinically this is noncardiac chest pain.                Scribe Attestation:   Scribe #1: I performed the above scribed service and the documentation accurately describes the services I  performed. I attest to the accuracy of the note.    Attending Attestation:           Physician Attestation for Scribe:  Physician Attestation Statement for Scribe #1: I, Quinten Raygoza MD, reviewed documentation, as scribed by Yudi Castro in my presence, and it is both accurate and complete.                    Clinical Impression:   The primary encounter diagnosis was Chest pain, non-cardiac. A diagnosis of Chest pain was also pertinent to this visit.                             Quinten Raygoza MD  02/08/19 9007

## 2019-02-08 ENCOUNTER — PES CALL (OUTPATIENT)
Dept: ADMINISTRATIVE | Facility: CLINIC | Age: 64
End: 2019-02-08

## 2019-02-08 NOTE — DISCHARGE INSTRUCTIONS
Please return immediately if you get worse or if new problems develop.  Flexeril as directed.  Please follow-up with your cardiologist, the cardiologist above in the next 48 hr.  Rest.  Tylenol for pain.

## 2019-02-13 ENCOUNTER — OFFICE VISIT (OUTPATIENT)
Dept: CARDIOLOGY | Facility: CLINIC | Age: 64
End: 2019-02-13
Payer: MEDICAID

## 2019-02-13 VITALS
BODY MASS INDEX: 26.23 KG/M2 | OXYGEN SATURATION: 98 % | SYSTOLIC BLOOD PRESSURE: 128 MMHG | DIASTOLIC BLOOD PRESSURE: 81 MMHG | HEART RATE: 101 BPM | WEIGHT: 187.38 LBS | HEIGHT: 71 IN

## 2019-02-13 DIAGNOSIS — R07.89 CHEST PAIN, NON-CARDIAC: ICD-10-CM

## 2019-02-13 DIAGNOSIS — Z72.0 TOBACCO ABUSE: ICD-10-CM

## 2019-02-13 DIAGNOSIS — R79.89 ELEVATED TROPONIN: ICD-10-CM

## 2019-02-13 DIAGNOSIS — I25.10 CORONARY ARTERY DISEASE INVOLVING NATIVE CORONARY ARTERY OF NATIVE HEART WITHOUT ANGINA PECTORIS: Primary | ICD-10-CM

## 2019-02-13 DIAGNOSIS — Z86.718 HISTORY OF DEEP VEIN THROMBOSIS (DVT) OF LOWER EXTREMITY: ICD-10-CM

## 2019-02-13 PROCEDURE — 99999 PR PBB SHADOW E&M-EST. PATIENT-LVL III: CPT | Mod: PBBFAC,,, | Performed by: INTERNAL MEDICINE

## 2019-02-13 PROCEDURE — 99213 OFFICE O/P EST LOW 20 MIN: CPT | Mod: PBBFAC | Performed by: INTERNAL MEDICINE

## 2019-02-13 PROCEDURE — 99214 PR OFFICE/OUTPT VISIT, EST, LEVL IV, 30-39 MIN: ICD-10-PCS | Mod: S$PBB,,, | Performed by: INTERNAL MEDICINE

## 2019-02-13 PROCEDURE — 99999 PR PBB SHADOW E&M-EST. PATIENT-LVL III: ICD-10-PCS | Mod: PBBFAC,,, | Performed by: INTERNAL MEDICINE

## 2019-02-13 PROCEDURE — 99214 OFFICE O/P EST MOD 30 MIN: CPT | Mod: S$PBB,,, | Performed by: INTERNAL MEDICINE

## 2019-02-13 RX ORDER — METOPROLOL SUCCINATE 50 MG/1
50 TABLET, EXTENDED RELEASE ORAL DAILY
Qty: 90 TABLET | Refills: 3 | Status: ON HOLD | OUTPATIENT
Start: 2019-02-13 | End: 2019-04-26 | Stop reason: HOSPADM

## 2019-02-13 NOTE — PROGRESS NOTES
"Subjective:    Patient ID:  Carlos A Ruff is a 63 y.o. male who presents for follow-up of Coronary Artery Disease      HPI     CAD - stent LAD, HTN, HLD     5/17/18 [facility] - EDP 15, LAD stent widely patent, normal vessels otherwise        Admitted 1/24/18  Patient is 63 yo male smoker with HTN, HLD, CAD s/p PCI who presents to ED with complaint of CP. Per patient has been with intermittent chest pain over the last 2 weeks mostly with exertion but occasionally at rest. He describes pain as "tight, stabbing" and reports lasts about a minute or so before resolving. He endorses associated diaphoresis and radiation to L arm and L neck. He reports pain worse this am thus causing presentation. Had cardiac stent placed about 2 years ago and no issues since then. He has been with a cold recently but otherwise denies fever/chills, SOB, abdominal pain, N/V, LE swelling and pain. On presentation patient with mildly elevated troponin at .05. Non ischemic EKG and CXR unremarkable. CTA obtained to r/o PE in ED which noted calcinosis to LAD. Placed in observation for ACS r/o.         Hospital Course:   Patient admitted with chest pain and ruled out for acute coronary syndrome. EKG is non ischemic. Serial troponin levels mildly elevated but flat with downward trend. BNP normal. CXR without acute cardiothoracic processes. Echocardiogram performed and did not show evidence of systolic/diastolic/or valvular dysfunction. NST performed and is without evidence of myocardial ischemia. Cardiology consulted and after evaluation has cleared the patient for discharge from a CV standpoint. Chest pain likely atypical in nature. Patient is stable and will discharge home at this time with instructions to follow up with PCP and cardiology in one week.      Echo 1/24/18    1 - Normal left ventricular systolic function (EF 60-65%).     2 - Concentric hypertrophy.     3 - Trivial tricuspid regurgitation.     4 - Trivial to mild mitral regurgitation. "      Stress test 1/25/18  LVEF: 50 %  Impression: NORMAL MYOCARDIAL PERFUSION  1. The perfusion scan is free of evidence for myocardial ischemia or injury.   2. There is a mild intensity fixed defect in the inferior wall of the left ventricle, secondary to diaphragm attenuation.   3. Resting wall motion is physiologic.   4. Resting LV function is normal.      4/11/18  I discussed a LHC for recurrent CP - he is agreeable but wants to wait until school breaks for the summer  Will add toprol XL 25 qd and imdur 30 qd     5/31/18 Did well after LHC - still some atypical CP    Went to the ER 2/7/19  HPI: This 63 y.o. male with a past medical history of Anticoagulant long-term use, Coronary artery disease, Hypercholesteremia, Hypertension, and MI, presents to the ED complaining of an acute onset of L pectoral chest pain that is well localized for last 2 days. He was initially getting this pain every hr lasting for 30-40 seconds at a time, which became more frequent yesterday every 30 minutes now. Pain is sharp and stabbing. Pain is not worse with deep breathing. He had similar chest pain 6-8 months ago and he was diagnosed with muscle spasms to his chest. He had colonoscopy done last Friday that went well. He reports no relief in his chest pain with Tylenol. Current tobacco use.       Medical decision making:  Given the above, this patient presents to the emergency room with well localized sharp stabbing pain in the left pectoral chest, coming and going in very brief episodes, all less than a minute. The pain is sharp stabbing.  It occurs, and then your stops, and then recurs.  It does not radiate.  The patient is not short of breath. The patient arrived with a tachycardia but resolved.  During the physical examination is heart rate was 60.  I doubt pulmonary embolus.  The patient has a history of coronary artery disease and has a stent.  His troponin was slightly elevated.  Consultation was obtained with Cardiology,   Gil, who asked that a 2nd troponin be performed.  If the 2nd troponin was substantially higher, the patient will require admission.  Sec troponin was lower.  I will discharge this patient outpatient evaluation and treatment.  Clinically this is noncardiac chest pain.    Troponin 0.065 to 0.057  EKG 2/7/10 sinus tachycardia 108 LAE - otherwise ok    Still with sharp CP both at rest and with exertion        Review of Systems   Constitution: Negative for decreased appetite.   HENT: Negative for ear discharge.    Eyes: Negative for blurred vision.   Endocrine: Negative for polyphagia.   Skin: Negative for nail changes.   Neurological: Negative for aphonia.   Psychiatric/Behavioral: Negative for hallucinations.        Objective:    Physical Exam   Constitutional: He is oriented to person, place, and time. He appears well-developed and well-nourished.   HENT:   Head: Normocephalic and atraumatic.   Eyes: Conjunctivae are normal. Pupils are equal, round, and reactive to light.   Neck: Normal range of motion. Neck supple.   Cardiovascular: Normal rate, normal heart sounds and intact distal pulses.   Pulmonary/Chest: Effort normal and breath sounds normal.   Abdominal: Soft. Bowel sounds are normal.   Musculoskeletal: Normal range of motion.   Neurological: He is alert and oriented to person, place, and time.   Skin: Skin is warm and dry.         Assessment:       1. Coronary artery disease involving native coronary artery of native heart without angina pectoris    2. Elevated troponin    3. History of deep vein thrombosis (DVT) of lower extremity    4. Chest pain, non-cardiac    5. Tobacco abuse         Plan:       Increase toprol XL 50 qd  Echo and lexiscan myoview for recurrent CP with known CAD and mildly elevated troponin

## 2019-04-09 DIAGNOSIS — R39.15 URINARY URGENCY: ICD-10-CM

## 2019-04-09 DIAGNOSIS — I21.9 MYOCARDIAL INFARCTION, UNSPECIFIED MI TYPE, UNSPECIFIED ARTERY: ICD-10-CM

## 2019-04-09 RX ORDER — TAMSULOSIN HYDROCHLORIDE 0.4 MG/1
0.4 CAPSULE ORAL DAILY
Qty: 30 CAPSULE | Refills: 11 | Status: SHIPPED | OUTPATIENT
Start: 2019-04-09 | End: 2019-07-25 | Stop reason: SDUPTHER

## 2019-04-09 NOTE — TELEPHONE ENCOUNTER
----- Message from Jackie Nicolas sent at 4/9/2019 10:26 AM CDT -----  Contact: PATSY MARTINEZ [[id number]]  Can the clinic reply in [facility]:    Please refill the medication(s) listed below.     Please call the patient when the prescription(s) is ready for  at the phone number   [phone]      Medication #1:tamsulosin (FLOMAX) 0.4 mg Cap    Medication #2:rivaroxaban (XARELTO) 20 mg Tab    Preferred Pharmacy:[address] [phone]

## 2019-04-16 ENCOUNTER — TELEPHONE (OUTPATIENT)
Dept: FAMILY MEDICINE | Facility: CLINIC | Age: 64
End: 2019-04-16

## 2019-04-16 NOTE — TELEPHONE ENCOUNTER
----- Message from Khalif Hassan MD sent at 4/15/2019 10:26 AM CDT -----  Contact: Self   Please schedule patient to see me    Thanks  Dr. Hassan     ----- Message -----  From: Benitez Briceño MA  Sent: 4/15/2019   9:46 AM  To: Khalif Hassan MD        ----- Message -----  From: Iggy Patricio MD  Sent: 4/15/2019   9:30 AM  To: [facility]s patient  ----- Message -----  From: Benitez Briceño MA  Sent: 4/15/2019   9:27 AM  To: Iggy Patricio MD    Would you like to schedule pt for OV  ----- Message -----  From: Gosia Caro  Sent: 4/15/2019   9:13 AM  To: [facility] Staff    Type:  Needs Medical Advice    Who Called: Self     Symptoms (please be specific): Leg swelling     How long has patient had these symptoms:  A Week     Pharmacy name and phone #:  [address] [phone] (Phone)  [phone] (Fax)      Would the patient rather a call back or a response via [facility]? Call     Best Call Back Number: [phone]    Additional Information: Patient wants to know if a fluid pill or any other form of relief can be called in for him

## 2019-04-22 ENCOUNTER — HOSPITAL ENCOUNTER (OUTPATIENT)
Dept: RADIOLOGY | Facility: HOSPITAL | Age: 64
Discharge: HOME | DRG: 300 | End: 2019-04-22
Attending: FAMILY MEDICINE
Payer: MEDICAID

## 2019-04-22 ENCOUNTER — OFFICE VISIT (OUTPATIENT)
Dept: FAMILY MEDICINE | Facility: CLINIC | Age: 64
DRG: 300 | End: 2019-04-22
Payer: MEDICAID

## 2019-04-22 VITALS
TEMPERATURE: 99 F | WEIGHT: 183.19 LBS | SYSTOLIC BLOOD PRESSURE: 144 MMHG | HEART RATE: 63 BPM | BODY MASS INDEX: 25.55 KG/M2 | OXYGEN SATURATION: 97 % | DIASTOLIC BLOOD PRESSURE: 80 MMHG

## 2019-04-22 DIAGNOSIS — M79.89 RIGHT LEG SWELLING: Primary | ICD-10-CM

## 2019-04-22 DIAGNOSIS — Z86.718 HISTORY OF DEEP VENOUS THROMBOSIS (DVT) OF DISTAL VEIN OF RIGHT LOWER EXTREMITY: ICD-10-CM

## 2019-04-22 DIAGNOSIS — M79.89 RIGHT LEG SWELLING: ICD-10-CM

## 2019-04-22 PROCEDURE — 93971 EXTREMITY STUDY: CPT | Mod: TC,RT

## 2019-04-22 PROCEDURE — 99214 OFFICE O/P EST MOD 30 MIN: CPT | Mod: S$PBB,,, | Performed by: FAMILY MEDICINE

## 2019-04-22 PROCEDURE — 99213 OFFICE O/P EST LOW 20 MIN: CPT | Mod: PBBFAC,PN,25 | Performed by: FAMILY MEDICINE

## 2019-04-22 PROCEDURE — 99999 PR PBB SHADOW E&M-EST. PATIENT-LVL III: CPT | Mod: PBBFAC,,, | Performed by: FAMILY MEDICINE

## 2019-04-22 PROCEDURE — 93971 EXTREMITY STUDY: CPT | Mod: 26,RT,, | Performed by: RADIOLOGY

## 2019-04-22 PROCEDURE — 99999 PR PBB SHADOW E&M-EST. PATIENT-LVL III: ICD-10-PCS | Mod: PBBFAC,,, | Performed by: FAMILY MEDICINE

## 2019-04-22 PROCEDURE — 93971 US LOWER EXTREMITY VEINS RIGHT: ICD-10-PCS | Mod: 26,RT,, | Performed by: RADIOLOGY

## 2019-04-22 PROCEDURE — 99214 PR OFFICE/OUTPT VISIT, EST, LEVL IV, 30-39 MIN: ICD-10-PCS | Mod: S$PBB,,, | Performed by: FAMILY MEDICINE

## 2019-04-22 NOTE — PROGRESS NOTES
Routine Office Visit    Patient Name: Carlos A Ruff    : 1955  MRN: [id number]    Subjective:  Carlos A is a 63 y.o. male who presents today for:    1. Right leg swelling  Patient presenting today with right leg pain and swelling x 1 week.  He states that it has been painful as well.  The swelling can improve with rest, but comes right back.  He had DVT at the end of last year.  He has been taking his Xarelto daily, but has been taking it twice daily instead of nightly as it was prescribed.  He has not had any shortness of breath or chest pain.  Patient does continue to smoke regularly.    Past Medical History  Past Medical History:   Diagnosis Date    Anticoagulant long-term use     Coronary artery disease     Hypercholesteremia     Hypertension     MI (myocardial infarction)        Past Surgical History  Past Surgical History:   Procedure Laterality Date    blood clots      cardiac stents      COLONOSCOPY N/A 2019    Performed by [facility] at [facility]    Left heart cath Left 2018    Performed by Alfred Cordero MD at [facility] CATH LAB       Family History  History reviewed. No pertinent family history.    Social History  Social History     Socioeconomic History    Marital status: Single     Spouse name: Not on file    Number of children: Not on file    Years of education: Not on file    Highest education level: Not on file   Occupational History    Not on file   Social Needs    Financial resource strain: Not on file    Food insecurity:     Worry: Not on file     Inability: Not on file    Transportation needs:     Medical: Not on file     Non-medical: Not on file   Tobacco Use    Smoking status: Current Some Day Smoker     Packs/day: 0.25     Types: Cigarettes    Smokeless tobacco: Never Used   Substance and Sexual Activity    Alcohol use: Yes     Comment: occasionally     Drug use: No    Sexual activity: Not Currently   Lifestyle    Physical activity:     Days per week: Not on  file     Minutes per session: Not on file    Stress: Not on file   Relationships    Social connections:     Talks on phone: Not on file     Gets together: Not on file     Attends Jainism service: Not on file     Active member of club or organization: Not on file     Attends meetings of clubs or organizations: Not on file     Relationship status: Not on file   Other Topics Concern    Not on file   Social History Narrative    Not on file       Current Medications  Current Outpatient Medications on File Prior to Visit   Medication Sig Dispense Refill    ascorbic acid, vitamin C, (VITAMIN C) 100 MG tablet Take 100 mg by mouth once daily.      aspirin (ECOTRIN) 81 MG EC tablet Take 81 mg by mouth.      atorvastatin (LIPITOR) 40 MG tablet Take 40 mg by mouth.      ergocalciferol (VITAMIN D2) 50,000 unit Cap Take 50,000 Units by mouth every 7 days.      losartan-hydrochlorothiazide 50-12.5 mg (HYZAAR) 50-12.5 mg per tablet Take 1 tablet by mouth once daily. 90 tablet 3    metoprolol succinate (TOPROL-XL) 50 MG 24 hr tablet Take 1 tablet (50 mg total) by mouth once daily. 90 tablet 3    nitroGLYCERIN (NITROSTAT) 0.4 MG SL tablet Place 0.4 mg under the tongue every 5 (five) minutes as needed for Chest pain.      rivaroxaban (XARELTO) 20 mg Tab Take 1 tablet (20 mg total) by mouth daily with dinner or evening meal. 30 tablet 3    tamsulosin (FLOMAX) 0.4 mg Cap Take 1 capsule (0.4 mg total) by mouth once daily. 30 capsule 11    clopidogrel (PLAVIX) 75 mg tablet Take 75 mg by mouth.      isosorbide mononitrate (IMDUR) 30 MG 24 hr tablet Take 1 tablet (30 mg total) by mouth once daily. 30 tablet 11     No current facility-administered medications on file prior to visit.        Allergies   Review of patient's allergies indicates:  No Known Allergies    Review of Systems (Pertinent positives)  Review of Systems   Constitutional: Negative.    HENT: Negative.    Eyes: Negative.    Respiratory: Negative.     Cardiovascular: Positive for leg swelling.   Gastrointestinal: Negative.    Musculoskeletal: Positive for myalgias.   Skin: Negative.          BP (!) 144/80 (BP Location: Left arm, Patient Position: Sitting)   Pulse 63   Temp 98.6 °F (37 °C)   Wt 83.1 kg (183 lb 3.2 oz)   SpO2 97%   BMI 25.55 kg/m²     GENERAL APPEARANCE: in no apparent distress and well developed and well nourished  HEENT: PERRL, EOMI, Sclera clear, anicteric, Oropharynx clear, no lesions, Neck supple with midline trachea  NECK: normal, supple, no adenopathy, thyroid normal in size  RESPIRATORY: appears well, vitals normal, no respiratory distress, acyanotic, normal RR, chest clear, no wheezing, crepitations, rhonchi, normal symmetric air entry  HEART: regular rate and rhythm, S1, S2 normal, no murmur, click, rub or gallop.    ABDOMEN: abdomen is soft without tenderness, no masses, no hernias, no organomegaly, no rebound, no guarding. Suprapubic tenderness absent. No CVA tenderness.  NEUROLOGIC: normal without focal findings, CN II-XII are intact.   Extremities: warm/well perfused.  No abnormal hair patterns.  No clubbing, cyanosis 2+edema in RLE  SKIN: no rashes, no wounds, no other lesions  PSYCH: Alert, oriented x 3, thought content appropriate, speech normal, pleasant and cooperative, good eye contact, well groomed    Assessment/Plan:  Carlos A Ruff is a 63 y.o. male who presents today for :    Carlos A was seen today for r leg swelling.    Diagnoses and all orders for this visit:    Right leg swelling  -     US Lower Extremity Veins Right; Future    History of deep venous thrombosis (DVT) of distal vein of right lower extremity  -     US Lower Extremity Veins Right; Future      1.  Patient instructed to take xarelto once a day instead of twice a day  2.  US to be done today  3.  Follow up as needed  4.  Go to the ED for any chest pain, dyspnea, or worsening pain    Khalif Hassan MD

## 2019-04-23 ENCOUNTER — TELEPHONE (OUTPATIENT)
Dept: FAMILY MEDICINE | Facility: CLINIC | Age: 64
End: 2019-04-23

## 2019-04-23 ENCOUNTER — HOSPITAL ENCOUNTER (INPATIENT)
Facility: HOSPITAL | Age: 64
LOS: 3 days | Discharge: HOME | DRG: 300 | End: 2019-04-26
Attending: EMERGENCY MEDICINE | Admitting: EMERGENCY MEDICINE
Payer: MEDICAID

## 2019-04-23 DIAGNOSIS — N32.89 DISTENDED BLADDER: ICD-10-CM

## 2019-04-23 DIAGNOSIS — I82.401 DVT, LOWER EXTREMITY, RECURRENT, RIGHT: ICD-10-CM

## 2019-04-23 DIAGNOSIS — N32.0 BLADDER OUTLET OBSTRUCTION: ICD-10-CM

## 2019-04-23 DIAGNOSIS — Z78.9 FAILURE OF OUTPATIENT TREATMENT: Primary | ICD-10-CM

## 2019-04-23 DIAGNOSIS — N32.89 BLADDER WALL THICKENING: ICD-10-CM

## 2019-04-23 DIAGNOSIS — Z72.0 TOBACCO ABUSE: ICD-10-CM

## 2019-04-23 DIAGNOSIS — Z86.718 HISTORY OF DEEP VEIN THROMBOSIS (DVT) OF LOWER EXTREMITY: ICD-10-CM

## 2019-04-23 LAB
ALBUMIN SERPL BCP-MCNC: 3.8 G/DL (ref 3.5–5.2)
ALP SERPL-CCNC: 64 U/L (ref 55–135)
ALT SERPL W/O P-5'-P-CCNC: 20 U/L (ref 10–44)
ANION GAP SERPL CALC-SCNC: 6 MMOL/L (ref 8–16)
APTT BLDCRRT: 28.2 SEC (ref 21–32)
AST SERPL-CCNC: 31 U/L (ref 10–40)
BASOPHILS # BLD AUTO: 0.08 K/UL (ref 0–0.2)
BASOPHILS NFR BLD: 1.3 % (ref 0–1.9)
BILIRUB SERPL-MCNC: 0.6 MG/DL (ref 0.1–1)
BUN SERPL-MCNC: 13 MG/DL (ref 8–23)
CALCIUM SERPL-MCNC: 9.1 MG/DL (ref 8.7–10.5)
CHLORIDE SERPL-SCNC: 108 MMOL/L (ref 95–110)
CO2 SERPL-SCNC: 28 MMOL/L (ref 23–29)
CREAT SERPL-MCNC: 0.9 MG/DL (ref 0.5–1.4)
DIFFERENTIAL METHOD: ABNORMAL
EOSINOPHIL # BLD AUTO: 0.3 K/UL (ref 0–0.5)
EOSINOPHIL NFR BLD: 4.1 % (ref 0–8)
ERYTHROCYTE [DISTWIDTH] IN BLOOD BY AUTOMATED COUNT: 13.8 % (ref 11.5–14.5)
EST. GFR  (AFRICAN AMERICAN): >60 ML/MIN/1.73 M^2
EST. GFR  (NON AFRICAN AMERICAN): >60 ML/MIN/1.73 M^2
GLUCOSE SERPL-MCNC: 88 MG/DL (ref 70–110)
HCT VFR BLD AUTO: 40.4 % (ref 40–54)
HGB BLD-MCNC: 12.8 G/DL (ref 14–18)
INR PPP: 1 (ref 0.8–1.2)
LYMPHOCYTES # BLD AUTO: 1.7 K/UL (ref 1–4.8)
LYMPHOCYTES NFR BLD: 26.4 % (ref 18–48)
MCH RBC QN AUTO: 30.6 PG (ref 27–31)
MCHC RBC AUTO-ENTMCNC: 31.7 G/DL (ref 32–36)
MCV RBC AUTO: 97 FL (ref 82–98)
MONOCYTES # BLD AUTO: 0.5 K/UL (ref 0.3–1)
MONOCYTES NFR BLD: 8.3 % (ref 4–15)
NEUTROPHILS # BLD AUTO: 3.8 K/UL (ref 1.8–7.7)
NEUTROPHILS NFR BLD: 59.9 % (ref 38–73)
PLATELET # BLD AUTO: 235 K/UL (ref 150–350)
PMV BLD AUTO: 10.9 FL (ref 9.2–12.9)
POTASSIUM SERPL-SCNC: 3.8 MMOL/L (ref 3.5–5.1)
PROT SERPL-MCNC: 6.9 G/DL (ref 6–8.4)
PROTHROMBIN TIME: 10 SEC (ref 9–12.5)
RBC # BLD AUTO: 4.18 M/UL (ref 4.6–6.2)
SODIUM SERPL-SCNC: 142 MMOL/L (ref 136–145)
WBC # BLD AUTO: 6.4 K/UL (ref 3.9–12.7)

## 2019-04-23 PROCEDURE — 85730 THROMBOPLASTIN TIME PARTIAL: CPT

## 2019-04-23 PROCEDURE — 93010 EKG 12-LEAD: ICD-10-PCS | Mod: ,,, | Performed by: INTERNAL MEDICINE

## 2019-04-23 PROCEDURE — 93010 ELECTROCARDIOGRAM REPORT: CPT | Mod: ,,, | Performed by: INTERNAL MEDICINE

## 2019-04-23 PROCEDURE — 99285 EMERGENCY DEPT VISIT HI MDM: CPT | Mod: 25

## 2019-04-23 PROCEDURE — 11000001 HC ACUTE MED/SURG PRIVATE ROOM

## 2019-04-23 PROCEDURE — 85610 PROTHROMBIN TIME: CPT

## 2019-04-23 PROCEDURE — 93005 ELECTROCARDIOGRAM TRACING: CPT

## 2019-04-23 PROCEDURE — 63600175 PHARM REV CODE 636 W HCPCS: Performed by: EMERGENCY MEDICINE

## 2019-04-23 PROCEDURE — 25500020 PHARM REV CODE 255: Performed by: EMERGENCY MEDICINE

## 2019-04-23 PROCEDURE — 25000003 PHARM REV CODE 250: Performed by: HOSPITALIST

## 2019-04-23 PROCEDURE — 85025 COMPLETE CBC W/AUTO DIFF WBC: CPT

## 2019-04-23 PROCEDURE — 80053 COMPREHEN METABOLIC PANEL: CPT

## 2019-04-23 PROCEDURE — 96372 THER/PROPH/DIAG INJ SC/IM: CPT | Mod: 59

## 2019-04-23 RX ORDER — METOPROLOL SUCCINATE 50 MG/1
50 TABLET, EXTENDED RELEASE ORAL DAILY
Status: DISCONTINUED | OUTPATIENT
Start: 2019-04-24 | End: 2019-04-24

## 2019-04-23 RX ORDER — ISOSORBIDE MONONITRATE 30 MG/1
30 TABLET, EXTENDED RELEASE ORAL DAILY
Status: DISCONTINUED | OUTPATIENT
Start: 2019-04-24 | End: 2019-04-26 | Stop reason: HOSPADM

## 2019-04-23 RX ORDER — ENOXAPARIN SODIUM 100 MG/ML
1 INJECTION SUBCUTANEOUS
Status: DISCONTINUED | OUTPATIENT
Start: 2019-04-24 | End: 2019-04-26 | Stop reason: HOSPADM

## 2019-04-23 RX ORDER — LOSARTAN POTASSIUM AND HYDROCHLOROTHIAZIDE 12.5; 5 MG/1; MG/1
1 TABLET ORAL DAILY
Status: DISCONTINUED | OUTPATIENT
Start: 2019-04-24 | End: 2019-04-26 | Stop reason: HOSPADM

## 2019-04-23 RX ORDER — ATORVASTATIN CALCIUM 40 MG/1
40 TABLET, FILM COATED ORAL DAILY
Status: DISCONTINUED | OUTPATIENT
Start: 2019-04-24 | End: 2019-04-26 | Stop reason: HOSPADM

## 2019-04-23 RX ORDER — TAMSULOSIN HYDROCHLORIDE 0.4 MG/1
0.4 CAPSULE ORAL DAILY
Status: DISCONTINUED | OUTPATIENT
Start: 2019-04-24 | End: 2019-04-26 | Stop reason: HOSPADM

## 2019-04-23 RX ORDER — ASPIRIN 81 MG/1
81 TABLET ORAL DAILY
Status: DISCONTINUED | OUTPATIENT
Start: 2019-04-24 | End: 2019-04-26 | Stop reason: HOSPADM

## 2019-04-23 RX ORDER — AMOXICILLIN 250 MG
1 CAPSULE ORAL 2 TIMES DAILY
Status: DISCONTINUED | OUTPATIENT
Start: 2019-04-23 | End: 2019-04-26 | Stop reason: HOSPADM

## 2019-04-23 RX ORDER — ENOXAPARIN SODIUM 100 MG/ML
1 INJECTION SUBCUTANEOUS
Status: COMPLETED | OUTPATIENT
Start: 2019-04-23 | End: 2019-04-23

## 2019-04-23 RX ORDER — CLOPIDOGREL BISULFATE 75 MG/1
75 TABLET ORAL DAILY
Status: DISCONTINUED | OUTPATIENT
Start: 2019-04-24 | End: 2019-04-23

## 2019-04-23 RX ORDER — ACETAMINOPHEN 325 MG/1
650 TABLET ORAL EVERY 6 HOURS PRN
Status: DISCONTINUED | OUTPATIENT
Start: 2019-04-23 | End: 2019-04-26 | Stop reason: HOSPADM

## 2019-04-23 RX ORDER — SODIUM CHLORIDE 0.9 % (FLUSH) 0.9 %
10 SYRINGE (ML) INJECTION
Status: DISCONTINUED | OUTPATIENT
Start: 2019-04-23 | End: 2019-04-26 | Stop reason: HOSPADM

## 2019-04-23 RX ADMIN — ENOXAPARIN SODIUM 80 MG: 100 INJECTION SUBCUTANEOUS at 12:04

## 2019-04-23 RX ADMIN — DOCUSATE SODIUM AND SENNOSIDES 1 TABLET: 8.6; 5 TABLET, FILM COATED ORAL at 08:04

## 2019-04-23 RX ADMIN — IOHEXOL 100 ML: 350 INJECTION, SOLUTION INTRAVENOUS at 01:04

## 2019-04-23 NOTE — TELEPHONE ENCOUNTER
Patient informed of US results and instructed to go to the ED now. Patient was at work and states he will go directly to the ED. No further questions at this time.

## 2019-04-23 NOTE — ED PROVIDER NOTES
Encounter Date: 4/23/2019  SORT: This is a 63 y.o. male with PMHx DVT, long term anti-coagulant use, CAD, MI, HTN, HLD who presents for emergent consideration of DVT of right leg per US ordered by Dr. Khalif Hassan yesterday. Patient takes Xarelto, last dose taken this morning. Patient will be moved to a room when one is available for further evaluation and treatment.  KATHYA Carey, PADayannaC     SCRIBE #1 NOTE: IEsther , am scribing for, and in the presence of,  Roverto Alaniz Jr., MD. I have scribed the following portions of the note - Other sections scribed: HPI, ROS.       History     Chief Complaint   Patient presents with    Leg Pain     pt here per MD request. reports being seen by pcp for right leg swelling and pain; u/s done, pt was informed he has new blood clot to leg. pt on xarelto; hx of blood clot.      CC: Leg Pain     HPI: 62 y/o M who has a past medical history of Anticoagulant long-term use, Coronary artery disease, Hypercholesteremia, Hypertension, and MI (myocardial infarction) presents to the ED with acute onset of right leg pain and intermittent chest pain which began two weeks ago. Pt was directed to the ED per MD request. Pt had an appointment with his PCP one day ago for right leg swelling and pain. An ulta sound was preformed and found a new blood clot to his R leg. Pt has been on Xarelto for one year after being taken off Plavix. Pt has a hx of blood clots. No fever/chills, nausea/emesis, chest/abdominal/back pain, cough/sob, headache/dizziness, weakness/numbness, rash, urinary symptoms, abnormal bowels.           The history is provided by the patient. No  was used.     Review of patient's allergies indicates:  No Known Allergies  Past Medical History:   Diagnosis Date    Anticoagulant long-term use     Coronary artery disease     Hypercholesteremia     Hypertension     MI (myocardial infarction)      Past Surgical History:   Procedure Laterality Date     blood clots      cardiac stents      COLONOSCOPY N/A 2/1/2019    Performed by Bl[facility]enton MD at [facility] ENDO    Left heart cath Left 5/17/2018    Performed by Alfred Cordero MD at [facility] CATH LAB     History reviewed. No pertinent family history.  Social History     Tobacco Use    Smoking status: Current Some Day Smoker     Packs/day: 0.50     Types: Cigarettes    Smokeless tobacco: Never Used   Substance Use Topics    Alcohol use: Yes     Comment: occasionally     Drug use: No     Review of Systems   Constitutional: Negative for appetite change and fever.   HENT: Negative for rhinorrhea and sore throat.    Eyes: Negative for visual disturbance.   Respiratory: Negative for cough and shortness of breath.    Cardiovascular: Positive for chest pain.   Gastrointestinal: Negative for abdominal pain.   Genitourinary: Negative for dysuria.   Musculoskeletal: Positive for arthralgias (Leg Pain). Negative for gait problem.   Skin: Negative for rash.   Neurological: Negative for syncope.       Physical Exam     Initial Vitals [04/23/19 1103]   BP Pulse Resp Temp SpO2   (!) 166/89 (!) 53 18 98.4 °F (36.9 °C) 99 %      MAP       --         Physical Exam    Nursing note and vitals reviewed.  Constitutional: He appears well-developed and well-nourished. He is not diaphoretic. No distress.   HENT:   Head: Normocephalic and atraumatic.   Right Ear: External ear normal.   Left Ear: External ear normal.   Nose: Nose normal.   Mouth/Throat: Oropharynx is clear and moist.   Eyes: Conjunctivae and EOM are normal. Pupils are equal, round, and reactive to light. Right eye exhibits no discharge. Left eye exhibits no discharge. No scleral icterus.   Neck: Normal range of motion. Neck supple. No JVD present.   Cardiovascular: Normal rate, regular rhythm, normal heart sounds and intact distal pulses. Exam reveals no gallop and no friction rub.    No murmur heard.  Pulmonary/Chest: Breath sounds normal. No stridor. No respiratory  distress. He has no wheezes. He has no rhonchi. He has no rales. He exhibits no tenderness.   Abdominal: Soft. Bowel sounds are normal. He exhibits no distension and no mass. There is no tenderness. There is no rebound and no guarding.   Musculoskeletal: Normal range of motion. He exhibits edema. He exhibits no tenderness.   There is asymmetric edema of the right lower extremity with erythema noted in the distal extremity.  DP and PT pulses are normal bilaterally.   Neurological: He is alert and oriented to person, place, and time. He has normal strength. No cranial nerve deficit or sensory deficit.   Skin: Skin is warm and dry. No rash noted. No erythema. No pallor.   Psychiatric: He has a normal mood and affect. His behavior is normal. Judgment and thought content normal.         ED Course   Procedures  Labs Reviewed   CBC W/ AUTO DIFFERENTIAL - Abnormal; Notable for the following components:       Result Value    RBC 4.18 (*)     Hemoglobin 12.8 (*)     MCHC 31.7 (*)     All other components within normal limits   COMPREHENSIVE METABOLIC PANEL - Abnormal; Notable for the following components:    Anion Gap 6 (*)     All other components within normal limits   PROTIME-INR   APTT     EKG Readings: (Independently Interpreted)   Initial Reading: No STEMI. Ectopy: No Ectopy.   EKG reviewed and interpreted by me shows sinus bradycardia rate of 50.  P.r., QRS intervals within normal limits.  The QT interval is prolonged.  There is normal axis.  There is normal R-wave progression.  There are no ST segment or T-wave ischemic findings.         Imaging Results          CT Abdomen Pelvis With Contrast (Final result)  Result time 04/23/19 13:58:43    Final result by Maribeth Farley MD (04/23/19 13:58:43)                 Impression:      Markedly distended bladder.  Bladder wall thickening suggestive of chronic outlet obstruction.  Further evaluation advised.      Electronically signed by: Maribeth Farley  MD  Date:    04/23/2019  Time:    13:58             Narrative:    EXAMINATION:  CT ABDOMEN PELVIS WITH CONTRAST    CLINICAL HISTORY:  recurrent lower extremity DVT.  Evaluation for obstructive process.;    TECHNIQUE:  Low dose axial images, sagittal and coronal reformations were obtained from the lung bases to the pubic symphysis following the IV administration of 100 mL of Omnipaque 350 .  Oral contrast was not given.    COMPARISON:  None.    FINDINGS:  The lung bases are clear.  No liver lesions.  The gallbladder is present.  The stomach, pancreas, spleen, adrenal glands and kidneys appear normal.  The aorta tapers normally, no para-aortic lymphadenopathy.  No significant atherosclerotic plaque.  The mesentery appears normal.  The bladder is markedly distended with circumferential bladder wall thickening suggestive of chronic outlet obstruction.  The prostate measures 4.8 x 4.4 cm transverse and AP diameter.  No inflammatory changes of the bowel seen, no bowel dilation.  The appendix is normal.  No significant stool retention.  No ascites.  The inguinal regions appear normal.  The osseous structures demonstrate no destructive osseous lesions.                                 Medical Decision Making:   ED Management:  This is the emergent evaluation of a 63-year-old male presents emergency department with acute on chronic worsening leg pain, redness, swelling in the right lower extremity.  Differential diagnosis at the time of initial evaluation included, but was not limited to:  DVT, cellulitis, venous stasis, traumatic injury. On further review of the patient's chart, patient had ultrasound performed yesterday which revealed extension of previously identified DVT.  This is despite the fact the patient has been on approximately 8 months of therapy with rivaroxaban.  The patient denies any chest pain or shortness of breath at this time.  He is not tachycardic or hypoxic.  I do not suspect pulmonary embolus.  Given  the patient is failed outpatient treatment for DVT, I contacted the attending hematologist.  I spoke with Dr. Tariq.  He advised admitting this patient in getting CT scan of the abdomen and pelvis to look for obstructive process.  Advised pre this patient on therapeutic dose Lovenox with plan to possibly bridge the patient to warfarin.  Patient agrees with this plan.  He is stable for admission.  I discussed the case with the admitting hospitalist, Dr. Muhammad, who agrees with the above plan.               Scribe Attestation:   Scribe #1: I performed the above scribed service and the documentation accurately describes the services I performed. I attest to the accuracy of the note.    Attending Attestation:           Physician Attestation for Scribe:  Physician Attestation Statement for Scribe #1: I, Roverto Alaniz Jr., MD, reviewed documentation, as scribed by Esther Flanagan  in my presence, and it is both accurate and complete.                    Clinical Impression:       ICD-10-CM ICD-9-CM   1. Failure of outpatient treatment Z78.9 V49.89   2. DVT, lower extremity, recurrent, right I82.401 453.40                                Roverto Alaniz Jr., MD  04/23/19 7149

## 2019-04-23 NOTE — ASSESSMENT & PLAN NOTE
No acute issues  Pt taken off plavix, last stent 3 years ago  Continue statin and aspirin daily

## 2019-04-23 NOTE — PROGRESS NOTES
Please notify patient hat he has a new blood clot and needs to go to the ED despite being on xarelto    Thanks  Dr. Hassan

## 2019-04-23 NOTE — ASSESSMENT & PLAN NOTE
Hematology consulted  Failure of outpatient xarelto x 8 months   Previously on coumadin years ago  INR daily  lovenox BID  Coumadin - defer to hematology

## 2019-04-23 NOTE — TELEPHONE ENCOUNTER
----- Message from Khalif Hassan MD sent at 4/23/2019  8:22 AM CDT -----  Please notify patient hat he has a new blood clot and needs to go to the ED despite being on xarelto    Thanks  Dr. Hassan

## 2019-04-23 NOTE — ED TRIAGE NOTES
Patient states he went to the MD yesterday & had an US of RLE. PCP, Dr. Khalif Vences, called him today at work and instructed him to come directly to ED for possible DVT to RLE. Good pedal pulses, warm to touch, minimal edema to right calf. Placed on the monitor.

## 2019-04-23 NOTE — PLAN OF CARE
04/23/19 1454   Discharge Assessment   Assessment Type Discharge Planning Assessment   Confirmed/corrected address and phone number on facesheet? Yes   Assessment information obtained from? Patient   Prior to hospitilization cognitive status: Alert/Oriented   Prior to hospitalization functional status: Independent   Current cognitive status: Alert/Oriented   Current Functional Status: Independent   Facility Arrived From: home    Lives With child(jeniffer), adult   Able to Return to Prior Arrangements yes   Is patient able to care for self after discharge? Yes   Who are your caregiver(s) and their phone number(s)? Carlos A Ruff [phone]    Patient's perception of discharge disposition home or selfcare   Readmission Within the Last 30 Days no previous admission in last 30 days   Patient currently being followed by outpatient case management? No   Patient currently receives any other outside agency services? No   Equipment Currently Used at Home none   Do you have any problems affording any of your prescribed medications? No   Is the patient taking medications as prescribed? yes   Does the patient have transportation home? Yes   Transportation Anticipated car, drives self;family or friend will provide   Dialysis Name and Scheduled days N/A   Does the patient receive services at the [facility]? No   Discharge Plan A Home with family   DME Needed Upon Discharge  none   Patient/Family in Agreement with Plan yes       SW met with pt and pt's family at bedside. SW explained her role in Care Management. Pt voiced understanding. SW inquired about HELP AT HOME. Pt stated that he will have Carlos A at home to help for support. SW voiced understanding. SW inquired about responsibilities when it comes to  MANAGING HER/HIS HEALTH at home and what it entails. Pt inquired about details. SW informed pt of RESPONSIBILITIES of:    1. Follow up appointments  2. Getting Prescriptions filled  3. Taking medications as prescribed.     Pt  "voiced understanding.  SW explained "My Health Packet" blue folder and the pink and green tabs that are on the folder as well. Discharge Brochure given to pt with Care Team information. Pt voiced understanding.     Pt's pharmacy:   [address] [place] - [address] [facility] [address]  [address] [facility] [address]  [place] [address]  Phone: [phone] Fax: [phone]      Pt's preference for appointments:afternoon       "

## 2019-04-23 NOTE — HPI
64 y/o M who has a past medical history of Anticoagulant long-term use, Coronary artery disease, Hypercholesteremia, Hypertension, and MI (myocardial infarction) presents to the ED with acute onset of right leg pain and intermittent chest pain which began two weeks ago. Pt was directed to the ED per MD request. Pt had an appointment with his PCP one day ago for right leg swelling and pain. An ulta sound was preformed and found a new blood clot to his R leg. Pt has been on Xarelto for one year after being taken off Plavix. Pt has a hx of blood clots. No fever/chills, nausea/emesis, chest/abdominal/back pain, cough/sob, headache/dizziness, weakness/numbness, rash, urinary symptoms, abnormal bowels.

## 2019-04-23 NOTE — H&P
[facility] Medicine  History & Physical    Patient Name: Carlos A Ruff  MRN: [id number]  Admission Date: 4/23/2019  Attending Physician: Mandi Muhammad MD   Primary Care Provider: Khalif Hassan MD         Patient information was obtained from patient and ER records.     Subjective:     Principal Problem:DVT, lower extremity, recurrent, right    Chief Complaint:   Chief Complaint   Patient presents with    Leg Pain     pt here per MD request. reports being seen by pcp for right leg swelling and pain; u/s done, pt was informed he has new blood clot to leg. pt on xarelto; hx of blood clot.         HPI: 64 y/o M who has a past medical history of Anticoagulant long-term use, Coronary artery disease, Hypercholesteremia, Hypertension, and MI (myocardial infarction) presents to the ED with acute onset of right leg pain and intermittent chest pain which began two weeks ago. Pt was directed to the ED per MD request. Pt had an appointment with his PCP one day ago for right leg swelling and pain. An ulta sound was preformed and found a new blood clot to his R leg. Pt has been on Xarelto for one year after being taken off Plavix. Pt has a hx of blood clots. No fever/chills, nausea/emesis, chest/abdominal/back pain, cough/sob, headache/dizziness, weakness/numbness, rash, urinary symptoms, abnormal bowels.        Past Medical History:   Diagnosis Date    Anticoagulant long-term use     Coronary artery disease     Hypercholesteremia     Hypertension     MI (myocardial infarction)        Past Surgical History:   Procedure Laterality Date    blood clots      cardiac stents      COLONOSCOPY N/A 2/1/2019    Performed by Blanca Fenton MD at [facility] ENDO    Left heart cath Left 5/17/2018    Performed by Alfred Cordero MD at [facility] CATH LAB       Review of patient's allergies indicates:  No Known Allergies    No current facility-administered medications on file prior to encounter.      Current  Outpatient Medications on File Prior to Encounter   Medication Sig    ascorbic acid, vitamin C, (VITAMIN C) 100 MG tablet Take 100 mg by mouth once daily.    aspirin (ECOTRIN) 81 MG EC tablet Take 81 mg by mouth.    atorvastatin (LIPITOR) 40 MG tablet Take 40 mg by mouth.    ergocalciferol (VITAMIN D2) 50,000 unit Cap Take 50,000 Units by mouth every 7 days.    losartan-hydrochlorothiazide 50-12.5 mg (HYZAAR) 50-12.5 mg per tablet Take 1 tablet by mouth once daily.    metoprolol succinate (TOPROL-XL) 50 MG 24 hr tablet Take 1 tablet (50 mg total) by mouth once daily.    rivaroxaban (XARELTO) 20 mg Tab Take 1 tablet (20 mg total) by mouth daily with dinner or evening meal.    tamsulosin (FLOMAX) 0.4 mg Cap Take 1 capsule (0.4 mg total) by mouth once daily.    clopidogrel (PLAVIX) 75 mg tablet Take 75 mg by mouth.    isosorbide mononitrate (IMDUR) 30 MG 24 hr tablet Take 1 tablet (30 mg total) by mouth once daily.    nitroGLYCERIN (NITROSTAT) 0.4 MG SL tablet Place 0.4 mg under the tongue every 5 (five) minutes as needed for Chest pain.     Family History     None        Tobacco Use    Smoking status: Current Some Day Smoker     Packs/day: 0.50     Types: Cigarettes    Smokeless tobacco: Never Used   Substance and Sexual Activity    Alcohol use: Yes     Comment: occasionally     Drug use: No    Sexual activity: Not Currently     Review of Systems   Constitutional: Negative.    HENT: Negative.    Eyes: Negative.    Respiratory: Negative.    Cardiovascular: Positive for leg swelling.   Gastrointestinal: Negative.    Endocrine: Negative.    Genitourinary: Negative.    Musculoskeletal: Positive for gait problem.   Psychiatric/Behavioral: Negative.      Objective:     Vital Signs (Most Recent):  Temp: 98.2 °F (36.8 °C) (04/23/19 1650)  Pulse: (!) 54 (04/23/19 1650)  Resp: 19 (04/23/19 1650)  BP: (!) 164/104 (04/23/19 1632)  SpO2: 100 % (04/23/19 1650) Vital Signs (24h Range):  Temp:  [98.2 °F (36.8 °C)-98.5  °F (36.9 °C)] 98.2 °F (36.8 °C)  Pulse:  [47-72] 54  Resp:  [16-20] 19  SpO2:  [99 %-100 %] 100 %  BP: (152-166)/() 164/104     Weight: 83.9 kg (185 lb)  Body mass index is 25.8 kg/m².    Physical Exam   Constitutional: He is oriented to person, place, and time. He appears well-developed and well-nourished.   HENT:   Head: Normocephalic and atraumatic.   Eyes: Pupils are equal, round, and reactive to light. EOM are normal.   Neck: Normal range of motion. Neck supple.   Cardiovascular: Regular rhythm, normal heart sounds and intact distal pulses.   Bradycardia S1 S2   Pulmonary/Chest: Effort normal and breath sounds normal. No respiratory distress.   Abdominal: Soft. Bowel sounds are normal. He exhibits no distension.   Musculoskeletal: Normal range of motion. He exhibits edema.   Neurological: He is alert and oriented to person, place, and time.   Skin: Skin is warm and dry. Capillary refill takes 2 to 3 seconds. No erythema.   Psychiatric: He has a normal mood and affect. His behavior is normal.         CRANIAL NERVES     CN III, IV, VI   Pupils are equal, round, and reactive to light.  Extraocular motions are normal.        Significant Labs:   CBC:   Recent Labs   Lab 04/23/19  1222   WBC 6.40   HGB 12.8*   HCT 40.4        CMP:   Recent Labs   Lab 04/23/19  1222      K 3.8      CO2 28   GLU 88   BUN 13   CREATININE 0.9   CALCIUM 9.1   PROT 6.9   ALBUMIN 3.8   BILITOT 0.6   ALKPHOS 64   AST 31   ALT 20   ANIONGAP 6*   EGFRNONAA >60     Cardiac Markers: No results for input(s): CKMB, MYOGLOBIN, BNP, TROPISTAT in the last 48 hours.  Coagulation:   Recent Labs   Lab 04/23/19  1222   INR 1.0   APTT 28.2     Magnesium: No results for input(s): MG in the last 48 hours.  POCT Glucose: No results for input(s): POCTGLUCOSE in the last 48 hours.  Urine Studies: No results for input(s): COLORU, APPEARANCEUA, PHUR, SPECGRAV, PROTEINUA, GLUCUA, KETONESU, BILIRUBINUA, OCCULTUA, NITRITE, UROBILINOGEN,  LEUKOCYTESUR, RBCUA, WBCUA, BACTERIA, SQUAMEPITHEL, HYALINECASTS in the last 48 hours.    Invalid input(s): WRIGHTSUR    Significant Imaging: CT abdomen and pelvis   Impression       Markedly distended bladder.  Bladder wall thickening suggestive of chronic outlet obstruction.  Further evaluation advised.       Impression Right LE ultrasound        Evolving right lower extremity deep venous thrombosis with continued thrombus in the right femoral vein from mid aspect to the popliteal vein with additional thrombus in the anterior tibial and peroneal veins.    Previous posterior tibial and thrombus is not currently seen.         Assessment/Plan:     * DVT, lower extremity, recurrent, right  Hematology consulted  Failure of outpatient xarelto x 8 months   Previously on coumadin years ago  INR daily  lovenox BID  Coumadin - defer to hematology       Failure of outpatient treatment    See DVT above    Tobacco abuse  Smoking cessation d/w patient > 5 minutes   Nicotine patch offered       Coronary artery disease involving native coronary artery of native heart without angina pectoris  No acute issues  Pt taken off plavix, last stent 3 years ago  Continue statin and aspirin daily         VTE Risk Mitigation (From admission, onward)        Ordered     enoxaparin injection 80 mg  Every 12 hours (non-standard times)      04/23/19 1712     IP VTE HIGH RISK PATIENT  Once      04/23/19 1712     Place AMILCAR hose  Until discontinued      04/23/19 1712             Mandi Muhammad MD  Department of Hospital Medicine   [facility]

## 2019-04-23 NOTE — SUBJECTIVE & OBJECTIVE
Past Medical History:   Diagnosis Date    Anticoagulant long-term use     Coronary artery disease     Hypercholesteremia     Hypertension     MI (myocardial infarction)        Past Surgical History:   Procedure Laterality Date    blood clots      cardiac stents      COLONOSCOPY N/A 2/1/2019    Performed by Blanca Fenton MD at [facility] ENDO    Left heart cath Left 5/17/2018    Performed by Alfred Cordero MD at [facility] CATH LAB       Review of patient's allergies indicates:  No Known Allergies    No current facility-administered medications on file prior to encounter.      Current Outpatient Medications on File Prior to Encounter   Medication Sig    ascorbic acid, vitamin C, (VITAMIN C) 100 MG tablet Take 100 mg by mouth once daily.    aspirin (ECOTRIN) 81 MG EC tablet Take 81 mg by mouth.    atorvastatin (LIPITOR) 40 MG tablet Take 40 mg by mouth.    ergocalciferol (VITAMIN D2) 50,000 unit Cap Take 50,000 Units by mouth every 7 days.    losartan-hydrochlorothiazide 50-12.5 mg (HYZAAR) 50-12.5 mg per tablet Take 1 tablet by mouth once daily.    metoprolol succinate (TOPROL-XL) 50 MG 24 hr tablet Take 1 tablet (50 mg total) by mouth once daily.    rivaroxaban (XARELTO) 20 mg Tab Take 1 tablet (20 mg total) by mouth daily with dinner or evening meal.    tamsulosin (FLOMAX) 0.4 mg Cap Take 1 capsule (0.4 mg total) by mouth once daily.    clopidogrel (PLAVIX) 75 mg tablet Take 75 mg by mouth.    isosorbide mononitrate (IMDUR) 30 MG 24 hr tablet Take 1 tablet (30 mg total) by mouth once daily.    nitroGLYCERIN (NITROSTAT) 0.4 MG SL tablet Place 0.4 mg under the tongue every 5 (five) minutes as needed for Chest pain.     Family History     None        Tobacco Use    Smoking status: Current Some Day Smoker     Packs/day: 0.50     Types: Cigarettes    Smokeless tobacco: Never Used   Substance and Sexual Activity    Alcohol use: Yes     Comment: occasionally     Drug use: No    Sexual activity: Not  Currently     Review of Systems   Constitutional: Negative.    HENT: Negative.    Eyes: Negative.    Respiratory: Negative.    Cardiovascular: Positive for leg swelling.   Gastrointestinal: Negative.    Endocrine: Negative.    Genitourinary: Negative.    Musculoskeletal: Positive for gait problem.   Psychiatric/Behavioral: Negative.      Objective:     Vital Signs (Most Recent):  Temp: 98.2 °F (36.8 °C) (04/23/19 1650)  Pulse: (!) 54 (04/23/19 1650)  Resp: 19 (04/23/19 1650)  BP: (!) 164/104 (04/23/19 1632)  SpO2: 100 % (04/23/19 1650) Vital Signs (24h Range):  Temp:  [98.2 °F (36.8 °C)-98.5 °F (36.9 °C)] 98.2 °F (36.8 °C)  Pulse:  [47-72] 54  Resp:  [16-20] 19  SpO2:  [99 %-100 %] 100 %  BP: (152-166)/() 164/104     Weight: 83.9 kg (185 lb)  Body mass index is 25.8 kg/m².    Physical Exam   Constitutional: He is oriented to person, place, and time. He appears well-developed and well-nourished.   HENT:   Head: Normocephalic and atraumatic.   Eyes: Pupils are equal, round, and reactive to light. EOM are normal.   Neck: Normal range of motion. Neck supple.   Cardiovascular: Regular rhythm, normal heart sounds and intact distal pulses.   Bradycardia S1 S2   Pulmonary/Chest: Effort normal and breath sounds normal. No respiratory distress.   Abdominal: Soft. Bowel sounds are normal. He exhibits no distension.   Musculoskeletal: Normal range of motion. He exhibits edema.   Neurological: He is alert and oriented to person, place, and time.   Skin: Skin is warm and dry. Capillary refill takes 2 to 3 seconds. No erythema.   Psychiatric: He has a normal mood and affect. His behavior is normal.         CRANIAL NERVES     CN III, IV, VI   Pupils are equal, round, and reactive to light.  Extraocular motions are normal.        Significant Labs:   CBC:   Recent Labs   Lab 04/23/19  1222   WBC 6.40   HGB 12.8*   HCT 40.4        CMP:   Recent Labs   Lab 04/23/19  1222      K 3.8      CO2 28   GLU 88   BUN 13    CREATININE 0.9   CALCIUM 9.1   PROT 6.9   ALBUMIN 3.8   BILITOT 0.6   ALKPHOS 64   AST 31   ALT 20   ANIONGAP 6*   EGFRNONAA >60     Cardiac Markers: No results for input(s): CKMB, MYOGLOBIN, BNP, TROPISTAT in the last 48 hours.  Coagulation:   Recent Labs   Lab 04/23/19  1222   INR 1.0   APTT 28.2     Magnesium: No results for input(s): MG in the last 48 hours.  POCT Glucose: No results for input(s): POCTGLUCOSE in the last 48 hours.  Urine Studies: No results for input(s): COLORU, APPEARANCEUA, PHUR, SPECGRAV, PROTEINUA, GLUCUA, KETONESU, BILIRUBINUA, OCCULTUA, NITRITE, UROBILINOGEN, LEUKOCYTESUR, RBCUA, WBCUA, BACTERIA, SQUAMEPITHEL, HYALINECASTS in the last 48 hours.    Invalid input(s): WRIGHTSUR    Significant Imaging: CT abdomen and pelvis   Impression       Markedly distended bladder.  Bladder wall thickening suggestive of chronic outlet obstruction.  Further evaluation advised.       Impression Right LE ultrasound        Evolving right lower extremity deep venous thrombosis with continued thrombus in the right femoral vein from mid aspect to the popliteal vein with additional thrombus in the anterior tibial and peroneal veins.    Previous posterior tibial and thrombus is not currently seen.

## 2019-04-24 LAB
INR PPP: 2.6 (ref 0.8–1.2)
PROTHROMBIN TIME: 27 SEC (ref 9–12.5)

## 2019-04-24 PROCEDURE — 11000001 HC ACUTE MED/SURG PRIVATE ROOM

## 2019-04-24 PROCEDURE — 63600175 PHARM REV CODE 636 W HCPCS: Performed by: HOSPITALIST

## 2019-04-24 PROCEDURE — 85610 PROTHROMBIN TIME: CPT

## 2019-04-24 PROCEDURE — 25000003 PHARM REV CODE 250: Performed by: INTERNAL MEDICINE

## 2019-04-24 PROCEDURE — 36415 COLL VENOUS BLD VENIPUNCTURE: CPT

## 2019-04-24 PROCEDURE — 25000003 PHARM REV CODE 250: Performed by: HOSPITALIST

## 2019-04-24 RX ORDER — RAMELTEON 8 MG/1
8 TABLET ORAL NIGHTLY PRN
Status: DISCONTINUED | OUTPATIENT
Start: 2019-04-24 | End: 2019-04-26 | Stop reason: HOSPADM

## 2019-04-24 RX ADMIN — ASPIRIN 81 MG: 81 TABLET, COATED ORAL at 08:04

## 2019-04-24 RX ADMIN — ATORVASTATIN CALCIUM 40 MG: 40 TABLET, FILM COATED ORAL at 08:04

## 2019-04-24 RX ADMIN — DOCUSATE SODIUM AND SENNOSIDES 1 TABLET: 8.6; 5 TABLET, FILM COATED ORAL at 08:04

## 2019-04-24 RX ADMIN — TAMSULOSIN HYDROCHLORIDE 0.4 MG: 0.4 CAPSULE ORAL at 08:04

## 2019-04-24 RX ADMIN — LOSARTAN POTASSIUM AND HYDROCHLOROTHIAZIDE 1 TABLET: 12.5; 5 TABLET ORAL at 08:04

## 2019-04-24 RX ADMIN — RAMELTEON 8 MG: 8 TABLET, FILM COATED ORAL at 10:04

## 2019-04-24 RX ADMIN — ENOXAPARIN SODIUM 80 MG: 100 INJECTION SUBCUTANEOUS at 12:04

## 2019-04-24 RX ADMIN — DOCUSATE SODIUM AND SENNOSIDES 1 TABLET: 8.6; 5 TABLET, FILM COATED ORAL at 09:04

## 2019-04-24 RX ADMIN — ENOXAPARIN SODIUM 80 MG: 100 INJECTION SUBCUTANEOUS at 01:04

## 2019-04-24 RX ADMIN — ISOSORBIDE MONONITRATE 30 MG: 30 TABLET, EXTENDED RELEASE ORAL at 08:04

## 2019-04-24 NOTE — CONSULTS
[facility]  Hematology/Oncology  Consult Note    Patient Name: Carlos A Ruff  MRN: [id number]  Admission Date: 4/23/2019  Hospital Length of Stay: 1 days  Code Status: Full Code   Attending Provider: Mandi Muhammad MD  Consulting Provider: Vicente Tariq MD  Primary Care Physician: Khalif Hassan MD  Principal Problem:DVT, lower extremity, recurrent, right    Inpatient consult to Hematology  Consult performed by: Vicente Tariq MD  Consult ordered by: Mandi Muhammad MD        Subjective:     HPI:     Mr. Ruff is a pleasant 62 YO gentleman with hypercoagulable state with heterozygote mutation of Prothrombin gene, transient lupus anticoagulant positive who had a DVT of RLE > 10 yrs ago. He was managed successfully with Wafarin x ? 1 yr. He did fine until Aug 2018 when he developed recurrent DVT of RLE (Near occlusive thrombus within the right mid to distal superficial femoral vein, popliteal vein, anterior and posterior tibial veins and peroneal vein). Pt was placed on Xarelto and began to have recurrent edema and pain to RLE. He had US of RLE in Oct and Nov 2018 both studies showing persistent thrombus. He presented to [facility] ED yesterday after getting an US on 04/22 which revealed  evolving right lower extremity deep venous thrombosis with continued thrombus in the right femoral vein from mid aspect to the popliteal vein with additional thrombus in the anterior tibial and peroneal veins. Previous posterior tibial and thrombus is not currently seen.    Pt states he has been compliant with Xarelto. Denies any trauma. Pt was stared on Lovenox and admitted for further work up and management.     Oncology Treatment Plan:   [No treatment plan]    Medications:  Continuous Infusions:  Scheduled Meds:   aspirin  81 mg Oral Daily    atorvastatin  40 mg Oral Daily    enoxparin  1 mg/kg Subcutaneous Q12H    isosorbide mononitrate  30 mg Oral Daily    losartan-hydrochlorothiazide 50-12.5 mg  1  tablet Oral Daily    metoprolol succinate  50 mg Oral Daily    senna-docusate 8.6-50 mg  1 tablet Oral BID    tamsulosin  0.4 mg Oral Daily     PRN Meds:acetaminophen, sodium chloride 0.9%     Review of patient's allergies indicates:  No Known Allergies     Past Medical History:   Diagnosis Date    Anticoagulant long-term use     Coronary artery disease     Hypercholesteremia     Hypertension     MI (myocardial infarction)      Past Surgical History:   Procedure Laterality Date    blood clots      cardiac stents      COLONOSCOPY N/A 2/1/2019    Performed by Blanca Fenton MD at [facility] ENDO    Left heart cath Left 5/17/2018    Performed by Alfred Cordero MD at [facility] CATH LAB     Family History     None        Tobacco Use    Smoking status: Current Some Day Smoker     Packs/day: 0.50     Types: Cigarettes    Smokeless tobacco: Never Used   Substance and Sexual Activity    Alcohol use: Yes     Comment: occasionally     Drug use: No    Sexual activity: Not Currently       Review of Systems     Constitutional: Denies any weigh or appetite changes.   Eyes: no visual changes   ENT: no nasal congestion. Denies sore throat with odynophagia   Respiratory: No cough or SOB. No hx of PE  Cardiovascular: no chest pain or palpitations   Gastrointestinal: no nausea, vomiting or abdominal pain.  Hematologic/Lymphatic:see HPI  Musculoskeletal: No ROM abnormalities or muscle weakness   Ext: see HPI  Neurological: no seizures or tremors  Skin: No rashes or lesions  Psych: Denies any anxiety, depression or insomnia      Objective:     Vital Signs (Most Recent):  Temp: 98 °F (36.7 °C) (04/24/19 0716)  Pulse: (!) 59 (04/24/19 0716)  Resp: 19 (04/24/19 0716)  BP: (!) 149/84 (04/24/19 0716)  SpO2: 96 % (04/24/19 0716) Vital Signs (24h Range):  Temp:  [98 °F (36.7 °C)-98.5 °F (36.9 °C)] 98 °F (36.7 °C)  Pulse:  [47-72] 59  Resp:  [16-20] 19  SpO2:  [95 %-100 %] 96 %  BP: (144-166)/() 149/84     Weight: 84 kg (185 lb 3.8  oz)  Body mass index is 25.84 kg/m².  Body surface area is 2.05 meters squared.      Intake/Output Summary (Last 24 hours) at 4/24/2019 0720  Last data filed at 4/23/2019 1745  Gross per 24 hour   Intake 240 ml   Output --   Net 240 ml       Physical Exam    General: NAD, resting in bed.   Head: normocephalic, atraumatic   Eyes: conjunctivae pink, anicteric sclera.   Throat: No erythema or post nasal discharge   Neck: supple, no LAD   Lungs: Decreased air entry at the bases   Heart: S1, S2, RRR   Abdomen: + BS x 4 quadrants, soft, non tender.    Extremities: RLE edema and calf region tenderness   Skin: turgor normal, no erythema or rashes.  MS: move all muscles   Neuro: A&O x 3  Psy: pleasant    Significant Labs:   CBC:   Recent Labs   Lab 04/23/19  1222   WBC 6.40   HGB 12.8*   HCT 40.4      , CMP:   Recent Labs   Lab 04/23/19  1222      K 3.8      CO2 28   GLU 88   BUN 13   CREATININE 0.9   CALCIUM 9.1   PROT 6.9   ALBUMIN 3.8   BILITOT 0.6   ALKPHOS 64   AST 31   ALT 20   ANIONGAP 6*   EGFRNONAA >60   , Coagulation:   Recent Labs   Lab 04/23/19  1222 04/24/19  0507   INR 1.0 2.6*   APTT 28.2  --    , Immunology: No results for input(s): SPEP, ALONDRA, MILTON, FREELAMBDALI in the last 48 hours., LDH: No results for input(s): LDHCSF, BFSOURCE in the last 48 hours., Reticulocytes: No results for input(s): RETIC in the last 48 hours., Tumor Markers: No results for input(s): PSA, CEA, , AFPTM, RO1203,  in the last 48 hours.    Invalid input(s): ALGTM, Uric Acid No results for input(s): URICACID in the last 48 hours. and Urine Studies: No results for input(s): COLORU, APPEARANCEUA, PHUR, SPECGRAV, PROTEINUA, GLUCUA, KETONESU, BILIRUBINUA, OCCULTUA, NITRITE, UROBILINOGEN, LEUKOCYTESUR, RBCUA, WBCUA, BACTERIA, SQUAMEPITHEL, HYALINECASTS in the last 48 hours.    Invalid input(s): GoHome    Diagnostic Results:      Assessment/Plan:     Active Diagnoses:    Diagnosis Date Noted POA    PRINCIPAL  PROBLEM:  DVT, lower extremity, recurrent, right [I82.401]    - pt has hypercoagulable condition and hx of Warfarin tolerance with no significant SE- recommend to transition to Warfarin with Lovenox bridge  - I do not recommend IVC filter   08/28/2018 Unknown    Failure of outpatient treatment [Z78.9]  -Xarelto- don't have good date to support use in pt 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  - recommend Warfarin therapy   04/23/2019 Yes    Coronary artery disease involving native coronary artery of native heart without angina pectoris [I25.10] 11/29/2017 Yes    Tobacco abuse [Z72.0] 11/29/2017 Yes      Problems Resolved During this Admission:     Bladder wall thickening on CT Ab: check UA   - need urology f/u out pt to evaluate for malignancy in this pt with tobacco abuse     Communicated with Dr. Muhammad       Thank you for your consult. I will follow-up with patient. Please contact us if you have any additional questions.    Vicente Tariq M.D  Internal Medicine & Geriatric Medicine  Hematology & Oncology  Palliative Medicine    [address], [address]  [place] [address]  [phone] (Office)  [phone] (Fax)

## 2019-04-24 NOTE — NURSING
Pt transfer to unit from ED,NADN,denies pain,aaox4 able to make needs known,moves all extremities,lung sound clear to all fields,abd soft non tender to touch states LBM today at work,AMILCAR hose applied per order,pppx4,cap refill WDL,skin w/d to touch,skin intact,iv site clear,dressind clean,dry,intact,ambulated to bathroom with standby assist,continent of b/b,continue monitoring

## 2019-04-24 NOTE — PLAN OF CARE
Problem: Adult Inpatient Plan of Care  Goal: Plan of Care Review  AAOx4 , pt free from fall and injury, able to make needs known, ambulates independently with standby assist, tolerated neds, lorie hose in progress, NAD or pain present. Safety maintained, will cont monitoring

## 2019-04-24 NOTE — MEDICAL/APP STUDENT
[facility] Hospital Medicine  Progress Note    Patient Name: Carlos A Ruff  MRN: [id number]  Patient Class: IP- Inpatient   Admission Date: 4/23/2019  Length of Stay: 1 days  Attending Physician: Mandi Muhammad MD  Primary Care Provider: Khalif Hassan MD        Subjective:     Principal Problem:DVT, lower extremity, recurrent, right    HPI: 62 y/o M who has a past medical history of Anticoagulant long-term use, Coronary artery disease, Hypercholesteremia, Hypertension, and MI (myocardial infarction) presents to the ED with acute onset of right leg pain and intermittent chest pain which began two weeks ago. Pt was directed to the ED per MD request. Pt had an appointment with his PCP one day ago for right leg swelling and pain. An ulta sound was preformed and found a new blood clot to his R leg. Pt has been on Xarelto for one year after being taken off Plavix. Pt has a hx of blood clots. No fever/chills, nausea/emesis, chest/abdominal/back pain, cough/sob, headache/dizziness, weakness/numbness, rash, urinary symptoms, abnormal bowels.    Hospital Course: *4/24/19- R leg swelling significantly decreased. No tenderness on exam, slight pedal edema R side. Lorenzo overnight, metoprolol d/c.     Interval History: Overnight pt able to ambulate to bathroom and take shower. Reports bowel movement and adequate urine output of light yellow color. Tolerated breakfast with no complaints. Denies shortness of breath, cough, or chest pain.     Review of Systems   Constitutional: Negative for activity change, chills, diaphoresis and fever.   HENT: Negative for hearing loss, nosebleeds, rhinorrhea and voice change.    Eyes: Negative for redness and visual disturbance.   Respiratory: Negative for cough, chest tightness and shortness of breath.    Gastrointestinal: Negative for abdominal distention and abdominal pain.   Genitourinary: Negative for difficulty urinating.   Neurological: Negative for facial asymmetry,  speech difficulty and weakness.   Psychiatric/Behavioral: Negative for behavioral problems, confusion and decreased concentration.     Objective:     Vital Signs (Most Recent):  Temp: 98.4 °F (36.9 °C) (04/24/19 1125)  Pulse: (!) 53 (04/24/19 1125)  Resp: 18 (04/24/19 1125)  BP: 122/72 (04/24/19 1125)  SpO2: 99 % (04/24/19 1125) Vital Signs (24h Range):  Temp:  [98 °F (36.7 °C)-98.5 °F (36.9 °C)] 98.4 °F (36.9 °C)  Pulse:  [47-72] 53  Resp:  [16-20] 18  SpO2:  [95 %-100 %] 99 %  BP: (122-164)/() 122/72     Weight: 84 kg (185 lb 3.8 oz)  Body mass index is 25.84 kg/m².    Intake/Output Summary (Last 24 hours) at 4/24/2019 1134  Last data filed at 4/24/2019 0825  Gross per 24 hour   Intake 480 ml   Output --   Net 480 ml      Physical Exam   Constitutional: He is oriented to person, place, and time. He appears well-developed and well-nourished. No distress.   HENT:   Head: Normocephalic and atraumatic.   Eyes: Pupils are equal, round, and reactive to light.   Neck: Normal range of motion.   Cardiovascular: Regular rhythm and intact distal pulses. Bradycardia present.   Pulmonary/Chest: Effort normal and breath sounds normal. No respiratory distress.   Neurological: He is oriented to person, place, and time.   Vitals reviewed.      Significant Labs:   Coagulation:   Recent Labs   Lab 04/23/19  1222 04/24/19  0507   INR 1.0 2.6*   APTT 28.2  --        Significant Imaging: I have reviewed all pertinent imaging results/findings within the past 24 hours.    Assessment/Plan:      * DVT, lower extremity, recurrent, right  Hematology consulted, appreciate recs.  Failure of outpatient xarelto x 8 months   Previously on coumadin years ago  INR daily, 2.6 today   Coumadin - defer to hematology       lovenox BID, already therapeutic. Holding pm lovenox dose tonight, recheck INR in morning.     Failure of outpatient treatment  See DVT above     Tobacco abuse  Smoking cessation d/w patient > 5 minutes   Nicotine patch offered       Coronary artery disease involving native coronary artery of native heart without angina pectoris  No acute issues  Pt taken off plavix, last stent 3 years ago  Continue statin and aspirin daily         Active Diagnoses:    Diagnosis Date Noted POA    PRINCIPAL PROBLEM:  DVT, lower extremity, recurrent, right [I82.401] 08/28/2018 Unknown    Failure of outpatient treatment [Z78.9] 04/23/2019 Yes    Coronary artery disease involving native coronary artery of native heart without angina pectoris [I25.10] 11/29/2017 Yes    Tobacco abuse [Z72.0] 11/29/2017 Yes      Problems Resolved During this Admission:     VTE Risk Mitigation (From admission, onward)        Ordered     enoxaparin injection 80 mg  Every 12 hours (non-standard times)      04/23/19 1712     IP VTE HIGH RISK PATIENT  Once      04/23/19 1712     Place AMILCAR hose  Until discontinued      04/23/19 1712             Bhavesh Dietrich  Department of Hospital Medicine   [facility]

## 2019-04-24 NOTE — PLAN OF CARE
Problem: Adult Inpatient Plan of Care  Goal: Plan of Care Review  Outcome: Ongoing (interventions implemented as appropriate)     04/24/19 0201   Plan of Care Review   Plan of Care Reviewed With patient   No falls, trauma or injury this shift. No complaints of pain voiced. VTE management with Lovenox. Continue with plan o f care and continue to monitor patient.

## 2019-04-24 NOTE — PLAN OF CARE
Problem: Adult Inpatient Plan of Care  Goal: Plan of Care Review     04/23/19 2027   Plan of Care Review   Plan of Care Reviewed With patient   See nurses notes     Comments: Pt remains free from falls,able to make needs known,moves all extremities,edwardo fluids,lorie hose in progress,lovenox in progress,denies pain,no c/o SOB or chest pain,,continue monitoring

## 2019-04-25 PROBLEM — N32.89 DISTENDED BLADDER: Status: ACTIVE | Noted: 2019-04-25

## 2019-04-25 LAB
APTT BLDCRRT: 27.9 SEC (ref 21–32)
INR PPP: 0.9 (ref 0.8–1.2)
PROTHROMBIN TIME: 9.9 SEC (ref 9–12.5)

## 2019-04-25 PROCEDURE — 25000003 PHARM REV CODE 250: Performed by: HOSPITALIST

## 2019-04-25 PROCEDURE — 85730 THROMBOPLASTIN TIME PARTIAL: CPT

## 2019-04-25 PROCEDURE — 99233 PR SUBSEQUENT HOSPITAL CARE,LEVL III: ICD-10-PCS | Mod: ,,, | Performed by: UROLOGY

## 2019-04-25 PROCEDURE — 85610 PROTHROMBIN TIME: CPT

## 2019-04-25 PROCEDURE — 11000001 HC ACUTE MED/SURG PRIVATE ROOM

## 2019-04-25 PROCEDURE — 63600175 PHARM REV CODE 636 W HCPCS: Performed by: HOSPITALIST

## 2019-04-25 PROCEDURE — 99233 SBSQ HOSP IP/OBS HIGH 50: CPT | Mod: ,,, | Performed by: UROLOGY

## 2019-04-25 PROCEDURE — 25000003 PHARM REV CODE 250: Performed by: INTERNAL MEDICINE

## 2019-04-25 PROCEDURE — 87086 URINE CULTURE/COLONY COUNT: CPT

## 2019-04-25 PROCEDURE — 36415 COLL VENOUS BLD VENIPUNCTURE: CPT

## 2019-04-25 RX ORDER — WARFARIN SODIUM 5 MG/1
5 TABLET ORAL DAILY
Status: DISCONTINUED | OUTPATIENT
Start: 2019-04-25 | End: 2019-04-26

## 2019-04-25 RX ADMIN — LOSARTAN POTASSIUM AND HYDROCHLOROTHIAZIDE 1 TABLET: 12.5; 5 TABLET ORAL at 09:04

## 2019-04-25 RX ADMIN — ATORVASTATIN CALCIUM 40 MG: 40 TABLET, FILM COATED ORAL at 09:04

## 2019-04-25 RX ADMIN — WARFARIN SODIUM 5 MG: 5 TABLET ORAL at 05:04

## 2019-04-25 RX ADMIN — ENOXAPARIN SODIUM 80 MG: 100 INJECTION SUBCUTANEOUS at 01:04

## 2019-04-25 RX ADMIN — TAMSULOSIN HYDROCHLORIDE 0.4 MG: 0.4 CAPSULE ORAL at 09:04

## 2019-04-25 RX ADMIN — ISOSORBIDE MONONITRATE 30 MG: 30 TABLET, EXTENDED RELEASE ORAL at 09:04

## 2019-04-25 RX ADMIN — ASPIRIN 81 MG: 81 TABLET, COATED ORAL at 09:04

## 2019-04-25 NOTE — ASSESSMENT & PLAN NOTE
Hematology consulted  Failure of outpatient xarelto x 8 months   Previously on coumadin years ago  INR daily  lovenox BID - INR over corrected - held evening dose, INR in am and dc home on coumadin   Coumadin - defer to hematology

## 2019-04-25 NOTE — HOSPITAL COURSE
Pt admitted with new RLE DVT with h/o previous DVTs. PLaced on lovenox. INR 1.0 to 2.6 overnight. RLE swelling is improved. Recheck INR in am and dc home with coumadin.     INR dropped, lovenox + coumadin and hope for dc tomorrow     4/26- pt will dc with lovenox x 3 days and have INR checked in PCP/coumadin clinic  office and adjust dosing of meds thereafter

## 2019-04-25 NOTE — HPI
64yo M admitted with recurrent DVT. CT scan showed a very distended bladder. He does not have barnes catheter in place. PVRs not recorded. He was seen by Dr Arndt in 8/18 with urinary frequency, elevated PVR. He refused catheter or CIC teaching at that time. He was recommended for urodynamics but appears to not have followed up (reports he did not want to travel to [facility]). He is on Flomax. Denies significant voiding issues currently though does report medium to weak stream.    PSA 6/18 - 1.2  CT - very distended bladder, bladder wall thickened, large prostate. No hydronephrosis

## 2019-04-25 NOTE — PLAN OF CARE
Problem: Adult Inpatient Plan of Care  Goal: Plan of Care Review  Outcome: Ongoing (interventions implemented as appropriate)     04/25/19 0556   Plan of Care Review   Plan of Care Reviewed With patient   No falls trauma or injury this shift. DVT being managed with lovenox sub que. Pt=27 and inr 2.6. Continue with plan of care and continue to monitor patient.

## 2019-04-25 NOTE — ASSESSMENT & PLAN NOTE
- Suspect related to bladder outlet obstruction   - Check PVR now   - Likely will need barnes catheter   - Urodynamics as outpatient as recommended by Dr Arndt. Can be done at [facility] as outpatient.    - Suspect he will need bladder outlet reducing procedure in future (ie TURP)

## 2019-04-25 NOTE — PLAN OF CARE
No falls trauma or injury this shift. DVT being managed with lovenox, VSS, NAD. Bed in the lowest position, side rails up x2, call bell in reach, bed alarm on, Patient able to make needs known, Safety precautions maintained. Continue with plan of care and continue to monitor patient.

## 2019-04-25 NOTE — SUBJECTIVE & OBJECTIVE
Interval History: no new complaints     Review of Systems   Constitutional: Negative.    HENT: Negative.    Eyes: Negative.    Respiratory: Negative.    Cardiovascular: Positive for leg swelling.   Gastrointestinal: Negative.    Endocrine: Negative.    Genitourinary: Negative.    Musculoskeletal: Positive for gait problem.   Psychiatric/Behavioral: Negative.      Objective:     Vital Signs (Most Recent):  Temp: 97.6 °F (36.4 °C) (04/25/19 0452)  Pulse: 62 (04/25/19 0452)  Resp: 18 (04/25/19 0452)  BP: 135/79 (04/25/19 0452)  SpO2: 98 % (04/25/19 0452) Vital Signs (24h Range):  Temp:  [97.6 °F (36.4 °C)-98.4 °F (36.9 °C)] 97.6 °F (36.4 °C)  Pulse:  [53-62] 62  Resp:  [18-19] 18  SpO2:  [96 %-100 %] 98 %  BP: (122-149)/(72-84) 135/79     Weight: 84 kg (185 lb 3.8 oz)  Body mass index is 25.84 kg/m².    Intake/Output Summary (Last 24 hours) at 4/25/2019 0625  Last data filed at 4/25/2019 0500  Gross per 24 hour   Intake 960 ml   Output --   Net 960 ml      Physical Exam   Constitutional: He is oriented to person, place, and time. He appears well-developed and well-nourished.   HENT:   Head: Normocephalic and atraumatic.   Eyes: Pupils are equal, round, and reactive to light. EOM are normal.   Neck: Normal range of motion. Neck supple.   Cardiovascular: Regular rhythm, normal heart sounds and intact distal pulses.   Bradycardia S1 S2   Pulmonary/Chest: Effort normal and breath sounds normal. No respiratory distress.   Abdominal: Soft. Bowel sounds are normal. He exhibits no distension.   Musculoskeletal: Normal range of motion. He exhibits edema.   Neurological: He is alert and oriented to person, place, and time.   Skin: Skin is warm and dry. Capillary refill takes 2 to 3 seconds. No erythema.   Psychiatric: He has a normal mood and affect. His behavior is normal.       Significant Labs: All pertinent labs within the past 24 hours have been reviewed.    Significant Imaging: I have reviewed and interpreted all pertinent  imaging results/findings within the past 24 hours.

## 2019-04-25 NOTE — CONSULTS
[facility]  Urology  Consult Note    Patient Name: Carlos A Ruff  MRN: [id number]  Admission Date: 4/23/2019  Hospital Length of Stay: 2   Code Status: Full Code   Attending Provider: Mandi Muhammad MD   Consulting Provider: Felecia Stiles MD  Primary Care Physician: Khalif Hassan MD  Principal Problem:DVT, lower extremity, recurrent, right    Inpatient consult to Urology  Consult performed by: Felecia Stiles MD  Consult ordered by: Vicente Traiq MD          Subjective:     HPI:  64yo M admitted with recurrent DVT. CT scan showed a very distended bladder. He does not have barnes catheter in place. PVRs not recorded. He was seen by Dr Arndt in 8/18 with urinary frequency, elevated PVR. He refused catheter or CIC teaching at that time. He was recommended for urodynamics but appears to not have followed up (reports he did not want to travel to [facility]). He is on Flomax. Denies significant voiding issues currently though does report medium to weak stream.    PSA 6/18 - 1.2  CT - very distended bladder, bladder wall thickened, large prostate. No hydronephrosis    Past Medical History:   Diagnosis Date    Anticoagulant long-term use     Coronary artery disease     Hypercholesteremia     Hypertension     MI (myocardial infarction)        Past Surgical History:   Procedure Laterality Date    blood clots      cardiac stents      COLONOSCOPY N/A 2/1/2019    Performed by Blanca Fenton MD at [facility] ENDO    Left heart cath Left 5/17/2018    Performed by Alfred Cordero MD at [facility] CATH LAB       Review of patient's allergies indicates:  No Known Allergies    Family History     None          Tobacco Use    Smoking status: Current Some Day Smoker     Packs/day: 0.50     Types: Cigarettes    Smokeless tobacco: Never Used   Substance and Sexual Activity    Alcohol use: Yes     Comment: occasionally     Drug use: No    Sexual activity: Not Currently       Review of Systems    Constitutional: Negative for chills and fever.   HENT: Negative for hearing loss, sore throat and trouble swallowing.    Eyes: Negative.    Respiratory: Negative for cough and shortness of breath.    Cardiovascular: Negative for chest pain.   Gastrointestinal: Negative for abdominal distention, abdominal pain, constipation, diarrhea, nausea and vomiting.   Genitourinary: Positive for frequency. Negative for difficulty urinating and hematuria.   Musculoskeletal: Negative for arthralgias and neck pain.   Skin: Negative for color change, pallor and rash.   Neurological: Negative for dizziness and seizures.   Hematological: Negative for adenopathy.   Psychiatric/Behavioral: Negative for confusion.   All other systems reviewed and are negative.      Objective:     Temp:  [97.6 °F (36.4 °C)-98.4 °F (36.9 °C)] 97.6 °F (36.4 °C)  Pulse:  [53-62] 62  Resp:  [18] 18  SpO2:  [98 %-100 %] 98 %  BP: (122-135)/(72-81) 135/79     Body mass index is 25.84 kg/m².           Drains          None          Physical Exam   Vitals reviewed.  Constitutional: He is oriented to person, place, and time. He appears well-developed and well-nourished. No distress.   HENT:   Head: Normocephalic and atraumatic.   Eyes: Right eye exhibits no discharge. Left eye exhibits no discharge. No scleral icterus.   Neck: Normal range of motion. Neck supple.   Cardiovascular: Normal rate and regular rhythm.    Pulmonary/Chest: Effort normal and breath sounds normal. No respiratory distress.   Abdominal: Soft. Bowel sounds are normal. He exhibits no distension. There is no tenderness. There is no rebound and no guarding.   Genitourinary:   Genitourinary Comments: Unable to palpate bladder   Musculoskeletal: Normal range of motion.   Neurological: He is alert and oriented to person, place, and time.   Skin: Skin is warm and dry. He is not diaphoretic. No erythema.         Significant Labs:    BMP:  Recent Labs   Lab 04/23/19  1222      K 3.8       CO2 28   BUN 13   CREATININE 0.9   CALCIUM 9.1       CBC:  Recent Labs   Lab 04/23/19  1222   WBC 6.40   HGB 12.8*   HCT 40.4          Blood Culture: No results for input(s): LABBLOO in the last 168 hours.  Urine Culture: No results for input(s): LABURIN in the last 168 hours.  Urine Studies: No results for input(s): COLORU, APPEARANCEUA, PHUR, SPECGRAV, PROTEINUA, GLUCUA, KETONESU, BILIRUBINUA, OCCULTUA, NITRITE, UROBILINOGEN, LEUKOCYTESUR, RBCUA, WBCUA, BACTERIA, SQUAMEPITHEL, HYALINECASTS in the last 168 hours.    Invalid input(s): WRIGHTSUR    Significant Imaging:  All pertinent imaging results/findings from the past 24 hours have been reviewed.          Assessment and Plan:     Distended bladder   - Suspect related to bladder outlet obstruction   - Check PVR now   - Likely will need barnes catheter   - Urodynamics as outpatient as recommended by Dr Arndt. Can be done at [facility] as outpatient.    - Suspect he will need bladder outlet reducing procedure in future (ie TURP)   - Urine culture        VTE Risk Mitigation (From admission, onward)        Ordered     warfarin (COUMADIN) tablet 5 mg  Daily      04/25/19 0742     enoxaparin injection 80 mg  Every 12 hours (non-standard times)      04/23/19 1712     IP VTE HIGH RISK PATIENT  Once      04/23/19 1712     Place AMILCAR hose  Until discontinued      04/23/19 1712          Thank you for your consult. I will follow-up with patient. Please contact us if you have any additional questions.    Felecia Stiles MD  Urology  [facility]

## 2019-04-25 NOTE — PROGRESS NOTES
[facility] Hospital Medicine  Progress Note    Patient Name: Carlos A Ruff  MRN: [id number]  Patient Class: IP- Inpatient   Admission Date: 4/23/2019  Length of Stay: 2 days  Attending Physician: Mandi Muhammad MD  Primary Care Provider: Khalif Hassan MD        Subjective:     Principal Problem:DVT, lower extremity, recurrent, right    HPI:  62 y/o M who has a past medical history of Anticoagulant long-term use, Coronary artery disease, Hypercholesteremia, Hypertension, and MI (myocardial infarction) presents to the ED with acute onset of right leg pain and intermittent chest pain which began two weeks ago. Pt was directed to the ED per MD request. Pt had an appointment with his PCP one day ago for right leg swelling and pain. An ulta sound was preformed and found a new blood clot to his R leg. Pt has been on Xarelto for one year after being taken off Plavix. Pt has a hx of blood clots. No fever/chills, nausea/emesis, chest/abdominal/back pain, cough/sob, headache/dizziness, weakness/numbness, rash, urinary symptoms, abnormal bowels.        Hospital Course:  Pt admitted with new RLE DVT with h/o previous DVTs. PLaced on lovenox. INR 1.0 to 2.6 overnight. RLE swelling is improved. Recheck INR in am and dc home with coumadin.     Interval History: no new complaints     Review of Systems   Constitutional: Negative.    HENT: Negative.    Eyes: Negative.    Respiratory: Negative.    Cardiovascular: Positive for leg swelling.   Gastrointestinal: Negative.    Endocrine: Negative.    Genitourinary: Negative.    Musculoskeletal: Positive for gait problem.   Psychiatric/Behavioral: Negative.      Objective:     Vital Signs (Most Recent):  Temp: 97.6 °F (36.4 °C) (04/25/19 0452)  Pulse: 62 (04/25/19 0452)  Resp: 18 (04/25/19 0452)  BP: 135/79 (04/25/19 0452)  SpO2: 98 % (04/25/19 0452) Vital Signs (24h Range):  Temp:  [97.6 °F (36.4 °C)-98.4 °F (36.9 °C)] 97.6 °F (36.4 °C)  Pulse:  [53-62] 62  Resp:   [18-19] 18  SpO2:  [96 %-100 %] 98 %  BP: (122-149)/(72-84) 135/79     Weight: 84 kg (185 lb 3.8 oz)  Body mass index is 25.84 kg/m².    Intake/Output Summary (Last 24 hours) at 4/25/2019 0625  Last data filed at 4/25/2019 0500  Gross per 24 hour   Intake 960 ml   Output --   Net 960 ml      Physical Exam   Constitutional: He is oriented to person, place, and time. He appears well-developed and well-nourished.   HENT:   Head: Normocephalic and atraumatic.   Eyes: Pupils are equal, round, and reactive to light. EOM are normal.   Neck: Normal range of motion. Neck supple.   Cardiovascular: Regular rhythm, normal heart sounds and intact distal pulses.   Bradycardia S1 S2   Pulmonary/Chest: Effort normal and breath sounds normal. No respiratory distress.   Abdominal: Soft. Bowel sounds are normal. He exhibits no distension.   Musculoskeletal: Normal range of motion. He exhibits edema.   Neurological: He is alert and oriented to person, place, and time.   Skin: Skin is warm and dry. Capillary refill takes 2 to 3 seconds. No erythema.   Psychiatric: He has a normal mood and affect. His behavior is normal.       Significant Labs: All pertinent labs within the past 24 hours have been reviewed.    Significant Imaging: I have reviewed and interpreted all pertinent imaging results/findings within the past 24 hours.    Assessment/Plan:      * DVT, lower extremity, recurrent, right  Hematology consulted  Failure of outpatient xarelto x 8 months   Previously on coumadin years ago  INR daily  lovenox BID - INR over corrected - held evening dose, INR in am and dc home on coumadin   Coumadin - defer to hematology       Failure of outpatient treatment    See DVT above    Tobacco abuse  Smoking cessation d/w patient > 5 minutes   Nicotine patch offered       Coronary artery disease involving native coronary artery of native heart without angina pectoris  No acute issues  Pt taken off plavix, last stent 3 years ago  Continue statin and  aspirin daily         VTE Risk Mitigation (From admission, onward)        Ordered     enoxaparin injection 80 mg  Every 12 hours (non-standard times)      04/23/19 1712     IP VTE HIGH RISK PATIENT  Once      04/23/19 1712     Place AMILCAR hose  Until discontinued      04/23/19 1712              Mandi Muhammad MD  Department of Hospital Medicine   [facility]

## 2019-04-26 ENCOUNTER — ANTI-COAG VISIT (OUTPATIENT)
Dept: CARDIOLOGY | Facility: CLINIC | Age: 64
End: 2019-04-26

## 2019-04-26 VITALS
BODY MASS INDEX: 25.94 KG/M2 | HEIGHT: 71 IN | TEMPERATURE: 98 F | HEART RATE: 71 BPM | DIASTOLIC BLOOD PRESSURE: 64 MMHG | RESPIRATION RATE: 18 BRPM | OXYGEN SATURATION: 95 % | SYSTOLIC BLOOD PRESSURE: 112 MMHG | WEIGHT: 185.25 LBS

## 2019-04-26 DIAGNOSIS — R76.0 LUPUS ANTICOAGULANT POSITIVE: ICD-10-CM

## 2019-04-26 DIAGNOSIS — I82.401 DVT, LOWER EXTREMITY, RECURRENT, RIGHT: ICD-10-CM

## 2019-04-26 DIAGNOSIS — Z86.718 HISTORY OF DEEP VEIN THROMBOSIS (DVT) OF LOWER EXTREMITY: ICD-10-CM

## 2019-04-26 DIAGNOSIS — D68.52 PROTHROMBIN GENE MUTATION: ICD-10-CM

## 2019-04-26 LAB
INR PPP: 1 (ref 0.8–1.2)
PROTHROMBIN TIME: 10.2 SEC (ref 9–12.5)

## 2019-04-26 PROCEDURE — 63600175 PHARM REV CODE 636 W HCPCS: Performed by: HOSPITALIST

## 2019-04-26 PROCEDURE — 36415 COLL VENOUS BLD VENIPUNCTURE: CPT

## 2019-04-26 PROCEDURE — 99232 PR SUBSEQUENT HOSPITAL CARE,LEVL II: ICD-10-PCS | Mod: ,,, | Performed by: UROLOGY

## 2019-04-26 PROCEDURE — 99232 SBSQ HOSP IP/OBS MODERATE 35: CPT | Mod: ,,, | Performed by: UROLOGY

## 2019-04-26 PROCEDURE — 85610 PROTHROMBIN TIME: CPT

## 2019-04-26 PROCEDURE — 25000003 PHARM REV CODE 250: Performed by: HOSPITALIST

## 2019-04-26 PROCEDURE — 25000003 PHARM REV CODE 250: Performed by: INTERNAL MEDICINE

## 2019-04-26 RX ORDER — WARFARIN 10 MG/1
10 TABLET ORAL DAILY
Qty: 30 TABLET | Refills: 11 | Status: SHIPPED | OUTPATIENT
Start: 2019-04-26 | End: 2020-05-15 | Stop reason: SDUPTHER

## 2019-04-26 RX ORDER — ENOXAPARIN SODIUM 100 MG/ML
1 INJECTION SUBCUTANEOUS EVERY 12 HOURS
Qty: 3.2 ML | Refills: 0 | Status: SHIPPED | OUTPATIENT
Start: 2019-04-27 | End: 2019-04-29

## 2019-04-26 RX ORDER — WARFARIN SODIUM 5 MG/1
10 TABLET ORAL DAILY
Status: DISCONTINUED | OUTPATIENT
Start: 2019-04-26 | End: 2019-04-26 | Stop reason: HOSPADM

## 2019-04-26 RX ADMIN — TAMSULOSIN HYDROCHLORIDE 0.4 MG: 0.4 CAPSULE ORAL at 08:04

## 2019-04-26 RX ADMIN — ISOSORBIDE MONONITRATE 30 MG: 30 TABLET, EXTENDED RELEASE ORAL at 08:04

## 2019-04-26 RX ADMIN — DOCUSATE SODIUM AND SENNOSIDES 1 TABLET: 8.6; 5 TABLET, FILM COATED ORAL at 08:04

## 2019-04-26 RX ADMIN — WARFARIN SODIUM 10 MG: 5 TABLET ORAL at 04:04

## 2019-04-26 RX ADMIN — ATORVASTATIN CALCIUM 40 MG: 40 TABLET, FILM COATED ORAL at 08:04

## 2019-04-26 RX ADMIN — ENOXAPARIN SODIUM 80 MG: 100 INJECTION SUBCUTANEOUS at 01:04

## 2019-04-26 RX ADMIN — LOSARTAN POTASSIUM AND HYDROCHLOROTHIAZIDE 1 TABLET: 12.5; 5 TABLET ORAL at 08:04

## 2019-04-26 RX ADMIN — ASPIRIN 81 MG: 81 TABLET, COATED ORAL at 08:04

## 2019-04-26 NOTE — PROGRESS NOTES
TN taught Symptoms and Problems for DVT home care with pt and  with teach back:  1. REDNESS, SWOLLEN LIMB, 2. COUGHING UP BLOOD. TN placed education sheet in [employer] Packet..     Help at home will be from  FAMILY, assisting in pt's recovery.     TN taught patient about things HE is responsible for when discharged TO HELP WITH  RECOVERY:  Particularly on how to Manage HIS Care At Home:  1. Getting his prescriptions filled.  2. Taking his medications as directed. DO NOT MISS ANY DOSES!  3. Going to his follow-up doctor appointments.   .  Deepika Coleman RN, BSN, STN CCM

## 2019-04-26 NOTE — PLAN OF CARE
"Problem: Adult Inpatient Plan of Care  Goal: Plan of Care Review  Outcome: Ongoing (interventions implemented as appropriate)     04/26/19 0600   Plan of Care Review   Plan of Care Reviewed With patient   AOx4. Ambulates in hallway and in room. Denies pain throughout shift. Call light within reach. Remains free from falls. Bed in lowest position c wheels locked. No distress noted. Handouts provided about blood thinners and "Diet and Warfarin (Coumadin) Therapy" discussed and provided.        "

## 2019-04-26 NOTE — PROGRESS NOTES
[facility] [place]  Urology  Progress Note    Patient Name: Carlos A Ruff  MRN: [id number]  Admission Date: 4/23/2019  Hospital Length of Stay: 3 days  Code Status: Full Code   Attending Provider: Mandi Muhammad MD   Primary Care Physician: Khalif Hassan MD    Subjective:     HPI:  64yo M admitted with recurrent DVT. CT scan showed a very distended bladder. He does not have barnes catheter in place. PVRs not recorded. He was seen by Dr Arndt in 8/18 with urinary frequency, elevated PVR. He refused catheter or CIC teaching at that time. He was recommended for urodynamics but appears to not have followed up (reports he did not want to travel to [facility]). He is on Flomax. Denies significant voiding issues currently though does report medium to weak stream.    PSA 6/18 - 1.2  CT - very distended bladder, bladder wall thickened, large prostate. No hydronephrosis    Interval History: He is voiding.  He only had one PVR check that was moderately elevated.      Review of Systems   Constitutional: Negative.  Negative for fever.   HENT: Negative.    Eyes: Negative.    Respiratory: Negative for cough, chest tightness and shortness of breath.    Cardiovascular: Negative for chest pain.   Gastrointestinal: Negative.  Negative for constipation, diarrhea and nausea.   Genitourinary: Negative for difficulty urinating, flank pain and hematuria.   Musculoskeletal: Negative.    Neurological: Negative.    Psychiatric/Behavioral: Negative.      Objective:     Temp:  [97.1 °F (36.2 °C)-98.6 °F (37 °C)] 98.1 °F (36.7 °C)  Pulse:  [] 70  Resp:  [18] 18  SpO2:  [95 %-98 %] 98 %  BP: (112-148)/(69-87) 148/85     Body mass index is 25.84 kg/m².      Bladder Scan Volume (mL): 200 mL(greater than 200) (04/25/19 1000)    Drains          None          Physical Exam   Nursing note and vitals reviewed.  Constitutional: He is oriented to person, place, and time. He appears well-developed and well-nourished.   HENT:   Head:  Normocephalic.   Eyes: Conjunctivae are normal.   Neck: Normal range of motion. Neck supple. No tracheal deviation present. No thyromegaly present.   Cardiovascular: Normal rate and normal heart sounds.    Pulmonary/Chest: Effort normal and breath sounds normal. No respiratory distress. He has no wheezes.   Abdominal: Soft. Bowel sounds are normal. There is no hepatosplenomegaly. There is no tenderness. There is no rebound and no CVA tenderness. No hernia.   Musculoskeletal: Normal range of motion. He exhibits no edema or tenderness.   Lymphadenopathy:     He has no cervical adenopathy.   Neurological: He is alert and oriented to person, place, and time.   Skin: Skin is warm and dry. No rash noted. No erythema.     Psychiatric: He has a normal mood and affect. His behavior is normal. Judgment and thought content normal.       Significant Labs:    BMP:  Recent Labs   Lab 04/23/19  1222      K 3.8      CO2 28   BUN 13   CREATININE 0.9   CALCIUM 9.1       CBC:   Recent Labs   Lab 04/23/19  1222   WBC 6.40   HGB 12.8*   HCT 40.4          Blood Culture: No results for input(s): LABBLOO in the last 168 hours.  Urine Culture: No results for input(s): LABURIN in the last 168 hours.    Significant Imaging:                    Assessment/Plan:     Distended bladder   - Suspect related to bladder outlet obstruction  But he seems to be voiding   - Urodynamics as outpatient as recommended by Dr Arndt. Can be done at [facility] as outpatient.     Follow up with Dr. Stiles or Dr. Arndt in 1-2 weeks  Will sign off          VTE Risk Mitigation (From admission, onward)        Ordered     warfarin (COUMADIN) tablet 10 mg  Daily      04/26/19 0810     enoxaparin injection 80 mg  Every 12 hours (non-standard times)      04/23/19 1712     IP VTE HIGH RISK PATIENT  Once      04/23/19 1712     Place AMILCAR hose  Until discontinued      04/23/19 1712          GAGAN Quevedo MD  Urology  [facility] [place]

## 2019-04-26 NOTE — PROGRESS NOTES
[address] [place], [place] - [address] [facility] [address] [address]  [address] [facility] [address]  [place] [address]  Phone: [phone] Fax: [phone]    KATRIN contacted [facility] @ [phone] to obtain co-pay for Coumadin and Lovenox prescription, KATRIN spoke to [facility] that confirmed patient will have a $0 for both prescriptions. KATRIN provided update to attending MD.

## 2019-04-26 NOTE — PROGRESS NOTES
KATRIN spoke to Magalie with Dr. Cordero office that confirmed Dr. Cordero will monitor patient coumadin. Magalie informed SW attending MD will need to put order in so patient can go to coumadin clinic and Dr. Cordero will monitor. KATRIN contacted attending MD to provide update. KATRIN spoke to Lovely, Pharmacist with [facility] that scheduled appointment for patient. Per attending MD patient will need next PT/INR on Monday 4/29/19. Chyna scheduled appointment for next PT/INR on 4/29/19 @ 2:00pm @ the [facility].

## 2019-04-26 NOTE — PROGRESS NOTES
[facility]  Hematology/Oncology  Progress Note    Patient Name: Carlos A Ruff  Admission Date: 4/23/2019  Hospital Length of Stay: 3 days  Code Status: Full Code     Subjective:     Interval History:     04/26/19: states feeling better. Denies any RLE pain   04/25/19: feeling better. Tolerating current dose of Lovenox     Oncology Treatment Plan:   [No treatment plan]    Medications:  Continuous Infusions:  Scheduled Meds:   aspirin  81 mg Oral Daily    atorvastatin  40 mg Oral Daily    enoxparin  1 mg/kg Subcutaneous Q12H    isosorbide mononitrate  30 mg Oral Daily    losartan-hydrochlorothiazide 50-12.5 mg  1 tablet Oral Daily    senna-docusate 8.6-50 mg  1 tablet Oral BID    tamsulosin  0.4 mg Oral Daily    warfarin  5 mg Oral Daily     PRN Meds:acetaminophen, ramelteon, sodium chloride 0.9%     Review of Systems     Constitutional: Denies any weigh or appetite changes.   Eyes: no visual changes   ENT: no nasal congestion. Denies sore throat with odynophagia   Respiratory: No cough or SOB. No hx of PE  Cardiovascular: no chest pain or palpitations   Gastrointestinal: no nausea, vomiting or abdominal pain.  Hematologic/Lymphatic:see HPI  Musculoskeletal: No ROM abnormalities or muscle weakness   Ext: see HPI  Neurological: no seizures or tremors  Skin: No rashes or lesions  Psych: Denies any anxiety, depression or insomnia      Objective:     Vital Signs (Most Recent):  Temp: 98.1 °F (36.7 °C) (04/26/19 0709)  Pulse: 70 (04/26/19 0709)  Resp: 18 (04/26/19 0709)  BP: (!) 148/85 (04/26/19 0709)  SpO2: 98 % (04/26/19 0709) Vital Signs (24h Range):  Temp:  [97.1 °F (36.2 °C)-98.6 °F (37 °C)] 98.1 °F (36.7 °C)  Pulse:  [] 70  Resp:  [18] 18  SpO2:  [95 %-98 %] 98 %  BP: (112-148)/(69-87) 148/85     Weight: 84 kg (185 lb 3.8 oz)  Body mass index is 25.84 kg/m².  Body surface area is 2.05 meters squared.      Intake/Output Summary (Last 24 hours) at 4/26/2019 0809  Last data filed at  4/25/2019 1744  Gross per 24 hour   Intake 240 ml   Output --   Net 240 ml       Physical Exam    General: NAD, sitting up in bed and eating breakfast   Head: normocephalic, atraumatic   Eyes: conjunctivae pink, anicteric sclera.   Throat: No erythema or post nasal discharge   Neck: supple, no LAD   Lungs: Decreased air entry at the bases   Heart: S1, S2, RRR   Abdomen: + BS x 4 quadrants, soft, non tender.    Extremities: RLE edema and calf region tenderness   Skin: turgor normal, no erythema or rashes.  MS: move all muscles   Neuro: A&O x 3  Psy: pleasant    Results for PATSY MARTINEZ (MRN [id number]) as of 4/26/2019 17:58   Ref. Range 4/25/2019 08:13 4/25/2019 13:02 4/26/2019 04:58   Protime Latest Ref Range: 9.0 - 12.5 sec 9.9  10.2   Coumadin Monitoring INR Latest Ref Range: 0.8 - 1.2  0.9  1.0   aPTT Latest Ref Range: 21.0 - 32.0 sec 27.9     CULTURE, URINE Unknown  Rpt    Urine Culture, Routine Unknown  No growth to date        Significant Labs:   Reviewed     Diagnostic Results:      Assessment/Plan:      PRINCIPAL PROBLEM:  DVT, lower extremity, recurrent, right [I82.401]     - pt has hypercoagulable condition and hx of Warfarin tolerance with no significant SEs- recommend to transition to Warfarin with Lovenox bridge  - I do not recommend IVC filter  - previous INR erroneous- repeat around is 1  - started Coumadin - increase dose to 10mg daily   - ok to dc home on Lovenox/coumadin bridge and follow up with me in clinic next week     08/28/2018 Unknown    Failure of outpatient treatment [Z78.9]  -Xarelto- don't have good date to support use in pt 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  - recommend Warfarin therapy- started today     04/23/2019 Yes    Coronary artery disease involving native coronary artery of native heart without angina pectoris [I25.10] 11/29/2017 Yes    Tobacco abuse [Z72.0] 11/29/2017 Yes       Problems Resolved During this Admission:      Bladder wall thickening on CT Ab: check  UA              - need urology f/u out pt to evaluate for malignancy in this pt with tobacco abuse    - seen by urology    - out pt bladder voiding trial        Vicente Tariq M.D  Internal Medicine & Geriatric Medicine  Hematology & Oncology  Palliative Medicine    [address], [address]  [place] [address]  [phone] (Office)  [phone] (Fax)

## 2019-04-26 NOTE — SUBJECTIVE & OBJECTIVE
Interval History: He is voiding.  He only had one PVR check that was moderately elevated.      Review of Systems   Constitutional: Negative.  Negative for fever.   HENT: Negative.    Eyes: Negative.    Respiratory: Negative for cough, chest tightness and shortness of breath.    Cardiovascular: Negative for chest pain.   Gastrointestinal: Negative.  Negative for constipation, diarrhea and nausea.   Genitourinary: Negative for difficulty urinating, flank pain and hematuria.   Musculoskeletal: Negative.    Neurological: Negative.    Psychiatric/Behavioral: Negative.      Objective:     Temp:  [97.1 °F (36.2 °C)-98.6 °F (37 °C)] 98.1 °F (36.7 °C)  Pulse:  [] 70  Resp:  [18] 18  SpO2:  [95 %-98 %] 98 %  BP: (112-148)/(69-87) 148/85     Body mass index is 25.84 kg/m².      Bladder Scan Volume (mL): 200 mL(greater than 200) (04/25/19 1000)    Drains          None          Physical Exam   Nursing note and vitals reviewed.  Constitutional: He is oriented to person, place, and time. He appears well-developed and well-nourished.   HENT:   Head: Normocephalic.   Eyes: Conjunctivae are normal.   Neck: Normal range of motion. Neck supple. No tracheal deviation present. No thyromegaly present.   Cardiovascular: Normal rate and normal heart sounds.    Pulmonary/Chest: Effort normal and breath sounds normal. No respiratory distress. He has no wheezes.   Abdominal: Soft. Bowel sounds are normal. There is no hepatosplenomegaly. There is no tenderness. There is no rebound and no CVA tenderness. No hernia.   Musculoskeletal: Normal range of motion. He exhibits no edema or tenderness.   Lymphadenopathy:     He has no cervical adenopathy.   Neurological: He is alert and oriented to person, place, and time.   Skin: Skin is warm and dry. No rash noted. No erythema.     Psychiatric: He has a normal mood and affect. His behavior is normal. Judgment and thought content normal.       Significant Labs:    BMP:  Recent Labs   Lab  04/23/19  1222      K 3.8      CO2 28   BUN 13   CREATININE 0.9   CALCIUM 9.1       CBC:   Recent Labs   Lab 04/23/19  1222   WBC 6.40   HGB 12.8*   HCT 40.4          Blood Culture: No results for input(s): LABBLOO in the last 168 hours.  Urine Culture: No results for input(s): LABURIN in the last 168 hours.    Significant Imaging:

## 2019-04-26 NOTE — PROGRESS NOTES
62 y/o male recently admitted for new RLE DVT. Patient has history of recurrent VTE and hypercoagulable state with heterozygote mutation of Prothrombin gene and transient lupus anticoagulant positive per Heme/Onc. He was previously on rivaroxaban but is now being bridged with LMWH to warfarin now. His other PMH is significant for CAD, HLD, HTN, MI.    See calendar for inpatient warfarin doses/INRs. Spoke to Megan KHAN CM, who states patient will be d/c from hospital 4/26. He is scheduled for INR and new patient education at the [facility] on 4/29 at 2pm. AVS not completed to confirm d/c warfarin or enoxaparin doses (can be discussed at f/u visit).

## 2019-04-26 NOTE — PLAN OF CARE
SW informed nurse Leeanne  completed all discharge planning for patient and is clear to discharge from case management standpoint.           04/26/19 1639   Final Note   Assessment Type Final Discharge Note   Anticipated Discharge Disposition Home   Hospital Follow Up  Appt(s) scheduled? Yes   Right Care Referral Info   Post Acute Recommendation No Care

## 2019-04-26 NOTE — PROGRESS NOTES
[facility] Hospital Medicine  Progress Note    Patient Name: Carlos A Ruff  MRN: [id number]  Patient Class: IP- Inpatient   Admission Date: 4/23/2019  Length of Stay: 2 days  Attending Physician: Mandi Muhammad MD  Primary Care Provider: Khalif Hassan MD        Subjective:     Principal Problem:DVT, lower extremity, recurrent, right    HPI:  64 y/o M who has a past medical history of Anticoagulant long-term use, Coronary artery disease, Hypercholesteremia, Hypertension, and MI (myocardial infarction) presents to the ED with acute onset of right leg pain and intermittent chest pain which began two weeks ago. Pt was directed to the ED per MD request. Pt had an appointment with his PCP one day ago for right leg swelling and pain. An ulta sound was preformed and found a new blood clot to his R leg. Pt has been on Xarelto for one year after being taken off Plavix. Pt has a hx of blood clots. No fever/chills, nausea/emesis, chest/abdominal/back pain, cough/sob, headache/dizziness, weakness/numbness, rash, urinary symptoms, abnormal bowels.        Hospital Course:  Pt admitted with new RLE DVT with h/o previous DVTs. PLaced on lovenox. INR 1.0 to 2.6 overnight. RLE swelling is improved. Recheck INR in am and dc home with coumadin.     INR dropped, lovenox + coumadin and hope for dc tomorrow       Interval History: pt has no new complaints     Review of Systems   Constitutional: Negative.    HENT: Negative.    Eyes: Negative.    Respiratory: Negative.    Cardiovascular: Positive for leg swelling.   Gastrointestinal: Negative.    Endocrine: Negative.    Genitourinary: Negative.    Musculoskeletal: Positive for gait problem.   Psychiatric/Behavioral: Negative.      Objective:     Vital Signs (Most Recent):  Temp: 98.3 °F (36.8 °C) (04/25/19 2041)  Pulse: 61 (04/25/19 2041)  Resp: 18 (04/25/19 2041)  BP: 123/84 (04/25/19 2041)  SpO2: 96 % (04/25/19 2041) Vital Signs (24h Range):  Temp:  [97.1 °F (36.2  °C)-98.3 °F (36.8 °C)] 98.3 °F (36.8 °C)  Pulse:  [] 61  Resp:  [18] 18  SpO2:  [95 %-99 %] 96 %  BP: (112-139)/(69-87) 123/84     Weight: 84 kg (185 lb 3.8 oz)  Body mass index is 25.84 kg/m².    Intake/Output Summary (Last 24 hours) at 4/25/2019 2121  Last data filed at 4/25/2019 1744  Gross per 24 hour   Intake 960 ml   Output --   Net 960 ml      Physical Exam   Constitutional: He is oriented to person, place, and time. He appears well-developed and well-nourished.   HENT:   Head: Normocephalic and atraumatic.   Eyes: Pupils are equal, round, and reactive to light. EOM are normal.   Neck: Normal range of motion. Neck supple.   Cardiovascular: Regular rhythm, normal heart sounds and intact distal pulses.   Bradycardia S1 S2   Pulmonary/Chest: Effort normal and breath sounds normal. No respiratory distress.   Abdominal: Soft. Bowel sounds are normal. He exhibits no distension.   Musculoskeletal: Normal range of motion. He exhibits edema.   Neurological: He is alert and oriented to person, place, and time.   Skin: Skin is warm and dry. Capillary refill takes 2 to 3 seconds. No erythema.   Psychiatric: He has a normal mood and affect. His behavior is normal.       Significant Labs:   Coagulation:   Recent Labs   Lab 04/25/19  0813   INR 0.9   APTT 27.9       Significant Imaging: I have reviewed and interpreted all pertinent imaging results/findings within the past 24 hours.    Assessment/Plan:      * DVT, lower extremity, recurrent, right  Hematology consulted  Failure of outpatient xarelto x 8 months   Previously on coumadin years ago  INR daily  lovenox BID - INR over corrected - held evening dose, INR in am and dc home on coumadin   Coumadin - defer to hematology       Distended bladder  Outpatient workup      Failure of outpatient treatment    See DVT above    Tobacco abuse  Smoking cessation d/w patient > 5 minutes   Nicotine patch offered       Coronary artery disease involving native coronary artery of  native heart without angina pectoris  No acute issues  Pt taken off plavix, last stent 3 years ago  Continue statin and aspirin daily         VTE Risk Mitigation (From admission, onward)        Ordered     warfarin (COUMADIN) tablet 5 mg  Daily      04/25/19 0742     enoxaparin injection 80 mg  Every 12 hours (non-standard times)      04/23/19 1712     IP VTE HIGH RISK PATIENT  Once      04/23/19 1712     Place AMILCAR hose  Until discontinued      04/23/19 1712              Mandi Muhammad MD  Department of Hospital Medicine   [facility]

## 2019-04-26 NOTE — PROGRESS NOTES
KATRIN contacted [facility] @ [phone] to confirm if Dr. Stiles will accept patient insurance, KATRIN spoke to Marisela that confirmed Dr. Stiles does not accept medicaid plan. Patient will have to follow-up with [facility] for follow-up. KATRIN scheduled PCP follow-up with Dr. Hassan on 6/7/19 @ 3:00pm. KATRIN contacted [facility] to schedule appointment, KATRIN spoke to  that took SW information and reported pharmacist will call KATRIN.

## 2019-04-26 NOTE — ASSESSMENT & PLAN NOTE
- Suspect related to bladder outlet obstruction  But he seems to be voiding   - Urodynamics as outpatient as recommended by Dr Arndt. Can be done at [facility] as outpatient.     Follow up with Dr. Stiles or Dr. Arndt in 1-2 weeks  Will sign off

## 2019-04-26 NOTE — SUBJECTIVE & OBJECTIVE
Interval History: pt has no new complaints     Review of Systems   Constitutional: Negative.    HENT: Negative.    Eyes: Negative.    Respiratory: Negative.    Cardiovascular: Positive for leg swelling.   Gastrointestinal: Negative.    Endocrine: Negative.    Genitourinary: Negative.    Musculoskeletal: Positive for gait problem.   Psychiatric/Behavioral: Negative.      Objective:     Vital Signs (Most Recent):  Temp: 98.3 °F (36.8 °C) (04/25/19 2041)  Pulse: 61 (04/25/19 2041)  Resp: 18 (04/25/19 2041)  BP: 123/84 (04/25/19 2041)  SpO2: 96 % (04/25/19 2041) Vital Signs (24h Range):  Temp:  [97.1 °F (36.2 °C)-98.3 °F (36.8 °C)] 98.3 °F (36.8 °C)  Pulse:  [] 61  Resp:  [18] 18  SpO2:  [95 %-99 %] 96 %  BP: (112-139)/(69-87) 123/84     Weight: 84 kg (185 lb 3.8 oz)  Body mass index is 25.84 kg/m².    Intake/Output Summary (Last 24 hours) at 4/25/2019 2121  Last data filed at 4/25/2019 1744  Gross per 24 hour   Intake 960 ml   Output --   Net 960 ml      Physical Exam   Constitutional: He is oriented to person, place, and time. He appears well-developed and well-nourished.   HENT:   Head: Normocephalic and atraumatic.   Eyes: Pupils are equal, round, and reactive to light. EOM are normal.   Neck: Normal range of motion. Neck supple.   Cardiovascular: Regular rhythm, normal heart sounds and intact distal pulses.   Bradycardia S1 S2   Pulmonary/Chest: Effort normal and breath sounds normal. No respiratory distress.   Abdominal: Soft. Bowel sounds are normal. He exhibits no distension.   Musculoskeletal: Normal range of motion. He exhibits edema.   Neurological: He is alert and oriented to person, place, and time.   Skin: Skin is warm and dry. Capillary refill takes 2 to 3 seconds. No erythema.   Psychiatric: He has a normal mood and affect. His behavior is normal.       Significant Labs:   Coagulation:   Recent Labs   Lab 04/25/19  0813   INR 0.9   APTT 27.9       Significant Imaging: I have reviewed and  interpreted all pertinent imaging results/findings within the past 24 hours.

## 2019-04-26 NOTE — NURSING
Discharge teaching completed, including medication, how to give himself his lovenox injections and upcoming dr's appointments, all questions and concerns address,  verbalized understanding, VSS, NAD, no complaints of pain, PIV discontinued catheter intact, pressure held til hemostasis, gauze and tape applied patient tolerated well, no redness swelling,or tenderness noted. Patient is aware of the s/s that may require patient to seek medical attention, , Awaiting family to pick him up.

## 2019-04-26 NOTE — PROGRESS NOTES
[facility]  Hematology/Oncology  Progress Note    Patient Name: Carlos A Ruff  Admission Date: 4/23/2019  Hospital Length of Stay: 2 days  Code Status: Full Code     Subjective:     Interval History:     04/25/19: feeling better. Tolerating current dose of Lovenox     Oncology Treatment Plan:   [No treatment plan]    Medications:  Continuous Infusions:  Scheduled Meds:   aspirin  81 mg Oral Daily    atorvastatin  40 mg Oral Daily    enoxparin  1 mg/kg Subcutaneous Q12H    isosorbide mononitrate  30 mg Oral Daily    losartan-hydrochlorothiazide 50-12.5 mg  1 tablet Oral Daily    senna-docusate 8.6-50 mg  1 tablet Oral BID    tamsulosin  0.4 mg Oral Daily    warfarin  5 mg Oral Daily     PRN Meds:acetaminophen, ramelteon, sodium chloride 0.9%     Review of Systems     Constitutional: Denies any weigh or appetite changes.   Eyes: no visual changes   ENT: no nasal congestion. Denies sore throat with odynophagia   Respiratory: No cough or SOB. No hx of PE  Cardiovascular: no chest pain or palpitations   Gastrointestinal: no nausea, vomiting or abdominal pain.  Hematologic/Lymphatic:see HPI  Musculoskeletal: No ROM abnormalities or muscle weakness   Ext: see HPI  Neurological: no seizures or tremors  Skin: No rashes or lesions  Psych: Denies any anxiety, depression or insomnia      Objective:     Vital Signs (Most Recent):  Temp: 98.3 °F (36.8 °C) (04/25/19 1516)  Pulse: 103 (04/25/19 1516)  Resp: 18 (04/25/19 1516)  BP: 112/69 (04/25/19 1516)  SpO2: 95 % (04/25/19 1516) Vital Signs (24h Range):  Temp:  [97.1 °F (36.2 °C)-98.3 °F (36.8 °C)] 98.3 °F (36.8 °C)  Pulse:  [] 103  Resp:  [18] 18  SpO2:  [95 %-99 %] 95 %  BP: (112-139)/(69-87) 112/69     Weight: 84 kg (185 lb 3.8 oz)  Body mass index is 25.84 kg/m².  Body surface area is 2.05 meters squared.      Intake/Output Summary (Last 24 hours) at 4/25/2019 1933  Last data filed at 4/25/2019 1744  Gross per 24 hour   Intake 960 ml   Output  --   Net 960 ml       Physical Exam    General: NAD, resting in bed.   Head: normocephalic, atraumatic   Eyes: conjunctivae pink, anicteric sclera.   Throat: No erythema or post nasal discharge   Neck: supple, no LAD   Lungs: Decreased air entry at the bases   Heart: S1, S2, RRR   Abdomen: + BS x 4 quadrants, soft, non tender.    Extremities: RLE edema and calf region tenderness   Skin: turgor normal, no erythema or rashes.  MS: move all muscles   Neuro: A&O x 3  Psy: pleasant    Results for PATSY MARTINEZ (MRN [id number]) as of 4/25/2019 19:34   Ref. Range 4/24/2019 05:07 4/25/2019 08:13   Protime Latest Ref Range: 9.0 - 12.5 sec 27.0 (H) 9.9   Coumadin Monitoring INR Latest Ref Range: 0.8 - 1.2  2.6 (H) 0.9   aPTT Latest Ref Range: 21.0 - 32.0 sec  27.9       Significant Labs:   Reviewed     Diagnostic Results:      Assessment/Plan:      PRINCIPAL PROBLEM:  DVT, lower extremity, recurrent, right [I82.401]     - pt has hypercoagulable condition and hx of Warfarin tolerance with no significant SEs- recommend to transition to Warfarin with Lovenox bridge  - I do not recommend IVC filter  - previous INR erroneous- repeat around is 1  - start Coumadin   -  08/28/2018 Unknown    Failure of outpatient treatment [Z78.9]  -Xarelto- don't have good date to support use in pt 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  - recommend Warfarin therapy- started today     04/23/2019 Yes    Coronary artery disease involving native coronary artery of native heart without angina pectoris [I25.10] 11/29/2017 Yes    Tobacco abuse [Z72.0] 11/29/2017 Yes       Problems Resolved During this Admission:      Bladder wall thickening on CT Ab: check UA              - need urology f/u out pt to evaluate for malignancy in this pt with tobacco abuse        Vicente Tariq M.D  Internal Medicine & Geriatric Medicine  Hematology & Oncology  Palliative Medicine    [address], [address]  [place], [place] [id number] [phone]  (Office)  [phone] (Fax)

## 2019-04-27 LAB — BACTERIA UR CULT: NO GROWTH

## 2019-04-29 ENCOUNTER — ANTI-COAG VISIT (OUTPATIENT)
Dept: CARDIOLOGY | Facility: CLINIC | Age: 64
End: 2019-04-29
Payer: MEDICAID

## 2019-04-29 ENCOUNTER — PATIENT OUTREACH (OUTPATIENT)
Dept: ADMINISTRATIVE | Facility: CLINIC | Age: 64
End: 2019-04-29

## 2019-04-29 DIAGNOSIS — I82.401 DVT, LOWER EXTREMITY, RECURRENT, RIGHT: ICD-10-CM

## 2019-04-29 DIAGNOSIS — D68.52 PROTHROMBIN GENE MUTATION: ICD-10-CM

## 2019-04-29 DIAGNOSIS — Z86.718 HISTORY OF DEEP VEIN THROMBOSIS (DVT) OF LOWER EXTREMITY: ICD-10-CM

## 2019-04-29 DIAGNOSIS — R76.0 LUPUS ANTICOAGULANT POSITIVE: ICD-10-CM

## 2019-04-29 DIAGNOSIS — Z79.01 LONG TERM (CURRENT) USE OF ANTICOAGULANTS: Primary | ICD-10-CM

## 2019-04-29 LAB — INR PPP: 1.9 (ref 2–3)

## 2019-04-29 PROCEDURE — 93793 ANTICOAG MGMT PT WARFARIN: CPT | Mod: ,,,

## 2019-04-29 PROCEDURE — 93793 PR ANTICOAGULANT MGMT FOR PT TAKING WARFARIN: ICD-10-PCS | Mod: ,,,

## 2019-04-29 PROCEDURE — 85610 PROTHROMBIN TIME: CPT | Mod: PBBFAC,PO

## 2019-04-29 NOTE — PROGRESS NOTES
Educated patient on coumadin diet restrictions, bleeding risks, potential for medication interactions, and when to call the coumadin clinic. Handouts given for reference. He will be avoiding high Vit K foods.     INR increasing quickly. He has already taken coumadin today. He will switch to PM dosing tomorrow. We will decrease dose based on INR trend. Repeat INR in 3 days. He was only prescribed 4 doses of lovenox, so is out. INR at 1.9 and increasing so we will not reorder lovenox.

## 2019-04-29 NOTE — PATIENT INSTRUCTIONS
Discharge Instructions for Deep Vein Thrombosis (DVT)  You have been diagnosed with deep vein thrombosis (DVT). You have a blood clot in a deep vein. Hospital and home treatment for DVT include medications to keep the clot from growing. Here are guidelines for home care and lifestyle changes to reduce your risk of future blood clots.  Home Care  Take your medications exactly as directed. Dont skip doses. Your medications will thin your blood and help prevent new clots.  Keep your appointments for lab tests. It is important for your doctor to monitor your prothrombin time (PT). This is a simple blood test.  Avoid sitting, standing, or lying down for long periods without moving your legs and feet.  When traveling by car, make frequent stops to get out and move around.  On long airplane, train, or bus rides, get up and move around when possible.  If you cant get up, wiggle your toes and tighten your calves to keep your blood moving.  Ask your doctor about daily aspirin therapy.  If you have to stay in bed, do leg exercises.  Wear support stockings as directed by your doctor.  Elevate your legs whenever they feel swollen or heavy.  Elevate the foot of your mattress 5 to 6 inches using a foam wedge.  Lifestyle Changes  Begin an exercise program. Ask your doctor how to get started. You can benefit from simple activities such as walking or gardening.  Maintain a healthy weight. Get help to lose any extra pounds.  Cut back on salt.  Limit canned, dried, packaged, and fast foods.  Dont add salt to your food at the table.  Season foods with herbs instead of salt when you cook.  Break the smoking habit. Enroll in a stop-smoking program to improve your chances of success.  Follow-Up  Make a follow-up appointment as directed by our staff.    When to Call Your Doctor  Call your doctor immediately if you have any of the following:  Swelling or pain in your leg (often in just one leg)  Sudden, continuous pain deep in a  muscle  Pain that worsens when you are active or when you stand still for a long time  Chest pain  Sudden shortness of breath  Cough with blood or bloody sputum  Bruises  Heavy or uncontrolled bleeding  Blood in your urine, stool, or vomit  Black or tarry stools   © 2888-8209 Melani [employer], [address] [place] [address], [place] [address]. All rights reserved. This information is not intended as a substitute for professional medical care. Always follow your healthcare professional's instructions.

## 2019-04-29 NOTE — PROGRESS NOTES
He was taking Xarelto and got admitted 4/23/19 - 4/26/19 for a DVT to right lower extremity and discharged on Coumadin and Lovenox (Lovenonx just for 2 days).  Patient verified his Coumadin tablet as 10 mg in strength, he states he stopped his Lovenox and took his last dose of 4/28/19 in am.   He will have a education clinic appointment today at [facility].

## 2019-05-02 ENCOUNTER — ANTI-COAG VISIT (OUTPATIENT)
Dept: CARDIOLOGY | Facility: CLINIC | Age: 64
End: 2019-05-02
Payer: MEDICAID

## 2019-05-02 DIAGNOSIS — R76.0 LUPUS ANTICOAGULANT POSITIVE: ICD-10-CM

## 2019-05-02 DIAGNOSIS — D68.52 PROTHROMBIN GENE MUTATION: ICD-10-CM

## 2019-05-02 DIAGNOSIS — Z79.01 LONG TERM (CURRENT) USE OF ANTICOAGULANTS: Primary | ICD-10-CM

## 2019-05-02 DIAGNOSIS — I82.401 DVT, LOWER EXTREMITY, RECURRENT, RIGHT: ICD-10-CM

## 2019-05-02 DIAGNOSIS — Z86.718 HISTORY OF DEEP VEIN THROMBOSIS (DVT) OF LOWER EXTREMITY: ICD-10-CM

## 2019-05-02 LAB — INR PPP: 1.6 (ref 2–3)

## 2019-05-02 PROCEDURE — 85610 PROTHROMBIN TIME: CPT | Mod: PBBFAC,PO

## 2019-05-02 PROCEDURE — 93793 PR ANTICOAGULANT MGMT FOR PT TAKING WARFARIN: ICD-10-PCS | Mod: ,,,

## 2019-05-02 PROCEDURE — 93793 ANTICOAG MGMT PT WARFARIN: CPT | Mod: ,,,

## 2019-05-02 NOTE — PROGRESS NOTES
INR unexpectedly decreased. No missed doses. No vit K. No changes. No signs or symptoms of bleeding. We will increase dose and f/u on Monday

## 2019-05-06 ENCOUNTER — ANTI-COAG VISIT (OUTPATIENT)
Dept: CARDIOLOGY | Facility: CLINIC | Age: 64
End: 2019-05-06
Payer: MEDICAID

## 2019-05-06 DIAGNOSIS — D68.52 PROTHROMBIN GENE MUTATION: ICD-10-CM

## 2019-05-06 DIAGNOSIS — Z86.718 HISTORY OF DEEP VEIN THROMBOSIS (DVT) OF LOWER EXTREMITY: ICD-10-CM

## 2019-05-06 DIAGNOSIS — Z79.01 LONG TERM (CURRENT) USE OF ANTICOAGULANTS: Primary | ICD-10-CM

## 2019-05-06 DIAGNOSIS — R76.0 LUPUS ANTICOAGULANT POSITIVE: ICD-10-CM

## 2019-05-06 DIAGNOSIS — I82.401 DVT, LOWER EXTREMITY, RECURRENT, RIGHT: ICD-10-CM

## 2019-05-06 LAB — INR PPP: 4.1 (ref 2–3)

## 2019-05-06 PROCEDURE — 93793 ANTICOAG MGMT PT WARFARIN: CPT | Mod: ,,,

## 2019-05-06 PROCEDURE — 85610 PROTHROMBIN TIME: CPT | Mod: PBBFAC,PO

## 2019-05-06 PROCEDURE — 93793 PR ANTICOAGULANT MGMT FOR PT TAKING WARFARIN: ICD-10-PCS | Mod: ,,,

## 2019-05-06 NOTE — PROGRESS NOTES
INR now high. His INRs have not moved as expected from the start even with slight dose adjustments. Will continue to follow closely until stable and change dose cautiously

## 2019-05-07 NOTE — DISCHARGE SUMMARY
[facility] Hospital Medicine  Discharge Summary      Patient Name: Carlos A Ruff  MRN: [id number]  Admission Date: 4/23/2019  Hospital Length of Stay: 3 days  Discharge Date and Time: 4/26/2019  Attending Physician: No att. providers found   Discharging Provider: Sis Muhammad MD  Primary Care Provider: Khalif Hassan MD      HPI:   64 y/o M who has a past medical history of Anticoagulant long-term use, Coronary artery disease, Hypercholesteremia, Hypertension, and MI (myocardial infarction) presents to the ED with acute onset of right leg pain and intermittent chest pain which began two weeks ago. Pt was directed to the ED per MD request. Pt had an appointment with his PCP one day ago for right leg swelling and pain. An ulta sound was preformed and found a new blood clot to his R leg. Pt has been on Xarelto for one year after being taken off Plavix. Pt has a hx of blood clots. No fever/chills, nausea/emesis, chest/abdominal/back pain, cough/sob, headache/dizziness, weakness/numbness, rash, urinary symptoms, abnormal bowels.        * No surgery found *      Hospital Course:   Pt admitted with new RLE DVT with h/o previous DVTs. PLaced on lovenox. INR 1.0 to 2.6 overnight. RLE swelling is improved. Recheck INR in am and dc home with coumadin.     INR dropped, lovenox + coumadin and hope for dc tomorrow     4/26- pt will dc with lovenox x 3 days and have INR checked in PCP/coumadin clinic  office and adjust dosing of meds thereafter     Consults:   Consults (From admission, onward)        Status Ordering Provider     Inpatient consult to Hematology  Once     Provider:  Vicente Bowman MD    Completed SIS MUHAMMAD     Inpatient consult to Urology  Once     Provider:  TODD Quevedo MD    Completed VICENTE BOWMAN            Final Active Diagnoses:    Diagnosis Date Noted POA    PRINCIPAL PROBLEM:  DVT, lower extremity, recurrent, right [I82.401] 08/28/2018 Yes    Distended bladder  [N32.89] 04/25/2019 Yes    Bladder outlet obstruction [N32.0]  Yes    Bladder wall thickening [N32.89]  Yes    Failure of outpatient treatment [Z78.9] 04/23/2019 Yes    Coronary artery disease involving native coronary artery of native heart without angina pectoris [I25.10] 11/29/2017 Yes    Tobacco abuse [Z72.0] 11/29/2017 Yes      Problems Resolved During this Admission:       Discharged Condition: good    Disposition: Home or Self Care    Follow Up:  Follow-up Information     Khalif Hassan MD. Go on 6/7/2019.    Specialties:  Family Medicine, Wound Care  Why:  Outpatient Services, Primary Care Follow-up Appointment, Please arrive to clinic for 3:00PM  Contact information:  [address]  [place] [address]  [phone]             Yumiko Flanagan, PharmD. Go on 4/29/2019.    Specialty:  Pharmacist  Why:  Outpatient Services, Coumadin Monitoring (PT/INR), Please arrive to [facility] [address] [place] [address] [address] @ 2:00PM, [phone]               Patient Instructions:      Ambulatory referral to Anticoagulation Monitoring   Referral Priority: Routine Referral Type: Consultation   Referral Reason: Specialty Services Required   Requested Specialty: Cardiology   Number of Visits Requested: 1     Diet Cardiac     Notify your health care provider if you experience any of the following:  severe uncontrolled pain     Notify your health care provider if you experience any of the following:  redness, tenderness, or signs of infection (pain, swelling, redness, odor or green/yellow discharge around incision site)     Activity as tolerated       Pending Diagnostic Studies:     None         Medications:  Reconciled Home Medications:      Medication List      START taking these medications    warfarin 10 MG tablet  Commonly known as:  COUMADIN  Take 1 tablet (10 mg total) by mouth Daily.        CONTINUE taking these medications    aspirin 81 MG EC tablet  Commonly known as:  ECOTRIN  Take 81 mg by  mouth once daily.     atorvastatin 40 MG tablet  Commonly known as:  LIPITOR  Take 40 mg by mouth once daily.     isosorbide mononitrate 30 MG 24 hr tablet  Commonly known as:  IMDUR  Take 1 tablet (30 mg total) by mouth once daily.     losartan-hydrochlorothiazide 50-12.5 mg 50-12.5 mg per tablet  Commonly known as:  HYZAAR  Take 1 tablet by mouth once daily.     nitroGLYCERIN 0.4 MG SL tablet  Commonly known as:  NITROSTAT  Place 0.4 mg under the tongue every 5 (five) minutes as needed for Chest pain.     tamsulosin 0.4 mg Cap  Commonly known as:  FLOMAX  Take 1 capsule (0.4 mg total) by mouth once daily.     VITAMIN D2 50,000 unit Cap  Generic drug:  ergocalciferol  Take 50,000 Units by mouth every 7 days.        STOP taking these medications    clopidogrel 75 mg tablet  Commonly known as:  PLAVIX     metoprolol succinate 50 MG 24 hr tablet  Commonly known as:  TOPROL-XL     rivaroxaban 20 mg Tab  Commonly known as:  XARELTO     VITAMIN C 100 MG tablet  Generic drug:  ascorbic acid (vitamin C)        ASK your doctor about these medications    enoxaparin 80 mg/0.8 mL Syrg  Commonly known as:  LOVENOX  Inject 0.8 mLs (80 mg total) into the skin every 12 (twelve) hours. for 2 days  Ask about: Should I take this medication?            Indwelling Lines/Drains at time of discharge:   Lines/Drains/Airways          None          Time spent on the discharge of patient: 40 minutes  Patient was seen and examined on the date of discharge and determined to be suitable for discharge.         Mandi Muhammad MD  Department of Hospital Medicine  [facility]

## 2019-05-09 ENCOUNTER — ANTI-COAG VISIT (OUTPATIENT)
Dept: CARDIOLOGY | Facility: CLINIC | Age: 64
End: 2019-05-09
Payer: MEDICAID

## 2019-05-09 DIAGNOSIS — Z79.01 LONG TERM (CURRENT) USE OF ANTICOAGULANTS: Primary | ICD-10-CM

## 2019-05-09 DIAGNOSIS — R76.0 LUPUS ANTICOAGULANT POSITIVE: ICD-10-CM

## 2019-05-09 DIAGNOSIS — D68.52 PROTHROMBIN GENE MUTATION: ICD-10-CM

## 2019-05-09 DIAGNOSIS — Z86.718 HISTORY OF DEEP VEIN THROMBOSIS (DVT) OF LOWER EXTREMITY: ICD-10-CM

## 2019-05-09 DIAGNOSIS — I82.401 DVT, LOWER EXTREMITY, RECURRENT, RIGHT: ICD-10-CM

## 2019-05-09 LAB — INR PPP: 3.9 (ref 2–3)

## 2019-05-09 PROCEDURE — 85610 PROTHROMBIN TIME: CPT | Mod: PBBFAC,PO

## 2019-05-09 PROCEDURE — 93793 PR ANTICOAGULANT MGMT FOR PT TAKING WARFARIN: ICD-10-PCS | Mod: ,,,

## 2019-05-09 PROCEDURE — 93793 ANTICOAG MGMT PT WARFARIN: CPT | Mod: ,,,

## 2019-05-09 NOTE — PROGRESS NOTES
INR improved but still high. Big change from how INR was moving last week. He reports talking to his sister who is on coumadin who told him to eat greens. He are greens 2 days ago. Patient reminded of what we talked about and his handout on vit K foods. He would like to start having a little greens twice per week. We will adjust dose and start on new diet plan. Patient advised to be consistent with high vit K foods week to week. Repeat INR next week

## 2019-05-15 PROBLEM — Z86.010 HX OF COLONIC POLYP: Status: ACTIVE | Noted: 2019-05-15

## 2019-05-16 NOTE — PROGRESS NOTES
Patient called to reschedule 5/15 missed coumadin clinic appointment, stated he missed it due to having an emergency, appointment was rescheduled to 5/20/19

## 2019-05-20 ENCOUNTER — ANTI-COAG VISIT (OUTPATIENT)
Dept: CARDIOLOGY | Facility: CLINIC | Age: 64
End: 2019-05-20
Payer: MEDICAID

## 2019-05-20 DIAGNOSIS — Z79.01 LONG TERM (CURRENT) USE OF ANTICOAGULANTS: Primary | ICD-10-CM

## 2019-05-20 DIAGNOSIS — I82.401 DVT, LOWER EXTREMITY, RECURRENT, RIGHT: ICD-10-CM

## 2019-05-20 DIAGNOSIS — Z86.718 HISTORY OF DEEP VEIN THROMBOSIS (DVT) OF LOWER EXTREMITY: ICD-10-CM

## 2019-05-20 DIAGNOSIS — D68.52 PROTHROMBIN GENE MUTATION: ICD-10-CM

## 2019-05-20 DIAGNOSIS — R76.0 LUPUS ANTICOAGULANT POSITIVE: ICD-10-CM

## 2019-05-20 LAB — INR PPP: 7.4 (ref 2–3)

## 2019-05-20 PROCEDURE — 93793 ANTICOAG MGMT PT WARFARIN: CPT | Mod: ,,,

## 2019-05-20 PROCEDURE — 93793 PR ANTICOAGULANT MGMT FOR PT TAKING WARFARIN: ICD-10-PCS | Mod: ,,,

## 2019-05-20 PROCEDURE — 85610 PROTHROMBIN TIME: CPT | Mod: PBBFAC,PO

## 2019-05-20 NOTE — PROGRESS NOTES
INR very high today. Patient missed appropriate follow up last week when INRs were already increasing. He did start greens 2x/week and actually had extra greens last week. He ate greens for lunch and dinner on 2 separate days. Patient advised to limit to 1 serving per day and on 2 days per week trying to space evenly in the week. He denies alcohol, acute illness, double dosing, or anything else that could have caused such a huge increase in INR. His INRs have been erratic since starting. No signs or symptoms of bleeding. He was advised of high INR and bleeding risk. He was advised to be cautious and watch for s/sx of bleeding. We will hold coumadin for 2 days and plan a significant dose decrease. Reevaluate in 3 days. He has had difficulty making CC appointments due to work. We have made a plan for him to change to hospital lab in early AMs instead of trying to get to clinic after work.

## 2019-05-23 ENCOUNTER — ANTI-COAG VISIT (OUTPATIENT)
Dept: CARDIOLOGY | Facility: CLINIC | Age: 64
End: 2019-05-23
Payer: MEDICAID

## 2019-05-23 ENCOUNTER — LAB VISIT (OUTPATIENT)
Dept: LAB | Facility: HOSPITAL | Age: 64
End: 2019-05-23
Attending: INTERNAL MEDICINE
Payer: MEDICAID

## 2019-05-23 DIAGNOSIS — Z86.718 HISTORY OF DEEP VEIN THROMBOSIS (DVT) OF LOWER EXTREMITY: ICD-10-CM

## 2019-05-23 DIAGNOSIS — D68.52 PROTHROMBIN GENE MUTATION: ICD-10-CM

## 2019-05-23 DIAGNOSIS — I82.401 DVT, LOWER EXTREMITY, RECURRENT, RIGHT: ICD-10-CM

## 2019-05-23 DIAGNOSIS — R76.0 LUPUS ANTICOAGULANT POSITIVE: ICD-10-CM

## 2019-05-23 DIAGNOSIS — Z79.01 LONG TERM (CURRENT) USE OF ANTICOAGULANTS: ICD-10-CM

## 2019-05-23 LAB
INR PPP: 2.3 (ref 0.8–1.2)
PROTHROMBIN TIME: 23.6 SEC (ref 9–12.5)

## 2019-05-23 PROCEDURE — 93793 PR ANTICOAGULANT MGMT FOR PT TAKING WARFARIN: ICD-10-PCS | Mod: ,,, | Performed by: PHARMACIST

## 2019-05-23 PROCEDURE — 85610 PROTHROMBIN TIME: CPT

## 2019-05-23 PROCEDURE — 36415 COLL VENOUS BLD VENIPUNCTURE: CPT

## 2019-05-23 PROCEDURE — 93793 ANTICOAG MGMT PT WARFARIN: CPT | Mod: ,,, | Performed by: PHARMACIST

## 2019-05-24 ENCOUNTER — PATIENT OUTREACH (OUTPATIENT)
Dept: ADMINISTRATIVE | Facility: HOSPITAL | Age: 64
End: 2019-05-24

## 2019-05-24 DIAGNOSIS — Z11.59 NEED FOR HEPATITIS C SCREENING TEST: Primary | ICD-10-CM

## 2019-05-27 ENCOUNTER — ANTI-COAG VISIT (OUTPATIENT)
Dept: CARDIOLOGY | Facility: CLINIC | Age: 64
End: 2019-05-27
Payer: MEDICAID

## 2019-05-27 ENCOUNTER — LAB VISIT (OUTPATIENT)
Dept: LAB | Facility: HOSPITAL | Age: 64
End: 2019-05-27
Attending: INTERNAL MEDICINE
Payer: MEDICAID

## 2019-05-27 DIAGNOSIS — Z86.718 HISTORY OF DEEP VEIN THROMBOSIS (DVT) OF LOWER EXTREMITY: ICD-10-CM

## 2019-05-27 DIAGNOSIS — I82.401 DVT, LOWER EXTREMITY, RECURRENT, RIGHT: ICD-10-CM

## 2019-05-27 DIAGNOSIS — D68.52 PROTHROMBIN GENE MUTATION: ICD-10-CM

## 2019-05-27 DIAGNOSIS — Z11.59 NEED FOR HEPATITIS C SCREENING TEST: ICD-10-CM

## 2019-05-27 DIAGNOSIS — R76.0 LUPUS ANTICOAGULANT POSITIVE: ICD-10-CM

## 2019-05-27 LAB
INR PPP: 1.7 (ref 0.8–1.2)
PROTHROMBIN TIME: 17.4 SEC (ref 9–12.5)

## 2019-05-27 PROCEDURE — 93793 ANTICOAG MGMT PT WARFARIN: CPT | Mod: ,,, | Performed by: PHARMACIST

## 2019-05-27 PROCEDURE — 93793 PR ANTICOAGULANT MGMT FOR PT TAKING WARFARIN: ICD-10-PCS | Mod: ,,, | Performed by: PHARMACIST

## 2019-05-27 PROCEDURE — 85610 PROTHROMBIN TIME: CPT

## 2019-05-27 PROCEDURE — 86803 HEPATITIS C AB TEST: CPT

## 2019-05-27 PROCEDURE — 36415 COLL VENOUS BLD VENIPUNCTURE: CPT

## 2019-05-28 LAB — HCV AB SERPL QL IA: NEGATIVE

## 2019-05-28 NOTE — PROGRESS NOTES
Confirmed taking incorrect dose w/ 10mg on SUN and MON;  5mg all other days  Reports only eating Greens beans/ soft stool  NO other changes

## 2019-05-30 ENCOUNTER — LAB VISIT (OUTPATIENT)
Dept: LAB | Facility: HOSPITAL | Age: 64
End: 2019-05-30
Attending: INTERNAL MEDICINE
Payer: MEDICAID

## 2019-05-30 DIAGNOSIS — R76.0 LUPUS ANTICOAGULANT POSITIVE: ICD-10-CM

## 2019-05-30 DIAGNOSIS — I82.401 DVT, LOWER EXTREMITY, RECURRENT, RIGHT: ICD-10-CM

## 2019-05-30 DIAGNOSIS — Z86.718 HISTORY OF DEEP VEIN THROMBOSIS (DVT) OF LOWER EXTREMITY: ICD-10-CM

## 2019-05-30 DIAGNOSIS — D68.52 PROTHROMBIN GENE MUTATION: ICD-10-CM

## 2019-05-30 LAB
INR PPP: 1.5 (ref 0.8–1.2)
PROTHROMBIN TIME: 15.6 SEC (ref 9–12.5)

## 2019-05-30 PROCEDURE — 36415 COLL VENOUS BLD VENIPUNCTURE: CPT

## 2019-05-30 PROCEDURE — 85610 PROTHROMBIN TIME: CPT

## 2019-05-31 ENCOUNTER — ANTI-COAG VISIT (OUTPATIENT)
Dept: CARDIOLOGY | Facility: CLINIC | Age: 64
End: 2019-05-31

## 2019-05-31 DIAGNOSIS — R76.0 LUPUS ANTICOAGULANT POSITIVE: ICD-10-CM

## 2019-05-31 DIAGNOSIS — D68.52 PROTHROMBIN GENE MUTATION: ICD-10-CM

## 2019-05-31 DIAGNOSIS — Z86.718 HISTORY OF DEEP VEIN THROMBOSIS (DVT) OF LOWER EXTREMITY: Primary | ICD-10-CM

## 2019-05-31 DIAGNOSIS — I82.401 DVT, LOWER EXTREMITY, RECURRENT, RIGHT: ICD-10-CM

## 2019-05-31 RX ORDER — ENOXAPARIN SODIUM 100 MG/ML
80 INJECTION SUBCUTANEOUS
Status: SHIPPED | OUTPATIENT
Start: 2019-05-31 | End: 2019-06-06

## 2019-05-31 NOTE — PROGRESS NOTES
Confirmed taking 5mg MON-THURS of this week  One serving Green beans once this week as well  NO other changes   Recent DVT, we will restart lovenox. Crcl>30. Lovenox 80mg every 12 hours; sent to pharmacy on file.

## 2019-06-04 ENCOUNTER — LAB VISIT (OUTPATIENT)
Dept: LAB | Facility: HOSPITAL | Age: 64
End: 2019-06-04
Attending: INTERNAL MEDICINE
Payer: MEDICAID

## 2019-06-04 DIAGNOSIS — I82.401 DVT, LOWER EXTREMITY, RECURRENT, RIGHT: ICD-10-CM

## 2019-06-04 DIAGNOSIS — D68.52 PROTHROMBIN GENE MUTATION: ICD-10-CM

## 2019-06-04 DIAGNOSIS — Z86.718 HISTORY OF DEEP VEIN THROMBOSIS (DVT) OF LOWER EXTREMITY: ICD-10-CM

## 2019-06-04 DIAGNOSIS — R76.0 LUPUS ANTICOAGULANT POSITIVE: ICD-10-CM

## 2019-06-04 LAB
INR PPP: 3 (ref 0.8–1.2)
PROTHROMBIN TIME: 31.4 SEC (ref 9–12.5)

## 2019-06-04 PROCEDURE — 85610 PROTHROMBIN TIME: CPT

## 2019-06-04 PROCEDURE — 36415 COLL VENOUS BLD VENIPUNCTURE: CPT

## 2019-06-04 RX ORDER — ENOXAPARIN SODIUM 100 MG/ML
INJECTION SUBCUTANEOUS
Refills: 0 | OUTPATIENT
Start: 2019-06-04

## 2019-06-05 ENCOUNTER — ANTI-COAG VISIT (OUTPATIENT)
Dept: CARDIOLOGY | Facility: CLINIC | Age: 64
End: 2019-06-05
Payer: MEDICAID

## 2019-06-05 DIAGNOSIS — R76.0 LUPUS ANTICOAGULANT POSITIVE: ICD-10-CM

## 2019-06-05 DIAGNOSIS — D68.52 PROTHROMBIN GENE MUTATION: ICD-10-CM

## 2019-06-05 DIAGNOSIS — Z86.718 HISTORY OF DEEP VEIN THROMBOSIS (DVT) OF LOWER EXTREMITY: ICD-10-CM

## 2019-06-05 DIAGNOSIS — I82.401 DVT, LOWER EXTREMITY, RECURRENT, RIGHT: ICD-10-CM

## 2019-06-05 PROCEDURE — 93793 PR ANTICOAGULANT MGMT FOR PT TAKING WARFARIN: ICD-10-PCS | Mod: ,,, | Performed by: PHARMACIST

## 2019-06-05 PROCEDURE — 93793 ANTICOAG MGMT PT WARFARIN: CPT | Mod: ,,, | Performed by: PHARMACIST

## 2019-06-05 NOTE — PROGRESS NOTES
Pt states local pharmacy did not have refills on file for lovenox injections; pt states he took 10mg coumadin everyday FRI-TUES

## 2019-06-07 ENCOUNTER — TELEPHONE (OUTPATIENT)
Dept: FAMILY MEDICINE | Facility: CLINIC | Age: 64
End: 2019-06-07

## 2019-06-07 NOTE — TELEPHONE ENCOUNTER
----- Message from Zoe Goode sent at 6/7/2019  2:40 PM CDT -----  Contact: Self  Type: RX Refill Request    Who Called: Speedy    Refill or New Rx:refill    RX Name and Strength:warfarin (COUMADIN) 10 MG tablet 30 tablet   losartan-hydrochlorothiazide 50-12.5 mg (HYZAAR) 50-12.5 mg per tablet 90 tablet   atorvastatin (LIPITOR) 40 MG tablet  tamsulosin (FLOMAX) 0.4 mg Cap 30 capsule       How is the patient currently taking it? (ex. 1XDay):    Is this a 30 day or 90 day RX:90    Preferred Pharmacy with phone number:[address] [phone] (Phone)  [phone] (Fax)    Local or Mail Order:Local    Ordering Provider:Page    Would the patient rather a call back or a response via [facility]? Call    Best Call Back Number:[phone]    Additional Information:patient requesting medication refill

## 2019-06-07 NOTE — TELEPHONE ENCOUNTER
I spoke to [facility] and advised that pt should have refills available on requested refills. They have refills on file at their store but show that pt transferred to a different [facility]. They will contact their sister pharmacy and the pt. They will call with problems.

## 2019-06-10 ENCOUNTER — LAB VISIT (OUTPATIENT)
Dept: LAB | Facility: HOSPITAL | Age: 64
End: 2019-06-10
Attending: INTERNAL MEDICINE
Payer: MEDICAID

## 2019-06-10 DIAGNOSIS — Z86.718 HISTORY OF DEEP VEIN THROMBOSIS (DVT) OF LOWER EXTREMITY: ICD-10-CM

## 2019-06-10 DIAGNOSIS — I82.401 DVT, LOWER EXTREMITY, RECURRENT, RIGHT: ICD-10-CM

## 2019-06-10 DIAGNOSIS — D68.52 PROTHROMBIN GENE MUTATION: ICD-10-CM

## 2019-06-10 DIAGNOSIS — R76.0 LUPUS ANTICOAGULANT POSITIVE: ICD-10-CM

## 2019-06-10 LAB
INR PPP: 1.9 (ref 0.8–1.2)
PROTHROMBIN TIME: 20 SEC (ref 9–12.5)

## 2019-06-10 PROCEDURE — 85610 PROTHROMBIN TIME: CPT

## 2019-06-10 PROCEDURE — 36415 COLL VENOUS BLD VENIPUNCTURE: CPT

## 2019-06-10 RX ORDER — ISOSORBIDE MONONITRATE 30 MG/1
TABLET, EXTENDED RELEASE ORAL
Qty: 30 TABLET | Refills: 11 | Status: SHIPPED | OUTPATIENT
Start: 2019-06-10

## 2019-06-11 ENCOUNTER — ANTI-COAG VISIT (OUTPATIENT)
Dept: CARDIOLOGY | Facility: CLINIC | Age: 64
End: 2019-06-11
Payer: MEDICAID

## 2019-06-11 DIAGNOSIS — Z86.718 HISTORY OF DEEP VEIN THROMBOSIS (DVT) OF LOWER EXTREMITY: ICD-10-CM

## 2019-06-11 DIAGNOSIS — R76.0 LUPUS ANTICOAGULANT POSITIVE: ICD-10-CM

## 2019-06-11 DIAGNOSIS — I82.401 DVT, LOWER EXTREMITY, RECURRENT, RIGHT: ICD-10-CM

## 2019-06-11 DIAGNOSIS — D68.52 PROTHROMBIN GENE MUTATION: ICD-10-CM

## 2019-06-11 PROCEDURE — 93793 PR ANTICOAGULANT MGMT FOR PT TAKING WARFARIN: ICD-10-PCS | Mod: ,,, | Performed by: PHARMACIST

## 2019-06-11 PROCEDURE — 93793 ANTICOAG MGMT PT WARFARIN: CPT | Mod: ,,, | Performed by: PHARMACIST

## 2019-06-11 NOTE — PROGRESS NOTES
Confirmed taking coumadin 10mg SUN and MON; 5mg all others  NO green veggies this week  NO other changes

## 2019-06-13 ENCOUNTER — LAB VISIT (OUTPATIENT)
Dept: LAB | Facility: HOSPITAL | Age: 64
End: 2019-06-13
Attending: INTERNAL MEDICINE
Payer: MEDICAID

## 2019-06-13 DIAGNOSIS — I82.401 DVT, LOWER EXTREMITY, RECURRENT, RIGHT: ICD-10-CM

## 2019-06-13 DIAGNOSIS — R76.0 LUPUS ANTICOAGULANT POSITIVE: ICD-10-CM

## 2019-06-13 DIAGNOSIS — D68.52 PROTHROMBIN GENE MUTATION: ICD-10-CM

## 2019-06-13 DIAGNOSIS — Z86.718 HISTORY OF DEEP VEIN THROMBOSIS (DVT) OF LOWER EXTREMITY: ICD-10-CM

## 2019-06-13 LAB
INR PPP: 1.1 (ref 0.8–1.2)
PROTHROMBIN TIME: 11.2 SEC (ref 9–12.5)

## 2019-06-13 PROCEDURE — 85610 PROTHROMBIN TIME: CPT

## 2019-06-13 PROCEDURE — 36415 COLL VENOUS BLD VENIPUNCTURE: CPT

## 2019-06-14 ENCOUNTER — ANTI-COAG VISIT (OUTPATIENT)
Dept: CARDIOLOGY | Facility: CLINIC | Age: 64
End: 2019-06-14
Payer: MEDICAID

## 2019-06-14 DIAGNOSIS — I82.401 DVT, LOWER EXTREMITY, RECURRENT, RIGHT: ICD-10-CM

## 2019-06-14 DIAGNOSIS — Z79.01 LONG TERM (CURRENT) USE OF ANTICOAGULANTS: Primary | ICD-10-CM

## 2019-06-14 DIAGNOSIS — R76.0 LUPUS ANTICOAGULANT POSITIVE: ICD-10-CM

## 2019-06-14 DIAGNOSIS — D68.52 PROTHROMBIN GENE MUTATION: ICD-10-CM

## 2019-06-14 DIAGNOSIS — Z86.718 HISTORY OF DEEP VEIN THROMBOSIS (DVT) OF LOWER EXTREMITY: ICD-10-CM

## 2019-06-14 PROCEDURE — 93793 PR ANTICOAGULANT MGMT FOR PT TAKING WARFARIN: ICD-10-PCS | Mod: ,,, | Performed by: PHARMACIST

## 2019-06-14 PROCEDURE — 93793 ANTICOAG MGMT PT WARFARIN: CPT | Mod: ,,, | Performed by: PHARMACIST

## 2019-06-14 RX ORDER — ENOXAPARIN SODIUM 100 MG/ML
80 INJECTION SUBCUTANEOUS
COMMUNITY
End: 2019-06-14

## 2019-06-14 RX ORDER — ENOXAPARIN SODIUM 100 MG/ML
INJECTION SUBCUTANEOUS
Qty: 12 SYRINGE | Refills: 1 | Status: SHIPPED | OUTPATIENT
Start: 2019-06-14 | End: 2019-08-22

## 2019-06-14 RX ORDER — ENOXAPARIN SODIUM 100 MG/ML
80 INJECTION SUBCUTANEOUS
Status: DISCONTINUED | OUTPATIENT
Start: 2019-06-14 | End: 2019-06-14 | Stop reason: CLARIF

## 2019-06-14 NOTE — PROGRESS NOTES
Patient missed his coumadin  doses 6/11 & 6/12. INR now at baseline and patient had recent DVT. I recommend restarting lovenox 80mg every 12 hours (Crcl>30). Rx for lovenox e-prescribed

## 2019-06-14 NOTE — PROGRESS NOTES
I called [employer] and did a expedited prior authorization for Lovenox (pt no longer has Medicaid).  Reference number given for POA is [id number] if need to call check on status [phone]  I called the patient and gave him the reference number and phone number for [employer] as it can take up to 24 hours for [employer] to approve and this clinic is not open tomorrow.  I explained to him the process and he can call [facility] to check to see if the Lovenox prescription has been filled as well as have the information/contact number for [employer].    UPDATE:  POA approved and good through 6/13/20.  Patient notified he can  Lovenox.

## 2019-06-17 ENCOUNTER — LAB VISIT (OUTPATIENT)
Dept: LAB | Facility: HOSPITAL | Age: 64
End: 2019-06-17
Attending: INTERNAL MEDICINE
Payer: MEDICAID

## 2019-06-17 DIAGNOSIS — R76.0 LUPUS ANTICOAGULANT POSITIVE: ICD-10-CM

## 2019-06-17 DIAGNOSIS — I82.401 DVT, LOWER EXTREMITY, RECURRENT, RIGHT: ICD-10-CM

## 2019-06-17 DIAGNOSIS — Z86.718 HISTORY OF DEEP VEIN THROMBOSIS (DVT) OF LOWER EXTREMITY: ICD-10-CM

## 2019-06-17 DIAGNOSIS — D68.52 PROTHROMBIN GENE MUTATION: ICD-10-CM

## 2019-06-17 LAB
INR PPP: 1.7 (ref 0.8–1.2)
PROTHROMBIN TIME: 17.4 SEC (ref 9–12.5)

## 2019-06-17 PROCEDURE — 85610 PROTHROMBIN TIME: CPT

## 2019-06-17 PROCEDURE — 36415 COLL VENOUS BLD VENIPUNCTURE: CPT

## 2019-06-18 ENCOUNTER — ANTI-COAG VISIT (OUTPATIENT)
Dept: CARDIOLOGY | Facility: CLINIC | Age: 64
End: 2019-06-18
Payer: MEDICAID

## 2019-06-18 DIAGNOSIS — Z86.718 HISTORY OF DEEP VEIN THROMBOSIS (DVT) OF LOWER EXTREMITY: ICD-10-CM

## 2019-06-18 DIAGNOSIS — R76.0 LUPUS ANTICOAGULANT POSITIVE: ICD-10-CM

## 2019-06-18 DIAGNOSIS — D68.52 PROTHROMBIN GENE MUTATION: ICD-10-CM

## 2019-06-18 DIAGNOSIS — I82.401 DVT, LOWER EXTREMITY, RECURRENT, RIGHT: ICD-10-CM

## 2019-06-18 PROCEDURE — 93793 ANTICOAG MGMT PT WARFARIN: CPT | Mod: ,,, | Performed by: PHARMACIST

## 2019-06-18 PROCEDURE — 93793 PR ANTICOAGULANT MGMT FOR PT TAKING WARFARIN: ICD-10-PCS | Mod: ,,, | Performed by: PHARMACIST

## 2019-06-18 NOTE — PROGRESS NOTES
PT confirmed taking 10mg FRI-SUN; 5mg MON; did not  lovenox injections; states he did not have funds at the time  Green peas on DUN  NO other changes

## 2019-06-20 ENCOUNTER — LAB VISIT (OUTPATIENT)
Dept: LAB | Facility: HOSPITAL | Age: 64
End: 2019-06-20
Attending: INTERNAL MEDICINE
Payer: MEDICAID

## 2019-06-20 DIAGNOSIS — I82.401 DVT, LOWER EXTREMITY, RECURRENT, RIGHT: ICD-10-CM

## 2019-06-20 DIAGNOSIS — R76.0 LUPUS ANTICOAGULANT POSITIVE: ICD-10-CM

## 2019-06-20 DIAGNOSIS — Z86.718 HISTORY OF DEEP VEIN THROMBOSIS (DVT) OF LOWER EXTREMITY: ICD-10-CM

## 2019-06-20 DIAGNOSIS — D68.52 PROTHROMBIN GENE MUTATION: ICD-10-CM

## 2019-06-20 LAB
INR PPP: 1.3 (ref 0.8–1.2)
PROTHROMBIN TIME: 14.1 SEC (ref 9–12.5)

## 2019-06-20 PROCEDURE — 85610 PROTHROMBIN TIME: CPT

## 2019-06-20 PROCEDURE — 36415 COLL VENOUS BLD VENIPUNCTURE: CPT

## 2019-06-21 ENCOUNTER — ANTI-COAG VISIT (OUTPATIENT)
Dept: CARDIOLOGY | Facility: CLINIC | Age: 64
End: 2019-06-21
Payer: MEDICAID

## 2019-06-21 DIAGNOSIS — I82.401 DVT, LOWER EXTREMITY, RECURRENT, RIGHT: ICD-10-CM

## 2019-06-21 DIAGNOSIS — D68.52 PROTHROMBIN GENE MUTATION: ICD-10-CM

## 2019-06-21 DIAGNOSIS — Z86.718 HISTORY OF DEEP VEIN THROMBOSIS (DVT) OF LOWER EXTREMITY: ICD-10-CM

## 2019-06-21 DIAGNOSIS — R76.0 LUPUS ANTICOAGULANT POSITIVE: ICD-10-CM

## 2019-06-21 PROCEDURE — 93793 ANTICOAG MGMT PT WARFARIN: CPT | Mod: ,,, | Performed by: PHARMACIST

## 2019-06-21 PROCEDURE — 93793 PR ANTICOAGULANT MGMT FOR PT TAKING WARFARIN: ICD-10-PCS | Mod: ,,, | Performed by: PHARMACIST

## 2019-06-21 NOTE — PROGRESS NOTES
*DID NOT start injection due to financial reasons* STATES he is finally able to pick Lovenox at [facility] on file*     Confirmed coumadin 10mg on TUES; 5mg all others; NO other changes

## 2019-06-24 ENCOUNTER — LAB VISIT (OUTPATIENT)
Dept: LAB | Facility: HOSPITAL | Age: 64
End: 2019-06-24
Attending: INTERNAL MEDICINE
Payer: MEDICAID

## 2019-06-24 DIAGNOSIS — D68.52 PROTHROMBIN GENE MUTATION: ICD-10-CM

## 2019-06-24 DIAGNOSIS — R76.0 LUPUS ANTICOAGULANT POSITIVE: ICD-10-CM

## 2019-06-24 DIAGNOSIS — Z86.718 HISTORY OF DEEP VEIN THROMBOSIS (DVT) OF LOWER EXTREMITY: ICD-10-CM

## 2019-06-24 DIAGNOSIS — I82.401 DVT, LOWER EXTREMITY, RECURRENT, RIGHT: ICD-10-CM

## 2019-06-24 LAB
INR PPP: 1.3 (ref 0.8–1.2)
PROTHROMBIN TIME: 13.8 SEC (ref 9–12.5)

## 2019-06-24 PROCEDURE — 85610 PROTHROMBIN TIME: CPT

## 2019-06-24 PROCEDURE — 36415 COLL VENOUS BLD VENIPUNCTURE: CPT

## 2019-06-25 ENCOUNTER — ANTI-COAG VISIT (OUTPATIENT)
Dept: CARDIOLOGY | Facility: CLINIC | Age: 64
End: 2019-06-25
Payer: MEDICAID

## 2019-06-25 DIAGNOSIS — D68.52 PROTHROMBIN GENE MUTATION: ICD-10-CM

## 2019-06-25 DIAGNOSIS — Z86.718 HISTORY OF DEEP VEIN THROMBOSIS (DVT) OF LOWER EXTREMITY: ICD-10-CM

## 2019-06-25 DIAGNOSIS — I82.401 DVT, LOWER EXTREMITY, RECURRENT, RIGHT: ICD-10-CM

## 2019-06-25 DIAGNOSIS — R76.0 LUPUS ANTICOAGULANT POSITIVE: ICD-10-CM

## 2019-06-25 PROCEDURE — 93793 PR ANTICOAGULANT MGMT FOR PT TAKING WARFARIN: ICD-10-PCS | Mod: ,,, | Performed by: PHARMACIST

## 2019-06-25 PROCEDURE — 93793 ANTICOAG MGMT PT WARFARIN: CPT | Mod: ,,, | Performed by: PHARMACIST

## 2019-06-25 NOTE — PROGRESS NOTES
Reports taking correct dose of coumadin; reports starting lovenox injections on 6/23 (pm), twice on 6/24, & (am) 6/25.   States feeling dehydrated, weak lately; also had okra on SUN  NO other changes

## 2019-06-27 ENCOUNTER — LAB VISIT (OUTPATIENT)
Dept: LAB | Facility: HOSPITAL | Age: 64
End: 2019-06-27
Attending: INTERNAL MEDICINE
Payer: MEDICAID

## 2019-06-27 DIAGNOSIS — R76.0 LUPUS ANTICOAGULANT POSITIVE: ICD-10-CM

## 2019-06-27 DIAGNOSIS — D68.52 PROTHROMBIN GENE MUTATION: ICD-10-CM

## 2019-06-27 DIAGNOSIS — I82.401 DVT, LOWER EXTREMITY, RECURRENT, RIGHT: ICD-10-CM

## 2019-06-27 DIAGNOSIS — Z86.718 HISTORY OF DEEP VEIN THROMBOSIS (DVT) OF LOWER EXTREMITY: ICD-10-CM

## 2019-06-27 LAB
INR PPP: 1.8 (ref 0.8–1.2)
PROTHROMBIN TIME: 18.5 SEC (ref 9–12.5)

## 2019-06-27 PROCEDURE — 85610 PROTHROMBIN TIME: CPT

## 2019-06-27 PROCEDURE — 36415 COLL VENOUS BLD VENIPUNCTURE: CPT

## 2019-06-28 ENCOUNTER — ANTI-COAG VISIT (OUTPATIENT)
Dept: CARDIOLOGY | Facility: CLINIC | Age: 64
End: 2019-06-28
Payer: MEDICAID

## 2019-06-28 DIAGNOSIS — R76.0 LUPUS ANTICOAGULANT POSITIVE: ICD-10-CM

## 2019-06-28 DIAGNOSIS — I82.401 DVT, LOWER EXTREMITY, RECURRENT, RIGHT: ICD-10-CM

## 2019-06-28 DIAGNOSIS — Z86.718 HISTORY OF DEEP VEIN THROMBOSIS (DVT) OF LOWER EXTREMITY: ICD-10-CM

## 2019-06-28 DIAGNOSIS — D68.52 PROTHROMBIN GENE MUTATION: ICD-10-CM

## 2019-06-28 PROCEDURE — 93793 PR ANTICOAGULANT MGMT FOR PT TAKING WARFARIN: ICD-10-PCS | Mod: ,,, | Performed by: PHARMACIST

## 2019-06-28 PROCEDURE — 93793 ANTICOAG MGMT PT WARFARIN: CPT | Mod: ,,, | Performed by: PHARMACIST

## 2019-06-28 NOTE — PROGRESS NOTES
Confirmed taking 15mg TUES-THURS; confirmed taking lovenox injections correctly; last injection given (am) 6/28; reports only 5 left  NO other changes   continue lovenox 80mg every 12 hours through Friday evening then stop

## 2019-07-02 ENCOUNTER — LAB VISIT (OUTPATIENT)
Dept: LAB | Facility: HOSPITAL | Age: 64
End: 2019-07-02
Attending: INTERNAL MEDICINE
Payer: MEDICAID

## 2019-07-02 DIAGNOSIS — D68.52 PROTHROMBIN GENE MUTATION: ICD-10-CM

## 2019-07-02 DIAGNOSIS — Z86.718 HISTORY OF DEEP VEIN THROMBOSIS (DVT) OF LOWER EXTREMITY: ICD-10-CM

## 2019-07-02 DIAGNOSIS — I82.401 DVT, LOWER EXTREMITY, RECURRENT, RIGHT: ICD-10-CM

## 2019-07-02 DIAGNOSIS — R76.0 LUPUS ANTICOAGULANT POSITIVE: ICD-10-CM

## 2019-07-02 LAB
INR PPP: 1.7 (ref 0.8–1.2)
PROTHROMBIN TIME: 17.4 SEC (ref 9–12.5)

## 2019-07-02 PROCEDURE — 36415 COLL VENOUS BLD VENIPUNCTURE: CPT

## 2019-07-02 PROCEDURE — 85610 PROTHROMBIN TIME: CPT

## 2019-07-03 ENCOUNTER — ANTI-COAG VISIT (OUTPATIENT)
Dept: CARDIOLOGY | Facility: CLINIC | Age: 64
End: 2019-07-03
Payer: MEDICAID

## 2019-07-03 DIAGNOSIS — D68.52 PROTHROMBIN GENE MUTATION: ICD-10-CM

## 2019-07-03 DIAGNOSIS — R76.0 LUPUS ANTICOAGULANT POSITIVE: ICD-10-CM

## 2019-07-03 DIAGNOSIS — I82.401 DVT, LOWER EXTREMITY, RECURRENT, RIGHT: ICD-10-CM

## 2019-07-03 DIAGNOSIS — Z86.718 HISTORY OF DEEP VEIN THROMBOSIS (DVT) OF LOWER EXTREMITY: ICD-10-CM

## 2019-07-03 PROCEDURE — 93793 ANTICOAG MGMT PT WARFARIN: CPT | Mod: ,,, | Performed by: PHARMACIST

## 2019-07-03 PROCEDURE — 93793 PR ANTICOAGULANT MGMT FOR PT TAKING WARFARIN: ICD-10-PCS | Mod: ,,, | Performed by: PHARMACIST

## 2019-07-03 NOTE — PROGRESS NOTES
Confirmed taking 10mg everyday, including SUN; last lovonox injection given (pm) FRI  NO other changes

## 2019-07-08 ENCOUNTER — LAB VISIT (OUTPATIENT)
Dept: LAB | Facility: HOSPITAL | Age: 64
End: 2019-07-08
Attending: INTERNAL MEDICINE
Payer: MEDICAID

## 2019-07-08 ENCOUNTER — ANTI-COAG VISIT (OUTPATIENT)
Dept: CARDIOLOGY | Facility: CLINIC | Age: 64
End: 2019-07-08
Payer: MEDICAID

## 2019-07-08 DIAGNOSIS — I82.401 DVT, LOWER EXTREMITY, RECURRENT, RIGHT: ICD-10-CM

## 2019-07-08 DIAGNOSIS — Z86.718 HISTORY OF DEEP VEIN THROMBOSIS (DVT) OF LOWER EXTREMITY: ICD-10-CM

## 2019-07-08 DIAGNOSIS — R76.0 LUPUS ANTICOAGULANT POSITIVE: ICD-10-CM

## 2019-07-08 DIAGNOSIS — D68.52 PROTHROMBIN GENE MUTATION: ICD-10-CM

## 2019-07-08 LAB
INR PPP: 3.5 (ref 0.8–1.2)
PROTHROMBIN TIME: 37.1 SEC (ref 9–12.5)

## 2019-07-08 PROCEDURE — 93793 PR ANTICOAGULANT MGMT FOR PT TAKING WARFARIN: ICD-10-PCS | Mod: ,,, | Performed by: PHARMACIST

## 2019-07-08 PROCEDURE — 36415 COLL VENOUS BLD VENIPUNCTURE: CPT

## 2019-07-08 PROCEDURE — 85610 PROTHROMBIN TIME: CPT

## 2019-07-08 PROCEDURE — 93793 ANTICOAG MGMT PT WARFARIN: CPT | Mod: ,,, | Performed by: PHARMACIST

## 2019-07-09 NOTE — PROGRESS NOTES
Confirmed taking incorrect dose of coumadin w/ 15mg WED-MON  Alcohol intake on 7/4  NO other changes

## 2019-07-10 ENCOUNTER — LAB VISIT (OUTPATIENT)
Dept: LAB | Facility: HOSPITAL | Age: 64
End: 2019-07-10
Attending: INTERNAL MEDICINE
Payer: MEDICAID

## 2019-07-10 DIAGNOSIS — D68.52 PROTHROMBIN GENE MUTATION: ICD-10-CM

## 2019-07-10 DIAGNOSIS — R76.0 LUPUS ANTICOAGULANT POSITIVE: ICD-10-CM

## 2019-07-10 DIAGNOSIS — Z86.718 HISTORY OF DEEP VEIN THROMBOSIS (DVT) OF LOWER EXTREMITY: ICD-10-CM

## 2019-07-10 DIAGNOSIS — I82.401 DVT, LOWER EXTREMITY, RECURRENT, RIGHT: ICD-10-CM

## 2019-07-10 LAB
INR PPP: 4.4 (ref 0.8–1.2)
PROTHROMBIN TIME: 46.3 SEC (ref 9–12.5)

## 2019-07-10 PROCEDURE — 36415 COLL VENOUS BLD VENIPUNCTURE: CPT

## 2019-07-10 PROCEDURE — 85610 PROTHROMBIN TIME: CPT

## 2019-07-11 ENCOUNTER — ANTI-COAG VISIT (OUTPATIENT)
Dept: CARDIOLOGY | Facility: CLINIC | Age: 64
End: 2019-07-11
Payer: MEDICAID

## 2019-07-11 DIAGNOSIS — I82.401 DVT, LOWER EXTREMITY, RECURRENT, RIGHT: ICD-10-CM

## 2019-07-11 DIAGNOSIS — D68.52 PROTHROMBIN GENE MUTATION: ICD-10-CM

## 2019-07-11 DIAGNOSIS — Z86.718 HISTORY OF DEEP VEIN THROMBOSIS (DVT) OF LOWER EXTREMITY: ICD-10-CM

## 2019-07-11 DIAGNOSIS — R76.0 LUPUS ANTICOAGULANT POSITIVE: ICD-10-CM

## 2019-07-11 PROCEDURE — 93793 ANTICOAG MGMT PT WARFARIN: CPT | Mod: ,,, | Performed by: PHARMACIST

## 2019-07-11 PROCEDURE — 93793 PR ANTICOAGULANT MGMT FOR PT TAKING WARFARIN: ICD-10-PCS | Mod: ,,, | Performed by: PHARMACIST

## 2019-07-11 NOTE — PROGRESS NOTES
Confirmed taking coumadin  10mg MON, HELD dose TUES & 5mg WED  Spinach on WED  Reports taking a Dietary supplement 'Immune booster' for 3 days now; not sure of exact name;     NO other changes

## 2019-07-15 ENCOUNTER — LAB VISIT (OUTPATIENT)
Dept: LAB | Facility: HOSPITAL | Age: 64
End: 2019-07-15
Attending: INTERNAL MEDICINE
Payer: MEDICAID

## 2019-07-15 DIAGNOSIS — I82.401 DVT, LOWER EXTREMITY, RECURRENT, RIGHT: ICD-10-CM

## 2019-07-15 DIAGNOSIS — D68.52 PROTHROMBIN GENE MUTATION: ICD-10-CM

## 2019-07-15 DIAGNOSIS — Z86.718 HISTORY OF DEEP VEIN THROMBOSIS (DVT) OF LOWER EXTREMITY: ICD-10-CM

## 2019-07-15 DIAGNOSIS — R76.0 LUPUS ANTICOAGULANT POSITIVE: ICD-10-CM

## 2019-07-15 LAB
INR PPP: 1.6 (ref 0.8–1.2)
PROTHROMBIN TIME: 17.1 SEC (ref 9–12.5)

## 2019-07-15 PROCEDURE — 85610 PROTHROMBIN TIME: CPT

## 2019-07-15 PROCEDURE — 36415 COLL VENOUS BLD VENIPUNCTURE: CPT

## 2019-07-16 ENCOUNTER — ANTI-COAG VISIT (OUTPATIENT)
Dept: CARDIOLOGY | Facility: CLINIC | Age: 64
End: 2019-07-16
Payer: MEDICAID

## 2019-07-16 DIAGNOSIS — D68.52 PROTHROMBIN GENE MUTATION: ICD-10-CM

## 2019-07-16 DIAGNOSIS — R76.0 LUPUS ANTICOAGULANT POSITIVE: ICD-10-CM

## 2019-07-16 DIAGNOSIS — I82.401 DVT, LOWER EXTREMITY, RECURRENT, RIGHT: ICD-10-CM

## 2019-07-16 DIAGNOSIS — Z86.718 HISTORY OF DEEP VEIN THROMBOSIS (DVT) OF LOWER EXTREMITY: ICD-10-CM

## 2019-07-16 PROCEDURE — 93793 ANTICOAG MGMT PT WARFARIN: CPT | Mod: ,,, | Performed by: PHARMACIST

## 2019-07-16 PROCEDURE — 93793 PR ANTICOAGULANT MGMT FOR PT TAKING WARFARIN: ICD-10-PCS | Mod: ,,, | Performed by: PHARMACIST

## 2019-07-16 NOTE — PROGRESS NOTES
Patient took a lower than prescribed dose of coumadin and INR dropped. The importance of compliance will be emphasized.

## 2019-07-22 ENCOUNTER — LAB VISIT (OUTPATIENT)
Dept: LAB | Facility: HOSPITAL | Age: 64
End: 2019-07-22
Attending: INTERNAL MEDICINE
Payer: COMMERCIAL

## 2019-07-22 DIAGNOSIS — I82.401 DVT, LOWER EXTREMITY, RECURRENT, RIGHT: ICD-10-CM

## 2019-07-22 DIAGNOSIS — Z86.718 HISTORY OF DEEP VEIN THROMBOSIS (DVT) OF LOWER EXTREMITY: ICD-10-CM

## 2019-07-22 DIAGNOSIS — R76.0 LUPUS ANTICOAGULANT POSITIVE: ICD-10-CM

## 2019-07-22 DIAGNOSIS — D68.52 PROTHROMBIN GENE MUTATION: ICD-10-CM

## 2019-07-22 LAB
INR PPP: 2.8 (ref 0.8–1.2)
PROTHROMBIN TIME: 29.5 SEC (ref 9–12.5)

## 2019-07-22 PROCEDURE — 36415 COLL VENOUS BLD VENIPUNCTURE: CPT

## 2019-07-22 PROCEDURE — 85610 PROTHROMBIN TIME: CPT

## 2019-07-23 ENCOUNTER — ANTI-COAG VISIT (OUTPATIENT)
Dept: CARDIOLOGY | Facility: CLINIC | Age: 64
End: 2019-07-23
Payer: MEDICAID

## 2019-07-23 DIAGNOSIS — R76.0 LUPUS ANTICOAGULANT POSITIVE: ICD-10-CM

## 2019-07-23 DIAGNOSIS — I82.401 DVT, LOWER EXTREMITY, RECURRENT, RIGHT: ICD-10-CM

## 2019-07-23 DIAGNOSIS — Z86.718 HISTORY OF DEEP VEIN THROMBOSIS (DVT) OF LOWER EXTREMITY: ICD-10-CM

## 2019-07-23 DIAGNOSIS — D68.52 PROTHROMBIN GENE MUTATION: ICD-10-CM

## 2019-07-23 PROCEDURE — 93793 ANTICOAG MGMT PT WARFARIN: CPT | Mod: ,,, | Performed by: PHARMACIST

## 2019-07-23 PROCEDURE — 93793 PR ANTICOAGULANT MGMT FOR PT TAKING WARFARIN: ICD-10-PCS | Mod: ,,, | Performed by: PHARMACIST

## 2019-07-25 ENCOUNTER — ANTI-COAG VISIT (OUTPATIENT)
Dept: CARDIOLOGY | Facility: CLINIC | Age: 64
End: 2019-07-25
Payer: COMMERCIAL

## 2019-07-25 ENCOUNTER — TELEPHONE (OUTPATIENT)
Dept: FAMILY MEDICINE | Facility: CLINIC | Age: 64
End: 2019-07-25

## 2019-07-25 ENCOUNTER — LAB VISIT (OUTPATIENT)
Dept: LAB | Facility: HOSPITAL | Age: 64
End: 2019-07-25
Attending: INTERNAL MEDICINE
Payer: COMMERCIAL

## 2019-07-25 DIAGNOSIS — Z86.718 HISTORY OF DEEP VEIN THROMBOSIS (DVT) OF LOWER EXTREMITY: ICD-10-CM

## 2019-07-25 DIAGNOSIS — D68.52 PROTHROMBIN GENE MUTATION: ICD-10-CM

## 2019-07-25 DIAGNOSIS — I82.401 DVT, LOWER EXTREMITY, RECURRENT, RIGHT: ICD-10-CM

## 2019-07-25 DIAGNOSIS — R76.0 LUPUS ANTICOAGULANT POSITIVE: ICD-10-CM

## 2019-07-25 DIAGNOSIS — R39.15 URINARY URGENCY: ICD-10-CM

## 2019-07-25 LAB
INR PPP: 2.1 (ref 0.8–1.2)
PROTHROMBIN TIME: 21.8 SEC (ref 9–12.5)

## 2019-07-25 PROCEDURE — 93793 ANTICOAG MGMT PT WARFARIN: CPT | Mod: S$GLB,,, | Performed by: PHARMACIST

## 2019-07-25 PROCEDURE — 85610 PROTHROMBIN TIME: CPT

## 2019-07-25 PROCEDURE — 36415 COLL VENOUS BLD VENIPUNCTURE: CPT

## 2019-07-25 PROCEDURE — 93793 PR ANTICOAGULANT MGMT FOR PT TAKING WARFARIN: ICD-10-PCS | Mod: S$GLB,,, | Performed by: PHARMACIST

## 2019-07-25 RX ORDER — TAMSULOSIN HYDROCHLORIDE 0.4 MG/1
0.4 CAPSULE ORAL DAILY
Qty: 30 CAPSULE | Refills: 11 | Status: SHIPPED | OUTPATIENT
Start: 2019-07-25 | End: 2019-08-22 | Stop reason: SDUPTHER

## 2019-07-25 NOTE — TELEPHONE ENCOUNTER
----- Message from Yajaira Vázquez sent at 7/25/2019 12:55 PM CDT -----  Contact: PATSY MARTINEZ [[id number]]  Name of Who is Calling: PATSY MARTINEZ [[id number]]       What is the request in detail: Patient is requesting a call from staff he states he missed his appointment and would like to reschedule sooner than the available date ( 09-09-19 ) he states his prostate is getting worse and he can't wait that long to see the doctor .....Please contact to further discuss and advise.     Can the clinic reply by [facility]: no     What Number to Call Back if not in [facility]:[phone]

## 2019-07-29 ENCOUNTER — ANTI-COAG VISIT (OUTPATIENT)
Dept: CARDIOLOGY | Facility: CLINIC | Age: 64
End: 2019-07-29
Payer: COMMERCIAL

## 2019-07-29 ENCOUNTER — LAB VISIT (OUTPATIENT)
Dept: LAB | Facility: HOSPITAL | Age: 64
End: 2019-07-29
Attending: INTERNAL MEDICINE
Payer: COMMERCIAL

## 2019-07-29 DIAGNOSIS — R76.0 LUPUS ANTICOAGULANT POSITIVE: ICD-10-CM

## 2019-07-29 DIAGNOSIS — I82.401 DVT, LOWER EXTREMITY, RECURRENT, RIGHT: ICD-10-CM

## 2019-07-29 DIAGNOSIS — D68.52 PROTHROMBIN GENE MUTATION: ICD-10-CM

## 2019-07-29 DIAGNOSIS — Z86.718 HISTORY OF DEEP VEIN THROMBOSIS (DVT) OF LOWER EXTREMITY: ICD-10-CM

## 2019-07-29 LAB
INR PPP: 3.1 (ref 0.8–1.2)
PROTHROMBIN TIME: 32.1 SEC (ref 9–12.5)

## 2019-07-29 PROCEDURE — 85610 PROTHROMBIN TIME: CPT

## 2019-07-29 PROCEDURE — 93793 ANTICOAG MGMT PT WARFARIN: CPT | Mod: S$GLB,,, | Performed by: PHARMACIST

## 2019-07-29 PROCEDURE — 36415 COLL VENOUS BLD VENIPUNCTURE: CPT

## 2019-07-29 PROCEDURE — 93793 PR ANTICOAGULANT MGMT FOR PT TAKING WARFARIN: ICD-10-PCS | Mod: S$GLB,,, | Performed by: PHARMACIST

## 2019-07-31 ENCOUNTER — OFFICE VISIT (OUTPATIENT)
Dept: FAMILY MEDICINE | Facility: CLINIC | Age: 64
End: 2019-07-31
Payer: COMMERCIAL

## 2019-07-31 VITALS
HEART RATE: 55 BPM | SYSTOLIC BLOOD PRESSURE: 124 MMHG | BODY MASS INDEX: 25.37 KG/M2 | HEIGHT: 71 IN | RESPIRATION RATE: 17 BRPM | TEMPERATURE: 98 F | DIASTOLIC BLOOD PRESSURE: 75 MMHG | WEIGHT: 181.19 LBS | OXYGEN SATURATION: 98 %

## 2019-07-31 DIAGNOSIS — Z00.00 VISIT FOR WELL MAN HEALTH CHECK: Primary | ICD-10-CM

## 2019-07-31 DIAGNOSIS — D68.52 PROTHROMBIN GENE MUTATION: ICD-10-CM

## 2019-07-31 DIAGNOSIS — I25.10 CORONARY ARTERY DISEASE INVOLVING NATIVE CORONARY ARTERY OF NATIVE HEART WITHOUT ANGINA PECTORIS: ICD-10-CM

## 2019-07-31 DIAGNOSIS — Z86.010 HX OF COLONIC POLYP: ICD-10-CM

## 2019-07-31 DIAGNOSIS — N39.41 URGE INCONTINENCE OF URINE: ICD-10-CM

## 2019-07-31 DIAGNOSIS — R76.0 LUPUS ANTICOAGULANT POSITIVE: ICD-10-CM

## 2019-07-31 DIAGNOSIS — Z72.0 TOBACCO ABUSE: ICD-10-CM

## 2019-07-31 PROBLEM — I82.4Z1 ACUTE DEEP VEIN THROMBOSIS (DVT) OF DISTAL VEIN OF RIGHT LOWER EXTREMITY: Status: RESOLVED | Noted: 2018-10-16 | Resolved: 2019-07-31

## 2019-07-31 PROBLEM — I82.4Z1 ACUTE DEEP VEIN THROMBOSIS (DVT) OF DISTAL END OF RIGHT LOWER EXTREMITY: Status: RESOLVED | Noted: 2018-08-22 | Resolved: 2019-07-31

## 2019-07-31 PROCEDURE — 3078F PR MOST RECENT DIASTOLIC BLOOD PRESSURE < 80 MM HG: ICD-10-PCS | Mod: CPTII,S$GLB,, | Performed by: FAMILY MEDICINE

## 2019-07-31 PROCEDURE — 99999 PR PBB SHADOW E&M-EST. PATIENT-LVL IV: CPT | Mod: PBBFAC,,, | Performed by: FAMILY MEDICINE

## 2019-07-31 PROCEDURE — 99396 PR PREVENTIVE VISIT,EST,40-64: ICD-10-PCS | Mod: S$GLB,,, | Performed by: FAMILY MEDICINE

## 2019-07-31 PROCEDURE — 3078F DIAST BP <80 MM HG: CPT | Mod: CPTII,S$GLB,, | Performed by: FAMILY MEDICINE

## 2019-07-31 PROCEDURE — 3074F PR MOST RECENT SYSTOLIC BLOOD PRESSURE < 130 MM HG: ICD-10-PCS | Mod: CPTII,S$GLB,, | Performed by: FAMILY MEDICINE

## 2019-07-31 PROCEDURE — 3074F SYST BP LT 130 MM HG: CPT | Mod: CPTII,S$GLB,, | Performed by: FAMILY MEDICINE

## 2019-07-31 PROCEDURE — 99396 PREV VISIT EST AGE 40-64: CPT | Mod: S$GLB,,, | Performed by: FAMILY MEDICINE

## 2019-07-31 PROCEDURE — 99999 PR PBB SHADOW E&M-EST. PATIENT-LVL IV: ICD-10-PCS | Mod: PBBFAC,,, | Performed by: FAMILY MEDICINE

## 2019-07-31 NOTE — LETTER
July 31, 2019      [facility]  [address]  [place] [address]  Phone: [phone]       Patient: Carlos A Ruff   YOB: 1955  Date of Visit: 07/31/2019    To Whom It May Concern:    Kim Ruff  was at [facility] on 07/31/2019. He may return to work on 08/01/2019 With no restrictions. If you have any questions or concerns, or if I can be of further assistance, please do not hesitate to contact me.    Sincerely,    Khalif Hassan MD

## 2019-08-02 ENCOUNTER — LAB VISIT (OUTPATIENT)
Dept: LAB | Facility: HOSPITAL | Age: 64
End: 2019-08-02
Attending: INTERNAL MEDICINE
Payer: COMMERCIAL

## 2019-08-02 DIAGNOSIS — R76.0 LUPUS ANTICOAGULANT POSITIVE: ICD-10-CM

## 2019-08-02 DIAGNOSIS — I82.401 DVT, LOWER EXTREMITY, RECURRENT, RIGHT: ICD-10-CM

## 2019-08-02 DIAGNOSIS — D68.52 PROTHROMBIN GENE MUTATION: ICD-10-CM

## 2019-08-02 DIAGNOSIS — Z86.718 HISTORY OF DEEP VEIN THROMBOSIS (DVT) OF LOWER EXTREMITY: ICD-10-CM

## 2019-08-02 LAB
INR PPP: 2.9 (ref 0.8–1.2)
PROTHROMBIN TIME: 30.8 SEC (ref 9–12.5)

## 2019-08-02 PROCEDURE — 85610 PROTHROMBIN TIME: CPT

## 2019-08-02 PROCEDURE — 36415 COLL VENOUS BLD VENIPUNCTURE: CPT

## 2019-08-05 ENCOUNTER — ANTI-COAG VISIT (OUTPATIENT)
Dept: CARDIOLOGY | Facility: CLINIC | Age: 64
End: 2019-08-05

## 2019-08-05 DIAGNOSIS — I82.401 DVT, LOWER EXTREMITY, RECURRENT, RIGHT: ICD-10-CM

## 2019-08-05 DIAGNOSIS — D68.52 PROTHROMBIN GENE MUTATION: ICD-10-CM

## 2019-08-05 DIAGNOSIS — Z86.718 HISTORY OF DEEP VEIN THROMBOSIS (DVT) OF LOWER EXTREMITY: ICD-10-CM

## 2019-08-05 DIAGNOSIS — R76.0 LUPUS ANTICOAGULANT POSITIVE: ICD-10-CM

## 2019-08-05 NOTE — PROGRESS NOTES
Chief Complaint :   DVT    Hx of Present illness :  Patient is a 64 y.o. year old male who presents to the clinic today for   Oncology  Followup. Documented to have right lower extremity DVT in  August,2018; documented to have positive lupus anticoagulant and Heterozygous . Prothrombin genotype,    Had DVT involving lower extremity about ten years ago. Took Blood thinner for about a year. Prior to onset of DVT in august 2018, had travelled by car to [place], after his evaluation in September 2018, was advised to have labs repeated in 12 weeks. Did not come for followup because of job issues.   April 2019 documented to have another DVT  In same leg while on anticoagulation.      Allergies :    Review of patient's allergies indicates:  No Known Allergies    Occupation :   [employer]. Retired. Has worked at [employer].    Transfusion :  None.     Menstrual & obstetric Hx :  N/A      Present Meds :   Medication List with Changes/Refills   Current Medications    ASPIRIN (ECOTRIN) 81 MG EC TABLET    Take 81 mg by mouth once daily.     ATORVASTATIN (LIPITOR) 40 MG TABLET    Take 40 mg by mouth once daily.     ENOXAPARIN (LOVENOX) 80 MG/0.8 ML SYRG    Dose is 80mg every 12 hours subcutaneously. Take as directed by [facility]    ERGOCALCIFEROL (VITAMIN D2) 50,000 UNIT CAP    Take 50,000 Units by mouth every 7 days.    ISOSORBIDE MONONITRATE (IMDUR) 30 MG 24 HR TABLET    TAKE 1 TABLET BY MOUTH ONCE DAILY    LOSARTAN-HYDROCHLOROTHIAZIDE 50-12.5 MG (HYZAAR) 50-12.5 MG PER TABLET    Take 1 tablet by mouth once daily.    NITROGLYCERIN (NITROSTAT) 0.4 MG SL TABLET    Place 0.4 mg under the tongue every 5 (five) minutes as needed for Chest pain.    TAMSULOSIN (FLOMAX) 0.4 MG CAP    Take 1 capsule (0.4 mg total) by mouth once daily.    WARFARIN (COUMADIN) 10 MG TABLET    Take 1 tablet (10 mg total) by mouth Daily.       Past Medical Hx :  DVT; Hyperlipidemia, Hypertension. Coronary artery disease. Long term  Anticoagulant  therapy.    Past Medical Hx :  Past Medical History:   Diagnosis Date    Anticoagulant long-term use     Colon polyps     Coronary artery disease     Hypercholesteremia     Hypertension     MI (myocardial infarction)        Travel Hx :   N/A    Immunization :  Immunization History   Administered Date(s) Administered    Influenza - Quadrivalent - PF 11/29/2017, 12/19/2018    Pneumococcal Polysaccharide - 23 Valent 12/19/2018       Family Hx :  No family history on file.    Social Hx :  Social History     Socioeconomic History    Marital status: Single     Spouse name: Not on file    Number of children: Not on file    Years of education: Not on file    Highest education level: Not on file   Occupational History    Not on file   Social Needs    Financial resource strain: Not on file    Food insecurity:     Worry: Not on file     Inability: Not on file    Transportation needs:     Medical: Not on file     Non-medical: Not on file   Tobacco Use    Smoking status: Current Some Day Smoker     Packs/day: 0.50     Types: Cigarettes    Smokeless tobacco: Never Used   Substance and Sexual Activity    Alcohol use: Yes     Comment: occasionally     Drug use: No    Sexual activity: Not Currently   Lifestyle    Physical activity:     Days per week: Not on file     Minutes per session: Not on file    Stress: Not on file   Relationships    Social connections:     Talks on phone: Not on file     Gets together: Not on file     Attends Shinto service: Not on file     Active member of club or organization: Not on file     Attends meetings of clubs or organizations: Not on file     Relationship status: Not on file   Other Topics Concern    Not on file   Social History Narrative    Not on file       Surgery :   Coronary artery stent.   Has not had colonoscopy    Symptoms :    Review of Systems   Constitutional: Negative for chills, diaphoresis, fever, malaise/fatigue and weight loss.   HENT: Negative for  congestion, hearing loss, sore throat and tinnitus.    Eyes: Negative for blurred vision, double vision and photophobia.        Wears glasses   Respiratory: Negative for cough, hemoptysis and shortness of breath.    Cardiovascular: Negative for chest pain, palpitations, claudication and leg swelling.   Gastrointestinal: Negative for abdominal pain, blood in stool, constipation, diarrhea, heartburn, nausea and vomiting.   Genitourinary: Positive for frequency. Negative for dysuria, flank pain, hematuria and urgency.        BPH   Musculoskeletal: Positive for back pain. Negative for falls, joint pain, myalgias and neck pain.   Skin: Negative for itching and rash.   Neurological: Positive for headaches. Negative for dizziness, tingling, tremors, sensory change and speech change.   Endo/Heme/Allergies: Negative for environmental allergies and polydipsia. Does not bruise/bleed easily.   Psychiatric/Behavioral: Negative for depression, hallucinations and memory loss. The patient is not nervous/anxious and does not have insomnia.        Physical Exam :   Physical Exam   Constitutional: He is oriented to person, place, and time and well-developed, well-nourished, and in no distress. Vital signs are normal. No distress.   HENT:   Head: Normocephalic and atraumatic.   Right Ear: External ear normal.   Left Ear: External ear normal.   Nose: Nose normal.   Mouth/Throat: Oropharynx is clear and moist. No oropharyngeal exudate.   Eyes: Pupils are equal, round, and reactive to light. Conjunctivae, EOM and lids are normal. Lids are everted and swept, no foreign bodies found. Right eye exhibits no discharge. Left eye exhibits no discharge. No scleral icterus.   Neck: Trachea normal, normal range of motion and full passive range of motion without pain. Neck supple. Normal carotid pulses, no hepatojugular reflux and no JVD present. No tracheal tenderness present. Carotid bruit is not present. No tracheal deviation present. No thyroid  mass and no thyromegaly present.   Cardiovascular: Normal rate, regular rhythm, normal heart sounds, intact distal pulses and normal pulses. PMI is not displaced. Exam reveals no gallop and no friction rub.   No murmur heard.  Pulmonary/Chest: Effort normal and breath sounds normal. No stridor. No apnea. No respiratory distress. He has no wheezes. He has no rales. He exhibits no tenderness.   Abdominal: Soft. Normal appearance, normal aorta and bowel sounds are normal. He exhibits no distension. There is no hepatosplenomegaly. There is no tenderness. There is no rebound, no guarding and no CVA tenderness. No hernia.   Genitourinary:   Genitourinary Comments: Not examined   Musculoskeletal: Normal range of motion. He exhibits no edema, tenderness or deformity.   Fullness right lower extremity. aidan's negative   Lymphadenopathy:        Head (right side): No submental, no submandibular, no tonsillar, no preauricular, no posterior auricular and no occipital adenopathy present.        Head (left side): No submental, no submandibular, no tonsillar, no preauricular, no posterior auricular and no occipital adenopathy present.     He has no cervical adenopathy.     He has no axillary adenopathy.        Right: No inguinal, no supraclavicular and no epitrochlear adenopathy present.        Left: No inguinal, no supraclavicular and no epitrochlear adenopathy present.   Neurological: He is alert and oriented to person, place, and time. He has normal motor skills, normal sensation, normal strength, normal reflexes and intact cranial nerves. No cranial nerve deficit. He exhibits normal muscle tone. Gait normal. Coordination normal. GCS score is 15.   Skin: Skin is warm, dry and intact. No rash noted. He is not diaphoretic. No cyanosis or erythema. No pallor. Nails show no clubbing.   Psychiatric: Memory, affect and judgment normal.   Nursing note and vitals reviewed.        Labs & Imaging :  08/20/18 : U/S Lower extremity : Near  Occlusive thrombus  Right mid to dital Femoral vein, popliteal vein, anterior & posterior tibial veins and and peroneal veins.  09/10/18 b: Lupus anticoagulant positive; Hexagonal Phospholipid Neutralisation Negative. Leiden factor 5 normal Genotype. Prothrombin 2 L: heterozygous. Cardiolipin antibody and homocysteine normal. Pro  4/22/19 : U/S lower extremity ; evolving  ritght lower extremity thrombus, involving right femoral vein.   04/23/19 :  CT abdomen & pelvis. Distended bladder suggestive of bladder Neck obstruction  07/31/19 : CMP unremarkable. Hgb 13.7; hct 42.5; MCV 96; Platelets 281,000 ANC 3,700.    Dx :    Recurrent  DVT right lower extremity. Positive lupus anticoagulant. Prothrombin 2 Gene : Heterozygous.      Assessment & Plan:  Discussed with patient. This is  Third  episode involving same lower extremity.   Patient is on coumadin therapy. Will recheck U/S right Lower extremity in 3 months. . Recheck Lupus anti coagulant holding Coumadin for 10 days.  In  12 weeks. Re evaluate with results. Even if labs are normal, he probably be on lifelong anticoagulation. Patient understands and verbalise

## 2019-08-06 ENCOUNTER — OFFICE VISIT (OUTPATIENT)
Dept: HEMATOLOGY/ONCOLOGY | Facility: CLINIC | Age: 64
End: 2019-08-06
Payer: COMMERCIAL

## 2019-08-06 VITALS
DIASTOLIC BLOOD PRESSURE: 77 MMHG | TEMPERATURE: 99 F | BODY MASS INDEX: 27 KG/M2 | SYSTOLIC BLOOD PRESSURE: 135 MMHG | HEIGHT: 69 IN | WEIGHT: 182.31 LBS | HEART RATE: 65 BPM | OXYGEN SATURATION: 98 %

## 2019-08-06 DIAGNOSIS — R76.0 LUPUS ANTICOAGULANT POSITIVE: ICD-10-CM

## 2019-08-06 DIAGNOSIS — D68.52 PROTHROMBIN GENE MUTATION: ICD-10-CM

## 2019-08-06 DIAGNOSIS — I82.401 RECURRENT ACUTE DEEP VEIN THROMBOSIS (DVT) OF RIGHT LOWER EXTREMITY: Primary | ICD-10-CM

## 2019-08-06 PROCEDURE — 99999 PR PBB SHADOW E&M-EST. PATIENT-LVL III: CPT | Mod: PBBFAC,,, | Performed by: INTERNAL MEDICINE

## 2019-08-06 PROCEDURE — 3078F PR MOST RECENT DIASTOLIC BLOOD PRESSURE < 80 MM HG: ICD-10-PCS | Mod: CPTII,S$GLB,, | Performed by: INTERNAL MEDICINE

## 2019-08-06 PROCEDURE — 3008F BODY MASS INDEX DOCD: CPT | Mod: CPTII,S$GLB,, | Performed by: INTERNAL MEDICINE

## 2019-08-06 PROCEDURE — 99213 OFFICE O/P EST LOW 20 MIN: CPT | Mod: S$GLB,,, | Performed by: INTERNAL MEDICINE

## 2019-08-06 PROCEDURE — 3075F SYST BP GE 130 - 139MM HG: CPT | Mod: CPTII,S$GLB,, | Performed by: INTERNAL MEDICINE

## 2019-08-06 PROCEDURE — 3075F PR MOST RECENT SYSTOLIC BLOOD PRESS GE 130-139MM HG: ICD-10-PCS | Mod: CPTII,S$GLB,, | Performed by: INTERNAL MEDICINE

## 2019-08-06 PROCEDURE — 99999 PR PBB SHADOW E&M-EST. PATIENT-LVL III: ICD-10-PCS | Mod: PBBFAC,,, | Performed by: INTERNAL MEDICINE

## 2019-08-06 PROCEDURE — 99213 PR OFFICE/OUTPT VISIT, EST, LEVL III, 20-29 MIN: ICD-10-PCS | Mod: S$GLB,,, | Performed by: INTERNAL MEDICINE

## 2019-08-06 PROCEDURE — 3008F PR BODY MASS INDEX (BMI) DOCUMENTED: ICD-10-PCS | Mod: CPTII,S$GLB,, | Performed by: INTERNAL MEDICINE

## 2019-08-06 PROCEDURE — 3078F DIAST BP <80 MM HG: CPT | Mod: CPTII,S$GLB,, | Performed by: INTERNAL MEDICINE

## 2019-08-06 NOTE — Clinical Note
U/s about a week before next visit.  Hold coumadin for ten days, get labs and resume coumadin immediately. Get labs drawn ten days before visit.

## 2019-08-08 ENCOUNTER — LAB VISIT (OUTPATIENT)
Dept: LAB | Facility: HOSPITAL | Age: 64
End: 2019-08-08
Attending: INTERNAL MEDICINE
Payer: COMMERCIAL

## 2019-08-08 ENCOUNTER — ANTI-COAG VISIT (OUTPATIENT)
Dept: CARDIOLOGY | Facility: CLINIC | Age: 64
End: 2019-08-08
Payer: COMMERCIAL

## 2019-08-08 ENCOUNTER — DOCUMENTATION ONLY (OUTPATIENT)
Dept: CARDIOLOGY | Facility: HOSPITAL | Age: 64
End: 2019-08-08

## 2019-08-08 DIAGNOSIS — Z86.718 HISTORY OF DEEP VEIN THROMBOSIS (DVT) OF LOWER EXTREMITY: ICD-10-CM

## 2019-08-08 DIAGNOSIS — R76.0 LUPUS ANTICOAGULANT POSITIVE: ICD-10-CM

## 2019-08-08 DIAGNOSIS — I82.401 DVT, LOWER EXTREMITY, RECURRENT, RIGHT: ICD-10-CM

## 2019-08-08 DIAGNOSIS — D68.52 PROTHROMBIN GENE MUTATION: ICD-10-CM

## 2019-08-08 LAB
INR PPP: 9.7 (ref 0.8–1.2)
PROTHROMBIN TIME: >100 SEC (ref 9–12.5)

## 2019-08-08 PROCEDURE — 93793 PR ANTICOAGULANT MGMT FOR PT TAKING WARFARIN: ICD-10-PCS | Mod: S$GLB,,,

## 2019-08-08 PROCEDURE — 85610 PROTHROMBIN TIME: CPT

## 2019-08-08 PROCEDURE — 93793 ANTICOAG MGMT PT WARFARIN: CPT | Mod: S$GLB,,,

## 2019-08-08 PROCEDURE — 36415 COLL VENOUS BLD VENIPUNCTURE: CPT

## 2019-08-08 NOTE — PROGRESS NOTES
Patient reports he already took coumadin this am -10mg, advised patient to have dark greens tonight and get lab early tomorrow am and also not to take coumadin tomorrow am and to switch to taking coumadin to the evening, also advised to be careful around sharp objects and if any bleeding occurs that will not stop to go to ER, verbalized understanding, appointment booked

## 2019-08-08 NOTE — PROGRESS NOTES
INR >9.    Unable to reach pt via cell or home phone numbers.  LM on VM to call office and if any bleeding issues to go to ER.  I did reach the pt's daughter (Bonnie Ruff [phone]) to get in touch with her dad and to go to ER if any bleeding.  Otherwise, he should contact coumadin clinic in am.

## 2019-08-09 ENCOUNTER — ANTI-COAG VISIT (OUTPATIENT)
Dept: CARDIOLOGY | Facility: CLINIC | Age: 64
End: 2019-08-09
Payer: COMMERCIAL

## 2019-08-09 ENCOUNTER — LAB VISIT (OUTPATIENT)
Dept: LAB | Facility: HOSPITAL | Age: 64
End: 2019-08-09
Attending: INTERNAL MEDICINE
Payer: COMMERCIAL

## 2019-08-09 DIAGNOSIS — I82.401 DVT, LOWER EXTREMITY, RECURRENT, RIGHT: ICD-10-CM

## 2019-08-09 DIAGNOSIS — Z86.718 HISTORY OF DEEP VEIN THROMBOSIS (DVT) OF LOWER EXTREMITY: ICD-10-CM

## 2019-08-09 DIAGNOSIS — D68.52 PROTHROMBIN GENE MUTATION: ICD-10-CM

## 2019-08-09 DIAGNOSIS — R76.0 LUPUS ANTICOAGULANT POSITIVE: ICD-10-CM

## 2019-08-09 LAB
INR PPP: 7.5 (ref 0.8–1.2)
PROTHROMBIN TIME: 79.1 SEC (ref 9–12.5)

## 2019-08-09 PROCEDURE — 85610 PROTHROMBIN TIME: CPT

## 2019-08-09 PROCEDURE — 93793 ANTICOAG MGMT PT WARFARIN: CPT | Mod: S$GLB,,, | Performed by: PHARMACIST

## 2019-08-09 PROCEDURE — 93793 PR ANTICOAGULANT MGMT FOR PT TAKING WARFARIN: ICD-10-PCS | Mod: S$GLB,,, | Performed by: PHARMACIST

## 2019-08-09 PROCEDURE — 36415 COLL VENOUS BLD VENIPUNCTURE: CPT

## 2019-08-09 NOTE — PROGRESS NOTES
Kate with [facility]  called with critical INR 7.5.  I routed to Pradip Pérez MA to question patient then advise per Pharm D orders.  I notified JESSICA Fish Pharm D of critical result.

## 2019-08-09 NOTE — PROGRESS NOTES
Patient confirmed holding dose on THURS;    Had a large serving of spinach THURS;       NO other changes (diarrhea, bleeding, new meds, OTC meds, alcohol, extra doses, etc)  INR is improving but still very high. We will hold coumadin again then have patient restart at a lower coumadin dose with close INR monitoring.

## 2019-08-12 ENCOUNTER — LAB VISIT (OUTPATIENT)
Dept: LAB | Facility: HOSPITAL | Age: 64
End: 2019-08-12
Attending: INTERNAL MEDICINE
Payer: COMMERCIAL

## 2019-08-12 DIAGNOSIS — R76.0 LUPUS ANTICOAGULANT POSITIVE: ICD-10-CM

## 2019-08-12 DIAGNOSIS — I82.401 DVT, LOWER EXTREMITY, RECURRENT, RIGHT: ICD-10-CM

## 2019-08-12 DIAGNOSIS — D68.52 PROTHROMBIN GENE MUTATION: ICD-10-CM

## 2019-08-12 DIAGNOSIS — Z86.718 HISTORY OF DEEP VEIN THROMBOSIS (DVT) OF LOWER EXTREMITY: ICD-10-CM

## 2019-08-12 LAB
INR PPP: 1.4 (ref 0.8–1.2)
PROTHROMBIN TIME: 14.5 SEC (ref 9–12.5)

## 2019-08-12 PROCEDURE — 85610 PROTHROMBIN TIME: CPT

## 2019-08-12 PROCEDURE — 36415 COLL VENOUS BLD VENIPUNCTURE: CPT

## 2019-08-13 ENCOUNTER — ANTI-COAG VISIT (OUTPATIENT)
Dept: CARDIOLOGY | Facility: CLINIC | Age: 64
End: 2019-08-13
Payer: COMMERCIAL

## 2019-08-13 DIAGNOSIS — R76.0 LUPUS ANTICOAGULANT POSITIVE: ICD-10-CM

## 2019-08-13 DIAGNOSIS — D68.52 PROTHROMBIN GENE MUTATION: ICD-10-CM

## 2019-08-13 DIAGNOSIS — Z86.718 HISTORY OF DEEP VEIN THROMBOSIS (DVT) OF LOWER EXTREMITY: ICD-10-CM

## 2019-08-13 DIAGNOSIS — I82.401 DVT, LOWER EXTREMITY, RECURRENT, RIGHT: ICD-10-CM

## 2019-08-13 PROCEDURE — 93793 ANTICOAG MGMT PT WARFARIN: CPT | Mod: S$GLB,,, | Performed by: PHARMACIST

## 2019-08-13 PROCEDURE — 93793 PR ANTICOAGULANT MGMT FOR PT TAKING WARFARIN: ICD-10-PCS | Mod: S$GLB,,, | Performed by: PHARMACIST

## 2019-08-13 NOTE — PROGRESS NOTES
Pt did not resume correct coumadin dose on Sunday as prescribed. He missed Sunday dose. He resumed coumadin Monday 8/12 (day this INR was drawn), Ate Broccoli, turnip green & spinach FRI-MON   No OTHER CHANGES

## 2019-08-21 ENCOUNTER — TELEPHONE (OUTPATIENT)
Dept: FAMILY MEDICINE | Facility: CLINIC | Age: 64
End: 2019-08-21

## 2019-08-21 ENCOUNTER — ANTI-COAG VISIT (OUTPATIENT)
Dept: CARDIOLOGY | Facility: CLINIC | Age: 64
End: 2019-08-21
Payer: COMMERCIAL

## 2019-08-21 ENCOUNTER — PATIENT OUTREACH (OUTPATIENT)
Dept: ADMINISTRATIVE | Facility: OTHER | Age: 64
End: 2019-08-21

## 2019-08-21 ENCOUNTER — LAB VISIT (OUTPATIENT)
Dept: LAB | Facility: HOSPITAL | Age: 64
End: 2019-08-21
Attending: INTERNAL MEDICINE
Payer: COMMERCIAL

## 2019-08-21 DIAGNOSIS — Z86.718 HISTORY OF DEEP VEIN THROMBOSIS (DVT) OF LOWER EXTREMITY: ICD-10-CM

## 2019-08-21 DIAGNOSIS — R76.0 LUPUS ANTICOAGULANT POSITIVE: ICD-10-CM

## 2019-08-21 DIAGNOSIS — D68.52 PROTHROMBIN GENE MUTATION: ICD-10-CM

## 2019-08-21 DIAGNOSIS — I82.401 DVT, LOWER EXTREMITY, RECURRENT, RIGHT: ICD-10-CM

## 2019-08-21 LAB
INR PPP: 1 (ref 0.8–1.2)
PROTHROMBIN TIME: 10.4 SEC (ref 9–12.5)

## 2019-08-21 PROCEDURE — 93793 PR ANTICOAGULANT MGMT FOR PT TAKING WARFARIN: ICD-10-PCS | Mod: S$GLB,,,

## 2019-08-21 PROCEDURE — 85610 PROTHROMBIN TIME: CPT

## 2019-08-21 PROCEDURE — 93793 ANTICOAG MGMT PT WARFARIN: CPT | Mod: S$GLB,,,

## 2019-08-21 PROCEDURE — 36415 COLL VENOUS BLD VENIPUNCTURE: CPT

## 2019-08-21 NOTE — PROGRESS NOTES
INR not at goal. Medications, chart, and patient findings reviewed. See calendar for adjustments to dose and follow up plan.  Pt findings: Pt confirmed correct dose; however, missed THURS, FRI & SAT dose.  He denies any other changes.  Will instruct pt to increase this week's dose per calendar, avoid missing doses, and maintain a consistent diet.  Plan to re-assess in 1 weeks.  Pt advised & indicated understanding.

## 2019-08-21 NOTE — TELEPHONE ENCOUNTER
----- Message from Khalif Hassan MD sent at 8/21/2019  7:47 AM CDT -----  Please let patient know that labs look good.  No changes to medication regimen needed    Thanks  Dr. Hassan

## 2019-08-22 ENCOUNTER — OFFICE VISIT (OUTPATIENT)
Dept: UROLOGY | Facility: CLINIC | Age: 64
End: 2019-08-22
Payer: COMMERCIAL

## 2019-08-22 VITALS
HEIGHT: 71 IN | SYSTOLIC BLOOD PRESSURE: 130 MMHG | DIASTOLIC BLOOD PRESSURE: 80 MMHG | BODY MASS INDEX: 25.52 KG/M2 | WEIGHT: 182.31 LBS

## 2019-08-22 DIAGNOSIS — R33.9 URINARY RETENTION: ICD-10-CM

## 2019-08-22 DIAGNOSIS — N13.8 BPH WITH OBSTRUCTION/LOWER URINARY TRACT SYMPTOMS: ICD-10-CM

## 2019-08-22 DIAGNOSIS — R33.9 INCOMPLETE BLADDER EMPTYING: ICD-10-CM

## 2019-08-22 DIAGNOSIS — N40.1 BPH WITH OBSTRUCTION/LOWER URINARY TRACT SYMPTOMS: ICD-10-CM

## 2019-08-22 DIAGNOSIS — N32.89 DISTENDED BLADDER: Primary | ICD-10-CM

## 2019-08-22 DIAGNOSIS — R35.0 URINARY FREQUENCY: ICD-10-CM

## 2019-08-22 PROCEDURE — 51798 PR MEAS,POST-VOID RES,US,NON-IMAGING: ICD-10-PCS | Mod: S$GLB,,, | Performed by: NURSE PRACTITIONER

## 2019-08-22 PROCEDURE — 99999 PR PBB SHADOW E&M-EST. PATIENT-LVL III: CPT | Mod: PBBFAC,,, | Performed by: NURSE PRACTITIONER

## 2019-08-22 PROCEDURE — 3075F SYST BP GE 130 - 139MM HG: CPT | Mod: CPTII,S$GLB,, | Performed by: NURSE PRACTITIONER

## 2019-08-22 PROCEDURE — 99214 PR OFFICE/OUTPT VISIT, EST, LEVL IV, 30-39 MIN: ICD-10-PCS | Mod: S$GLB,,, | Performed by: NURSE PRACTITIONER

## 2019-08-22 PROCEDURE — 99999 PR PBB SHADOW E&M-EST. PATIENT-LVL III: ICD-10-PCS | Mod: PBBFAC,,, | Performed by: NURSE PRACTITIONER

## 2019-08-22 PROCEDURE — 3075F PR MOST RECENT SYSTOLIC BLOOD PRESS GE 130-139MM HG: ICD-10-PCS | Mod: CPTII,S$GLB,, | Performed by: NURSE PRACTITIONER

## 2019-08-22 PROCEDURE — 3008F PR BODY MASS INDEX (BMI) DOCUMENTED: ICD-10-PCS | Mod: CPTII,S$GLB,, | Performed by: NURSE PRACTITIONER

## 2019-08-22 PROCEDURE — 3079F DIAST BP 80-89 MM HG: CPT | Mod: CPTII,S$GLB,, | Performed by: NURSE PRACTITIONER

## 2019-08-22 PROCEDURE — 87086 URINE CULTURE/COLONY COUNT: CPT

## 2019-08-22 PROCEDURE — 51798 US URINE CAPACITY MEASURE: CPT | Mod: S$GLB,,, | Performed by: NURSE PRACTITIONER

## 2019-08-22 PROCEDURE — 3079F PR MOST RECENT DIASTOLIC BLOOD PRESSURE 80-89 MM HG: ICD-10-PCS | Mod: CPTII,S$GLB,, | Performed by: NURSE PRACTITIONER

## 2019-08-22 PROCEDURE — 99214 OFFICE O/P EST MOD 30 MIN: CPT | Mod: S$GLB,,, | Performed by: NURSE PRACTITIONER

## 2019-08-22 PROCEDURE — 3008F BODY MASS INDEX DOCD: CPT | Mod: CPTII,S$GLB,, | Performed by: NURSE PRACTITIONER

## 2019-08-22 RX ORDER — CIPROFLOXACIN 250 MG/1
500 TABLET, FILM COATED ORAL
Status: CANCELLED | OUTPATIENT
Start: 2019-08-22

## 2019-08-22 RX ORDER — TAMSULOSIN HYDROCHLORIDE 0.4 MG/1
0.4 CAPSULE ORAL DAILY
Qty: 30 CAPSULE | Refills: 11 | Status: SHIPPED | OUTPATIENT
Start: 2019-08-22 | End: 2019-10-03 | Stop reason: SDUPTHER

## 2019-08-22 RX ORDER — FINASTERIDE 5 MG/1
5 TABLET, FILM COATED ORAL DAILY
Qty: 30 TABLET | Refills: 11 | Status: SHIPPED | OUTPATIENT
Start: 2019-08-22 | End: 2023-01-01 | Stop reason: SDUPTHER

## 2019-08-22 NOTE — H&P (VIEW-ONLY)
"Subjective:       Patient ID: Carlos A Ruff is a 64 y.o. male who is an established patient of Dr. Stiles though new to me was seen for hospital follow up and "urge incontinence of urine"    Chief Complaint:   Chief Complaint   Patient presents with    Benign Prostatic Hypertrophy     pt states bph an some urinary frequency pt also states he is on flomax an seems to be helping somewhat      He was recently admitted in 4/2019 with recurrent DVT. CT scan showed a very distended bladder. He was seen as an inpatient consultation at that time. During his admission he had a barnes in place. Barnes was later removed. Of note, patient with one elevated PVR after barnes removal    He was seen by Dr Arndt in 8/18 with urinary frequency, elevated PVR. He refused catheter or CIC teaching at that time. He was recommended for urodynamics but appears to not have followed up (reports he did not want to travel to [facility]). He is on Flomax. Denies significant voiding issues currently though does report medium to weak stream.     PSA 7/19- 0.96    CT - very distended bladder, bladder wall thickened, large prostate. No hydronephrosis    He is here today for follow up. He reports "pressure in bladder" with voiding. Also reports occasional straining, sensation LOREN and dribbling of urine at times. He denies UI. Denies dysuria, gross hematuria or flank pain. He reports compliance with Flomax    PVR (bladder scan) today - >368 ml (patient voided ~ 30 mins prior to office visit)    ACTIVE MEDICAL ISSUES:  Patient Active Problem List   Diagnosis    Coronary artery disease involving native coronary artery of native heart without angina pectoris    History of deep vein thrombosis (DVT) of lower extremity    Mass of left foot    Tobacco abuse    Elevated troponin    Chest pain, non-cardiac    DVT, lower extremity, recurrent, right    Prothrombin gene mutation    Lupus anticoagulant positive    Foot swelling    Failure of outpatient " treatment    Distended bladder    Bladder outlet obstruction    Bladder wall thickening    Hx of colonic polyp       ALLERGIES AND MEDICATIONS: updated and reviewed.  Review of patient's allergies indicates:  No Known Allergies  Current Outpatient Medications   Medication Sig    atorvastatin (LIPITOR) 40 MG tablet Take 40 mg by mouth once daily.     ergocalciferol (VITAMIN D2) 50,000 unit Cap Take 50,000 Units by mouth every 7 days.    isosorbide mononitrate (IMDUR) 30 MG 24 hr tablet TAKE 1 TABLET BY MOUTH ONCE DAILY    losartan-hydrochlorothiazide 50-12.5 mg (HYZAAR) 50-12.5 mg per tablet Take 1 tablet by mouth once daily.    nitroGLYCERIN (NITROSTAT) 0.4 MG SL tablet Place 0.4 mg under the tongue every 5 (five) minutes as needed for Chest pain.    tamsulosin (FLOMAX) 0.4 mg Cap Take 1 capsule (0.4 mg total) by mouth once daily.    warfarin (COUMADIN) 10 MG tablet Take 1 tablet (10 mg total) by mouth Daily.    finasteride (PROSCAR) 5 mg tablet Take 1 tablet (5 mg total) by mouth once daily.     No current facility-administered medications for this visit.        Review of Systems   Constitutional: Negative for activity change, chills, fatigue, fever and unexpected weight change.   Eyes: Negative for discharge, redness and visual disturbance.   Respiratory: Negative for cough, shortness of breath and wheezing.    Cardiovascular: Negative for chest pain and leg swelling.   Gastrointestinal: Negative for abdominal distention, abdominal pain, constipation, diarrhea, nausea and vomiting.   Genitourinary: Positive for difficulty urinating and frequency. Negative for discharge, dysuria, flank pain, hematuria, scrotal swelling, testicular pain and urgency.   Musculoskeletal: Negative for arthralgias, joint swelling and myalgias.   Skin: Negative for color change and rash.   Neurological: Negative for dizziness and light-headedness.   Psychiatric/Behavioral: Negative for behavioral problems and confusion. The  "patient is not nervous/anxious.        Objective:      Vitals:    08/22/19 1129   BP: 130/80   BP Location: Right arm   Patient Position: Sitting   BP Method: Large (Manual)   Weight: 82.7 kg (182 lb 5.1 oz)   Height: 5' 11" (1.803 m)     Physical Exam   Constitutional: He is oriented to person, place, and time. He appears well-developed.   HENT:   Head: Normocephalic and atraumatic.   Nose: Nose normal.   Eyes: Conjunctivae are normal. Right eye exhibits no discharge. Left eye exhibits no discharge.   Neck: Normal range of motion. Neck supple. No tracheal deviation present. No thyromegaly present.   Cardiovascular: Normal rate and regular rhythm.    Pulmonary/Chest: Effort normal. No respiratory distress. He has no wheezes.   Abdominal: Soft. He exhibits no distension. There is no hepatosplenomegaly. There is no tenderness. There is no CVA tenderness. No hernia.   Genitourinary:   Genitourinary Comments: Patient declined exam   Musculoskeletal: Normal range of motion. He exhibits no edema.   Neurological: He is alert and oriented to person, place, and time.   Skin: Skin is warm and dry. No rash noted. No erythema.     Psychiatric: He has a normal mood and affect. His behavior is normal. Judgment normal.       Urine dipstick shows patient unable to produce specimen.      Assessment:       1. Distended bladder    2. BPH with obstruction/lower urinary tract symptoms    3. Urinary frequency    4. Incomplete bladder emptying          Plan:       1. Distended bladder  -Suspect related to bladder outlet obstruction  -Plan for cysto with urodynamics on Friday 8/30/19 with Dr. Stiles  - POCT Bladder Scan  - POCT urinalysis, dipstick or tablet reag    2. BPH with obstruction/lower urinary tract symptoms  - tamsulosin (FLOMAX) 0.4 mg Cap; Take 1 capsule (0.4 mg total) by mouth once daily.  Dispense: 30 capsule; Refill: 11  - finasteride (PROSCAR) 5 mg tablet; Take 1 tablet (5 mg total) by mouth once daily.  Dispense: 30 " tablet; Refill: 11    3. Urinary frequency  -Suspect related to LOREN  - POCT Bladder Scan  - Urine culture today    4. Incomplete bladder emptying  -Elevated PVR today  -Declines CIC or placement of indwelling barnes catheter. Patient cautioned concerning symptoms of urinary retention. Instructed to rtc or ER if symptoms develop  -Plan for cysto with urodynamics on Friday 8/30/19 with Dr. Stiles  - tamsulosin (FLOMAX) 0.4 mg Cap; Take 1 capsule (0.4 mg total) by mouth once daily.  Dispense: 30 capsule; Refill: 11          Follow up in about 3 weeks (around 9/12/2019) for Follow up.

## 2019-08-22 NOTE — H&P
"Subjective:       Patient ID: Carlos A Ruff is a 64 y.o. male who is an established patient of Dr. Stiles though new to me was seen for hospital follow up and "urge incontinence of urine"    Chief Complaint:   Chief Complaint   Patient presents with    Benign Prostatic Hypertrophy     pt states bph an some urinary frequency pt also states he is on flomax an seems to be helping somewhat      He was recently admitted in 4/2019 with recurrent DVT. CT scan showed a very distended bladder. He was seen as an inpatient consultation at that time. During his admission he had a barnes in place. Barnes was later removed. Of note, patient with one elevated PVR after barnes removal    He was seen by Dr Arndt in 8/18 with urinary frequency, elevated PVR. He refused catheter or CIC teaching at that time. He was recommended for urodynamics but appears to not have followed up (reports he did not want to travel to [facility]). He is on Flomax. Denies significant voiding issues currently though does report medium to weak stream.     PSA 7/19- 0.96    CT - very distended bladder, bladder wall thickened, large prostate. No hydronephrosis    He is here today for follow up. He reports "pressure in bladder" with voiding. Also reports occasional straining, sensation LOREN and dribbling of urine at times. He denies UI. Denies dysuria, gross hematuria or flank pain. He reports compliance with Flomax    PVR (bladder scan) today - >368 ml (patient voided ~ 30 mins prior to office visit)    ACTIVE MEDICAL ISSUES:  Patient Active Problem List   Diagnosis    Coronary artery disease involving native coronary artery of native heart without angina pectoris    History of deep vein thrombosis (DVT) of lower extremity    Mass of left foot    Tobacco abuse    Elevated troponin    Chest pain, non-cardiac    DVT, lower extremity, recurrent, right    Prothrombin gene mutation    Lupus anticoagulant positive    Foot swelling    Failure of outpatient " treatment    Distended bladder    Bladder outlet obstruction    Bladder wall thickening    Hx of colonic polyp       ALLERGIES AND MEDICATIONS: updated and reviewed.  Review of patient's allergies indicates:  No Known Allergies  Current Outpatient Medications   Medication Sig    atorvastatin (LIPITOR) 40 MG tablet Take 40 mg by mouth once daily.     ergocalciferol (VITAMIN D2) 50,000 unit Cap Take 50,000 Units by mouth every 7 days.    isosorbide mononitrate (IMDUR) 30 MG 24 hr tablet TAKE 1 TABLET BY MOUTH ONCE DAILY    losartan-hydrochlorothiazide 50-12.5 mg (HYZAAR) 50-12.5 mg per tablet Take 1 tablet by mouth once daily.    nitroGLYCERIN (NITROSTAT) 0.4 MG SL tablet Place 0.4 mg under the tongue every 5 (five) minutes as needed for Chest pain.    tamsulosin (FLOMAX) 0.4 mg Cap Take 1 capsule (0.4 mg total) by mouth once daily.    warfarin (COUMADIN) 10 MG tablet Take 1 tablet (10 mg total) by mouth Daily.    finasteride (PROSCAR) 5 mg tablet Take 1 tablet (5 mg total) by mouth once daily.     No current facility-administered medications for this visit.        Review of Systems   Constitutional: Negative for activity change, chills, fatigue, fever and unexpected weight change.   Eyes: Negative for discharge, redness and visual disturbance.   Respiratory: Negative for cough, shortness of breath and wheezing.    Cardiovascular: Negative for chest pain and leg swelling.   Gastrointestinal: Negative for abdominal distention, abdominal pain, constipation, diarrhea, nausea and vomiting.   Genitourinary: Positive for difficulty urinating and frequency. Negative for discharge, dysuria, flank pain, hematuria, scrotal swelling, testicular pain and urgency.   Musculoskeletal: Negative for arthralgias, joint swelling and myalgias.   Skin: Negative for color change and rash.   Neurological: Negative for dizziness and light-headedness.   Psychiatric/Behavioral: Negative for behavioral problems and confusion. The  "patient is not nervous/anxious.        Objective:      Vitals:    08/22/19 1129   BP: 130/80   BP Location: Right arm   Patient Position: Sitting   BP Method: Large (Manual)   Weight: 82.7 kg (182 lb 5.1 oz)   Height: 5' 11" (1.803 m)     Physical Exam   Constitutional: He is oriented to person, place, and time. He appears well-developed.   HENT:   Head: Normocephalic and atraumatic.   Nose: Nose normal.   Eyes: Conjunctivae are normal. Right eye exhibits no discharge. Left eye exhibits no discharge.   Neck: Normal range of motion. Neck supple. No tracheal deviation present. No thyromegaly present.   Cardiovascular: Normal rate and regular rhythm.    Pulmonary/Chest: Effort normal. No respiratory distress. He has no wheezes.   Abdominal: Soft. He exhibits no distension. There is no hepatosplenomegaly. There is no tenderness. There is no CVA tenderness. No hernia.   Genitourinary:   Genitourinary Comments: Patient declined exam   Musculoskeletal: Normal range of motion. He exhibits no edema.   Neurological: He is alert and oriented to person, place, and time.   Skin: Skin is warm and dry. No rash noted. No erythema.     Psychiatric: He has a normal mood and affect. His behavior is normal. Judgment normal.       Urine dipstick shows patient unable to produce specimen.      Assessment:       1. Distended bladder    2. BPH with obstruction/lower urinary tract symptoms    3. Urinary frequency    4. Incomplete bladder emptying          Plan:       1. Distended bladder  -Suspect related to bladder outlet obstruction  -Plan for cysto with urodynamics on Friday 8/30/19 with Dr. Stiles  - POCT Bladder Scan  - POCT urinalysis, dipstick or tablet reag    2. BPH with obstruction/lower urinary tract symptoms  - tamsulosin (FLOMAX) 0.4 mg Cap; Take 1 capsule (0.4 mg total) by mouth once daily.  Dispense: 30 capsule; Refill: 11  - finasteride (PROSCAR) 5 mg tablet; Take 1 tablet (5 mg total) by mouth once daily.  Dispense: 30 " tablet; Refill: 11    3. Urinary frequency  -Suspect related to LOREN  - POCT Bladder Scan  - Urine culture today    4. Incomplete bladder emptying  -Elevated PVR today  -Declines CIC or placement of indwelling barnes catheter. Patient cautioned concerning symptoms of urinary retention. Instructed to rtc or ER if symptoms develop  -Plan for cysto with urodynamics on Friday 8/30/19 with Dr. Stiles  - tamsulosin (FLOMAX) 0.4 mg Cap; Take 1 capsule (0.4 mg total) by mouth once daily.  Dispense: 30 capsule; Refill: 11          Follow up in about 3 weeks (around 9/12/2019) for Follow up.

## 2019-08-22 NOTE — LETTER
August 22, 2019      Khalif Hassan MD  [address] [facility]  [place] [address]           [facility]  [address] [facility] [address]  [place] [address]  Phone: [phone]  Fax: [phone]          Patient: Carlos A Ruff   MR Number: [id number]   YOB: 1955   Date of Visit: 8/22/2019       Dear Dr. Khalif RIVERA Page:    Thank you for referring Carlos A Ruff to me for evaluation. Attached you will find relevant portions of my assessment and plan of care.    If you have questions, please do not hesitate to call me. I look forward to following Carlos A Ruff along with you.    Sincerely,    Berkley Esposito, NP    Enclosure  CC:  No Recipients    If you would like to receive this communication electronically, please contact [email or web address] or [phone] to request more information on [facility] Link access.    For providers and/or their staff who would like to refer a patient to [facility], please contact us through our one-stop-shop provider referral line, [facility], at [phone].    If you feel you have received this communication in error or would no longer like to receive these types of communications, please e-mail [email or web address]

## 2019-08-22 NOTE — PROGRESS NOTES
"Subjective:       Patient ID: Carlos A Ruff is a 64 y.o. male who is an established patient of Dr. Stiles though new to me was seen for hospital follow up and "urge incontinence of urine"    Chief Complaint:   Chief Complaint   Patient presents with    Benign Prostatic Hypertrophy     pt states bph an some urinary frequency pt also states he is on flomax an seems to be helping somewhat      He was recently admitted in 4/2019 with recurrent DVT. CT scan showed a very distended bladder. He was seen as an inpatient consultation at that time. During his admission he had a barnes in place. Barnes was later removed. Of note, patient with one elevated PVR after barnes removal    He was seen by Dr Arndt in 8/18 with urinary frequency, elevated PVR. He refused catheter or CIC teaching at that time. He was recommended for urodynamics but appears to not have followed up (reports he did not want to travel to [facility]). He is on Flomax. Denies significant voiding issues currently though does report medium to weak stream.     PSA 7/19- 0.96    CT - very distended bladder, bladder wall thickened, large prostate. No hydronephrosis    He is here today for follow up. He reports "pressure in bladder" with voiding. Also reports occasional straining, sensation LOREN and dribbling of urine at times. He denies UI. Denies dysuria, gross hematuria or flank pain. He reports compliance with Flomax    PVR (bladder scan) today - >368 ml (patient voided ~ 30 mins prior to office visit)    ACTIVE MEDICAL ISSUES:  Patient Active Problem List   Diagnosis    Coronary artery disease involving native coronary artery of native heart without angina pectoris    History of deep vein thrombosis (DVT) of lower extremity    Mass of left foot    Tobacco abuse    Elevated troponin    Chest pain, non-cardiac    DVT, lower extremity, recurrent, right    Prothrombin gene mutation    Lupus anticoagulant positive    Foot swelling    Failure of outpatient " treatment    Distended bladder    Bladder outlet obstruction    Bladder wall thickening    Hx of colonic polyp       ALLERGIES AND MEDICATIONS: updated and reviewed.  Review of patient's allergies indicates:  No Known Allergies  Current Outpatient Medications   Medication Sig    atorvastatin (LIPITOR) 40 MG tablet Take 40 mg by mouth once daily.     ergocalciferol (VITAMIN D2) 50,000 unit Cap Take 50,000 Units by mouth every 7 days.    isosorbide mononitrate (IMDUR) 30 MG 24 hr tablet TAKE 1 TABLET BY MOUTH ONCE DAILY    losartan-hydrochlorothiazide 50-12.5 mg (HYZAAR) 50-12.5 mg per tablet Take 1 tablet by mouth once daily.    nitroGLYCERIN (NITROSTAT) 0.4 MG SL tablet Place 0.4 mg under the tongue every 5 (five) minutes as needed for Chest pain.    tamsulosin (FLOMAX) 0.4 mg Cap Take 1 capsule (0.4 mg total) by mouth once daily.    warfarin (COUMADIN) 10 MG tablet Take 1 tablet (10 mg total) by mouth Daily.    finasteride (PROSCAR) 5 mg tablet Take 1 tablet (5 mg total) by mouth once daily.     No current facility-administered medications for this visit.        Review of Systems   Constitutional: Negative for activity change, chills, fatigue, fever and unexpected weight change.   Eyes: Negative for discharge, redness and visual disturbance.   Respiratory: Negative for cough, shortness of breath and wheezing.    Cardiovascular: Negative for chest pain and leg swelling.   Gastrointestinal: Negative for abdominal distention, abdominal pain, constipation, diarrhea, nausea and vomiting.   Genitourinary: Positive for difficulty urinating and frequency. Negative for discharge, dysuria, flank pain, hematuria, scrotal swelling, testicular pain and urgency.   Musculoskeletal: Negative for arthralgias, joint swelling and myalgias.   Skin: Negative for color change and rash.   Neurological: Negative for dizziness and light-headedness.   Psychiatric/Behavioral: Negative for behavioral problems and confusion. The  "patient is not nervous/anxious.        Objective:      Vitals:    08/22/19 1129   BP: 130/80   BP Location: Right arm   Patient Position: Sitting   BP Method: Large (Manual)   Weight: 82.7 kg (182 lb 5.1 oz)   Height: 5' 11" (1.803 m)     Physical Exam   Constitutional: He is oriented to person, place, and time. He appears well-developed.   HENT:   Head: Normocephalic and atraumatic.   Nose: Nose normal.   Eyes: Conjunctivae are normal. Right eye exhibits no discharge. Left eye exhibits no discharge.   Neck: Normal range of motion. Neck supple. No tracheal deviation present. No thyromegaly present.   Cardiovascular: Normal rate and regular rhythm.    Pulmonary/Chest: Effort normal. No respiratory distress. He has no wheezes.   Abdominal: Soft. He exhibits no distension. There is no hepatosplenomegaly. There is no tenderness. There is no CVA tenderness. No hernia.   Genitourinary:   Genitourinary Comments: Patient declined exam   Musculoskeletal: Normal range of motion. He exhibits no edema.   Neurological: He is alert and oriented to person, place, and time.   Skin: Skin is warm and dry. No rash noted. No erythema.     Psychiatric: He has a normal mood and affect. His behavior is normal. Judgment normal.       Urine dipstick shows patient unable to produce specimen.      Assessment:       1. Distended bladder    2. BPH with obstruction/lower urinary tract symptoms    3. Urinary frequency    4. Incomplete bladder emptying          Plan:       1. Distended bladder  -Suspect related to bladder outlet obstruction.  -Plan for cysto with urodynamics on Friday 8/30/19 with Dr. Stiles  - POCT Bladder Scan  - POCT urinalysis, dipstick or tablet reag    2. BPH with obstruction/lower urinary tract symptoms  - tamsulosin (FLOMAX) 0.4 mg Cap; Take 1 capsule (0.4 mg total) by mouth once daily.  Dispense: 30 capsule; Refill: 11  - finasteride (PROSCAR) 5 mg tablet; Take 1 tablet (5 mg total) by mouth once daily.  Dispense: 30 " tablet; Refill: 11    3. Urinary frequency  -Suspect related to LOREN  - POCT Bladder Scan  - Urine culture today    4. Incomplete bladder emptying  -Elevated PVR today  -Declines CIC or placement of indwelling barnes catheter. Patient cautioned concerning symptoms of urinary retention. Instructed to rtc or ER if symptoms worsen  -Plan for cysto with urodynamics on Friday 8/30/19 with Dr. Stiles  - tamsulosin (FLOMAX) 0.4 mg Cap; Take 1 capsule (0.4 mg total) by mouth once daily.  Dispense: 30 capsule; Refill: 11          Follow up in about 3 weeks (around 9/12/2019) for Follow up.

## 2019-08-24 LAB — BACTERIA UR CULT: NORMAL

## 2019-08-27 ENCOUNTER — HOSPITAL ENCOUNTER (OUTPATIENT)
Dept: PREADMISSION TESTING | Facility: HOSPITAL | Age: 64
Discharge: HOME | End: 2019-08-27
Attending: UROLOGY
Payer: COMMERCIAL

## 2019-08-27 VITALS
HEART RATE: 54 BPM | SYSTOLIC BLOOD PRESSURE: 145 MMHG | OXYGEN SATURATION: 98 % | DIASTOLIC BLOOD PRESSURE: 87 MMHG | TEMPERATURE: 98 F | BODY MASS INDEX: 25.46 KG/M2 | HEIGHT: 71 IN | RESPIRATION RATE: 16 BRPM | WEIGHT: 181.88 LBS

## 2019-08-27 NOTE — DISCHARGE INSTRUCTIONS
Your surgery is scheduled for _Friday Aug. 30, 2019__.    Call [phone] between 2 p.m. and 5 p.m. on   __Thursday__ to find out your arrival time for the day of your surgery.      Please report to SAME DAY SURGERY UNIT on the 2nd FLOOR at _______ a.m.  Use front door entrance. The doors open at 0530 am.          INSTRUCTIONS IMPORTANT!!!    MAY EAT AND DRINK AS USUAL.  TAKE USUAL MORNING MEDICATIONS.      _X__  Prep instructions:    SHOWER     ____  Please shower using Hibiclens soap the night before AND  the morning of  your surgery/procedure. Do not use Hibiclens on your face or genitals               _X___  No powder, lotions or creams to your body.  __X__  You may wear only deodorant on the day of surgery.  X____  Please remove all jewelry, including piercings and leave at home.  ____  No money or valuables needed. Please leave at home.  You may bring your  cell phone.  ____  Please bring any documents given by your doctor.  ____  If going home the same day, arrange for a ride home. You will not be able to   drive if Anesthesia was used.  ____  Children under 18 years require a parent / guardian present the entire time   they are in surgery / recovery.  _X___  Wear loose fitting clothing. Allow for dressings, bandages.  ____  Stop Aspirin, Ibuprofen, Motrin and Aleve at least 3-5 days before  surgery, unless otherwise instructed by your doctor, or the nurse.              You MAY use Tylenol/acetaminophen until day of surgery.  ____  If you take diabetic medication, do not take am of surgery unless instructed by Doctor.  _X___  Call MD for temperature above 101 degrees.                   I have read or had read and explained to me, and understand the above information.  Additional comments or instructions:Please call   [phone] if you have any questions regarding the instructions above.

## 2019-08-28 ENCOUNTER — ANTI-COAG VISIT (OUTPATIENT)
Dept: CARDIOLOGY | Facility: CLINIC | Age: 64
End: 2019-08-28
Payer: COMMERCIAL

## 2019-08-28 ENCOUNTER — LAB VISIT (OUTPATIENT)
Dept: LAB | Facility: HOSPITAL | Age: 64
End: 2019-08-28
Attending: INTERNAL MEDICINE
Payer: COMMERCIAL

## 2019-08-28 DIAGNOSIS — I82.401 DVT, LOWER EXTREMITY, RECURRENT, RIGHT: ICD-10-CM

## 2019-08-28 DIAGNOSIS — R76.0 LUPUS ANTICOAGULANT POSITIVE: ICD-10-CM

## 2019-08-28 DIAGNOSIS — D68.52 PROTHROMBIN GENE MUTATION: ICD-10-CM

## 2019-08-28 DIAGNOSIS — Z86.718 HISTORY OF DEEP VEIN THROMBOSIS (DVT) OF LOWER EXTREMITY: ICD-10-CM

## 2019-08-28 LAB
INR PPP: 3 (ref 0.8–1.2)
PROTHROMBIN TIME: 31.4 SEC (ref 9–12.5)

## 2019-08-28 PROCEDURE — 36415 COLL VENOUS BLD VENIPUNCTURE: CPT

## 2019-08-28 PROCEDURE — 93793 ANTICOAG MGMT PT WARFARIN: CPT | Mod: S$GLB,,, | Performed by: PHARMACIST

## 2019-08-28 PROCEDURE — 85610 PROTHROMBIN TIME: CPT

## 2019-08-28 PROCEDURE — 93793 PR ANTICOAGULANT MGMT FOR PT TAKING WARFARIN: ICD-10-PCS | Mod: S$GLB,,, | Performed by: PHARMACIST

## 2019-08-30 ENCOUNTER — HOSPITAL ENCOUNTER (OUTPATIENT)
Dept: RADIOLOGY | Facility: HOSPITAL | Age: 64
Discharge: HOME | End: 2019-08-30
Attending: UROLOGY | Admitting: UROLOGY
Payer: COMMERCIAL

## 2019-08-30 ENCOUNTER — HOSPITAL ENCOUNTER (OUTPATIENT)
Facility: HOSPITAL | Age: 64
Discharge: HOME | End: 2019-08-30
Attending: UROLOGY | Admitting: UROLOGY
Payer: COMMERCIAL

## 2019-08-30 VITALS
DIASTOLIC BLOOD PRESSURE: 83 MMHG | BODY MASS INDEX: 25.46 KG/M2 | HEIGHT: 71 IN | HEART RATE: 47 BPM | SYSTOLIC BLOOD PRESSURE: 162 MMHG | RESPIRATION RATE: 20 BRPM | OXYGEN SATURATION: 99 % | WEIGHT: 181.88 LBS | TEMPERATURE: 98 F

## 2019-08-30 DIAGNOSIS — R33.9 INCOMPLETE BLADDER EMPTYING: ICD-10-CM

## 2019-08-30 DIAGNOSIS — N32.89 DISTENDED BLADDER: ICD-10-CM

## 2019-08-30 DIAGNOSIS — N40.1 BPH WITH OBSTRUCTION/LOWER URINARY TRACT SYMPTOMS: ICD-10-CM

## 2019-08-30 DIAGNOSIS — R33.9 URINARY RETENTION: Primary | ICD-10-CM

## 2019-08-30 DIAGNOSIS — N13.8 BPH WITH OBSTRUCTION/LOWER URINARY TRACT SYMPTOMS: ICD-10-CM

## 2019-08-30 PROCEDURE — 76000 FLUOROSCOPY <1 HR PHYS/QHP: CPT | Mod: TC

## 2019-08-30 PROCEDURE — 51728 PR COMPLEX CYSTOMETROGRAM VOIDING PRESSURE STUDIES: ICD-10-PCS | Mod: 26,,, | Performed by: UROLOGY

## 2019-08-30 PROCEDURE — 25500020 PHARM REV CODE 255: Performed by: UROLOGY

## 2019-08-30 PROCEDURE — 52000 PR CYSTOURETHROSCOPY: ICD-10-PCS | Mod: 51,,, | Performed by: UROLOGY

## 2019-08-30 PROCEDURE — 51784 PR ANAL/URINARY MUSCLE STUDY: ICD-10-PCS | Mod: 26,51,, | Performed by: UROLOGY

## 2019-08-30 PROCEDURE — 71000015 HC POSTOP RECOV 1ST HR: Performed by: UROLOGY

## 2019-08-30 PROCEDURE — 51728 CYSTOMETROGRAM W/VP: CPT | Mod: 26,,, | Performed by: UROLOGY

## 2019-08-30 PROCEDURE — 25000003 PHARM REV CODE 250: Performed by: UROLOGY

## 2019-08-30 PROCEDURE — 51784 ANAL/URINARY MUSCLE STUDY: CPT | Mod: 26,51,, | Performed by: UROLOGY

## 2019-08-30 PROCEDURE — 51741 ELECTRO-UROFLOWMETRY FIRST: CPT | Mod: 26,51,, | Performed by: UROLOGY

## 2019-08-30 PROCEDURE — 76000 PR  FLUOROSCOPE EXAMINATION: ICD-10-PCS | Mod: 26,59,, | Performed by: UROLOGY

## 2019-08-30 PROCEDURE — 36000706: Performed by: UROLOGY

## 2019-08-30 PROCEDURE — 51741 PR UROFLOWMETRY, COMPLEX: ICD-10-PCS | Mod: 26,51,, | Performed by: UROLOGY

## 2019-08-30 PROCEDURE — 76000 FLUOROSCOPY <1 HR PHYS/QHP: CPT | Mod: 26,59,, | Performed by: UROLOGY

## 2019-08-30 PROCEDURE — 36000707: Performed by: UROLOGY

## 2019-08-30 PROCEDURE — 52000 CYSTOURETHROSCOPY: CPT | Mod: 51,,, | Performed by: UROLOGY

## 2019-08-30 PROCEDURE — 51797 INTRAABDOMINAL PRESSURE TEST: CPT | Mod: 26,,, | Performed by: UROLOGY

## 2019-08-30 PROCEDURE — 51797 PR VOIDING PRESS STUDY INTRA-ABDOMINAL VOID: ICD-10-PCS | Mod: 26,,, | Performed by: UROLOGY

## 2019-08-30 RX ORDER — CIPROFLOXACIN 500 MG/1
500 TABLET ORAL
Status: COMPLETED | OUTPATIENT
Start: 2019-08-30 | End: 2019-08-30

## 2019-08-30 RX ORDER — HYDROCODONE BITARTRATE AND ACETAMINOPHEN 5; 325 MG/1; MG/1
1 TABLET ORAL EVERY 4 HOURS PRN
Status: DISCONTINUED | OUTPATIENT
Start: 2019-08-30 | End: 2019-08-30 | Stop reason: HOSPADM

## 2019-08-30 RX ORDER — LIDOCAINE HYDROCHLORIDE 20 MG/ML
JELLY TOPICAL
Status: DISCONTINUED | OUTPATIENT
Start: 2019-08-30 | End: 2019-08-30 | Stop reason: HOSPADM

## 2019-08-30 RX ORDER — ONDANSETRON 2 MG/ML
4 INJECTION INTRAMUSCULAR; INTRAVENOUS EVERY 12 HOURS PRN
Status: DISCONTINUED | OUTPATIENT
Start: 2019-08-30 | End: 2019-08-30 | Stop reason: HOSPADM

## 2019-08-30 RX ORDER — ACETAMINOPHEN 325 MG/1
650 TABLET ORAL EVERY 4 HOURS PRN
Status: DISCONTINUED | OUTPATIENT
Start: 2019-08-30 | End: 2019-08-30 | Stop reason: HOSPADM

## 2019-08-30 RX ORDER — PHENAZOPYRIDINE HYDROCHLORIDE 100 MG/1
200 TABLET, FILM COATED ORAL 3 TIMES DAILY PRN
Status: DISCONTINUED | OUTPATIENT
Start: 2019-08-30 | End: 2019-08-30 | Stop reason: HOSPADM

## 2019-08-30 RX ADMIN — CIPROFLOXACIN HYDROCHLORIDE 500 MG: 500 TABLET, FILM COATED ORAL at 08:08

## 2019-08-30 NOTE — DISCHARGE INSTRUCTIONS
Expect blood and/or burning with urination. Drink plenty of fluids.    ACTIVITY LEVEL: If you have received sedation or an anesthetic, you may feel sleepy for several hours. Rest until you are more awake. Gradually resume your normal activities.       DIET: You may resume your home diet. If nausea is present, increase your diet gradually with fluids and bland foods.      Medications: Pain medication should be taken only if needed and as directed. If antibiotics are prescribed, the medication should be taken until completed. You will be given an updated list of you medications.  ?       CALL THE DOCTOR:     · Fever over 101°F  · Severe pain that doesnt go away with medication.  · Upset stomach and vomiting that is persistent.  · Problems urinating-unable to urinate or heavy bleeding (with or without clots)  Fall Prevention  Millions of people fall every year and injure themselves. You may have had anesthesia or sedation which may increase your risk of falling. You may have health issues that put you at an increased risk of falling.     Here are ways to reduce your risk of falling.    Make your home safe by keeping walkways clear of objects you may trip over.  Use non-slip pads under rugs. Do not use area rugs or small throw rugs.  Use non-slip mats in bathtubs and showers.  Install handrails and lights on staircases.  Do not walk in poorly lit areas.  Do not stand on chairs or wobbly ladders.  Use caution when reaching overhead or looking upward. This position can cause a loss of balance.  Be sure your shoes fit properly, have non-slip bottoms and are in good condition.   Wear shoes both inside and out. Avoid going barefoot or wearing slippers.  Be cautious when going up and down stairs, curbs, and when walking on uneven sidewalks.  If your balance is poor, consider using a cane or walker.  If your fall was related to alcohol use, stop or limit alcohol intake.   If your fall was related to use of sleeping medicines,  talk to your doctor about this. You may need to reduce your dosage at bedtime if you awaken during the night to go to the bathroom.    To reduce the need for nighttime bathroom trips:  Avoid drinking fluids for several hours before going to bed  Empty your bladder before going to bed  Men can keep a urinal at the bedside  Stay as active as you can. Balance, flexibility, strength, and endurance all come from exercise. They all play a role in preventing falls. Ask your healthcare provider which types of activity are right for you.  Get your vision checked on a regular basis.  If you have pets, know where they are before you stand up or walk so you don't trip over them.  Use night lights.

## 2019-08-30 NOTE — OP NOTE
[facility]  Surgery Department  Operative Note      Date of Procedure:  2019 8:50 AM     Procedure:   1. Complex urodynamics with fluoroscopy  2. Cystoscopy    Surgeon(s) and Role:     * Felecia Stiles MD - Primary    Assisting Surgeon: None    Pre-Operative Diagnosis:  Urinary retention    Post-Operative Diagnosis: Post-Op Diagnosis Codes:     * urinary retention    Indication for procedure:  64-year-old male with history of incomplete bladder emptying.  He has been recommended for CIC, [facility], urodynamics in the past but has failed to follow-up.  He continues to have very elevated PVRs.  He was noted to have a large distended bladder on recent hospital admission for DVT.  He presents today for further evaluation of his bladder function.    Description of procedure:  The patient was brought to the urodynamics suite and transferred to the urodynamics chair. Full timeout procedures were performed identifying the correct patient and procedure. Appropriate antibiotics with PO ciprofloxacin were given prior to commencement of surgery A 16-Maldivian red rubber catheter was then placed per urethra after genitals were prepped with Betadine solution to remove any residual urine in bladder.  P1 and P2 catheters were placed in the urethra into the bladder and rectally respectively. EMG senses were placed in the appropriate location on perineum and left leg. The bladder was filled at an appropriately slow rate.    Uroflow:  Max flow: 3.2mL/s  Avg flow: 0.1mL/s  Flow time: 74 sec  Voided volume: 10.2mL    PVR pre-procedure: 1250mL!!    Filling phase:  Rate of fillin-70 mL/min  First sensation: 1465mL  First desire: NA  Strong desire: NA  Capacity: NA  Permission to void given at 1500mL (max capacity of UDS machine) - pt still without strong urge to void    Stress maneuvers (Valsalva and cough) at NA  VLLP: NA  Compliance: normal  ALLP: NA  Involuntary bladder contraction: none    Voiding phase:  Bladder  "contraction: good bladder contraction with attempt to void. Delayed void after contraction. Also with Valsalva voiding.   Coordination: normal  Flow rate (max): 2.7mL/s  Flow rate (avg): 2.2mL/s  Pdet (max flow): -14.9cm H2O  Max Pdet: 60cm H2O  Voided volume: 144mL  PVR: 1351mL    Fluoroscopy:  Bladder wall: smooth once filled, very large capacity  Voiding: difficult to assess  Vesicoureteral reflux: none  Radiographic PVR: large volume      Cystoscopy:  At the conclusion of the urodynamics procedure, P1 and P2 catheters as well as EMG sensors were removed. The patient then was placed in the dorsal lithotomy position and prepped and draped in the usual sterile fashion. Uro-Jet lidocaine was placed in the urethra for jalen-operative analgesia. A 16-Senegalese flexible cystoscope was passed per urethra into the bladder. Full cystoscopy was performed systematically to inspect all aspects of the bladder wall. Retroflexion of the cystoscope was done to inspect the bladder neck. Bilateral UOs were visualized. Urethra was carefully inspected upon removal of cystoscope. The procedure was terminated. The patient tolerated the procedure well.    Urethra: normal  Bladder mucosa: large "floppy" bladder noted, some hyperemia, no tumors  Trabeculation: none appreciated  UOs: difficult to visualize  Prostate: mild intravesical component, lateral kissing lobes - moderate BPH    Anesthesia: Local    Complications: none    Estimated Blood Loss (EBL): 0cc          Drains: none    Specimens: none     Attestation: I was present the entirety of the procedure.     Disposition:  Patient is stable and deemed appropriate for discharge home. The patient will follow up in 2-3 weeks.        Findings of the Procedure:  Very large initial PVR.  Uroflow volume not significant for evaluation.  Significant decreased sensation. No IBC/DO. SILVESTRE not assessed.  Very large bladder capacity.  Normal compliance.  No vesicoureteral reflux.  Slow flow with " incomplete bladder emptying.  Bladder contraction noted with delayed voiding.  Normal coordination.  Also with some Valsalva voiding.  Cystoscopy without bladder tumor or other lesion, mild intravesical component with bilateral hypertrophy of the prostate.    Recommendations:  Appears to have bladder outlet obstruction.  Very large capacity bladder though the bladder contraction was noted.  Recommend bladder outlet reducing procedure such as TURP.        Discharge Note    SUMMARY     Admit Date:  08/30/2019 11:13 AM    Discharge Date and Time:  08/30/2019 11:13 AM    Hospital Course (synopsis of major diagnoses, care, treatment, and services provided during the course of the hospital stay): Uncomplicated cysto/UDS.     Final Diagnosis: Post-Op Diagnosis Codes:     *Urinary retention    Disposition: Home or Self Care    Follow Up/Patient Instructions:   Expect blood and burning with urination. Drink plenty of fluids.    Medications:  Reconciled Home Medications:      Medication List      CONTINUE taking these medications    atorvastatin 40 MG tablet  Commonly known as:  LIPITOR  Take 40 mg by mouth once daily.     finasteride 5 mg tablet  Commonly known as:  PROSCAR  Take 1 tablet (5 mg total) by mouth once daily.     isosorbide mononitrate 30 MG 24 hr tablet  Commonly known as:  IMDUR  TAKE 1 TABLET BY MOUTH ONCE DAILY     losartan-hydrochlorothiazide 50-12.5 mg 50-12.5 mg per tablet  Commonly known as:  HYZAAR  Take 1 tablet by mouth once daily.     nitroGLYCERIN 0.4 MG SL tablet  Commonly known as:  NITROSTAT  Place 0.4 mg under the tongue every 5 (five) minutes as needed for Chest pain.     tamsulosin 0.4 mg Cap  Commonly known as:  FLOMAX  Take 1 capsule (0.4 mg total) by mouth once daily.     VITAMIN D2 50,000 unit Cap  Generic drug:  ergocalciferol  Take 50,000 Units by mouth every 7 days.     warfarin 10 MG tablet  Commonly known as:  COUMADIN  Take 1 tablet (10 mg total) by mouth Daily.            Discharge  Procedure Orders  Diet general     Activity as tolerated     Call MD for:  temperature >100.4     Call MD for:  persistent nausea and vomiting     Call MD for:  severe uncontrolled pain     Call MD for:  difficulty breathing, headache or visual disturbances     No dressing needed     Follow-up Information     Follow up with Felecia Stiles MD In 2 weeks.    Specialty:  Urology    Why:  For post-op follow up    Contact information:    [address] [place] [id number] [phone]

## 2019-08-31 NOTE — PROGRESS NOTES
"Well Man VISIT      CHIEF COMPLAINT  Chief Complaint   Patient presents with    Follow-up    Medication Problem       HPI  Carlso A Ruff is a 64 y.o. male who presents for physical.     Social Factors  Tobacco use: Yes only at night  Ready to Quit: Yes   Alcohol: Yes on weekends  Intimate partner violence screening  "Do you feel safe in your current relationship?" Yes   "Have you ever been in a relationship in which your partner frightened you or hurt you?" No  Living Will/POA: No  Regular Exercise: No    Depression  Over the past two weeks, have you felt down, depressed, or hopeless? No  Over the past two weeks, have you felt little interest or pleasure in doing things? No    Reproductive Health  STD screening in last year: decliend  HIV screening: declined    Screen for Chronic Disease  CHD Risk Factors: male gender and smoking/ tobacco exposure  Estimated body mass index is 25.29 kg/m² as calculated from the following:    Height as of this encounter: 5' 10.98" (1.803 m).    Weight as of this encounter: 82.2 kg (181 lb 3.5 oz).  Dyslipidemia screening needed: ordered  T2DM screening needed: ordered  Colonoscopy needed: utd  PSA needed: ordered  Screen men 35 years and older, and men 20 to 34 years of age who have cardiovascular risk factors for dyslipidemia  Begin screening colonoscopies at 50 years of age in men of average risk, and continue until 75 years of age; offer fecal occult blood testing every year, flexible sigmoidoscopy every five years combined with fecal occult blood testing every three years, or colonoscopy every 10 years   The American U[employer] Association recommends offering PSA testing and digital rectal examination to well-informed men beginning at 40 years of age and continuing until life expectancy is less than 10 years  Screen once with ultrasonography in men 65 to 75 years of age if they have a family history or have smoked at [id number] cigarettes in their lifetime  Screen men with a " "sustained blood pressure greater than 135/80 mm Hg for T2DM      Immunizations  delayed    ALLERGIES and MEDS were verified.   PMHx, PSHx, FHx, SOCIALHx were updated as pertinent.    REVIEW OF SYSTEMS  Review of Systems   Constitutional: Negative.    HENT: Negative.    Eyes: Negative.    Respiratory: Negative.    Cardiovascular: Negative.    Gastrointestinal: Negative.    Genitourinary: Negative.    Skin: Negative.    Neurological: Negative.    Psychiatric/Behavioral: Negative.          PHYSICAL EXAM  VITAL SIGNS: /75 (BP Location: Left arm, Patient Position: Sitting, BP Method: Medium (Automatic))   Pulse (!) 55   Temp 98.4 °F (36.9 °C) (Oral)   Resp 17   Ht 5' 10.98" (1.803 m)   Wt 82.2 kg (181 lb 3.5 oz)   SpO2 98%   BMI 25.29 kg/m²   GEN: Well developed, Well nourished, No acute distress.  HENT: Normocephalic, Atraumatic, Bilateral external ears normal, Nose normal, Oropharynx moist, No oral exudates.   Eyes: PERRL, EOMI, Conjunctiva normal, No discharge.   Neck: Supple, No tenderness.  Lymphatic: No cervical or supraclavicular lymphadenopathy noted.   Cardiovascular: Normal heart rate, Normal rhythm, No murmurs, No rubs, No gallops.   Thorax & Lungs: Normal breath sounds, No respiratory distress, No wheezing.  Abdomen: Soft, No tenderness, Bowel sounds normal.  Genital: deferred  Skin: Warm, Dry, No erythema, No rash.   Extremities: No edema, No tenderness.       ASSESSMENT/PLAN    Carlos A was seen today for follow-up and medication problem.    Diagnoses and all orders for this visit:    Visit for [facility] health check  -     CBC auto differential; Future  -     Comprehensive metabolic panel; Future  -     Lipid panel; Future  -     Urinalysis; Future  -     PSA, Screening; Future    Urge incontinence of urine  -     Ambulatory referral to Urology    Tobacco abuse    Hx of colonic polyp    Prothrombin gene mutation    Lupus anticoagulant positive  -     Ambulatory referral to Hematology / " Oncology    Coronary artery disease involving native coronary artery of native heart without angina pectoris         FOLLOW UP: 3 months or sooner if needed    Khalif Hassan MD

## 2019-09-04 ENCOUNTER — LAB VISIT (OUTPATIENT)
Dept: LAB | Facility: HOSPITAL | Age: 64
End: 2019-09-04
Attending: INTERNAL MEDICINE
Payer: COMMERCIAL

## 2019-09-04 DIAGNOSIS — Z86.718 HISTORY OF DEEP VEIN THROMBOSIS (DVT) OF LOWER EXTREMITY: ICD-10-CM

## 2019-09-04 DIAGNOSIS — I82.401 DVT, LOWER EXTREMITY, RECURRENT, RIGHT: ICD-10-CM

## 2019-09-04 DIAGNOSIS — D68.52 PROTHROMBIN GENE MUTATION: ICD-10-CM

## 2019-09-04 DIAGNOSIS — R76.0 LUPUS ANTICOAGULANT POSITIVE: ICD-10-CM

## 2019-09-04 LAB
INR PPP: 1.1 (ref 0.8–1.2)
PROTHROMBIN TIME: 12.1 SEC (ref 9–12.5)

## 2019-09-04 PROCEDURE — 36415 COLL VENOUS BLD VENIPUNCTURE: CPT

## 2019-09-04 PROCEDURE — 85610 PROTHROMBIN TIME: CPT

## 2019-09-05 ENCOUNTER — ANTI-COAG VISIT (OUTPATIENT)
Dept: CARDIOLOGY | Facility: CLINIC | Age: 64
End: 2019-09-05
Payer: COMMERCIAL

## 2019-09-05 DIAGNOSIS — R76.0 LUPUS ANTICOAGULANT POSITIVE: ICD-10-CM

## 2019-09-05 DIAGNOSIS — I82.401 DVT, LOWER EXTREMITY, RECURRENT, RIGHT: ICD-10-CM

## 2019-09-05 DIAGNOSIS — D68.52 PROTHROMBIN GENE MUTATION: ICD-10-CM

## 2019-09-05 DIAGNOSIS — Z86.718 HISTORY OF DEEP VEIN THROMBOSIS (DVT) OF LOWER EXTREMITY: ICD-10-CM

## 2019-09-05 PROCEDURE — 93793 PR ANTICOAGULANT MGMT FOR PT TAKING WARFARIN: ICD-10-PCS | Mod: S$GLB,,, | Performed by: PHARMACIST

## 2019-09-05 PROCEDURE — 93793 ANTICOAG MGMT PT WARFARIN: CPT | Mod: S$GLB,,, | Performed by: PHARMACIST

## 2019-09-05 NOTE — PROGRESS NOTES
Confirmed taking correct dose of coumadin; states running out of meds & missed 3 coumadin doses MON-WED of this week  No other changes

## 2019-09-10 ENCOUNTER — LAB VISIT (OUTPATIENT)
Dept: LAB | Facility: HOSPITAL | Age: 64
End: 2019-09-10
Attending: INTERNAL MEDICINE
Payer: COMMERCIAL

## 2019-09-10 DIAGNOSIS — D68.52 PROTHROMBIN GENE MUTATION: ICD-10-CM

## 2019-09-10 DIAGNOSIS — Z86.718 HISTORY OF DEEP VEIN THROMBOSIS (DVT) OF LOWER EXTREMITY: ICD-10-CM

## 2019-09-10 DIAGNOSIS — R76.0 LUPUS ANTICOAGULANT POSITIVE: ICD-10-CM

## 2019-09-10 DIAGNOSIS — I82.401 DVT, LOWER EXTREMITY, RECURRENT, RIGHT: ICD-10-CM

## 2019-09-10 LAB
INR PPP: 2.5 (ref 0.8–1.2)
PROTHROMBIN TIME: 26.1 SEC (ref 9–12.5)

## 2019-09-10 PROCEDURE — 36415 COLL VENOUS BLD VENIPUNCTURE: CPT

## 2019-09-10 PROCEDURE — 85610 PROTHROMBIN TIME: CPT

## 2019-09-11 ENCOUNTER — ANTI-COAG VISIT (OUTPATIENT)
Dept: CARDIOLOGY | Facility: CLINIC | Age: 64
End: 2019-09-11
Payer: COMMERCIAL

## 2019-09-11 DIAGNOSIS — Z86.718 HISTORY OF DEEP VEIN THROMBOSIS (DVT) OF LOWER EXTREMITY: ICD-10-CM

## 2019-09-11 DIAGNOSIS — D68.52 PROTHROMBIN GENE MUTATION: ICD-10-CM

## 2019-09-11 DIAGNOSIS — I82.401 DVT, LOWER EXTREMITY, RECURRENT, RIGHT: ICD-10-CM

## 2019-09-11 DIAGNOSIS — R76.0 LUPUS ANTICOAGULANT POSITIVE: ICD-10-CM

## 2019-09-11 PROCEDURE — 93793 ANTICOAG MGMT PT WARFARIN: CPT | Mod: S$GLB,,, | Performed by: PHARMACIST

## 2019-09-11 PROCEDURE — 93793 PR ANTICOAGULANT MGMT FOR PT TAKING WARFARIN: ICD-10-PCS | Mod: S$GLB,,, | Performed by: PHARMACIST

## 2019-09-17 ENCOUNTER — ANTI-COAG VISIT (OUTPATIENT)
Dept: CARDIOLOGY | Facility: CLINIC | Age: 64
End: 2019-09-17
Payer: COMMERCIAL

## 2019-09-17 ENCOUNTER — PATIENT OUTREACH (OUTPATIENT)
Dept: ADMINISTRATIVE | Facility: OTHER | Age: 64
End: 2019-09-17

## 2019-09-17 ENCOUNTER — LAB VISIT (OUTPATIENT)
Dept: LAB | Facility: HOSPITAL | Age: 64
End: 2019-09-17
Attending: INTERNAL MEDICINE
Payer: COMMERCIAL

## 2019-09-17 DIAGNOSIS — Z86.718 HISTORY OF DEEP VEIN THROMBOSIS (DVT) OF LOWER EXTREMITY: ICD-10-CM

## 2019-09-17 DIAGNOSIS — I82.401 DVT, LOWER EXTREMITY, RECURRENT, RIGHT: ICD-10-CM

## 2019-09-17 DIAGNOSIS — R76.0 LUPUS ANTICOAGULANT POSITIVE: ICD-10-CM

## 2019-09-17 DIAGNOSIS — D68.52 PROTHROMBIN GENE MUTATION: ICD-10-CM

## 2019-09-17 LAB
INR PPP: 2 (ref 0.8–1.2)
PROTHROMBIN TIME: 21 SEC (ref 9–12.5)

## 2019-09-17 PROCEDURE — 36415 COLL VENOUS BLD VENIPUNCTURE: CPT

## 2019-09-17 PROCEDURE — 85610 PROTHROMBIN TIME: CPT

## 2019-09-17 PROCEDURE — 93793 ANTICOAG MGMT PT WARFARIN: CPT | Mod: S$GLB,,, | Performed by: PHARMACIST

## 2019-09-17 PROCEDURE — 93793 PR ANTICOAGULANT MGMT FOR PT TAKING WARFARIN: ICD-10-PCS | Mod: S$GLB,,, | Performed by: PHARMACIST

## 2019-09-19 ENCOUNTER — OFFICE VISIT (OUTPATIENT)
Dept: UROLOGY | Facility: CLINIC | Age: 64
End: 2019-09-19
Payer: COMMERCIAL

## 2019-09-19 VITALS
BODY MASS INDEX: 25.32 KG/M2 | WEIGHT: 180.88 LBS | SYSTOLIC BLOOD PRESSURE: 112 MMHG | HEIGHT: 71 IN | DIASTOLIC BLOOD PRESSURE: 80 MMHG

## 2019-09-19 DIAGNOSIS — N13.8 BPH WITH OBSTRUCTION/LOWER URINARY TRACT SYMPTOMS: Primary | ICD-10-CM

## 2019-09-19 DIAGNOSIS — N40.1 BPH WITH OBSTRUCTION/LOWER URINARY TRACT SYMPTOMS: Primary | ICD-10-CM

## 2019-09-19 DIAGNOSIS — R33.9 URINARY RETENTION: ICD-10-CM

## 2019-09-19 DIAGNOSIS — N32.89 DISTENDED BLADDER: ICD-10-CM

## 2019-09-19 DIAGNOSIS — N40.0 ENLARGED PROSTATE: ICD-10-CM

## 2019-09-19 LAB
BILIRUB SERPL-MCNC: NORMAL MG/DL
BLOOD URINE, POC: NORMAL
COLOR, POC UA: YELLOW
GLUCOSE UR QL STRIP: NORMAL
KETONES UR QL STRIP: NORMAL
LEUKOCYTE ESTERASE URINE, POC: NORMAL
NITRITE, POC UA: NORMAL
PH, POC UA: 6
PROTEIN, POC: NORMAL
SPECIFIC GRAVITY, POC UA: 1015
UROBILINOGEN, POC UA: NORMAL

## 2019-09-19 PROCEDURE — 99999 PR PBB SHADOW E&M-EST. PATIENT-LVL III: ICD-10-PCS | Mod: PBBFAC,,, | Performed by: NURSE PRACTITIONER

## 2019-09-19 PROCEDURE — 81001 PR  URINALYSIS, AUTO, W/SCOPE: ICD-10-PCS | Mod: S$GLB,,, | Performed by: NURSE PRACTITIONER

## 2019-09-19 PROCEDURE — 3008F PR BODY MASS INDEX (BMI) DOCUMENTED: ICD-10-PCS | Mod: CPTII,S$GLB,, | Performed by: NURSE PRACTITIONER

## 2019-09-19 PROCEDURE — 99214 PR OFFICE/OUTPT VISIT, EST, LEVL IV, 30-39 MIN: ICD-10-PCS | Mod: 25,S$GLB,, | Performed by: NURSE PRACTITIONER

## 2019-09-19 PROCEDURE — 99999 PR PBB SHADOW E&M-EST. PATIENT-LVL III: CPT | Mod: PBBFAC,,, | Performed by: NURSE PRACTITIONER

## 2019-09-19 PROCEDURE — 3079F DIAST BP 80-89 MM HG: CPT | Mod: CPTII,S$GLB,, | Performed by: NURSE PRACTITIONER

## 2019-09-19 PROCEDURE — 3008F BODY MASS INDEX DOCD: CPT | Mod: CPTII,S$GLB,, | Performed by: NURSE PRACTITIONER

## 2019-09-19 PROCEDURE — 3079F PR MOST RECENT DIASTOLIC BLOOD PRESSURE 80-89 MM HG: ICD-10-PCS | Mod: CPTII,S$GLB,, | Performed by: NURSE PRACTITIONER

## 2019-09-19 PROCEDURE — 3074F SYST BP LT 130 MM HG: CPT | Mod: CPTII,S$GLB,, | Performed by: NURSE PRACTITIONER

## 2019-09-19 PROCEDURE — 3074F PR MOST RECENT SYSTOLIC BLOOD PRESSURE < 130 MM HG: ICD-10-PCS | Mod: CPTII,S$GLB,, | Performed by: NURSE PRACTITIONER

## 2019-09-19 PROCEDURE — 81001 URINALYSIS AUTO W/SCOPE: CPT | Mod: S$GLB,,, | Performed by: NURSE PRACTITIONER

## 2019-09-19 PROCEDURE — 99214 OFFICE O/P EST MOD 30 MIN: CPT | Mod: 25,S$GLB,, | Performed by: NURSE PRACTITIONER

## 2019-09-19 PROCEDURE — 87086 URINE CULTURE/COLONY COUNT: CPT

## 2019-09-19 NOTE — H&P
"Subjective:       Patient ID: Carlos A Ruff is a 64 y.o. male who was last seen in this office 8/22/2019    Chief Complaint:   Chief Complaint   Patient presents with    Follow-up     Pt. states he has a little improvment since the test.. sometimes he still strains to urinate..also has some burning at times when he urinates      He was recently admitted in 4/2019 with recurrent DVT. CT scan showed a very distended bladder. He was seen as an inpatient consultation at that time. During his admission he had a barnes in place. Barnes was later removed. Of note, patient with one elevated PVR after barnes removal    He was seen by Dr Arndt in 8/18 with urinary frequency, elevated PVR. He refused catheter or CIC teaching at that time. He was recommended for urodynamics but appears to not have followed up (reports he did not want to travel to [facility]). He is on Flomax. Denies significant voiding issues currently though does report medium to weak stream.     PSA 7/19- 0.96    CT - very distended bladder, bladder wall thickened, large prostate. No hydronephrosis     He reports "pressure in bladder" with voiding. Also reports occasional straining, sensation LOREN and dribbling of urine at times. He denies UI. Denies dysuria, gross hematuria or flank pain. He reports compliance with Flomax    PVR (bladder scan) previous visit - >368 ml (patient voided ~ 30 mins prior to office visit)    He is now s/p cysto/UDS on 8/30/19. He is still straining to void. Also reports occasional dysuria. No new complaints    Cysto/UDS 8/30/19:    Findings of the Procedure:  Very large initial PVR.  Uroflow volume not significant for evaluation.  Significant decreased sensation. No IBC/DO. SILVESTRE not assessed.  Very large bladder capacity.  Normal compliance.  No vesicoureteral reflux.  Slow flow with incomplete bladder emptying.  Bladder contraction noted with delayed voiding.  Normal coordination.  Also with some Valsalva voiding.  Cystoscopy without " bladder tumor or other lesion, mild intravesical component with bilateral hypertrophy of the prostate.     Recommendations:  Appears to have bladder outlet obstruction.  Very large capacity bladder though the bladder contraction was noted.  Recommend bladder outlet reducing procedure such as TURP.    ACTIVE MEDICAL ISSUES:  Patient Active Problem List   Diagnosis    Coronary artery disease involving native coronary artery of native heart without angina pectoris    History of deep vein thrombosis (DVT) of lower extremity    Mass of left foot    Tobacco abuse    Elevated troponin    Chest pain, non-cardiac    DVT, lower extremity, recurrent, right    Prothrombin gene mutation    Lupus anticoagulant positive    Foot swelling    Failure of outpatient treatment    Distended bladder    Bladder outlet obstruction    Bladder wall thickening    Hx of colonic polyp    Urinary retention       ALLERGIES AND MEDICATIONS: updated and reviewed.  Review of patient's allergies indicates:  No Known Allergies  Current Outpatient Medications   Medication Sig    atorvastatin (LIPITOR) 40 MG tablet Take 40 mg by mouth once daily.     ergocalciferol (VITAMIN D2) 50,000 unit Cap Take 50,000 Units by mouth every 7 days.    finasteride (PROSCAR) 5 mg tablet Take 1 tablet (5 mg total) by mouth once daily.    isosorbide mononitrate (IMDUR) 30 MG 24 hr tablet TAKE 1 TABLET BY MOUTH ONCE DAILY    losartan-hydrochlorothiazide 50-12.5 mg (HYZAAR) 50-12.5 mg per tablet Take 1 tablet by mouth once daily.    nitroGLYCERIN (NITROSTAT) 0.4 MG SL tablet Place 0.4 mg under the tongue every 5 (five) minutes as needed for Chest pain.    tamsulosin (FLOMAX) 0.4 mg Cap Take 1 capsule (0.4 mg total) by mouth once daily.    warfarin (COUMADIN) 10 MG tablet Take 1 tablet (10 mg total) by mouth Daily.     No current facility-administered medications for this visit.        Review of Systems   Constitutional: Negative for activity change,  "chills, fatigue, fever and unexpected weight change.   Eyes: Negative for discharge, redness and visual disturbance.   Respiratory: Negative for cough, shortness of breath and wheezing.    Cardiovascular: Negative for chest pain and leg swelling.   Gastrointestinal: Negative for abdominal distention, abdominal pain, constipation, diarrhea, nausea and vomiting.   Genitourinary: Positive for difficulty urinating and dysuria. Negative for decreased urine volume, flank pain, frequency, hematuria, penile swelling, scrotal swelling, testicular pain and urgency.   Musculoskeletal: Negative for arthralgias, joint swelling and myalgias.   Skin: Negative for color change and rash.   Neurological: Negative for dizziness and light-headedness.   Psychiatric/Behavioral: Negative for behavioral problems and confusion. The patient is not nervous/anxious.        Objective:      Vitals:    09/19/19 1109   BP: 112/80   Weight: 82.1 kg (180 lb 14.2 oz)   Height: 5' 11" (1.803 m)     Physical Exam   Constitutional: He is oriented to person, place, and time. He appears well-developed.   HENT:   Head: Normocephalic and atraumatic.   Nose: Nose normal.   Eyes: Conjunctivae are normal. Right eye exhibits no discharge. Left eye exhibits no discharge.   Neck: Normal range of motion. Neck supple. No tracheal deviation present. No thyromegaly present.   Cardiovascular: Normal rate and regular rhythm.    Pulmonary/Chest: Effort normal. No respiratory distress. He has no wheezes.   Abdominal: Soft. He exhibits no distension. There is no hepatosplenomegaly. There is no tenderness. There is no CVA tenderness. No hernia.   Genitourinary:   Genitourinary Comments: Patient declined exam   Musculoskeletal: Normal range of motion. He exhibits no edema.   Neurological: He is alert and oriented to person, place, and time.   Skin: Skin is warm and dry. No rash noted. No erythema.     Psychiatric: He has a normal mood and affect. His behavior is normal. " Judgment normal.       Urine dipstick shows negative for all components.  Micro exam: negative for WBC's or RBC's.    Assessment:       1. BPH with obstruction/lower urinary tract symptoms    2. Distended bladder    3. Urinary retention          Plan:       1. BPH with obstruction/lower urinary tract symptoms  -Plan for TURP on 10/18/19 with Dr. Stiles  -Patient will need cardiac clearance to stop coumadin 5 days prior to surgery  -Flomax  -Proscar  - POCT urinalysis, dipstick or tablet reag  - Urine culture    2. Distended bladder  -Suspect related to bladder outlet obstruction    3. Urinary retention  -s/p cysto/UDS on 8/30/19--large PVR, bladder outlet obstruction  -Plan for TURP on 10/18/19            Follow up in about 6 weeks (around 10/31/2019) for Follow up.

## 2019-09-19 NOTE — PROGRESS NOTES
"Subjective:       Patient ID: Carlos A Ruff is a 64 y.o. male who was last seen in this office 8/22/2019    Chief Complaint:   Chief Complaint   Patient presents with    Follow-up     Pt. states he has a little improvment since the test.. sometimes he still strains to urinate..also has some burning at times when he urinates      He was recently admitted in 4/2019 with recurrent DVT. CT scan showed a very distended bladder. He was seen as an inpatient consultation at that time. During his admission he had a barnes in place. Barnes was later removed. Of note, patient with one elevated PVR after barnes removal    He was seen by Dr Arndt in 8/18 with urinary frequency, elevated PVR. He refused catheter or CIC teaching at that time. He was recommended for urodynamics but appears to not have followed up (reports he did not want to travel to [facility]). He is on Flomax. Denies significant voiding issues currently though does report medium to weak stream.     PSA 7/19- 0.96    CT - very distended bladder, bladder wall thickened, large prostate. No hydronephrosis     He reports "pressure in bladder" with voiding. Also reports occasional straining, sensation LOREN and dribbling of urine at times. He denies UI. Denies dysuria, gross hematuria or flank pain. He reports compliance with Flomax    PVR (bladder scan) previous visit - >368 ml (patient voided ~ 30 mins prior to office visit)    He is now s/p cysto/UDS on 8/30/19. He is still straining to void. Also reports occasional dysuria. No new complaints    Cysto/UDS 8/30/19:    Findings of the Procedure:  Very large initial PVR.  Uroflow volume not significant for evaluation.  Significant decreased sensation. No IBC/DO. SILVESTRE not assessed.  Very large bladder capacity.  Normal compliance.  No vesicoureteral reflux.  Slow flow with incomplete bladder emptying.  Bladder contraction noted with delayed voiding.  Normal coordination.  Also with some Valsalva voiding.  Cystoscopy without " bladder tumor or other lesion, mild intravesical component with bilateral hypertrophy of the prostate.     Recommendations:  Appears to have bladder outlet obstruction.  Very large capacity bladder though the bladder contraction was noted.  Recommend bladder outlet reducing procedure such as TURP.    ACTIVE MEDICAL ISSUES:  Patient Active Problem List   Diagnosis    Coronary artery disease involving native coronary artery of native heart without angina pectoris    History of deep vein thrombosis (DVT) of lower extremity    Mass of left foot    Tobacco abuse    Elevated troponin    Chest pain, non-cardiac    DVT, lower extremity, recurrent, right    Prothrombin gene mutation    Lupus anticoagulant positive    Foot swelling    Failure of outpatient treatment    Distended bladder    Bladder outlet obstruction    Bladder wall thickening    Hx of colonic polyp    Urinary retention       ALLERGIES AND MEDICATIONS: updated and reviewed.  Review of patient's allergies indicates:  No Known Allergies  Current Outpatient Medications   Medication Sig    atorvastatin (LIPITOR) 40 MG tablet Take 40 mg by mouth once daily.     ergocalciferol (VITAMIN D2) 50,000 unit Cap Take 50,000 Units by mouth every 7 days.    finasteride (PROSCAR) 5 mg tablet Take 1 tablet (5 mg total) by mouth once daily.    isosorbide mononitrate (IMDUR) 30 MG 24 hr tablet TAKE 1 TABLET BY MOUTH ONCE DAILY    losartan-hydrochlorothiazide 50-12.5 mg (HYZAAR) 50-12.5 mg per tablet Take 1 tablet by mouth once daily.    nitroGLYCERIN (NITROSTAT) 0.4 MG SL tablet Place 0.4 mg under the tongue every 5 (five) minutes as needed for Chest pain.    tamsulosin (FLOMAX) 0.4 mg Cap Take 1 capsule (0.4 mg total) by mouth once daily.    warfarin (COUMADIN) 10 MG tablet Take 1 tablet (10 mg total) by mouth Daily.     No current facility-administered medications for this visit.        Review of Systems   Constitutional: Negative for activity change,  "chills, fatigue, fever and unexpected weight change.   Eyes: Negative for discharge, redness and visual disturbance.   Respiratory: Negative for cough, shortness of breath and wheezing.    Cardiovascular: Negative for chest pain and leg swelling.   Gastrointestinal: Negative for abdominal distention, abdominal pain, constipation, diarrhea, nausea and vomiting.   Genitourinary: Positive for difficulty urinating and dysuria. Negative for decreased urine volume, flank pain, frequency, hematuria, penile swelling, scrotal swelling, testicular pain and urgency.   Musculoskeletal: Negative for arthralgias, joint swelling and myalgias.   Skin: Negative for color change and rash.   Neurological: Negative for dizziness and light-headedness.   Psychiatric/Behavioral: Negative for behavioral problems and confusion. The patient is not nervous/anxious.        Objective:      Vitals:    09/19/19 1109   BP: 112/80   Weight: 82.1 kg (180 lb 14.2 oz)   Height: 5' 11" (1.803 m)     Physical Exam   Constitutional: He is oriented to person, place, and time. He appears well-developed.   HENT:   Head: Normocephalic and atraumatic.   Nose: Nose normal.   Eyes: Conjunctivae are normal. Right eye exhibits no discharge. Left eye exhibits no discharge.   Neck: Normal range of motion. Neck supple. No tracheal deviation present. No thyromegaly present.   Cardiovascular: Normal rate and regular rhythm.    Pulmonary/Chest: Effort normal. No respiratory distress. He has no wheezes.   Abdominal: Soft. He exhibits no distension. There is no hepatosplenomegaly. There is no tenderness. There is no CVA tenderness. No hernia.   Genitourinary:   Genitourinary Comments: Patient declined exam   Musculoskeletal: Normal range of motion. He exhibits no edema.   Neurological: He is alert and oriented to person, place, and time.   Skin: Skin is warm and dry. No rash noted. No erythema.     Psychiatric: He has a normal mood and affect. His behavior is normal. " Judgment normal.       Urine dipstick shows negative for all components.  Micro exam: negative for WBC's or RBC's.    Assessment:       1. BPH with obstruction/lower urinary tract symptoms    2. Distended bladder    3. Urinary retention          Plan:       1. BPH with obstruction/lower urinary tract symptoms  -Plan for TURP on 10/18/19 with Dr. Stiles  -Patient will need cardiac clearance to stop coumadin 5 days prior to surgery  -Flomax  -Proscar  - POCT urinalysis, dipstick or tablet reag  - Urine culture    2. Distended bladder  -Suspect related to bladder outlet obstruction    3. Urinary retention  -s/p cysto/UDS on 8/30/19--large PVR, bladder outlet obstruction  -Plan for TURP on 10/18/19            Follow up in about 6 weeks (around 10/31/2019) for Follow up.

## 2019-09-21 LAB — BACTERIA UR CULT: NO GROWTH

## 2019-09-25 ENCOUNTER — LAB VISIT (OUTPATIENT)
Dept: LAB | Facility: HOSPITAL | Age: 64
End: 2019-09-25
Attending: INTERNAL MEDICINE
Payer: COMMERCIAL

## 2019-09-25 DIAGNOSIS — Z86.718 HISTORY OF DEEP VEIN THROMBOSIS (DVT) OF LOWER EXTREMITY: ICD-10-CM

## 2019-09-25 DIAGNOSIS — D68.52 PROTHROMBIN GENE MUTATION: ICD-10-CM

## 2019-09-25 DIAGNOSIS — I82.401 DVT, LOWER EXTREMITY, RECURRENT, RIGHT: ICD-10-CM

## 2019-09-25 DIAGNOSIS — R76.0 LUPUS ANTICOAGULANT POSITIVE: ICD-10-CM

## 2019-09-25 LAB
INR PPP: 3.9 (ref 0.8–1.2)
PROTHROMBIN TIME: 40.8 SEC (ref 9–12.5)

## 2019-09-25 PROCEDURE — 36415 COLL VENOUS BLD VENIPUNCTURE: CPT

## 2019-09-25 PROCEDURE — 85610 PROTHROMBIN TIME: CPT

## 2019-09-26 ENCOUNTER — ANTI-COAG VISIT (OUTPATIENT)
Dept: CARDIOLOGY | Facility: CLINIC | Age: 64
End: 2019-09-26
Payer: COMMERCIAL

## 2019-09-26 DIAGNOSIS — R76.0 LUPUS ANTICOAGULANT POSITIVE: ICD-10-CM

## 2019-09-26 DIAGNOSIS — Z86.718 HISTORY OF DEEP VEIN THROMBOSIS (DVT) OF LOWER EXTREMITY: ICD-10-CM

## 2019-09-26 DIAGNOSIS — D68.52 PROTHROMBIN GENE MUTATION: ICD-10-CM

## 2019-09-26 DIAGNOSIS — I82.401 DVT, LOWER EXTREMITY, RECURRENT, RIGHT: ICD-10-CM

## 2019-09-26 PROCEDURE — 93793 PR ANTICOAGULANT MGMT FOR PT TAKING WARFARIN: ICD-10-PCS | Mod: S$GLB,,, | Performed by: PHARMACIST

## 2019-09-26 PROCEDURE — 93793 ANTICOAG MGMT PT WARFARIN: CPT | Mod: S$GLB,,, | Performed by: PHARMACIST

## 2019-09-26 NOTE — PROGRESS NOTES
Confirmed taking correct dose of coumadin  Did not eating any veggies this week; will resume intake this week  NO other changes

## 2019-09-29 ENCOUNTER — PATIENT OUTREACH (OUTPATIENT)
Dept: ADMINISTRATIVE | Facility: OTHER | Age: 64
End: 2019-09-29

## 2019-10-02 ENCOUNTER — LAB VISIT (OUTPATIENT)
Dept: LAB | Facility: HOSPITAL | Age: 64
End: 2019-10-02
Attending: INTERNAL MEDICINE
Payer: COMMERCIAL

## 2019-10-02 ENCOUNTER — ANTI-COAG VISIT (OUTPATIENT)
Dept: CARDIOLOGY | Facility: CLINIC | Age: 64
End: 2019-10-02
Payer: COMMERCIAL

## 2019-10-02 DIAGNOSIS — I82.401 DVT, LOWER EXTREMITY, RECURRENT, RIGHT: ICD-10-CM

## 2019-10-02 DIAGNOSIS — R76.0 LUPUS ANTICOAGULANT POSITIVE: ICD-10-CM

## 2019-10-02 DIAGNOSIS — D68.52 PROTHROMBIN GENE MUTATION: ICD-10-CM

## 2019-10-02 DIAGNOSIS — Z86.718 HISTORY OF DEEP VEIN THROMBOSIS (DVT) OF LOWER EXTREMITY: ICD-10-CM

## 2019-10-02 LAB
INR PPP: 1.8 (ref 0.8–1.2)
PROTHROMBIN TIME: 18.8 SEC (ref 9–12.5)

## 2019-10-02 PROCEDURE — 93793 PR ANTICOAGULANT MGMT FOR PT TAKING WARFARIN: ICD-10-PCS | Mod: S$GLB,,, | Performed by: PHARMACIST

## 2019-10-02 PROCEDURE — 36415 COLL VENOUS BLD VENIPUNCTURE: CPT

## 2019-10-02 PROCEDURE — 85610 PROTHROMBIN TIME: CPT

## 2019-10-02 PROCEDURE — 93793 ANTICOAG MGMT PT WARFARIN: CPT | Mod: S$GLB,,, | Performed by: PHARMACIST

## 2019-10-03 ENCOUNTER — TELEPHONE (OUTPATIENT)
Dept: UROLOGY | Facility: CLINIC | Age: 64
End: 2019-10-03

## 2019-10-03 DIAGNOSIS — N13.8 BPH WITH OBSTRUCTION/LOWER URINARY TRACT SYMPTOMS: ICD-10-CM

## 2019-10-03 DIAGNOSIS — N40.1 BPH WITH OBSTRUCTION/LOWER URINARY TRACT SYMPTOMS: ICD-10-CM

## 2019-10-03 DIAGNOSIS — R33.9 INCOMPLETE BLADDER EMPTYING: ICD-10-CM

## 2019-10-03 RX ORDER — TAMSULOSIN HYDROCHLORIDE 0.4 MG/1
0.4 CAPSULE ORAL DAILY
Qty: 30 CAPSULE | Refills: 11 | Status: SHIPPED | OUTPATIENT
Start: 2019-10-03 | End: 2021-02-01

## 2019-10-03 NOTE — TELEPHONE ENCOUNTER
----- Message from Ambika Jackman sent at 10/3/2019  1:55 PM CDT -----  Contact: Carlos A [phone]  Type: Patient Call Back    Who called:Carlos A     What is the request in detail: The patient is requesting a call back from the staff. He would like to push his procedure back to the Jenn holidays    Can the clinic reply by [facility]?no    Would the patient rather a call back or a response via [facility]? Call back     Best call back number:[phone]

## 2019-10-03 NOTE — TELEPHONE ENCOUNTER
Pt would like to push procedure for turp back until dec. Will call us back with some dates once he checks with work schedule.-dontae

## 2019-10-03 NOTE — PROGRESS NOTES
"Pt questioned and unable to verify correct dose of coumadin- reports taking 20 mg on Monday and 10 mg all other days . Pt stated he taken one tablet in the morning and another at 8 pm - informed pt that doesn't sound correct - he couldn't verify if he held dose last week also - reports being under a lot of stress .   In looking at the INR I do not believe he has taken a 20mg dose. We will need close watch to determine the appropriate dose adjustments. "This note will not be shared with the patient."  "

## 2019-10-09 ENCOUNTER — ANTI-COAG VISIT (OUTPATIENT)
Dept: CARDIOLOGY | Facility: CLINIC | Age: 64
End: 2019-10-09
Payer: COMMERCIAL

## 2019-10-09 ENCOUNTER — LAB VISIT (OUTPATIENT)
Dept: LAB | Facility: HOSPITAL | Age: 64
End: 2019-10-09
Attending: INTERNAL MEDICINE
Payer: COMMERCIAL

## 2019-10-09 DIAGNOSIS — Z86.718 HISTORY OF DEEP VEIN THROMBOSIS (DVT) OF LOWER EXTREMITY: ICD-10-CM

## 2019-10-09 DIAGNOSIS — I82.401 DVT, LOWER EXTREMITY, RECURRENT, RIGHT: ICD-10-CM

## 2019-10-09 DIAGNOSIS — R76.0 LUPUS ANTICOAGULANT POSITIVE: ICD-10-CM

## 2019-10-09 DIAGNOSIS — D68.52 PROTHROMBIN GENE MUTATION: ICD-10-CM

## 2019-10-09 LAB
INR PPP: 1.3 (ref 0.8–1.2)
PROTHROMBIN TIME: 14 SEC (ref 9–12.5)

## 2019-10-09 PROCEDURE — 93793 ANTICOAG MGMT PT WARFARIN: CPT | Mod: S$GLB,,, | Performed by: PHARMACIST

## 2019-10-09 PROCEDURE — 93793 PR ANTICOAGULANT MGMT FOR PT TAKING WARFARIN: ICD-10-PCS | Mod: S$GLB,,, | Performed by: PHARMACIST

## 2019-10-09 PROCEDURE — 36415 COLL VENOUS BLD VENIPUNCTURE: CPT

## 2019-10-09 PROCEDURE — 85610 PROTHROMBIN TIME: CPT

## 2019-10-09 NOTE — PROGRESS NOTES
Patient denies any changes in diet, medications, or health that would effect warfarin therapy.  Per patient unable to purchase lovenox until 10/11 (if still needed). I will  Boost his coumadin and repeat INR on 10/11

## 2019-10-14 ENCOUNTER — LAB VISIT (OUTPATIENT)
Dept: LAB | Facility: HOSPITAL | Age: 64
End: 2019-10-14
Attending: INTERNAL MEDICINE
Payer: COMMERCIAL

## 2019-10-14 DIAGNOSIS — D68.52 PROTHROMBIN GENE MUTATION: ICD-10-CM

## 2019-10-14 DIAGNOSIS — I82.401 DVT, LOWER EXTREMITY, RECURRENT, RIGHT: ICD-10-CM

## 2019-10-14 DIAGNOSIS — Z86.718 HISTORY OF DEEP VEIN THROMBOSIS (DVT) OF LOWER EXTREMITY: ICD-10-CM

## 2019-10-14 DIAGNOSIS — R76.0 LUPUS ANTICOAGULANT POSITIVE: ICD-10-CM

## 2019-10-14 LAB
INR PPP: 4.5 (ref 0.8–1.2)
PROTHROMBIN TIME: 47.2 SEC (ref 9–12.5)

## 2019-10-14 PROCEDURE — 36415 COLL VENOUS BLD VENIPUNCTURE: CPT

## 2019-10-14 PROCEDURE — 85610 PROTHROMBIN TIME: CPT

## 2019-10-15 ENCOUNTER — ANTI-COAG VISIT (OUTPATIENT)
Dept: CARDIOLOGY | Facility: CLINIC | Age: 64
End: 2019-10-15
Payer: COMMERCIAL

## 2019-10-15 DIAGNOSIS — R76.0 LUPUS ANTICOAGULANT POSITIVE: ICD-10-CM

## 2019-10-15 DIAGNOSIS — D68.52 PROTHROMBIN GENE MUTATION: ICD-10-CM

## 2019-10-15 DIAGNOSIS — Z86.718 HISTORY OF DEEP VEIN THROMBOSIS (DVT) OF LOWER EXTREMITY: ICD-10-CM

## 2019-10-15 DIAGNOSIS — I82.401 DVT, LOWER EXTREMITY, RECURRENT, RIGHT: ICD-10-CM

## 2019-10-15 PROCEDURE — 93793 PR ANTICOAGULANT MGMT FOR PT TAKING WARFARIN: ICD-10-PCS | Mod: S$GLB,,, | Performed by: PHARMACIST

## 2019-10-15 PROCEDURE — 93793 ANTICOAG MGMT PT WARFARIN: CPT | Mod: S$GLB,,, | Performed by: PHARMACIST

## 2019-10-15 NOTE — PROGRESS NOTES
Confirmed taking correct dose of coumadin.   Reports alcohol intake on Sunday.   NO other changes

## 2019-10-21 ENCOUNTER — LAB VISIT (OUTPATIENT)
Dept: LAB | Facility: HOSPITAL | Age: 64
End: 2019-10-21
Attending: FAMILY MEDICINE
Payer: COMMERCIAL

## 2019-10-21 DIAGNOSIS — I82.401 DVT, LOWER EXTREMITY, RECURRENT, RIGHT: ICD-10-CM

## 2019-10-21 DIAGNOSIS — Z86.718 HISTORY OF DEEP VEIN THROMBOSIS (DVT) OF LOWER EXTREMITY: ICD-10-CM

## 2019-10-21 DIAGNOSIS — D68.52 PROTHROMBIN GENE MUTATION: ICD-10-CM

## 2019-10-21 DIAGNOSIS — R76.0 LUPUS ANTICOAGULANT POSITIVE: ICD-10-CM

## 2019-10-21 LAB
INR PPP: 1.9 (ref 0.8–1.2)
PROTHROMBIN TIME: 19.4 SEC (ref 9–12.5)

## 2019-10-21 PROCEDURE — 36415 COLL VENOUS BLD VENIPUNCTURE: CPT

## 2019-10-21 PROCEDURE — 85610 PROTHROMBIN TIME: CPT

## 2019-10-22 ENCOUNTER — ANTI-COAG VISIT (OUTPATIENT)
Dept: CARDIOLOGY | Facility: CLINIC | Age: 64
End: 2019-10-22
Payer: COMMERCIAL

## 2019-10-22 DIAGNOSIS — D68.52 PROTHROMBIN GENE MUTATION: ICD-10-CM

## 2019-10-22 DIAGNOSIS — Z86.718 HISTORY OF DEEP VEIN THROMBOSIS (DVT) OF LOWER EXTREMITY: ICD-10-CM

## 2019-10-22 DIAGNOSIS — I82.401 DVT, LOWER EXTREMITY, RECURRENT, RIGHT: ICD-10-CM

## 2019-10-22 DIAGNOSIS — R76.0 LUPUS ANTICOAGULANT POSITIVE: ICD-10-CM

## 2019-10-22 PROCEDURE — 93793 ANTICOAG MGMT PT WARFARIN: CPT | Mod: S$GLB,,, | Performed by: PHARMACIST

## 2019-10-22 PROCEDURE — 93793 PR ANTICOAGULANT MGMT FOR PT TAKING WARFARIN: ICD-10-PCS | Mod: S$GLB,,, | Performed by: PHARMACIST

## 2019-10-22 NOTE — PROGRESS NOTES
Confirmed taking 15mg ONCE last, but could not confirmed exact day/dates;   All other days, pt confirmed correctly  Ate Cabbage SUN-TUES 'today'  NO other changes

## 2019-10-29 ENCOUNTER — LAB VISIT (OUTPATIENT)
Dept: LAB | Facility: HOSPITAL | Age: 64
End: 2019-10-29
Attending: INTERNAL MEDICINE
Payer: COMMERCIAL

## 2019-10-29 ENCOUNTER — PATIENT OUTREACH (OUTPATIENT)
Dept: ADMINISTRATIVE | Facility: OTHER | Age: 64
End: 2019-10-29

## 2019-10-29 DIAGNOSIS — R76.0 LUPUS ANTICOAGULANT POSITIVE: ICD-10-CM

## 2019-10-29 DIAGNOSIS — D68.52 PROTHROMBIN GENE MUTATION: ICD-10-CM

## 2019-10-29 DIAGNOSIS — I82.401 DVT, LOWER EXTREMITY, RECURRENT, RIGHT: ICD-10-CM

## 2019-10-29 DIAGNOSIS — Z86.718 HISTORY OF DEEP VEIN THROMBOSIS (DVT) OF LOWER EXTREMITY: ICD-10-CM

## 2019-10-29 LAB
INR PPP: 1.4 (ref 0.8–1.2)
PROTHROMBIN TIME: 14.7 SEC (ref 9–12.5)

## 2019-10-29 PROCEDURE — 85610 PROTHROMBIN TIME: CPT

## 2019-10-29 PROCEDURE — 36415 COLL VENOUS BLD VENIPUNCTURE: CPT

## 2019-10-30 ENCOUNTER — ANTI-COAG VISIT (OUTPATIENT)
Dept: CARDIOLOGY | Facility: CLINIC | Age: 64
End: 2019-10-30
Payer: COMMERCIAL

## 2019-10-30 DIAGNOSIS — R76.0 LUPUS ANTICOAGULANT POSITIVE: ICD-10-CM

## 2019-10-30 DIAGNOSIS — I82.401 DVT, LOWER EXTREMITY, RECURRENT, RIGHT: ICD-10-CM

## 2019-10-30 DIAGNOSIS — Z86.718 HISTORY OF DEEP VEIN THROMBOSIS (DVT) OF LOWER EXTREMITY: ICD-10-CM

## 2019-10-30 DIAGNOSIS — D68.52 PROTHROMBIN GENE MUTATION: ICD-10-CM

## 2019-10-30 PROCEDURE — 93793 PR ANTICOAGULANT MGMT FOR PT TAKING WARFARIN: ICD-10-PCS | Mod: S$GLB,,, | Performed by: PHARMACIST

## 2019-10-30 PROCEDURE — 93793 ANTICOAG MGMT PT WARFARIN: CPT | Mod: S$GLB,,, | Performed by: PHARMACIST

## 2019-10-30 NOTE — PROGRESS NOTES
Confirmed taking incorrect dose of coumadin w/ 15mg SUN, 5mg MON & 10mg all other days   Ate more greens than usual; Spring mixed salad 3-4 days this week  NO other changes. Due to consecutive low INRs I suggest lovenox. Per patient he woul not be able to purchase this until 11/1. By then the INR could possibly be 2.0 with a coumadin boost. We will increase coumadin and check AM STAT on 11/1 to determine if he still needs lovenox.

## 2019-11-01 ENCOUNTER — LAB VISIT (OUTPATIENT)
Dept: LAB | Facility: HOSPITAL | Age: 64
End: 2019-11-01
Attending: INTERNAL MEDICINE
Payer: COMMERCIAL

## 2019-11-01 ENCOUNTER — ANTI-COAG VISIT (OUTPATIENT)
Dept: CARDIOLOGY | Facility: CLINIC | Age: 64
End: 2019-11-01
Payer: COMMERCIAL

## 2019-11-01 DIAGNOSIS — R76.0 LUPUS ANTICOAGULANT POSITIVE: ICD-10-CM

## 2019-11-01 DIAGNOSIS — Z86.718 HISTORY OF DEEP VEIN THROMBOSIS (DVT) OF LOWER EXTREMITY: ICD-10-CM

## 2019-11-01 DIAGNOSIS — D68.52 PROTHROMBIN GENE MUTATION: ICD-10-CM

## 2019-11-01 DIAGNOSIS — I82.401 DVT, LOWER EXTREMITY, RECURRENT, RIGHT: ICD-10-CM

## 2019-11-01 LAB
INR PPP: 1.7 (ref 0.8–1.2)
PROTHROMBIN TIME: 18.1 SEC (ref 9–12.5)

## 2019-11-01 PROCEDURE — 93793 ANTICOAG MGMT PT WARFARIN: CPT | Mod: S$GLB,,, | Performed by: PHARMACIST

## 2019-11-01 PROCEDURE — 85610 PROTHROMBIN TIME: CPT

## 2019-11-01 PROCEDURE — 93793 PR ANTICOAGULANT MGMT FOR PT TAKING WARFARIN: ICD-10-PCS | Mod: S$GLB,,, | Performed by: PHARMACIST

## 2019-11-01 PROCEDURE — 36415 COLL VENOUS BLD VENIPUNCTURE: CPT

## 2019-11-01 NOTE — PROGRESS NOTES
INR improved since 10/29 with boost doses. Patient unwilling to obtain Lovenox due to cost.  Since INR is responding well to increased doses, will continue to adjust to achieve target INR.    Patient did report another siri salad since 10/29, noted simply because dietary changes were the cause of his original INR drop.    In an effort to not overshoot INR, will resume routine dose at this point and recheck INR early next week to ensure it returned to therapeutic range.

## 2019-11-05 ENCOUNTER — LAB VISIT (OUTPATIENT)
Dept: LAB | Facility: HOSPITAL | Age: 64
End: 2019-11-05
Attending: INTERNAL MEDICINE
Payer: COMMERCIAL

## 2019-11-05 DIAGNOSIS — R76.0 LUPUS ANTICOAGULANT POSITIVE: ICD-10-CM

## 2019-11-05 DIAGNOSIS — Z86.718 HISTORY OF DEEP VEIN THROMBOSIS (DVT) OF LOWER EXTREMITY: ICD-10-CM

## 2019-11-05 DIAGNOSIS — D68.52 PROTHROMBIN GENE MUTATION: ICD-10-CM

## 2019-11-05 DIAGNOSIS — I82.401 DVT, LOWER EXTREMITY, RECURRENT, RIGHT: ICD-10-CM

## 2019-11-05 LAB
INR PPP: 2.1 (ref 0.8–1.2)
PROTHROMBIN TIME: 22.3 SEC (ref 9–12.5)

## 2019-11-05 PROCEDURE — 36415 COLL VENOUS BLD VENIPUNCTURE: CPT

## 2019-11-05 PROCEDURE — 85610 PROTHROMBIN TIME: CPT

## 2019-11-06 ENCOUNTER — ANTI-COAG VISIT (OUTPATIENT)
Dept: CARDIOLOGY | Facility: CLINIC | Age: 64
End: 2019-11-06
Payer: COMMERCIAL

## 2019-11-06 DIAGNOSIS — I82.401 DVT, LOWER EXTREMITY, RECURRENT, RIGHT: ICD-10-CM

## 2019-11-06 DIAGNOSIS — R76.0 LUPUS ANTICOAGULANT POSITIVE: ICD-10-CM

## 2019-11-06 DIAGNOSIS — Z86.718 HISTORY OF DEEP VEIN THROMBOSIS (DVT) OF LOWER EXTREMITY: ICD-10-CM

## 2019-11-06 DIAGNOSIS — D68.52 PROTHROMBIN GENE MUTATION: ICD-10-CM

## 2019-11-06 PROCEDURE — 93793 PR ANTICOAGULANT MGMT FOR PT TAKING WARFARIN: ICD-10-PCS | Mod: S$GLB,,, | Performed by: PHARMACIST

## 2019-11-06 PROCEDURE — 93793 ANTICOAG MGMT PT WARFARIN: CPT | Mod: S$GLB,,, | Performed by: PHARMACIST

## 2019-11-08 ENCOUNTER — PATIENT OUTREACH (OUTPATIENT)
Dept: ADMINISTRATIVE | Facility: OTHER | Age: 64
End: 2019-11-08

## 2019-11-11 ENCOUNTER — HOSPITAL ENCOUNTER (OUTPATIENT)
Dept: RADIOLOGY | Facility: HOSPITAL | Age: 64
Discharge: HOME | End: 2019-11-11
Attending: INTERNAL MEDICINE
Payer: COMMERCIAL

## 2019-11-11 DIAGNOSIS — I82.401 RECURRENT ACUTE DEEP VEIN THROMBOSIS (DVT) OF RIGHT LOWER EXTREMITY: ICD-10-CM

## 2019-11-11 DIAGNOSIS — R76.0 LUPUS ANTICOAGULANT POSITIVE: ICD-10-CM

## 2019-11-11 PROCEDURE — 93971 EXTREMITY STUDY: CPT | Mod: TC,RT

## 2019-11-11 PROCEDURE — 93971 US LOWER EXTREMITY VEINS RIGHT: ICD-10-PCS | Mod: 26,RT,, | Performed by: RADIOLOGY

## 2019-11-11 PROCEDURE — 93971 EXTREMITY STUDY: CPT | Mod: 26,RT,, | Performed by: RADIOLOGY

## 2019-11-12 ENCOUNTER — ANTI-COAG VISIT (OUTPATIENT)
Dept: CARDIOLOGY | Facility: CLINIC | Age: 64
End: 2019-11-12
Payer: COMMERCIAL

## 2019-11-12 DIAGNOSIS — I82.401 DVT, LOWER EXTREMITY, RECURRENT, RIGHT: ICD-10-CM

## 2019-11-12 DIAGNOSIS — R76.0 LUPUS ANTICOAGULANT POSITIVE: ICD-10-CM

## 2019-11-12 DIAGNOSIS — D68.52 PROTHROMBIN GENE MUTATION: ICD-10-CM

## 2019-11-12 DIAGNOSIS — Z86.718 HISTORY OF DEEP VEIN THROMBOSIS (DVT) OF LOWER EXTREMITY: ICD-10-CM

## 2019-11-12 PROCEDURE — 93793 PR ANTICOAGULANT MGMT FOR PT TAKING WARFARIN: ICD-10-PCS | Mod: S$GLB,,, | Performed by: PHARMACIST

## 2019-11-12 PROCEDURE — 93793 ANTICOAG MGMT PT WARFARIN: CPT | Mod: S$GLB,,, | Performed by: PHARMACIST

## 2019-11-13 NOTE — PROGRESS NOTES
Confirmed taking coumadin 10mg daily; states missed Friday coumadin dose;     *Patient is NOT willing to purchase lovenox injections*  NO green veggies or any dietary supplements;  Only takes OTW tylenol  NO other changes   INR low likely due to missed dose and taking lower than prescribed coumadin dose

## 2019-11-18 ENCOUNTER — ANTI-COAG VISIT (OUTPATIENT)
Dept: CARDIOLOGY | Facility: CLINIC | Age: 64
End: 2019-11-18
Payer: COMMERCIAL

## 2019-11-18 ENCOUNTER — LAB VISIT (OUTPATIENT)
Dept: LAB | Facility: HOSPITAL | Age: 64
End: 2019-11-18
Attending: INTERNAL MEDICINE
Payer: COMMERCIAL

## 2019-11-18 DIAGNOSIS — Z86.718 HISTORY OF DEEP VEIN THROMBOSIS (DVT) OF LOWER EXTREMITY: ICD-10-CM

## 2019-11-18 DIAGNOSIS — D68.52 PROTHROMBIN GENE MUTATION: ICD-10-CM

## 2019-11-18 DIAGNOSIS — R76.0 LUPUS ANTICOAGULANT POSITIVE: ICD-10-CM

## 2019-11-18 DIAGNOSIS — I82.401 DVT, LOWER EXTREMITY, RECURRENT, RIGHT: ICD-10-CM

## 2019-11-18 LAB
INR PPP: 3.2 (ref 0.8–1.2)
PROTHROMBIN TIME: 33.7 SEC (ref 9–12.5)

## 2019-11-18 PROCEDURE — 85610 PROTHROMBIN TIME: CPT

## 2019-11-18 PROCEDURE — 36415 COLL VENOUS BLD VENIPUNCTURE: CPT

## 2019-11-18 PROCEDURE — 93793 ANTICOAG MGMT PT WARFARIN: CPT | Mod: S$GLB,,, | Performed by: PHARMACIST

## 2019-11-18 PROCEDURE — 93793 PR ANTICOAGULANT MGMT FOR PT TAKING WARFARIN: ICD-10-PCS | Mod: S$GLB,,, | Performed by: PHARMACIST

## 2019-12-02 ENCOUNTER — LAB VISIT (OUTPATIENT)
Dept: LAB | Facility: HOSPITAL | Age: 64
End: 2019-12-02
Attending: INTERNAL MEDICINE
Payer: COMMERCIAL

## 2019-12-02 DIAGNOSIS — Z86.718 HISTORY OF DEEP VEIN THROMBOSIS (DVT) OF LOWER EXTREMITY: ICD-10-CM

## 2019-12-02 DIAGNOSIS — D68.52 PROTHROMBIN GENE MUTATION: ICD-10-CM

## 2019-12-02 DIAGNOSIS — R76.0 LUPUS ANTICOAGULANT POSITIVE: ICD-10-CM

## 2019-12-02 DIAGNOSIS — I82.401 DVT, LOWER EXTREMITY, RECURRENT, RIGHT: ICD-10-CM

## 2019-12-02 LAB
INR PPP: 1 (ref 0.8–1.2)
PROTHROMBIN TIME: 10.8 SEC (ref 9–12.5)

## 2019-12-02 PROCEDURE — 36415 COLL VENOUS BLD VENIPUNCTURE: CPT

## 2019-12-02 PROCEDURE — 85610 PROTHROMBIN TIME: CPT

## 2019-12-03 ENCOUNTER — ANTI-COAG VISIT (OUTPATIENT)
Dept: CARDIOLOGY | Facility: CLINIC | Age: 64
End: 2019-12-03
Payer: COMMERCIAL

## 2019-12-03 DIAGNOSIS — R76.0 LUPUS ANTICOAGULANT POSITIVE: ICD-10-CM

## 2019-12-03 DIAGNOSIS — D68.52 PROTHROMBIN GENE MUTATION: ICD-10-CM

## 2019-12-03 DIAGNOSIS — I82.401 DVT, LOWER EXTREMITY, RECURRENT, RIGHT: ICD-10-CM

## 2019-12-03 DIAGNOSIS — Z86.718 HISTORY OF DEEP VEIN THROMBOSIS (DVT) OF LOWER EXTREMITY: ICD-10-CM

## 2019-12-03 PROCEDURE — 93793 PR ANTICOAGULANT MGMT FOR PT TAKING WARFARIN: ICD-10-PCS | Mod: S$GLB,,, | Performed by: PHARMACIST

## 2019-12-03 PROCEDURE — 93793 ANTICOAG MGMT PT WARFARIN: CPT | Mod: S$GLB,,, | Performed by: PHARMACIST

## 2019-12-03 NOTE — PROGRESS NOTES
"States not taking a few doses due to running out of meds. also not able to purchase them. He will  RX "today"  *compliance will be emphasized.   Had Mustard greens FRI & SAT;   Nyquil recently  NO other changes       "

## 2019-12-11 ENCOUNTER — ANTI-COAG VISIT (OUTPATIENT)
Dept: CARDIOLOGY | Facility: CLINIC | Age: 64
End: 2019-12-11
Payer: COMMERCIAL

## 2019-12-11 ENCOUNTER — LAB VISIT (OUTPATIENT)
Dept: LAB | Facility: HOSPITAL | Age: 64
End: 2019-12-11
Attending: INTERNAL MEDICINE
Payer: COMMERCIAL

## 2019-12-11 DIAGNOSIS — D68.52 PROTHROMBIN GENE MUTATION: ICD-10-CM

## 2019-12-11 DIAGNOSIS — I82.401 DVT, LOWER EXTREMITY, RECURRENT, RIGHT: ICD-10-CM

## 2019-12-11 DIAGNOSIS — Z86.718 HISTORY OF DEEP VEIN THROMBOSIS (DVT) OF LOWER EXTREMITY: ICD-10-CM

## 2019-12-11 DIAGNOSIS — R76.0 LUPUS ANTICOAGULANT POSITIVE: ICD-10-CM

## 2019-12-11 LAB
INR PPP: 1.2 (ref 0.8–1.2)
PROTHROMBIN TIME: 12.7 SEC (ref 9–12.5)

## 2019-12-11 PROCEDURE — 85610 PROTHROMBIN TIME: CPT

## 2019-12-11 PROCEDURE — 93793 PR ANTICOAGULANT MGMT FOR PT TAKING WARFARIN: ICD-10-PCS | Mod: S$GLB,,, | Performed by: PHARMACIST

## 2019-12-11 PROCEDURE — 36415 COLL VENOUS BLD VENIPUNCTURE: CPT

## 2019-12-11 PROCEDURE — 93793 ANTICOAG MGMT PT WARFARIN: CPT | Mod: S$GLB,,, | Performed by: PHARMACIST

## 2019-12-12 NOTE — PROGRESS NOTES
Confirmed taking correct dose of coumadin;  Advised on 12/11 dose  States eating Broccoli SAT & TUES of this week  Reports NO new changes

## 2019-12-17 ENCOUNTER — ANTI-COAG VISIT (OUTPATIENT)
Dept: CARDIOLOGY | Facility: CLINIC | Age: 64
End: 2019-12-17
Payer: COMMERCIAL

## 2019-12-17 ENCOUNTER — LAB VISIT (OUTPATIENT)
Dept: LAB | Facility: HOSPITAL | Age: 64
End: 2019-12-17
Attending: INTERNAL MEDICINE
Payer: COMMERCIAL

## 2019-12-17 DIAGNOSIS — R76.0 LUPUS ANTICOAGULANT POSITIVE: ICD-10-CM

## 2019-12-17 DIAGNOSIS — I82.401 DVT, LOWER EXTREMITY, RECURRENT, RIGHT: ICD-10-CM

## 2019-12-17 DIAGNOSIS — D68.52 PROTHROMBIN GENE MUTATION: ICD-10-CM

## 2019-12-17 DIAGNOSIS — Z86.718 HISTORY OF DEEP VEIN THROMBOSIS (DVT) OF LOWER EXTREMITY: ICD-10-CM

## 2019-12-17 LAB
INR PPP: 1.6 (ref 0.8–1.2)
PROTHROMBIN TIME: 16.4 SEC (ref 9–12.5)

## 2019-12-17 PROCEDURE — 85610 PROTHROMBIN TIME: CPT

## 2019-12-17 PROCEDURE — 93793 ANTICOAG MGMT PT WARFARIN: CPT | Mod: S$GLB,,, | Performed by: PHARMACIST

## 2019-12-17 PROCEDURE — 36415 COLL VENOUS BLD VENIPUNCTURE: CPT

## 2019-12-17 PROCEDURE — 93793 PR ANTICOAGULANT MGMT FOR PT TAKING WARFARIN: ICD-10-PCS | Mod: S$GLB,,, | Performed by: PHARMACIST

## 2019-12-31 ENCOUNTER — LAB VISIT (OUTPATIENT)
Dept: LAB | Facility: HOSPITAL | Age: 64
End: 2019-12-31
Attending: INTERNAL MEDICINE
Payer: COMMERCIAL

## 2019-12-31 ENCOUNTER — ANTI-COAG VISIT (OUTPATIENT)
Dept: CARDIOLOGY | Facility: CLINIC | Age: 64
End: 2019-12-31
Payer: COMMERCIAL

## 2019-12-31 DIAGNOSIS — R76.0 LUPUS ANTICOAGULANT POSITIVE: ICD-10-CM

## 2019-12-31 DIAGNOSIS — I82.401 DVT, LOWER EXTREMITY, RECURRENT, RIGHT: ICD-10-CM

## 2019-12-31 DIAGNOSIS — Z86.718 HISTORY OF DEEP VEIN THROMBOSIS (DVT) OF LOWER EXTREMITY: ICD-10-CM

## 2019-12-31 DIAGNOSIS — D68.52 PROTHROMBIN GENE MUTATION: ICD-10-CM

## 2019-12-31 LAB
INR PPP: 2.3 (ref 0.8–1.2)
PROTHROMBIN TIME: 24.4 SEC (ref 9–12.5)

## 2019-12-31 PROCEDURE — 85610 PROTHROMBIN TIME: CPT

## 2019-12-31 PROCEDURE — 93793 PR ANTICOAGULANT MGMT FOR PT TAKING WARFARIN: ICD-10-PCS | Mod: S$GLB,,,

## 2019-12-31 PROCEDURE — 36415 COLL VENOUS BLD VENIPUNCTURE: CPT

## 2019-12-31 PROCEDURE — 93793 ANTICOAG MGMT PT WARFARIN: CPT | Mod: S$GLB,,,

## 2020-01-15 ENCOUNTER — LAB VISIT (OUTPATIENT)
Dept: LAB | Facility: HOSPITAL | Age: 65
End: 2020-01-15
Attending: INTERNAL MEDICINE
Payer: COMMERCIAL

## 2020-01-15 DIAGNOSIS — Z86.718 HISTORY OF DEEP VEIN THROMBOSIS (DVT) OF LOWER EXTREMITY: ICD-10-CM

## 2020-01-15 DIAGNOSIS — I82.401 DVT, LOWER EXTREMITY, RECURRENT, RIGHT: ICD-10-CM

## 2020-01-15 DIAGNOSIS — R76.0 LUPUS ANTICOAGULANT POSITIVE: ICD-10-CM

## 2020-01-15 DIAGNOSIS — D68.52 PROTHROMBIN GENE MUTATION: ICD-10-CM

## 2020-01-15 LAB
INR PPP: 1.1 (ref 0.8–1.2)
PROTHROMBIN TIME: 11.8 SEC (ref 9–12.5)

## 2020-01-15 PROCEDURE — 36415 COLL VENOUS BLD VENIPUNCTURE: CPT

## 2020-01-15 PROCEDURE — 85610 PROTHROMBIN TIME: CPT

## 2020-01-16 ENCOUNTER — ANTI-COAG VISIT (OUTPATIENT)
Dept: CARDIOLOGY | Facility: CLINIC | Age: 65
End: 2020-01-16
Payer: COMMERCIAL

## 2020-01-16 DIAGNOSIS — D68.52 PROTHROMBIN GENE MUTATION: ICD-10-CM

## 2020-01-16 DIAGNOSIS — R76.0 LUPUS ANTICOAGULANT POSITIVE: ICD-10-CM

## 2020-01-16 DIAGNOSIS — I82.401 DVT, LOWER EXTREMITY, RECURRENT, RIGHT: ICD-10-CM

## 2020-01-16 DIAGNOSIS — Z86.718 HISTORY OF DEEP VEIN THROMBOSIS (DVT) OF LOWER EXTREMITY: ICD-10-CM

## 2020-01-16 PROCEDURE — 93793 PR ANTICOAGULANT MGMT FOR PT TAKING WARFARIN: ICD-10-PCS | Mod: S$GLB,,, | Performed by: PHARMACIST

## 2020-01-16 PROCEDURE — 93793 ANTICOAG MGMT PT WARFARIN: CPT | Mod: S$GLB,,, | Performed by: PHARMACIST

## 2020-01-16 NOTE — PROGRESS NOTES
"Confirmed taking incorrect dose of coumadin w/ a rotation of 10mg & 15mg "every other day"  &     **pts not willing to purchase Lovenox injection due to cost**  Ate Clayton Rose 1/10 & brumattie sprousagar 1/13  NO other changes   I suspect missed doses of coumadin. "This note will not be shared with the patient."  "

## 2020-01-22 ENCOUNTER — LAB VISIT (OUTPATIENT)
Dept: LAB | Facility: HOSPITAL | Age: 65
End: 2020-01-22
Attending: INTERNAL MEDICINE
Payer: COMMERCIAL

## 2020-01-22 ENCOUNTER — ANTI-COAG VISIT (OUTPATIENT)
Dept: CARDIOLOGY | Facility: CLINIC | Age: 65
End: 2020-01-22
Payer: COMMERCIAL

## 2020-01-22 DIAGNOSIS — R76.0 LUPUS ANTICOAGULANT POSITIVE: ICD-10-CM

## 2020-01-22 DIAGNOSIS — D68.52 PROTHROMBIN GENE MUTATION: ICD-10-CM

## 2020-01-22 DIAGNOSIS — Z86.718 HISTORY OF DEEP VEIN THROMBOSIS (DVT) OF LOWER EXTREMITY: ICD-10-CM

## 2020-01-22 DIAGNOSIS — I82.401 DVT, LOWER EXTREMITY, RECURRENT, RIGHT: ICD-10-CM

## 2020-01-22 LAB
INR PPP: 1.2 (ref 0.8–1.2)
PROTHROMBIN TIME: 12.7 SEC (ref 9–12.5)

## 2020-01-22 PROCEDURE — 93793 PR ANTICOAGULANT MGMT FOR PT TAKING WARFARIN: ICD-10-PCS | Mod: S$GLB,,,

## 2020-01-22 PROCEDURE — 85610 PROTHROMBIN TIME: CPT

## 2020-01-22 PROCEDURE — 36415 COLL VENOUS BLD VENIPUNCTURE: CPT

## 2020-01-22 PROCEDURE — 93793 ANTICOAG MGMT PT WARFARIN: CPT | Mod: S$GLB,,,

## 2020-01-22 NOTE — PROGRESS NOTES
Patient instructed to take Coumadin per calendar and INR 1/27/20 which he verbalized understanding and repeated back correctly.  Lovenox was not ordered see pharmacist progress note.

## 2020-01-22 NOTE — PROGRESS NOTES
INR not at goal. Medications, chart, and patient findings reviewed. See calendar for adjustments to dose and follow up plan.  Pt denies any changes.  Although his dosing has been increase his INR has not moved.  He refused lovenox last week due to cost.  Will increase pt's dosing over the next week & re-assess 1/27.  Pt to be informed of risk of being non-compliant & being subtherapeutic.

## 2020-01-27 ENCOUNTER — LAB VISIT (OUTPATIENT)
Dept: LAB | Facility: HOSPITAL | Age: 65
End: 2020-01-27
Attending: INTERNAL MEDICINE
Payer: COMMERCIAL

## 2020-01-27 ENCOUNTER — ANTI-COAG VISIT (OUTPATIENT)
Dept: CARDIOLOGY | Facility: CLINIC | Age: 65
End: 2020-01-27
Payer: COMMERCIAL

## 2020-01-27 DIAGNOSIS — D68.52 PROTHROMBIN GENE MUTATION: ICD-10-CM

## 2020-01-27 DIAGNOSIS — Z86.718 HISTORY OF DEEP VEIN THROMBOSIS (DVT) OF LOWER EXTREMITY: ICD-10-CM

## 2020-01-27 DIAGNOSIS — R76.0 LUPUS ANTICOAGULANT POSITIVE: ICD-10-CM

## 2020-01-27 DIAGNOSIS — I82.401 DVT, LOWER EXTREMITY, RECURRENT, RIGHT: ICD-10-CM

## 2020-01-27 LAB
INR PPP: 2.9 (ref 0.8–1.2)
PROTHROMBIN TIME: 29.9 SEC (ref 9–12.5)

## 2020-01-27 PROCEDURE — 93793 ANTICOAG MGMT PT WARFARIN: CPT | Mod: S$GLB,,, | Performed by: PHARMACIST

## 2020-01-27 PROCEDURE — 93793 PR ANTICOAGULANT MGMT FOR PT TAKING WARFARIN: ICD-10-PCS | Mod: S$GLB,,, | Performed by: PHARMACIST

## 2020-01-27 PROCEDURE — 85610 PROTHROMBIN TIME: CPT

## 2020-01-27 PROCEDURE — 36415 COLL VENOUS BLD VENIPUNCTURE: CPT

## 2020-02-03 ENCOUNTER — ANTI-COAG VISIT (OUTPATIENT)
Dept: CARDIOLOGY | Facility: CLINIC | Age: 65
End: 2020-02-03
Payer: COMMERCIAL

## 2020-02-03 ENCOUNTER — LAB VISIT (OUTPATIENT)
Dept: LAB | Facility: HOSPITAL | Age: 65
End: 2020-02-03
Attending: INTERNAL MEDICINE
Payer: COMMERCIAL

## 2020-02-03 DIAGNOSIS — Z86.718 HISTORY OF DEEP VEIN THROMBOSIS (DVT) OF LOWER EXTREMITY: ICD-10-CM

## 2020-02-03 DIAGNOSIS — I82.401 DVT, LOWER EXTREMITY, RECURRENT, RIGHT: ICD-10-CM

## 2020-02-03 DIAGNOSIS — D68.52 PROTHROMBIN GENE MUTATION: ICD-10-CM

## 2020-02-03 DIAGNOSIS — R76.0 LUPUS ANTICOAGULANT POSITIVE: ICD-10-CM

## 2020-02-03 LAB
INR PPP: 0.9 (ref 0.8–1.2)
PROTHROMBIN TIME: 9.6 SEC (ref 9–12.5)

## 2020-02-03 PROCEDURE — 93793 ANTICOAG MGMT PT WARFARIN: CPT | Mod: S$GLB,,, | Performed by: PHARMACIST

## 2020-02-03 PROCEDURE — 85610 PROTHROMBIN TIME: CPT

## 2020-02-03 PROCEDURE — 36415 COLL VENOUS BLD VENIPUNCTURE: CPT

## 2020-02-03 PROCEDURE — 93793 PR ANTICOAGULANT MGMT FOR PT TAKING WARFARIN: ICD-10-PCS | Mod: S$GLB,,, | Performed by: PHARMACIST

## 2020-02-03 NOTE — PROGRESS NOTES
"I strongly suspect missed coumadin doses. I will aggressively boost but we will repeat INR in 3 days. "This note will not be shared with the patient."  "

## 2020-02-10 ENCOUNTER — LAB VISIT (OUTPATIENT)
Dept: LAB | Facility: HOSPITAL | Age: 65
End: 2020-02-10
Attending: INTERNAL MEDICINE
Payer: COMMERCIAL

## 2020-02-10 ENCOUNTER — ANTI-COAG VISIT (OUTPATIENT)
Dept: CARDIOLOGY | Facility: CLINIC | Age: 65
End: 2020-02-10
Payer: COMMERCIAL

## 2020-02-10 DIAGNOSIS — Z86.718 HISTORY OF DEEP VEIN THROMBOSIS (DVT) OF LOWER EXTREMITY: ICD-10-CM

## 2020-02-10 DIAGNOSIS — I82.401 DVT, LOWER EXTREMITY, RECURRENT, RIGHT: ICD-10-CM

## 2020-02-10 DIAGNOSIS — D68.52 PROTHROMBIN GENE MUTATION: ICD-10-CM

## 2020-02-10 DIAGNOSIS — R76.0 LUPUS ANTICOAGULANT POSITIVE: ICD-10-CM

## 2020-02-10 LAB
INR PPP: 5.3
INR PPP: 5.3 (ref 0.8–1.2)
PROTHROMBIN TIME: 59.2 SEC (ref 9–12.5)

## 2020-02-10 PROCEDURE — 36415 COLL VENOUS BLD VENIPUNCTURE: CPT

## 2020-02-10 PROCEDURE — 93793 PR ANTICOAGULANT MGMT FOR PT TAKING WARFARIN: ICD-10-PCS | Mod: S$GLB,,, | Performed by: PHARMACIST

## 2020-02-10 PROCEDURE — 85610 PROTHROMBIN TIME: CPT

## 2020-02-10 PROCEDURE — 93793 ANTICOAG MGMT PT WARFARIN: CPT | Mod: S$GLB,,, | Performed by: PHARMACIST

## 2020-02-10 NOTE — PROGRESS NOTES
Confirmed taking coumadin 20mg daily (higher than prescribed dose)  Possible fluid both legs/ankles; states having appt for this 2/11  NO other changes

## 2020-02-11 ENCOUNTER — OFFICE VISIT (OUTPATIENT)
Dept: FAMILY MEDICINE | Facility: CLINIC | Age: 65
End: 2020-02-11
Payer: COMMERCIAL

## 2020-02-11 ENCOUNTER — PATIENT OUTREACH (OUTPATIENT)
Dept: ADMINISTRATIVE | Facility: OTHER | Age: 65
End: 2020-02-11

## 2020-02-11 VITALS
OXYGEN SATURATION: 97 % | RESPIRATION RATE: 16 BRPM | SYSTOLIC BLOOD PRESSURE: 134 MMHG | HEART RATE: 56 BPM | TEMPERATURE: 98 F | BODY MASS INDEX: 25.58 KG/M2 | WEIGHT: 182.75 LBS | DIASTOLIC BLOOD PRESSURE: 84 MMHG | HEIGHT: 71 IN

## 2020-02-11 DIAGNOSIS — M25.561 CHRONIC PAIN OF BOTH KNEES: Primary | ICD-10-CM

## 2020-02-11 DIAGNOSIS — Z86.718 HISTORY OF DEEP VEIN THROMBOSIS (DVT) OF LOWER EXTREMITY: ICD-10-CM

## 2020-02-11 DIAGNOSIS — G89.29 CHRONIC PAIN OF BOTH KNEES: Primary | ICD-10-CM

## 2020-02-11 DIAGNOSIS — M25.562 CHRONIC PAIN OF BOTH KNEES: Primary | ICD-10-CM

## 2020-02-11 PROCEDURE — 3008F PR BODY MASS INDEX (BMI) DOCUMENTED: ICD-10-PCS | Mod: CPTII,S$GLB,, | Performed by: NURSE PRACTITIONER

## 2020-02-11 PROCEDURE — 99999 PR PBB SHADOW E&M-EST. PATIENT-LVL IV: ICD-10-PCS | Mod: PBBFAC,,, | Performed by: NURSE PRACTITIONER

## 2020-02-11 PROCEDURE — 99213 PR OFFICE/OUTPT VISIT, EST, LEVL III, 20-29 MIN: ICD-10-PCS | Mod: S$GLB,,, | Performed by: NURSE PRACTITIONER

## 2020-02-11 PROCEDURE — 3075F PR MOST RECENT SYSTOLIC BLOOD PRESS GE 130-139MM HG: ICD-10-PCS | Mod: CPTII,S$GLB,, | Performed by: NURSE PRACTITIONER

## 2020-02-11 PROCEDURE — 3075F SYST BP GE 130 - 139MM HG: CPT | Mod: CPTII,S$GLB,, | Performed by: NURSE PRACTITIONER

## 2020-02-11 PROCEDURE — 99213 OFFICE O/P EST LOW 20 MIN: CPT | Mod: S$GLB,,, | Performed by: NURSE PRACTITIONER

## 2020-02-11 PROCEDURE — 3079F PR MOST RECENT DIASTOLIC BLOOD PRESSURE 80-89 MM HG: ICD-10-PCS | Mod: CPTII,S$GLB,, | Performed by: NURSE PRACTITIONER

## 2020-02-11 PROCEDURE — 99999 PR PBB SHADOW E&M-EST. PATIENT-LVL IV: CPT | Mod: PBBFAC,,, | Performed by: NURSE PRACTITIONER

## 2020-02-11 PROCEDURE — 3008F BODY MASS INDEX DOCD: CPT | Mod: CPTII,S$GLB,, | Performed by: NURSE PRACTITIONER

## 2020-02-11 PROCEDURE — 3079F DIAST BP 80-89 MM HG: CPT | Mod: CPTII,S$GLB,, | Performed by: NURSE PRACTITIONER

## 2020-02-11 NOTE — PROGRESS NOTES
Routine Office Visit    Patient Name: Carlos A Ruff    : 1955  MRN: [id number]    Chief Complaint:  Bilateral knee pain    Subjective:  Carlos A is a 64 y.o. male who presents today for:    1.  Bilateral knee pain - patient reports today for evaluation of bilateral knee pain. He states that he has been having pain on the bilateral knees for the last 6 months.  He states that on both knees the pain is located on both medial and lateral aspects of the knees and does not radiate anywhere.  He states that he normally has a lot of swelling in the knee, but the swelling has decreased somewhat today.  He wears an over-the-counter knee sleeve every day on both knees.  Pain is aching in nature.  He endorses varying levels of stiffness in bilateral knees throughout the day.  Denies any fevers, chills, chest pain, wheezing, shortness of breath, or palpitations.  Denies any nausea, vomiting, diarrhea, abdominal pain, or other constitutional symptoms.  Endorses a popping sensation the knees but denies any sensations of the knee locking or giving out.  Denies any numbness, tingling, or weakness of the bilateral lower extremities.  Endorses some swelling on the right lower leg pretibially but he states this is chronic and stable.  He has a chronic DVT in the right leg for which he takes warfarin daily.  Denies any calf pain or tenderness. He has taken Tylenol for the symptoms with only partial relief.  Patient is new to me and this is the extent of his concerns at this time.    Past Medical History  Past Medical History:   Diagnosis Date    Anticoagulant long-term use     Colon polyps     Coronary artery disease     Hypercholesteremia     Hypertension     MI (myocardial infarction)        Past Surgical History  Past Surgical History:   Procedure Laterality Date    blood clots      cardiac stents      COLONOSCOPY N/A 2019    Procedure: COLONOSCOPY;  Surgeon: Blanca Fenton MD;  Location: [facility];  Service: Endoscopy;   Laterality: N/A;  RX PLAVIX/XARELTO ok to stop (7 days, 3 days) per Dr. Cordero see scan    CYSTOSCOPY WITH URODYNAMIC TESTING N/A 8/30/2019    Procedure: CYSTOSCOPY,WITH URODYNAMIC TESTING;  Surgeon: Felecia Stiles MD;  Location: [facility] OR;  Service: Urology;  Laterality: N/A;  RN PRE OP 8-27-19       Family History  No family history on file.    Social History  Social History     Socioeconomic History    Marital status: Single     Spouse name: Not on file    Number of children: Not on file    Years of education: Not on file    Highest education level: Not on file   Occupational History    Not on file   Social Needs    Financial resource strain: Not on file    Food insecurity:     Worry: Not on file     Inability: Not on file    Transportation needs:     Medical: Not on file     Non-medical: Not on file   Tobacco Use    Smoking status: Current Some Day Smoker     Packs/day: 0.25     Types: Cigarettes    Smokeless tobacco: Never Used   Substance and Sexual Activity    Alcohol use: Yes     Comment: occasionally     Drug use: Never    Sexual activity: Not Currently   Lifestyle    Physical activity:     Days per week: Not on file     Minutes per session: Not on file    Stress: Not on file   Relationships    Social connections:     Talks on phone: Not on file     Gets together: Not on file     Attends Islam service: Not on file     Active member of club or organization: Not on file     Attends meetings of clubs or organizations: Not on file     Relationship status: Not on file   Other Topics Concern    Not on file   Social History Narrative    Not on file       Current Medications  Current Outpatient Medications on File Prior to Visit   Medication Sig Dispense Refill    atorvastatin (LIPITOR) 40 MG tablet Take 40 mg by mouth once daily.       ergocalciferol (VITAMIN D2) 50,000 unit Cap Take 50,000 Units by mouth every 7 days.      finasteride (PROSCAR) 5 mg tablet Take 1 tablet (5 mg total)  "by mouth once daily. 30 tablet 11    isosorbide mononitrate (IMDUR) 30 MG 24 hr tablet TAKE 1 TABLET BY MOUTH ONCE DAILY 30 tablet 11    nitroGLYCERIN (NITROSTAT) 0.4 MG SL tablet Place 0.4 mg under the tongue every 5 (five) minutes as needed for Chest pain.      tamsulosin (FLOMAX) 0.4 mg Cap Take 1 capsule (0.4 mg total) by mouth once daily. 30 capsule 11    warfarin (COUMADIN) 10 MG tablet Take 1 tablet (10 mg total) by mouth Daily. 30 tablet 11    losartan-hydrochlorothiazide 50-12.5 mg (HYZAAR) 50-12.5 mg per tablet Take 1 tablet by mouth once daily. 90 tablet 3     No current facility-administered medications on file prior to visit.        Allergies   Review of patient's allergies indicates:  No Known Allergies    Review of Systems (Pertinent positives)  Review of Systems   Constitutional: Negative for chills and fever.   HENT: Negative.    Respiratory: Negative for cough, hemoptysis, sputum production, shortness of breath and wheezing.    Cardiovascular: Positive for leg swelling. Negative for chest pain, palpitations, orthopnea and claudication.   Gastrointestinal: Negative.    Genitourinary: Negative.    Musculoskeletal: Positive for back pain, joint pain and myalgias. Negative for falls and neck pain.   Skin: Negative.    Neurological: Negative.    Endo/Heme/Allergies: Negative.    Psychiatric/Behavioral: Negative.        /84 (BP Location: Right arm, Patient Position: Sitting, BP Method: Medium (Manual))   Pulse (!) 56   Temp 98.2 °F (36.8 °C) (Oral)   Resp 16   Ht 5' 11" (1.803 m)   Wt 82.9 kg (182 lb 12.2 oz)   SpO2 97%   BMI 25.49 kg/m²     Physical Exam   Constitutional: He is oriented to person, place, and time. He appears well-developed and well-nourished.  Non-toxic appearance. He does not have a sickly appearance. He does not appear ill. No distress.   Neck: Normal range of motion. No JVD present.   Cardiovascular: Normal rate, regular rhythm, normal heart sounds and intact distal " pulses. Exam reveals no gallop and no friction rub.   No murmur heard.  Clinically euvolemic no JVD   Pulmonary/Chest: Effort normal and breath sounds normal. No stridor. No respiratory distress. He has no wheezes. He has no rales. He exhibits no tenderness.   Musculoskeletal: Normal range of motion.        Right knee: He exhibits effusion. He exhibits normal range of motion, no swelling, no ecchymosis, no deformity, no laceration, no erythema, normal alignment, no LCL laxity, normal patellar mobility, no bony tenderness, normal meniscus and no MCL laxity. No tenderness found.        Left knee: He exhibits effusion. He exhibits normal range of motion, no swelling, no ecchymosis, no deformity, no laceration, no erythema, normal alignment, no LCL laxity, normal patellar mobility, no bony tenderness, normal meniscus and no MCL laxity. No tenderness found.   Bilateral knee effusions exhibited right R>L    Mild 1+ pretibial edema noted on right leg    No calf tenderness bilaterally; negative Homans sign bilaterally   Lymphadenopathy:     He has no cervical adenopathy.   Neurological: He is alert and oriented to person, place, and time.   Skin: Skin is warm and dry. Capillary refill takes less than 2 seconds. He is not diaphoretic.   Vitals reviewed.       Assessment/Plan:  Carlos A Ruff is a 64 y.o. male who presents today for :    Carlos A was seen today for knee pain.    Diagnoses and all orders for this visit:    Chronic pain of both knees  -     Ambulatory referral/consult to Orthopedics; Future  -     Ambulatory referral/consult to Physical/Occupational Therapy; Future     Given length of symptoms were refer to Orthopedics for further evaluation.  Will defer imaging to them.  Patient is amenable to starting physical therapy for his knee pain as well.  Continue Tylenol and OTC knee bracing.  Topical heat may help as well.  Will avoid NSAIDs as patient is taking warfarin.  Patient verbalized understanding of  instructions.    History of deep vein thrombosis (DVT) of lower extremity    Stable symptoms no calf pain no calf swelling no calf tenderness to suggest worsening of current DVT or formation of new DVT.  Continue warfarin as directed by Cardiology.    Patient verbalized understanding of instructions.        This office note has been dictated.  This dictation has been generated using M-Modal Fluency Direct dictation; some phonetic errors may occur.   My collaborating physician is Dr. Mark Thornton.

## 2020-02-13 ENCOUNTER — LAB VISIT (OUTPATIENT)
Dept: LAB | Facility: HOSPITAL | Age: 65
End: 2020-02-13
Attending: INTERNAL MEDICINE
Payer: COMMERCIAL

## 2020-02-13 ENCOUNTER — ANTI-COAG VISIT (OUTPATIENT)
Dept: CARDIOLOGY | Facility: CLINIC | Age: 65
End: 2020-02-13
Payer: COMMERCIAL

## 2020-02-13 DIAGNOSIS — D68.52 PROTHROMBIN GENE MUTATION: ICD-10-CM

## 2020-02-13 DIAGNOSIS — R76.0 LUPUS ANTICOAGULANT POSITIVE: ICD-10-CM

## 2020-02-13 DIAGNOSIS — I82.401 DVT, LOWER EXTREMITY, RECURRENT, RIGHT: ICD-10-CM

## 2020-02-13 DIAGNOSIS — Z86.718 HISTORY OF DEEP VEIN THROMBOSIS (DVT) OF LOWER EXTREMITY: ICD-10-CM

## 2020-02-13 LAB
INR PPP: 3.8 (ref 0.8–1.2)
PROTHROMBIN TIME: 42.6 SEC (ref 9–12.5)

## 2020-02-13 PROCEDURE — 85610 PROTHROMBIN TIME: CPT

## 2020-02-13 PROCEDURE — 93793 PR ANTICOAGULANT MGMT FOR PT TAKING WARFARIN: ICD-10-PCS | Mod: S$GLB,,, | Performed by: PHARMACIST

## 2020-02-13 PROCEDURE — 36415 COLL VENOUS BLD VENIPUNCTURE: CPT | Mod: PN

## 2020-02-13 PROCEDURE — 93793 ANTICOAG MGMT PT WARFARIN: CPT | Mod: S$GLB,,, | Performed by: PHARMACIST

## 2020-02-13 NOTE — PROGRESS NOTES
Confirmed after holding dose MON & TUES, pt resumed taking 10mg WED  NO veggies or any supplements recently  NO other changes

## 2020-02-20 ENCOUNTER — ANTI-COAG VISIT (OUTPATIENT)
Dept: CARDIOLOGY | Facility: CLINIC | Age: 65
End: 2020-02-20
Payer: COMMERCIAL

## 2020-02-20 ENCOUNTER — LAB VISIT (OUTPATIENT)
Dept: LAB | Facility: HOSPITAL | Age: 65
End: 2020-02-20
Attending: INTERNAL MEDICINE
Payer: COMMERCIAL

## 2020-02-20 DIAGNOSIS — D68.52 PROTHROMBIN GENE MUTATION: ICD-10-CM

## 2020-02-20 DIAGNOSIS — Z86.718 HISTORY OF DEEP VEIN THROMBOSIS (DVT) OF LOWER EXTREMITY: ICD-10-CM

## 2020-02-20 DIAGNOSIS — R76.0 LUPUS ANTICOAGULANT POSITIVE: ICD-10-CM

## 2020-02-20 DIAGNOSIS — I82.401 DVT, LOWER EXTREMITY, RECURRENT, RIGHT: ICD-10-CM

## 2020-02-20 LAB
INR PPP: 1.5 (ref 0.8–1.2)
PROTHROMBIN TIME: 16.5 SEC (ref 9–12.5)

## 2020-02-20 PROCEDURE — 93793 ANTICOAG MGMT PT WARFARIN: CPT | Mod: S$GLB,,, | Performed by: PHARMACIST

## 2020-02-20 PROCEDURE — 85610 PROTHROMBIN TIME: CPT

## 2020-02-20 PROCEDURE — 93793 PR ANTICOAGULANT MGMT FOR PT TAKING WARFARIN: ICD-10-PCS | Mod: S$GLB,,, | Performed by: PHARMACIST

## 2020-02-20 PROCEDURE — 36415 COLL VENOUS BLD VENIPUNCTURE: CPT

## 2020-02-20 NOTE — PROGRESS NOTES
Confirmed taking coumadin ((10mg)) FRI, SUN, TUES & WED;     All other days 15mg  Ate Jay salad w/ tomatos on WED   NO other changes

## 2020-02-24 ENCOUNTER — ANTI-COAG VISIT (OUTPATIENT)
Dept: CARDIOLOGY | Facility: CLINIC | Age: 65
End: 2020-02-24
Payer: COMMERCIAL

## 2020-02-24 ENCOUNTER — LAB VISIT (OUTPATIENT)
Dept: LAB | Facility: HOSPITAL | Age: 65
End: 2020-02-24
Attending: INTERNAL MEDICINE
Payer: COMMERCIAL

## 2020-02-24 DIAGNOSIS — Z86.718 HISTORY OF DEEP VEIN THROMBOSIS (DVT) OF LOWER EXTREMITY: ICD-10-CM

## 2020-02-24 DIAGNOSIS — D68.52 PROTHROMBIN GENE MUTATION: ICD-10-CM

## 2020-02-24 DIAGNOSIS — I82.401 DVT, LOWER EXTREMITY, RECURRENT, RIGHT: ICD-10-CM

## 2020-02-24 DIAGNOSIS — R76.0 LUPUS ANTICOAGULANT POSITIVE: ICD-10-CM

## 2020-02-24 LAB
INR PPP: 1.1 (ref 0.8–1.2)
PROTHROMBIN TIME: 12.7 SEC (ref 9–12.5)

## 2020-02-24 PROCEDURE — 85610 PROTHROMBIN TIME: CPT

## 2020-02-24 PROCEDURE — 93793 PR ANTICOAGULANT MGMT FOR PT TAKING WARFARIN: ICD-10-PCS | Mod: S$GLB,,, | Performed by: PHARMACIST

## 2020-02-24 PROCEDURE — 93793 ANTICOAG MGMT PT WARFARIN: CPT | Mod: S$GLB,,, | Performed by: PHARMACIST

## 2020-02-24 PROCEDURE — 36415 COLL VENOUS BLD VENIPUNCTURE: CPT

## 2020-02-24 NOTE — PROGRESS NOTES
"Patient denies any changes in diet, medications, or health that would effect warfarin therapy.  I suspect missed doses of coumadin. "This note will not be shared with the patient."  "

## 2020-02-27 ENCOUNTER — ANTI-COAG VISIT (OUTPATIENT)
Dept: CARDIOLOGY | Facility: CLINIC | Age: 65
End: 2020-02-27
Payer: COMMERCIAL

## 2020-02-27 ENCOUNTER — LAB VISIT (OUTPATIENT)
Dept: LAB | Facility: HOSPITAL | Age: 65
End: 2020-02-27
Attending: INTERNAL MEDICINE
Payer: COMMERCIAL

## 2020-02-27 DIAGNOSIS — Z86.718 HISTORY OF DEEP VEIN THROMBOSIS (DVT) OF LOWER EXTREMITY: ICD-10-CM

## 2020-02-27 DIAGNOSIS — I82.401 DVT, LOWER EXTREMITY, RECURRENT, RIGHT: ICD-10-CM

## 2020-02-27 DIAGNOSIS — D68.52 PROTHROMBIN GENE MUTATION: ICD-10-CM

## 2020-02-27 DIAGNOSIS — R76.0 LUPUS ANTICOAGULANT POSITIVE: ICD-10-CM

## 2020-02-27 LAB
INR PPP: 1.8 (ref 0.8–1.2)
PROTHROMBIN TIME: 20.3 SEC (ref 9–12.5)

## 2020-02-27 PROCEDURE — 93793 ANTICOAG MGMT PT WARFARIN: CPT | Mod: S$GLB,,, | Performed by: PHARMACIST

## 2020-02-27 PROCEDURE — 36415 COLL VENOUS BLD VENIPUNCTURE: CPT

## 2020-02-27 PROCEDURE — 85610 PROTHROMBIN TIME: CPT

## 2020-02-27 PROCEDURE — 93793 PR ANTICOAGULANT MGMT FOR PT TAKING WARFARIN: ICD-10-PCS | Mod: S$GLB,,, | Performed by: PHARMACIST

## 2020-02-27 NOTE — PROGRESS NOTES
Confirmed taking correct dose of coumadin  Ate a siri salad once this week  NO other new changes

## 2020-03-02 ENCOUNTER — ANTI-COAG VISIT (OUTPATIENT)
Dept: CARDIOLOGY | Facility: CLINIC | Age: 65
End: 2020-03-02
Payer: MEDICAID

## 2020-03-02 ENCOUNTER — LAB VISIT (OUTPATIENT)
Dept: LAB | Facility: HOSPITAL | Age: 65
End: 2020-03-02
Attending: INTERNAL MEDICINE
Payer: MEDICAID

## 2020-03-02 DIAGNOSIS — D68.52 PROTHROMBIN GENE MUTATION: ICD-10-CM

## 2020-03-02 DIAGNOSIS — R76.0 LUPUS ANTICOAGULANT POSITIVE: ICD-10-CM

## 2020-03-02 DIAGNOSIS — I82.401 DVT, LOWER EXTREMITY, RECURRENT, RIGHT: ICD-10-CM

## 2020-03-02 DIAGNOSIS — Z86.718 HISTORY OF DEEP VEIN THROMBOSIS (DVT) OF LOWER EXTREMITY: ICD-10-CM

## 2020-03-02 LAB
INR PPP: 1.9 (ref 0.8–1.2)
PROTHROMBIN TIME: 21 SEC (ref 9–12.5)

## 2020-03-02 PROCEDURE — 36415 COLL VENOUS BLD VENIPUNCTURE: CPT

## 2020-03-02 PROCEDURE — 93793 PR ANTICOAGULANT MGMT FOR PT TAKING WARFARIN: ICD-10-PCS | Mod: S$GLB,,, | Performed by: PHARMACIST

## 2020-03-02 PROCEDURE — 85610 PROTHROMBIN TIME: CPT

## 2020-03-02 PROCEDURE — 93793 ANTICOAG MGMT PT WARFARIN: CPT | Mod: S$GLB,,, | Performed by: PHARMACIST

## 2020-03-02 NOTE — PROGRESS NOTES
Patient denies any changes in diet, medications, or health that would effect warfarin therapy.

## 2020-03-06 ENCOUNTER — ANTI-COAG VISIT (OUTPATIENT)
Dept: CARDIOLOGY | Facility: CLINIC | Age: 65
End: 2020-03-06
Payer: MEDICAID

## 2020-03-06 ENCOUNTER — LAB VISIT (OUTPATIENT)
Dept: LAB | Facility: HOSPITAL | Age: 65
End: 2020-03-06
Attending: INTERNAL MEDICINE
Payer: MEDICAID

## 2020-03-06 DIAGNOSIS — Z86.718 HISTORY OF DEEP VEIN THROMBOSIS (DVT) OF LOWER EXTREMITY: ICD-10-CM

## 2020-03-06 DIAGNOSIS — D68.52 PROTHROMBIN GENE MUTATION: ICD-10-CM

## 2020-03-06 DIAGNOSIS — R76.0 LUPUS ANTICOAGULANT POSITIVE: ICD-10-CM

## 2020-03-06 DIAGNOSIS — I82.401 DVT, LOWER EXTREMITY, RECURRENT, RIGHT: ICD-10-CM

## 2020-03-06 LAB
INR PPP: 2 (ref 0.8–1.2)
PROTHROMBIN TIME: 22.7 SEC (ref 9–12.5)

## 2020-03-06 PROCEDURE — 93793 PR ANTICOAGULANT MGMT FOR PT TAKING WARFARIN: ICD-10-PCS | Mod: S$GLB,,,

## 2020-03-06 PROCEDURE — 85610 PROTHROMBIN TIME: CPT

## 2020-03-06 PROCEDURE — 93793 ANTICOAG MGMT PT WARFARIN: CPT | Mod: S$GLB,,,

## 2020-03-06 PROCEDURE — 36415 COLL VENOUS BLD VENIPUNCTURE: CPT

## 2020-03-09 ENCOUNTER — OFFICE VISIT (OUTPATIENT)
Dept: FAMILY MEDICINE | Facility: CLINIC | Age: 65
End: 2020-03-09
Payer: MEDICAID

## 2020-03-09 VITALS
DIASTOLIC BLOOD PRESSURE: 83 MMHG | RESPIRATION RATE: 18 BRPM | TEMPERATURE: 98 F | OXYGEN SATURATION: 97 % | HEIGHT: 71 IN | SYSTOLIC BLOOD PRESSURE: 138 MMHG | HEART RATE: 60 BPM | BODY MASS INDEX: 25.31 KG/M2 | WEIGHT: 180.75 LBS

## 2020-03-09 DIAGNOSIS — R07.9 CHEST PAIN, UNSPECIFIED TYPE: ICD-10-CM

## 2020-03-09 DIAGNOSIS — Z86.718 HISTORY OF BLOOD CLOTS: ICD-10-CM

## 2020-03-09 DIAGNOSIS — I73.9 BILATERAL CLAUDICATION OF LOWER LIMB: Primary | ICD-10-CM

## 2020-03-09 DIAGNOSIS — D68.52 PROTHROMBIN GENE MUTATION: ICD-10-CM

## 2020-03-09 DIAGNOSIS — R76.0 LUPUS ANTICOAGULANT POSITIVE: ICD-10-CM

## 2020-03-09 DIAGNOSIS — Z72.0 TOBACCO ABUSE: ICD-10-CM

## 2020-03-09 PROCEDURE — 99214 OFFICE O/P EST MOD 30 MIN: CPT | Mod: PBBFAC,PN | Performed by: FAMILY MEDICINE

## 2020-03-09 PROCEDURE — 99214 OFFICE O/P EST MOD 30 MIN: CPT | Mod: S$GLB,,, | Performed by: FAMILY MEDICINE

## 2020-03-09 PROCEDURE — 99999 PR PBB SHADOW E&M-EST. PATIENT-LVL IV: ICD-10-PCS | Mod: PBBFAC,,, | Performed by: FAMILY MEDICINE

## 2020-03-09 PROCEDURE — 99999 PR PBB SHADOW E&M-EST. PATIENT-LVL IV: CPT | Mod: PBBFAC,,, | Performed by: FAMILY MEDICINE

## 2020-03-09 PROCEDURE — 99214 PR OFFICE/OUTPT VISIT, EST, LEVL IV, 30-39 MIN: ICD-10-PCS | Mod: S$GLB,,, | Performed by: FAMILY MEDICINE

## 2020-03-09 NOTE — PROGRESS NOTES
Routine Office Visit    Patient Name: Carlos A Ruff    : 1955  MRN: [id number]    Subjective:  Carlos A is a 64 y.o. male who presents today for:    1. Leg pain  Patient presenting today with recurring leg pain that occurs when standing for prolonged periods of time or with prolonged time of walking.  He states that the pain sometimes worsens when wearing compression stockings.  He has a history of CAD with one stent and is followed by Dr. Cordero.  Patient does continue to smoke.  He has discoloration of right ankle.  There has been chest pain that is worsened on palpation.  He states that he is under a lot of stress as he had to quit his last job due to the pain and needing to be on his feet a lot.    Past Medical History  Past Medical History:   Diagnosis Date    Anticoagulant long-term use     Colon polyps     Coronary artery disease     Hypercholesteremia     Hypertension     MI (myocardial infarction)        Past Surgical History  Past Surgical History:   Procedure Laterality Date    blood clots      cardiac stents      COLONOSCOPY N/A 2019    Procedure: COLONOSCOPY;  Surgeon: Blanca Fenton MD;  Location: [facility] ENDO;  Service: Endoscopy;  Laterality: N/A;  RX PLAVIX/XARELTO ok to stop (7 days, 3 days) per Dr. Cordero see scan    CYSTOSCOPY WITH URODYNAMIC TESTING N/A 2019    Procedure: CYSTOSCOPY,WITH URODYNAMIC TESTING;  Surgeon: Felecia Stiles MD;  Location: [facility] OR;  Service: Urology;  Laterality: N/A;  RN PRE OP 19       Family History  History reviewed. No pertinent family history.    Social History  Social History     Socioeconomic History    Marital status: Single     Spouse name: Not on file    Number of children: Not on file    Years of education: Not on file    Highest education level: Not on file   Occupational History    Not on file   Social Needs    Financial resource strain: Not on file    Food insecurity:     Worry: Not on file     Inability: Not on file     Transportation needs:     Medical: Not on file     Non-medical: Not on file   Tobacco Use    Smoking status: Current Some Day Smoker     Packs/day: 0.25     Types: Cigarettes    Smokeless tobacco: Never Used   Substance and Sexual Activity    Alcohol use: Yes     Comment: occasionally     Drug use: Never    Sexual activity: Not Currently   Lifestyle    Physical activity:     Days per week: Not on file     Minutes per session: Not on file    Stress: Not on file   Relationships    Social connections:     Talks on phone: Not on file     Gets together: Not on file     Attends Jain service: Not on file     Active member of club or organization: Not on file     Attends meetings of clubs or organizations: Not on file     Relationship status: Not on file   Other Topics Concern    Not on file   Social History Narrative    Not on file       Current Medications  Current Outpatient Medications on File Prior to Visit   Medication Sig Dispense Refill    ergocalciferol (VITAMIN D2) 50,000 unit Cap Take 50,000 Units by mouth every 7 days.      finasteride (PROSCAR) 5 mg tablet Take 1 tablet (5 mg total) by mouth once daily. 30 tablet 11    nitroGLYCERIN (NITROSTAT) 0.4 MG SL tablet Place 0.4 mg under the tongue every 5 (five) minutes as needed for Chest pain.      tamsulosin (FLOMAX) 0.4 mg Cap Take 1 capsule (0.4 mg total) by mouth once daily. 30 capsule 11    warfarin (COUMADIN) 10 MG tablet Take 1 tablet (10 mg total) by mouth Daily. 30 tablet 11    atorvastatin (LIPITOR) 40 MG tablet Take 40 mg by mouth once daily.       isosorbide mononitrate (IMDUR) 30 MG 24 hr tablet TAKE 1 TABLET BY MOUTH ONCE DAILY 30 tablet 11    losartan-hydrochlorothiazide 50-12.5 mg (HYZAAR) 50-12.5 mg per tablet Take 1 tablet by mouth once daily. 90 tablet 3     No current facility-administered medications on file prior to visit.        Allergies   Review of patient's allergies indicates:  No Known Allergies    Review of Systems  "(Pertinent positives)  Review of Systems   Constitutional: Negative.    HENT: Negative.    Eyes: Negative.    Respiratory: Negative.    Cardiovascular: Positive for chest pain and claudication.   Gastrointestinal: Negative.    Genitourinary: Negative.    Musculoskeletal: Positive for myalgias.   Skin: Negative.    Neurological: Negative.          /83 (BP Location: Right arm, Patient Position: Sitting, BP Method: Large (Manual))   Pulse 60   Temp 98.3 °F (36.8 °C)   Resp 18   Ht 5' 11" (1.803 m)   Wt 82 kg (180 lb 12.4 oz)   SpO2 97%   BMI 25.21 kg/m²     GENERAL APPEARANCE: in no apparent distress and well developed and well nourished  HEENT: PERRL, EOMI, Sclera clear, anicteric, Oropharynx clear, no lesions, Neck supple with midline trachea  NECK: normal, supple, no adenopathy, thyroid normal in size  RESPIRATORY: appears well, vitals normal, no respiratory distress, acyanotic, normal RR, chest clear, no wheezing, crepitations, rhonchi, normal symmetric air entry  HEART: regular rate and rhythm, S1, S2 normal, no murmur, click, rub or gallop.    ABDOMEN: abdomen is soft without tenderness, no masses, no hernias, no organomegaly, no rebound, no guarding. Suprapubic tenderness absent. No CVA tenderness.  NEUROLOGIC: normal without focal findings, CN II-XII are intact.    Extremities: warm/well perfused.  No abnormal hair patterns.  No clubbing, cyanosis or edema.  no muscular tenderness noted; decreased PT on right, but palpable DP on right  SKIN: no rashes, no wounds, no other lesions; decreased hair on bilateral LE;s  PSYCH: Alert, oriented x 3, thought content appropriate, speech normal, pleasant and cooperative, good eye contact, well groomed    Assessment/Plan:  Carlos A Ruff is a 64 y.o. male who presents today for :    Carlos A was seen today for leg pain and skin discoloration.    Diagnoses and all orders for this visit:    Bilateral claudication of lower limb  -     CV Ankle Brachial Indices W Stress " W Toe Tracings; Future  -     CV US Lower Extremity Veins Bilateral Insufficiency; Future  -     CV Ultrasound doppler arterial legs bilat; Future  -     Ambulatory referral/consult to Vascular Surgery; Future    History of blood clots  -     CV Ankle Brachial Indices W Stress W Toe Tracings; Future  -     CV US Lower Extremity Veins Bilateral Insufficiency; Future  -     CV Ultrasound doppler arterial legs bilat; Future  -     Ambulatory referral/consult to Vascular Surgery; Future    Lupus anticoagulant positive    Prothrombin gene mutation    Tobacco abuse    Chest pain, unspecified type  -     Ambulatory referral/consult to Cardiology; Future      1.  Vascular ultrasounds ordered  2.  Referral to vascular and cardiology placed  3.  Encouraged smoking cessation  4.  Directed to stop using compression stokcings

## 2020-03-12 ENCOUNTER — INITIAL CONSULT (OUTPATIENT)
Dept: VASCULAR SURGERY | Facility: CLINIC | Age: 65
End: 2020-03-12
Payer: MEDICAID

## 2020-03-12 ENCOUNTER — ANTI-COAG VISIT (OUTPATIENT)
Dept: CARDIOLOGY | Facility: CLINIC | Age: 65
End: 2020-03-12

## 2020-03-12 ENCOUNTER — PATIENT OUTREACH (OUTPATIENT)
Dept: ADMINISTRATIVE | Facility: OTHER | Age: 65
End: 2020-03-12

## 2020-03-12 ENCOUNTER — OFFICE VISIT (OUTPATIENT)
Dept: CARDIOLOGY | Facility: CLINIC | Age: 65
End: 2020-03-12
Payer: MEDICAID

## 2020-03-12 ENCOUNTER — LAB VISIT (OUTPATIENT)
Dept: LAB | Facility: HOSPITAL | Age: 65
End: 2020-03-12
Attending: INTERNAL MEDICINE
Payer: MEDICAID

## 2020-03-12 VITALS
DIASTOLIC BLOOD PRESSURE: 78 MMHG | WEIGHT: 178.38 LBS | SYSTOLIC BLOOD PRESSURE: 118 MMHG | BODY MASS INDEX: 24.97 KG/M2 | HEIGHT: 71 IN

## 2020-03-12 VITALS
WEIGHT: 180.31 LBS | HEART RATE: 57 BPM | DIASTOLIC BLOOD PRESSURE: 85 MMHG | BODY MASS INDEX: 25.15 KG/M2 | RESPIRATION RATE: 16 BRPM | OXYGEN SATURATION: 98 % | SYSTOLIC BLOOD PRESSURE: 169 MMHG

## 2020-03-12 DIAGNOSIS — I82.401 DVT, LOWER EXTREMITY, RECURRENT, RIGHT: ICD-10-CM

## 2020-03-12 DIAGNOSIS — R07.9 CHEST PAIN, UNSPECIFIED TYPE: ICD-10-CM

## 2020-03-12 DIAGNOSIS — D68.52 PROTHROMBIN GENE MUTATION: ICD-10-CM

## 2020-03-12 DIAGNOSIS — R76.0 LUPUS ANTICOAGULANT POSITIVE: ICD-10-CM

## 2020-03-12 DIAGNOSIS — Z86.718 HISTORY OF DEEP VEIN THROMBOSIS (DVT) OF LOWER EXTREMITY: ICD-10-CM

## 2020-03-12 DIAGNOSIS — R07.9 CHEST PAIN: ICD-10-CM

## 2020-03-12 DIAGNOSIS — R07.89 CHEST PAIN, NON-CARDIAC: ICD-10-CM

## 2020-03-12 DIAGNOSIS — I25.10 CORONARY ARTERY DISEASE INVOLVING NATIVE CORONARY ARTERY OF NATIVE HEART WITHOUT ANGINA PECTORIS: Primary | ICD-10-CM

## 2020-03-12 DIAGNOSIS — I82.511 CHRONIC DEEP VEIN THROMBOSIS (DVT) OF RIGHT FEMORAL VEIN: ICD-10-CM

## 2020-03-12 DIAGNOSIS — Z86.718 HISTORY OF BLOOD CLOTS: Primary | ICD-10-CM

## 2020-03-12 DIAGNOSIS — I73.9 BILATERAL CLAUDICATION OF LOWER LIMB: ICD-10-CM

## 2020-03-12 LAB
INR PPP: 1.3 (ref 0.8–1.2)
PROTHROMBIN TIME: 14.2 SEC (ref 9–12.5)

## 2020-03-12 PROCEDURE — 99213 OFFICE O/P EST LOW 20 MIN: CPT | Mod: PBBFAC,25,27 | Performed by: SURGERY

## 2020-03-12 PROCEDURE — 99999 PR PBB SHADOW E&M-EST. PATIENT-LVL IV: CPT | Mod: PBBFAC,,, | Performed by: INTERNAL MEDICINE

## 2020-03-12 PROCEDURE — 99204 PR OFFICE/OUTPT VISIT, NEW, LEVL IV, 45-59 MIN: ICD-10-PCS | Mod: S$PBB,,, | Performed by: SURGERY

## 2020-03-12 PROCEDURE — 99999 PR PBB SHADOW E&M-EST. PATIENT-LVL IV: ICD-10-PCS | Mod: PBBFAC,,, | Performed by: INTERNAL MEDICINE

## 2020-03-12 PROCEDURE — 99999 PR PBB SHADOW E&M-EST. PATIENT-LVL III: ICD-10-PCS | Mod: PBBFAC,,, | Performed by: SURGERY

## 2020-03-12 PROCEDURE — 93005 ELECTROCARDIOGRAM TRACING: CPT | Mod: PBBFAC | Performed by: INTERNAL MEDICINE

## 2020-03-12 PROCEDURE — 93010 ELECTROCARDIOGRAM REPORT: CPT | Mod: S$PBB,,, | Performed by: INTERNAL MEDICINE

## 2020-03-12 PROCEDURE — 85610 PROTHROMBIN TIME: CPT

## 2020-03-12 PROCEDURE — 99214 PR OFFICE/OUTPT VISIT, EST, LEVL IV, 30-39 MIN: ICD-10-PCS | Mod: S$PBB,,, | Performed by: INTERNAL MEDICINE

## 2020-03-12 PROCEDURE — 36415 COLL VENOUS BLD VENIPUNCTURE: CPT

## 2020-03-12 PROCEDURE — 99999 PR PBB SHADOW E&M-EST. PATIENT-LVL III: CPT | Mod: PBBFAC,,, | Performed by: SURGERY

## 2020-03-12 PROCEDURE — 93010 EKG 12-LEAD: ICD-10-PCS | Mod: S$PBB,,, | Performed by: INTERNAL MEDICINE

## 2020-03-12 PROCEDURE — 99214 OFFICE O/P EST MOD 30 MIN: CPT | Mod: PBBFAC | Performed by: INTERNAL MEDICINE

## 2020-03-12 PROCEDURE — 99204 OFFICE O/P NEW MOD 45 MIN: CPT | Mod: S$PBB,,, | Performed by: SURGERY

## 2020-03-12 PROCEDURE — 99214 OFFICE O/P EST MOD 30 MIN: CPT | Mod: S$PBB,,, | Performed by: INTERNAL MEDICINE

## 2020-03-12 NOTE — PATIENT INSTRUCTIONS
Putting on Compression Stockings     Turn the stocking inside-out, then fit it over your toes and heel.          Roll the stocking up your leg.            Once stockings are on, make sure the top of the stocking is about two fingers width below the crease of the knee (or the groin if you wear thigh-high stockings).          Use equipment, such as a stocking frieda, or wear rubber gloves to make it easier to put on compression stockings.         Elastic compression stockings are prescribed to treat many vein problems. Wearing them may be the most important thing you do to manage your symptoms. The stockings fit tightly around your ankle, gradually reducing in pressure as they go up your legs. This helps keep blood flowing to your heart. As a result, swelling is reduced. Your healthcare provider will prescribe stockings at a safe pressure for you. He or she will also tell you how often to wear and remove the stockings. Follow these instructions closely. Also, do not buy or wear compression stockings without first seeing your healthcare provider.  Tips for wear and care  To wear stockings safely and to get the most benefit:  · Wear the length prescribed by your healthcare provider.  · Pull them to the designated height and no farther. Dont let them bunch at the top. This can restrict blood flow and increase swelling.  · Wear the stockings for the amount of time your healthcare provider recommends. Replace them when they start to feel loose. This will likely be every 3 to 6 months.  · Remove them as your healthcare provider directs. When removed, wash your legs. Then check your legs and feet for sores. Call your healthcare provider if you find a sore. Dont put the stockings back on unless your healthcare provider directs.   · Wash the stockings as instructed. They may need to be hand-washed.  Date Last Reviewed: 5/1/2016  © 8100-8006 [employer]. [address] [place] [address], [place] [address]. All rights  reserved. This information is not intended as a substitute for professional medical care. Always follow your healthcare professional's instructions.        Tips for Using Less Salt    Most people with heart problems need to eat less salt (sodium). Reducing the amount of salt you eat may help control your blood pressure. The higher your blood pressure, the greater your risk for heart disease, stroke, blindness, and kidney problems.  At the store  · Make low-salt choices by reading labels carefully. Look for the total amount of sodium per serving.  · Use more fresh food. Buy more fruits and vegetables. Select lean meats, fish, and poultry.  · Use fewer frozen, canned, and packaged foods which often contain a lot of sodium.  · Use plain frozen vegetables without sauces or toppings. These products are often low- or no-sodium.  · Opt for reduced-sodium or no-salt-added versions of canned vegetables and soups.  In the kitchen  · Don't add salt to food when you're cooking. Season with flavorings such as onion, garlic, pepper, salt-free herbal blends, and lemon or lime juice.  · Use a cookbook containing low-salt recipes. It can give you ideas for tasty meals that are healthy for your heart.  · Sprinkle salt-free herbal blends on vegetables and meat.  · Drain and rinse canned foods, such as canned beans and vegetables, before cooking or eating.  Eating out  · Tell the  you're on a low-salt diet. Ask questions about the menu.  · Order fish, chicken, and meat broiled, baked, poached, or grilled without salt, butter, or breading.  · Use lemon, pepper, and salt-free herb mixes to add flavor.  · Choose plain steamed rice, boiled noodles, and baked or boiled potatoes. Top potatoes with chives and a little sour cream.     Beware! Salt goes by many other names. Limit foods with these words listed as ingredients: salt, sodium, soy sauce, baking soda, baking powder, MSG, monosodium, Na (the chemical symbol for sodium). Some  antacids are also high in salt.   Date Last Reviewed: 6/19/2015  © 4682-0439 [employer]. [address] [place] [address], [place] [address]. All rights reserved. This information is not intended as a substitute for professional medical care. Always follow your healthcare professional's instructions.        Low-Salt Diet  This diet removes foods that are high in salt. It also limits the amount of salt you use when cooking. It is most often used for people with high blood pressure, edema (fluid retention), and kidney, liver, or heart disease.  Table salt contains the mineral sodium. Your body needs sodium to work normally. But too much sodium can make your health problems worse. Your healthcare provider is recommending a low-salt (also called low-sodium) diet for you. Your total daily allowance of salt is 1,500 to 2,300 milligrams (mg). It is less than 1 teaspoon of table salt. This means you can have only about 500 to 700 mg of sodium at each meal. People with certain health problems should limit salt intake to the lower end of the recommended range.    When you cook, dont add much salt. If you can cook without using salt, even better. Dont add salt to your food at the table.  When shopping, read food labels. Salt is often called sodium on the label. Choose foods that are salt-free, low salt, or very low salt. Note that foods with reduced salt may not lower your salt intake enough.    Beans, potatoes, and pasta  Ok: Dry beans, split peas, lentils, potatoes, rice, macaroni, pasta, spaghetti without added salt  Avoid: Potato chips, tortilla chips, and similar products  Breads and cereals  Ok: Low-sodium breads, rolls, cereals, and cakes; low-salt crackers, matzo crackers  Avoid: Salted crackers, pretzels, popcorn, Puerto [place]ican toast, pancakes, muffins  Dairy  Ok: Milk, chocolate milk, hot chocolate mix, low-salt cheeses, and yogurt  Avoid: Processed cheese and cheese spreads; Roquefort, Camembert, and cottage cheese;  buttermilk, instant breakfast drink  Desserts  Ok: Ice cream, frozen yogurt, juice bars, gelatin, cookies and pies, sugar, honey, jelly, hard candy  Avoid: Most pies, cakes and cookies prepared or processed with salt; instant pudding  Drinks  Ok: Tea, coffee, fizzy (carbonated) drinks, juices  Avoid: Flavored coffees, electrolyte replacement drinks, sports drinks  Meats  Ok: All fresh meat, fish, poultry, low-salt tuna, eggs, egg substitute  Avoid: Smoked, pickled, brine-cured, or salted meats and fish. This includes woods, chipped beef, corned beef, hot dogs, deli meats, ham, kosher meats, salt pork, sausage, canned tuna, salted codfish, smoked salmon, herring, sardines, or anchovies.  Seasonings and spices  Ok: Most seasonings are okay. Good substitutes for salt include: fresh herb blends, hot sauce, lemon, garlic, walsh, vinegar, dry mustard, parsley, cilantro, horseradish, tomato paste, regular margarine, mayonnaise, unsalted butter, cream cheese, vegetable oil, cream, low-salt salad dressing and gravy.  Avoid: Regular ketchup, relishes, pickles, soy sauce, teriyaki sauce, Worcestershire sauce, BBQ sauce, tartar sauce, meat tenderizer, chili sauce, regular gravy, regular salad dressing, salted butter  Soups  Ok: Low-salt soups and broths made with allowed foods  Avoid: Bouillon cubes, soups with smoked or salted meats, regular soup and broth  Vegetables  Ok: Most vegetables are okay; also low-salt tomato and vegetable juices  Avoid: Sauerkraut and other brine-soaked vegetables; pickles and other pickled vegetables; tomato juice, olives  Date Last Reviewed: 8/1/2016  © 7456-3186 [employer]. [address] [place] [address]. All rights reserved. This information is not intended as a substitute for professional medical care. Always follow your healthcare professional's instructions.        Low-Salt Choices  Eating salt (sodium) can make your body retain too much water. Excess water makes  your heart work harder. Canned, packaged, and frozen foods are easy to prepare, but they are often high in sodium. Here are some ideas for low-salt foods you can easily prepare yourself.    For breakfast  · Fruit or 100% fruit juice  · Whole-wheat bread or an English muffin. Compare sodium content on labels.  · Low-fat milk or yogurt  · Unsalted eggs  · Shredded wheat  · Corn tortillas  · Unsalted steamed rice  · Regular (not instant) hot cereal, made without salt  Stay away from:  · Sausage, woods, and ham  · Flour tortillas  · Packaged muffins, pancakes, and biscuits  · Instant hot cereals  · Cottage cheese  For lunch and dinner  · Fresh fish, chicken, turkey, or meat--baked, broiled, or roasted without salt  · Dry beans, cooked without salt  · Tofu, stir-fried without salt  · Unsalted fresh fruit and vegetables, or frozen or canned fruit and vegetables with no added salt  Stay away from:  · Lunch or deli meat that is cured or smoked  · Cheese  · Tomato juice and catsup  · Canned vegetables, soups, and fish not labeled as no-salt-added or reduced sodium  · Packaged gravies and sauces  · Olives, pickles, and relish  · Bottled salad dressings  For snacks and desserts  · Yogurt  · Unsalted, air popped popcorn  · Unsalted nuts or seeds  Stay away from:  · Pies and cakes  · Packaged dessert mixes  · Pizza  · Canned and packaged puddings  · Pretzels, chips, crackers, and nuts--unless the label says unsalted  Date Last Reviewed: 6/17/2015  © 8118-7530 [employer]. [address] [place] [address], [place] [address]. All rights reserved. This information is not intended as a substitute for professional medical care. Always follow your healthcare professional's instructions.

## 2020-03-12 NOTE — LETTER
March 12, 2020      Khalif Hassan MD  [address] [facility] [address]           [facility] [place] - Cardiology  [address]  [facility] [address]  Phone: [phone]          Patient: Carlos A Ruff   MR Number: [id number]   YOB: 1955   Date of Visit: 3/12/2020       Dear Dr. Khalif RIVERA Page:    Thank you for referring Carlos A Ruff to me for evaluation. Attached you will find relevant portions of my assessment and plan of care.    If you have questions, please do not hesitate to call me. I look forward to following Carlos A Ruff along with you.    Sincerely,    Alfred Cordero MD    Enclosure  CC:  No Recipients    If you would like to receive this communication electronically, please contact [email or web address] or [phone] to request more information on [employer] Link access.    For providers and/or their staff who would like to refer a patient to [facility], please contact us through our one-stop-shop provider referral line, [facility], at [phone].    If you feel you have received this communication in error or would no longer like to receive these types of communications, please e-mail [email or web address]

## 2020-03-12 NOTE — PROGRESS NOTES
Wesley Bentley MD, RPVI                                 [facility]                                                        03/12/2020    HPI:  Carlos A Ruff is a 64 y.o. male with   Patient Active Problem List   Diagnosis    Coronary artery disease involving native coronary artery of native heart without angina pectoris    History of deep vein thrombosis (DVT) of lower extremity    Mass of left foot    Tobacco abuse    Elevated troponin    Chest pain, non-cardiac    DVT, lower extremity, recurrent, right    Prothrombin gene mutation    Lupus anticoagulant positive    Foot swelling    Failure of outpatient treatment    Distended bladder    Bladder outlet obstruction    Bladder wall thickening    Hx of colonic polyp    Urinary retention    being managed by PCP and specialists who is here today for evaluation of BLE edema.  H/o RLE DVT 2018 and 2019 treated with anticoagulation; prior US showed chronic R femoral and popliteal DVT with acute peroneal and he was admitted and continued on AC.  Found to have abnormal prothrombin gene, currently on Coumadin.  No claudication.  Patient states location is below knee bilaterally occurring for 2-3 yrs.  Associated signs and symptoms include decreased ROM.  Quality is tight and severity is 10/10 at worst.  Symptoms began 2-3 yrs ago.  Alleviating factors include elevation and rest.  Worsening factors include dependency.  Patient is wearing compression stockings daily, OTC.  No FH of venous disease.  Symptoms do limit patient's functional status and daily activities.  + DVT history.  No venous interventions.  + low sodium diet.  + excessive water intake.    Migraine with aura: no  PFO/ASD/right to left shunt: no  Pregnant: no  Breastfeeding: no    no MI  no Stroke  Tobacco use: 5 cig / day    Past Medical History:   Diagnosis Date    Anticoagulant long-term use     Colon polyps     Coronary artery disease     Hypercholesteremia      Hypertension     MI (myocardial infarction)      Past Surgical History:   Procedure Laterality Date    blood clots      cardiac stents      COLONOSCOPY N/A 2/1/2019    Procedure: COLONOSCOPY;  Surgeon: Blanca Fenton MD;  Location: [facility] ENDO;  Service: Endoscopy;  Laterality: N/A;  RX PLAVIX/XARELTO ok to stop (7 days, 3 days) per Dr. Cordero see scan    CYSTOSCOPY WITH URODYNAMIC TESTING N/A 8/30/2019    Procedure: CYSTOSCOPY,WITH URODYNAMIC TESTING;  Surgeon: Felecia Stiles MD;  Location: [facility] OR;  Service: Urology;  Laterality: N/A;  RN PRE OP 8-27-19     History reviewed. No pertinent family history.  Social History     Socioeconomic History    Marital status: Single     Spouse name: Not on file    Number of children: Not on file    Years of education: Not on file    Highest education level: Not on file   Occupational History    Not on file   Social Needs    Financial resource strain: Not on file    Food insecurity:     Worry: Not on file     Inability: Not on file    Transportation needs:     Medical: Not on file     Non-medical: Not on file   Tobacco Use    Smoking status: Current Some Day Smoker     Packs/day: 0.25     Types: Cigarettes    Smokeless tobacco: Never Used   Substance and Sexual Activity    Alcohol use: Yes     Comment: occasionally     Drug use: Never    Sexual activity: Not Currently   Lifestyle    Physical activity:     Days per week: Not on file     Minutes per session: Not on file    Stress: Not on file   Relationships    Social connections:     Talks on phone: Not on file     Gets together: Not on file     Attends [employer] service: Not on file     Active member of club or organization: Not on file     Attends meetings of clubs or organizations: Not on file     Relationship status: Not on file   Other Topics Concern    Not on file   Social History Narrative    Not on file       Current Outpatient Medications:     ergocalciferol (VITAMIN D2) 50,000 unit Cap, Take  50,000 Units by mouth every 7 days., Disp: , Rfl:     nitroGLYCERIN (NITROSTAT) 0.4 MG SL tablet, Place 0.4 mg under the tongue every 5 (five) minutes as needed for Chest pain., Disp: , Rfl:     tamsulosin (FLOMAX) 0.4 mg Cap, Take 1 capsule (0.4 mg total) by mouth once daily., Disp: 30 capsule, Rfl: 11    warfarin (COUMADIN) 10 MG tablet, Take 1 tablet (10 mg total) by mouth Daily., Disp: 30 tablet, Rfl: 11    atorvastatin (LIPITOR) 40 MG tablet, Take 40 mg by mouth once daily. , Disp: , Rfl:     finasteride (PROSCAR) 5 mg tablet, Take 1 tablet (5 mg total) by mouth once daily. (Patient not taking: Reported on 3/12/2020), Disp: 30 tablet, Rfl: 11    isosorbide mononitrate (IMDUR) 30 MG 24 hr tablet, TAKE 1 TABLET BY MOUTH ONCE DAILY (Patient not taking: Reported on 3/12/2020), Disp: 30 tablet, Rfl: 11    losartan-hydrochlorothiazide 50-12.5 mg (HYZAAR) 50-12.5 mg per tablet, Take 1 tablet by mouth once daily., Disp: 90 tablet, Rfl: 3    REVIEW OF SYSTEMS:  General: No fevers or chills; ENT: No sore throat; Allergy and Immunology: no persistent infections; Hematological and Lymphatic: No history of bleeding or easy bruising; Endocrine: negative; Respiratory: no cough, shortness of breath, or wheezing; Cardiovascular: no chest pain or dyspnea on exertion; Gastrointestinal: no abdominal pain/back, change in bowel habits, or bloody stools; Genito-Urinary: no dysuria, trouble voiding, or hematuria; Musculoskeletal: edema and pain; Neurological: no TIA or stroke symptoms; Psychiatric: no nervousness, anxiety or depression.    PHYSICAL EXAM:                General appearance:  Alert, well-appearing, and in no distress.  Oriented to person, place, and time                    Neurological: Normal speech, no focal findings noted; CN II - XII grossly intact. RLE with sensation to light touch, LLE with sensation to light touch.            Musculoskeletal: Digits/nail without cyanosis/clubbing.  Strength 5/5 BLE.                     Neck: Supple, no significant adenopathy                  Chest:  No wheezes, symmetric air entry. No use of accessory muscles               Cardiac: Normal rate and regular rhythm            Abdomen: Soft, nontender, nondistended      Extremities:   2+ R DP pulse, 2+ L DP pulse      1+ RLE edema, 1+ LLE edema    Skin:  RLE no ulcer; LLE no ulcer      RLE no spider veins, LLE no spider veins      RLE no varicose veins, LLE no varicose veins    CEAP 3/3    LAB RESULTS:  No results found for: CBC  Lab Results   Component Value Date    LABPROT 22.7 (H) 03/06/2020    INR 2.0 (H) 03/06/2020     Lab Results   Component Value Date     07/31/2019    K 4.1 07/31/2019     (H) 07/31/2019    CO2 23 07/31/2019    GLU 86 07/31/2019    BUN 15 07/31/2019    CREATININE 0.9 07/31/2019    CALCIUM 9.1 07/31/2019    ANIONGAP 9 07/31/2019    EGFRNONAA >60 07/31/2019     Lab Results   Component Value Date    WBC 6.37 07/31/2019    RBC 4.44 (L) 07/31/2019    HGB 13.7 (L) 07/31/2019    HCT 42.5 07/31/2019    MCV 96 07/31/2019    MCH 30.9 07/31/2019    MCHC 32.2 07/31/2019    RDW 13.9 07/31/2019     07/31/2019    MPV 11.4 07/31/2019    GRAN 3.7 07/31/2019    GRAN 58.1 07/31/2019    LYMPH 2.0 07/31/2019    LYMPH 30.6 07/31/2019    MONO 0.4 07/31/2019    MONO 6.6 07/31/2019    EOS 0.2 07/31/2019    BASO 0.10 07/31/2019    EOSINOPHIL 3.3 07/31/2019    BASOPHIL 1.6 07/31/2019    DIFFMETHOD Automated 07/31/2019     .  Lab Results   Component Value Date    HGBA1C 5.1 11/29/2017       IMAGING:  All pertinent imaging has been reviewed and interpreted independently.    Venous US 11/2019 Impression:  Chronic R femoral, popliteal, peroneal thrombus    IMP/PLAN:  64 y.o. male with   Patient Active Problem List   Diagnosis    Coronary artery disease involving native coronary artery of native heart without angina pectoris    History of deep vein thrombosis (DVT) of lower extremity    Mass of left foot    Tobacco abuse     Elevated troponin    Chest pain, non-cardiac    DVT, lower extremity, recurrent, right    Prothrombin gene mutation    Lupus anticoagulant positive    Foot swelling    Failure of outpatient treatment    Distended bladder    Bladder outlet obstruction    Bladder wall thickening    Hx of colonic polyp    Urinary retention    being managed by PCP and specialists who is here today for evaluation of BLE edema and pain.    -recommend compression with Rx stockings, elevation, dietary changes associated with water and sodium intake discussed at length with patient  -Exercise   -RTC 3 months for further evaluation    I spent 20 minutes evaluating this patient and greater than 50% of the time was spent counseling, coordinator care and discussing the plan of care.  All questions were answered and patient stated understanding with agreement with the above treatment plan.    Wesley Bentley MD [facility]  Vascular and Endovascular Surgery

## 2020-03-12 NOTE — PROGRESS NOTES
Confirmed taking coumadin 15mg daily   States drinking pomegranate juice MON & TUES  NO other changes

## 2020-03-12 NOTE — PROGRESS NOTES
"Subjective:    Patient ID:  Carlos A Ruff is a 64 y.o. male who presents for follow-up of Coronary Artery Disease      HPI     CAD - stent LAD, HTN, HLD, recurrent RLE DVT - chronic coumadin     5/17/18 [facility] - EDP 15, LAD stent widely patent, normal vessels otherwise        Admitted 1/24/18  Patient is 61 yo male smoker with HTN, HLD, CAD s/p PCI who presents to ED with complaint of CP. Per patient has been with intermittent chest pain over the last 2 weeks mostly with exertion but occasionally at rest. He describes pain as "tight, stabbing" and reports lasts about a minute or so before resolving. He endorses associated diaphoresis and radiation to L arm and L neck. He reports pain worse this am thus causing presentation. Had cardiac stent placed about 2 years ago and no issues since then. He has been with a cold recently but otherwise denies fever/chills, SOB, abdominal pain, N/V, LE swelling and pain. On presentation patient with mildly elevated troponin at .05. Non ischemic EKG and CXR unremarkable. CTA obtained to r/o PE in ED which noted calcinosis to LAD. Placed in observation for ACS r/o.         Hospital Course:   Patient admitted with chest pain and ruled out for acute coronary syndrome. EKG is non ischemic. Serial troponin levels mildly elevated but flat with downward trend. BNP normal. CXR without acute cardiothoracic processes. Echocardiogram performed and did not show evidence of systolic/diastolic/or valvular dysfunction. NST performed and is without evidence of myocardial ischemia. Cardiology consulted and after evaluation has cleared the patient for discharge from a CV standpoint. Chest pain likely atypical in nature. Patient is stable and will discharge home at this time with instructions to follow up with PCP and cardiology in one week.      Echo 1/24/18    1 - Normal left ventricular systolic function (EF 60-65%).     2 - Concentric hypertrophy.     3 - Trivial tricuspid regurgitation.     4 - " Trivial to mild mitral regurgitation.      Stress test 1/25/18  LVEF: 50 %  Impression: NORMAL MYOCARDIAL PERFUSION  1. The perfusion scan is free of evidence for myocardial ischemia or injury.   2. There is a mild intensity fixed defect in the inferior wall of the left ventricle, secondary to diaphragm attenuation.   3. Resting wall motion is physiologic.   4. Resting LV function is normal.      4/11/18  I discussed a LHC for recurrent CP - he is agreeable but wants to wait until school breaks for the summer  Will add toprol XL 25 qd and imdur 30 qd     5/31/18 Did well after LHC - still some atypical CP     Went to the ER 2/7/19  HPI: This 63 y.o. male with a past medical history of Anticoagulant long-term use, Coronary artery disease, Hypercholesteremia, Hypertension, and MI, presents to the ED complaining of an acute onset of L pectoral chest pain that is well localized for last 2 days. He was initially getting this pain every hr lasting for 30-40 seconds at a time, which became more frequent yesterday every 30 minutes now. Pain is sharp and stabbing. Pain is not worse with deep breathing. He had similar chest pain 6-8 months ago and he was diagnosed with muscle spasms to his chest. He had colonoscopy done last Friday that went well. He reports no relief in his chest pain with Tylenol. Current tobacco use.        Medical decision making:  Given the above, this patient presents to the emergency room with well localized sharp stabbing pain in the left pectoral chest, coming and going in very brief episodes, all less than a minute. The pain is sharp stabbing.  It occurs, and then your stops, and then recurs.  It does not radiate.  The patient is not short of breath. The patient arrived with a tachycardia but resolved.  During the physical examination is heart rate was 60.  I doubt pulmonary embolus.  The patient has a history of coronary artery disease and has a stent.  His troponin was slightly elevated.   Consultation was obtained with Cardiology, Dr. Cordero, who asked that a 2nd troponin be performed.  If the 2nd troponin was substantially higher, the patient will require admission.  Sec troponin was lower.  I will discharge this patient outpatient evaluation and treatment.  Clinically this is noncardiac chest pain.     Troponin 0.065 to 0.057  EKG 2/7/10 sinus tachycardia 108 LAE - otherwise ok     2/13/19 Still with sharp CP both at rest and with exertion   Increase toprol XL 50 qd  Echo and lexiscan myoview for recurrent CP with known CAD and mildly elevated troponin    3/12/20 Saw Dr Hassan 3/9/20 for leg pain - venous and arterial US ordered - not done yet  Intermittent sharp CP for the last 2 weeks with mild HERNANDES  EKG NSR PRWP otherwise ok    Review of Systems   Constitution: Negative for decreased appetite.   HENT: Negative for ear discharge.    Eyes: Negative for blurred vision.   Endocrine: Negative for polyphagia.   Skin: Negative for nail changes.   Genitourinary: Negative for bladder incontinence.   Neurological: Negative for aphonia.   Psychiatric/Behavioral: Negative for hallucinations.   Allergic/Immunologic: Negative for hives.        Objective:    Physical Exam   Constitutional: He is oriented to person, place, and time. He appears well-developed and well-nourished.   HENT:   Head: Normocephalic and atraumatic.   Eyes: Pupils are equal, round, and reactive to light. Conjunctivae are normal.   Neck: Normal range of motion. Neck supple.   Cardiovascular: Normal rate, normal heart sounds and intact distal pulses.   Pulmonary/Chest: Effort normal and breath sounds normal.   Abdominal: Soft. Bowel sounds are normal.   Musculoskeletal: Normal range of motion.   Neurological: He is alert and oriented to person, place, and time.   Skin: Skin is warm and dry.         Assessment:       1. Coronary artery disease involving native coronary artery of native heart without angina pectoris    2. Chest pain,  unspecified type    3. History of deep vein thrombosis (DVT) of lower extremity    4. DVT, lower extremity, recurrent, right    5. Chest pain, non-cardiac         Plan:       Echo and lexiscan myoview for CP and CAD

## 2020-03-12 NOTE — LETTER
March 12, 2020      Khalif Hassan MD  [address] [address]           [facility]  [address]  [facility] [address]  Phone: [phone]  Fax: [phone]          Patient: Carlos A Ruff   MR Number: [id number]   YOB: 1955   Date of Visit: 3/12/2020       Dear Dr. Khalif RIVERA Page:    Thank you for referring Carlos A Ruff to me for evaluation. Attached you will find relevant portions of my assessment and plan of care.    If you have questions, please do not hesitate to call me. I look forward to following Carlos A Ruff along with you.    Sincerely,    Wesley Bentley MD    Enclosure  CC:  No Recipients    If you would like to receive this communication electronically, please contact [email or web address] or [phone] to request more information on [facility] access.    For providers and/or their staff who would like to refer a patient to [facility], please contact us through our one-stop-shop provider referral line, [facility], at [phone].    If you feel you have received this communication in error or would no longer like to receive these types of communications, please e-mail [email or web address]

## 2020-03-13 ENCOUNTER — HOSPITAL ENCOUNTER (OUTPATIENT)
Dept: RADIOLOGY | Facility: HOSPITAL | Age: 65
Discharge: HOME | End: 2020-03-13
Attending: PHYSICIAN ASSISTANT
Payer: MEDICAID

## 2020-03-13 ENCOUNTER — HOSPITAL ENCOUNTER (OUTPATIENT)
Dept: CARDIOLOGY | Facility: HOSPITAL | Age: 65
Discharge: HOME | End: 2020-03-13
Attending: FAMILY MEDICINE
Payer: MEDICAID

## 2020-03-13 DIAGNOSIS — M25.561 CHRONIC PAIN OF BOTH KNEES: ICD-10-CM

## 2020-03-13 DIAGNOSIS — G89.29 CHRONIC PAIN OF BOTH KNEES: ICD-10-CM

## 2020-03-13 DIAGNOSIS — I73.9 BILATERAL CLAUDICATION OF LOWER LIMB: ICD-10-CM

## 2020-03-13 DIAGNOSIS — Z86.718 HISTORY OF BLOOD CLOTS: ICD-10-CM

## 2020-03-13 DIAGNOSIS — M25.562 CHRONIC PAIN OF BOTH KNEES: ICD-10-CM

## 2020-03-13 PROCEDURE — 73562 X-RAY EXAM OF KNEE 3: CPT | Mod: 26,50,, | Performed by: RADIOLOGY

## 2020-03-13 PROCEDURE — 93924 LWR XTR VASC STDY BILAT: CPT | Mod: 50

## 2020-03-13 PROCEDURE — 73562 X-RAY EXAM OF KNEE 3: CPT | Mod: TC,50,FY

## 2020-03-13 PROCEDURE — 93970 EXTREMITY STUDY: CPT | Mod: 50,TC

## 2020-03-13 PROCEDURE — 93925 CV US DOPPLER ARTERIAL LEGS BILATERAL (CUPID ONLY): ICD-10-PCS | Mod: 26,,, | Performed by: SURGERY

## 2020-03-13 PROCEDURE — 93970 EXTREMITY STUDY: CPT | Mod: 26,,, | Performed by: SURGERY

## 2020-03-13 PROCEDURE — 93925 LOWER EXTREMITY STUDY: CPT | Mod: 50

## 2020-03-13 PROCEDURE — 93924 LWR XTR VASC STDY BILAT: CPT | Mod: 26,,, | Performed by: SURGERY

## 2020-03-13 PROCEDURE — 93925 LOWER EXTREMITY STUDY: CPT | Mod: 26,,, | Performed by: SURGERY

## 2020-03-13 PROCEDURE — 73562 XR KNEE ORTHO BILAT: ICD-10-PCS | Mod: 26,50,, | Performed by: RADIOLOGY

## 2020-03-13 PROCEDURE — 93924 CV US ANKLE BRACHIAL INDICES W STRESS W TOE TRACINGS (CUPID ONLY): ICD-10-PCS | Mod: 26,,, | Performed by: SURGERY

## 2020-03-13 PROCEDURE — 93970 CV US LOWER VENOUS INSUFFICIENCY BILATERAL (CUPID ONLY): ICD-10-PCS | Mod: 26,,, | Performed by: SURGERY

## 2020-03-16 LAB
IMMEDIATE ARM BP: 148 MMHG
IMMEDIATE LEFT ABI: 1
IMMEDIATE LEFT TIBIAL: 148 MMHG
IMMEDIATE RIGHT ABI: 0.99
IMMEDIATE RIGHT TIBIAL: 147 MMHG
LEFT ABI: 1.2
LEFT ANT TIBIAL SYS PSV: 98 CM/S
LEFT ARM BP: 153 MMHG
LEFT CFA PSV: 80 CM/S
LEFT DORSALIS PEDIS: 184 MMHG
LEFT EXTERNAL ILIAC PSV: 78 CM/S
LEFT GREAT SAPHENOUS DISTAL THIGH DIA: 0.2 CM
LEFT GREAT SAPHENOUS JUNCTION DIA: 0.9 CM
LEFT GREAT SAPHENOUS KNEE DIA: 0.2 CM
LEFT GREAT SAPHENOUS MIDDLE THIGH DIA: 0.2 CM
LEFT GREAT SAPHENOUS MIDDLE THIGH REFLUX: 940 MS
LEFT GREAT SAPHENOUS PROXIMAL CALF DIA: 0.2 CM
LEFT PERONEAL SYS PSV: 45 CM/S
LEFT POPLITEAL PSV: 53 CM/S
LEFT POST TIBIAL SYS PSV: 83 CM/S
LEFT POSTERIOR TIBIAL: 158 MMHG
LEFT PROFUNDA SYS PSV: 56 CM/S
LEFT SMALL SAPHENOUS KNEE DIA: 0.3 CM
LEFT SMALL SAPHENOUS SPJ DIA: 0.3 CM
LEFT SUPER FEMORAL DIST SYS PSV: 52 CM/S
LEFT SUPER FEMORAL MID SYS PSV: 58 CM/S
LEFT SUPER FEMORAL OSTIAL SYS PSV: 100 CM/S
LEFT SUPER FEMORAL PROX SYS PSV: 89 CM/S
LEFT TBI: 0.78
LEFT TIB/PER TRUNK SYS PSV: 46 CM/S
LEFT TOE PRESSURE: 120 MMHG
OHS CV LEFT LOWER EXTREMITY ABI (NO CALC): 1.2
OHS CV RIGHT ABI LOWER EXTREMITY (NO CALC): 1.18
RIGHT ABI: 1.18
RIGHT ANT TIBIAL SYS PSV: 106 CM/S
RIGHT ARM BP: 150 MMHG
RIGHT CFA PSV: 81 CM/S
RIGHT DORSALIS PEDIS: 165 MMHG
RIGHT EXTERNAL ILLIAC PSV: 61 CM/S
RIGHT GREAT SAPHENOUS DISTAL THIGH DIA: 0.3 CM
RIGHT GREAT SAPHENOUS JUNCTION DIA: 0.8 CM
RIGHT GREAT SAPHENOUS KNEE DIA: 0.3 CM
RIGHT GREAT SAPHENOUS MIDDLE THIGH DIA: 0.3 CM
RIGHT GREAT SAPHENOUS PROXIMAL CALF DIA: 0.4 CM
RIGHT PERONEAL SYS PSV: 46 CM/S
RIGHT POPLITEAL PSV: 67 CM/S
RIGHT POST TIBIAL SYS PSV: 83 CM/S
RIGHT POSTERIOR TIBIAL: 180 MMHG
RIGHT PROFUNDA SYS PSV: 69 CM/S
RIGHT SMALL SAPHENOUS KNEE DIA: 0.3 CM
RIGHT SMALL SAPHENOUS SPJ DIA: 0.2 CM
RIGHT SUPER FEMORAL DIST SYS PSV: 68 CM/S
RIGHT SUPER FEMORAL MID SYS PSV: 107 CM/S
RIGHT SUPER FEMORAL OSTIAL SYS PSV: 79 CM/S
RIGHT SUPER FEMORAL PROX SYS PSV: 92 CM/S
RIGHT TBI: 0.99
RIGHT TIB/PER TRUNK SYS PSV: 78 CM/S
RIGHT TOE PRESSURE: 151 MMHG
TOE RAISES: 40

## 2020-03-20 ENCOUNTER — TELEPHONE (OUTPATIENT)
Dept: CARDIOLOGY | Facility: CLINIC | Age: 65
End: 2020-03-20

## 2020-03-20 NOTE — TELEPHONE ENCOUNTER
----- Message from Teofilo Bae RT sent at 3/20/2020  1:14 PM CDT -----  Due to concerns associated with Covid19 the radiology team is seeking to reschedule outpatient diagnostic exams between 3/21 - 4/21 that have been ordered prior to 3/19.  Yoselin Ruff is scheduled for Nuclear stress test on 3- at [facility].  Please respond to this message if you believe it is safe to postpone this exam and the radiology team will reschedule and update you on the patient's response.  If you have concerns that postponement is not safe (urgent or time sensitive diagnostic study), then reply and it will be performed as ordered.   If further discussion needed, please provide a contact number and a Radiologist will reach out to you shortly.

## 2020-04-24 ENCOUNTER — ANTI-COAG VISIT (OUTPATIENT)
Dept: CARDIOLOGY | Facility: CLINIC | Age: 65
End: 2020-04-24
Payer: MEDICAID

## 2020-04-24 ENCOUNTER — LAB VISIT (OUTPATIENT)
Dept: LAB | Facility: HOSPITAL | Age: 65
End: 2020-04-24
Attending: INTERNAL MEDICINE
Payer: MEDICAID

## 2020-04-24 DIAGNOSIS — R76.0 LUPUS ANTICOAGULANT POSITIVE: ICD-10-CM

## 2020-04-24 DIAGNOSIS — I82.401 DVT, LOWER EXTREMITY, RECURRENT, RIGHT: ICD-10-CM

## 2020-04-24 DIAGNOSIS — Z86.718 HISTORY OF DEEP VEIN THROMBOSIS (DVT) OF LOWER EXTREMITY: ICD-10-CM

## 2020-04-24 DIAGNOSIS — D68.52 PROTHROMBIN GENE MUTATION: ICD-10-CM

## 2020-04-24 LAB
INR PPP: 1.1 (ref 0.8–1.2)
PROTHROMBIN TIME: 12.7 SEC (ref 9–12.5)

## 2020-04-24 PROCEDURE — 93793 PR ANTICOAGULANT MGMT FOR PT TAKING WARFARIN: ICD-10-PCS | Mod: ,,, | Performed by: PHARMACIST

## 2020-04-24 PROCEDURE — 93793 ANTICOAG MGMT PT WARFARIN: CPT | Mod: ,,, | Performed by: PHARMACIST

## 2020-04-24 PROCEDURE — 85610 PROTHROMBIN TIME: CPT

## 2020-04-24 PROCEDURE — 36415 COLL VENOUS BLD VENIPUNCTURE: CPT

## 2020-04-24 NOTE — PROGRESS NOTES
"States restarting coumadin on 4/20 w/ rotation (10mg & 15mg);  Pt unable topick up RX "due to pandemic"  States eating Mustard greens, ice abreu lettuce lately  NO other changes     "

## 2020-04-30 ENCOUNTER — ANTI-COAG VISIT (OUTPATIENT)
Dept: CARDIOLOGY | Facility: CLINIC | Age: 65
End: 2020-04-30
Payer: MEDICAID

## 2020-04-30 ENCOUNTER — LAB VISIT (OUTPATIENT)
Dept: LAB | Facility: HOSPITAL | Age: 65
End: 2020-04-30
Attending: INTERNAL MEDICINE
Payer: MEDICAID

## 2020-04-30 DIAGNOSIS — Z86.718 HISTORY OF DEEP VEIN THROMBOSIS (DVT) OF LOWER EXTREMITY: ICD-10-CM

## 2020-04-30 DIAGNOSIS — R76.0 LUPUS ANTICOAGULANT POSITIVE: ICD-10-CM

## 2020-04-30 DIAGNOSIS — D68.52 PROTHROMBIN GENE MUTATION: ICD-10-CM

## 2020-04-30 DIAGNOSIS — I82.401 DVT, LOWER EXTREMITY, RECURRENT, RIGHT: ICD-10-CM

## 2020-04-30 LAB
INR PPP: 2.2 (ref 0.8–1.2)
PROTHROMBIN TIME: 24.8 SEC (ref 9–12.5)

## 2020-04-30 PROCEDURE — 93793 PR ANTICOAGULANT MGMT FOR PT TAKING WARFARIN: ICD-10-PCS | Mod: ,,, | Performed by: PHARMACIST

## 2020-04-30 PROCEDURE — 93793 ANTICOAG MGMT PT WARFARIN: CPT | Mod: ,,, | Performed by: PHARMACIST

## 2020-04-30 PROCEDURE — 85610 PROTHROMBIN TIME: CPT

## 2020-04-30 PROCEDURE — 36415 COLL VENOUS BLD VENIPUNCTURE: CPT

## 2020-05-12 ENCOUNTER — LAB VISIT (OUTPATIENT)
Dept: LAB | Facility: HOSPITAL | Age: 65
End: 2020-05-12
Attending: INTERNAL MEDICINE
Payer: MEDICAID

## 2020-05-12 ENCOUNTER — ANTI-COAG VISIT (OUTPATIENT)
Dept: CARDIOLOGY | Facility: CLINIC | Age: 65
End: 2020-05-12
Payer: MEDICAID

## 2020-05-12 DIAGNOSIS — R76.0 LUPUS ANTICOAGULANT POSITIVE: ICD-10-CM

## 2020-05-12 DIAGNOSIS — D68.52 PROTHROMBIN GENE MUTATION: ICD-10-CM

## 2020-05-12 DIAGNOSIS — Z86.718 HISTORY OF DEEP VEIN THROMBOSIS (DVT) OF LOWER EXTREMITY: ICD-10-CM

## 2020-05-12 DIAGNOSIS — I82.401 DVT, LOWER EXTREMITY, RECURRENT, RIGHT: ICD-10-CM

## 2020-05-12 LAB
INR PPP: 6.6 (ref 0.8–1.2)
PROTHROMBIN TIME: 73.5 SEC (ref 9–12.5)

## 2020-05-12 PROCEDURE — 85610 PROTHROMBIN TIME: CPT

## 2020-05-12 PROCEDURE — 93793 PR ANTICOAGULANT MGMT FOR PT TAKING WARFARIN: ICD-10-PCS | Mod: ,,, | Performed by: PHARMACIST

## 2020-05-12 PROCEDURE — 36415 COLL VENOUS BLD VENIPUNCTURE: CPT

## 2020-05-12 PROCEDURE — 93793 ANTICOAG MGMT PT WARFARIN: CPT | Mod: ,,, | Performed by: PHARMACIST

## 2020-05-15 ENCOUNTER — ANTI-COAG VISIT (OUTPATIENT)
Dept: CARDIOLOGY | Facility: CLINIC | Age: 65
End: 2020-05-15
Payer: MEDICAID

## 2020-05-15 ENCOUNTER — LAB VISIT (OUTPATIENT)
Dept: LAB | Facility: HOSPITAL | Age: 65
End: 2020-05-15
Attending: INTERNAL MEDICINE
Payer: MEDICAID

## 2020-05-15 DIAGNOSIS — D68.52 PROTHROMBIN GENE MUTATION: ICD-10-CM

## 2020-05-15 DIAGNOSIS — I82.401 DVT, LOWER EXTREMITY, RECURRENT, RIGHT: ICD-10-CM

## 2020-05-15 DIAGNOSIS — R76.0 LUPUS ANTICOAGULANT POSITIVE: ICD-10-CM

## 2020-05-15 DIAGNOSIS — Z86.718 HISTORY OF DEEP VEIN THROMBOSIS (DVT) OF LOWER EXTREMITY: ICD-10-CM

## 2020-05-15 LAB
INR PPP: 3.3 (ref 0.8–1.2)
PROTHROMBIN TIME: 36.1 SEC (ref 9–12.5)

## 2020-05-15 PROCEDURE — 93793 PR ANTICOAGULANT MGMT FOR PT TAKING WARFARIN: ICD-10-PCS | Mod: ,,, | Performed by: PHARMACIST

## 2020-05-15 PROCEDURE — 85610 PROTHROMBIN TIME: CPT

## 2020-05-15 PROCEDURE — 36415 COLL VENOUS BLD VENIPUNCTURE: CPT

## 2020-05-15 PROCEDURE — 93793 ANTICOAG MGMT PT WARFARIN: CPT | Mod: ,,, | Performed by: PHARMACIST

## 2020-05-15 RX ORDER — WARFARIN 10 MG/1
TABLET ORAL
Qty: 50 TABLET | Refills: 11 | Status: SHIPPED | OUTPATIENT
Start: 2020-05-15 | End: 2021-08-10

## 2020-05-15 NOTE — PROGRESS NOTES
PT states he did not receive new RX w/ increase on QTY @ [facility];   Held dose TUES-THURS  Jay de paz WED & THURS  NO other new changes   New higher quantity rx sent electronically

## 2020-05-22 ENCOUNTER — LAB VISIT (OUTPATIENT)
Dept: LAB | Facility: HOSPITAL | Age: 65
End: 2020-05-22
Attending: INTERNAL MEDICINE
Payer: MEDICAID

## 2020-05-22 ENCOUNTER — ANTI-COAG VISIT (OUTPATIENT)
Dept: CARDIOLOGY | Facility: CLINIC | Age: 65
End: 2020-05-22
Payer: MEDICAID

## 2020-05-22 DIAGNOSIS — I82.401 DVT, LOWER EXTREMITY, RECURRENT, RIGHT: ICD-10-CM

## 2020-05-22 DIAGNOSIS — R76.0 LUPUS ANTICOAGULANT POSITIVE: ICD-10-CM

## 2020-05-22 DIAGNOSIS — D68.52 PROTHROMBIN GENE MUTATION: ICD-10-CM

## 2020-05-22 DIAGNOSIS — Z86.718 HISTORY OF DEEP VEIN THROMBOSIS (DVT) OF LOWER EXTREMITY: ICD-10-CM

## 2020-05-22 LAB
INR PPP: 1.3 (ref 0.8–1.2)
PROTHROMBIN TIME: 14.4 SEC (ref 9–12.5)

## 2020-05-22 PROCEDURE — 36415 COLL VENOUS BLD VENIPUNCTURE: CPT

## 2020-05-22 PROCEDURE — 93793 PR ANTICOAGULANT MGMT FOR PT TAKING WARFARIN: ICD-10-PCS | Mod: ,,,

## 2020-05-22 PROCEDURE — 85610 PROTHROMBIN TIME: CPT

## 2020-05-22 PROCEDURE — 93793 ANTICOAG MGMT PT WARFARIN: CPT | Mod: ,,,

## 2020-05-22 NOTE — PROGRESS NOTES
Called patient to reschedule 5/21/20 missed lab appointment, he reports he went to lab today 5/22/20 -appointment was rescheduled by another department, coumadin clinic was not notified

## 2020-05-27 ENCOUNTER — ANTI-COAG VISIT (OUTPATIENT)
Dept: CARDIOLOGY | Facility: CLINIC | Age: 65
End: 2020-05-27
Payer: MEDICAID

## 2020-05-27 ENCOUNTER — LAB VISIT (OUTPATIENT)
Dept: LAB | Facility: HOSPITAL | Age: 65
End: 2020-05-27
Attending: INTERNAL MEDICINE
Payer: MEDICAID

## 2020-05-27 DIAGNOSIS — R76.0 LUPUS ANTICOAGULANT POSITIVE: ICD-10-CM

## 2020-05-27 DIAGNOSIS — Z86.718 HISTORY OF DEEP VEIN THROMBOSIS (DVT) OF LOWER EXTREMITY: ICD-10-CM

## 2020-05-27 DIAGNOSIS — I82.401 DVT, LOWER EXTREMITY, RECURRENT, RIGHT: ICD-10-CM

## 2020-05-27 DIAGNOSIS — D68.52 PROTHROMBIN GENE MUTATION: ICD-10-CM

## 2020-05-27 LAB
INR PPP: 1.6 (ref 0.8–1.2)
PROTHROMBIN TIME: 17.5 SEC (ref 9–12.5)

## 2020-05-27 PROCEDURE — 85610 PROTHROMBIN TIME: CPT

## 2020-05-27 PROCEDURE — 93793 PR ANTICOAGULANT MGMT FOR PT TAKING WARFARIN: ICD-10-PCS | Mod: ,,, | Performed by: PHARMACIST

## 2020-05-27 PROCEDURE — 36415 COLL VENOUS BLD VENIPUNCTURE: CPT

## 2020-05-27 PROCEDURE — 93793 ANTICOAG MGMT PT WARFARIN: CPT | Mod: ,,, | Performed by: PHARMACIST

## 2020-06-04 ENCOUNTER — ANTI-COAG VISIT (OUTPATIENT)
Dept: CARDIOLOGY | Facility: CLINIC | Age: 65
End: 2020-06-04
Payer: MEDICAID

## 2020-06-04 ENCOUNTER — LAB VISIT (OUTPATIENT)
Dept: LAB | Facility: HOSPITAL | Age: 65
End: 2020-06-04
Attending: INTERNAL MEDICINE
Payer: MEDICAID

## 2020-06-04 DIAGNOSIS — D68.52 PROTHROMBIN GENE MUTATION: ICD-10-CM

## 2020-06-04 DIAGNOSIS — R76.0 LUPUS ANTICOAGULANT POSITIVE: ICD-10-CM

## 2020-06-04 DIAGNOSIS — I82.401 DVT, LOWER EXTREMITY, RECURRENT, RIGHT: ICD-10-CM

## 2020-06-04 DIAGNOSIS — Z86.718 HISTORY OF DEEP VEIN THROMBOSIS (DVT) OF LOWER EXTREMITY: ICD-10-CM

## 2020-06-04 LAB
INR PPP: 3.3 (ref 0.8–1.2)
PROTHROMBIN TIME: 37 SEC (ref 9–12.5)

## 2020-06-04 PROCEDURE — 36415 COLL VENOUS BLD VENIPUNCTURE: CPT

## 2020-06-04 PROCEDURE — 85610 PROTHROMBIN TIME: CPT

## 2020-06-04 PROCEDURE — 93793 ANTICOAG MGMT PT WARFARIN: CPT | Mod: ,,, | Performed by: PHARMACIST

## 2020-06-04 PROCEDURE — 93793 PR ANTICOAGULANT MGMT FOR PT TAKING WARFARIN: ICD-10-PCS | Mod: ,,, | Performed by: PHARMACIST

## 2020-06-04 NOTE — PROGRESS NOTES
Confirmed taking correct dose of coumadin but took 10mg on 6/3  Slammed L hands Pinky in a door on 6/3, & now has dry up bruise/blood  NO other changes

## 2020-06-11 ENCOUNTER — LAB VISIT (OUTPATIENT)
Dept: LAB | Facility: HOSPITAL | Age: 65
End: 2020-06-11
Attending: INTERNAL MEDICINE
Payer: MEDICAID

## 2020-06-11 ENCOUNTER — ANTI-COAG VISIT (OUTPATIENT)
Dept: CARDIOLOGY | Facility: CLINIC | Age: 65
End: 2020-06-11
Payer: MEDICAID

## 2020-06-11 DIAGNOSIS — R76.0 LUPUS ANTICOAGULANT POSITIVE: ICD-10-CM

## 2020-06-11 DIAGNOSIS — Z86.718 HISTORY OF DEEP VEIN THROMBOSIS (DVT) OF LOWER EXTREMITY: ICD-10-CM

## 2020-06-11 DIAGNOSIS — I82.401 DVT, LOWER EXTREMITY, RECURRENT, RIGHT: ICD-10-CM

## 2020-06-11 DIAGNOSIS — D68.52 PROTHROMBIN GENE MUTATION: ICD-10-CM

## 2020-06-11 LAB
INR PPP: 4.4 (ref 0.8–1.2)
PROTHROMBIN TIME: 42.1 SEC (ref 9–12.5)

## 2020-06-11 PROCEDURE — 93793 ANTICOAG MGMT PT WARFARIN: CPT | Mod: ,,, | Performed by: PHARMACIST

## 2020-06-11 PROCEDURE — 36415 COLL VENOUS BLD VENIPUNCTURE: CPT

## 2020-06-11 PROCEDURE — 85610 PROTHROMBIN TIME: CPT

## 2020-06-11 PROCEDURE — 93793 PR ANTICOAGULANT MGMT FOR PT TAKING WARFARIN: ICD-10-PCS | Mod: ,,, | Performed by: PHARMACIST

## 2020-06-11 NOTE — PROGRESS NOTES
Confirmed taking coumadin 15mg FRI & 10mg all others  OTC Priya selzer WED  NO othher changes

## 2020-06-25 ENCOUNTER — ANTI-COAG VISIT (OUTPATIENT)
Dept: CARDIOLOGY | Facility: CLINIC | Age: 65
End: 2020-06-25
Payer: MEDICAID

## 2020-06-25 ENCOUNTER — LAB VISIT (OUTPATIENT)
Dept: LAB | Facility: HOSPITAL | Age: 65
End: 2020-06-25
Attending: INTERNAL MEDICINE
Payer: MEDICAID

## 2020-06-25 DIAGNOSIS — R76.0 LUPUS ANTICOAGULANT POSITIVE: ICD-10-CM

## 2020-06-25 DIAGNOSIS — I82.401 DVT, LOWER EXTREMITY, RECURRENT, RIGHT: ICD-10-CM

## 2020-06-25 DIAGNOSIS — D68.52 PROTHROMBIN GENE MUTATION: ICD-10-CM

## 2020-06-25 DIAGNOSIS — Z86.718 HISTORY OF DEEP VEIN THROMBOSIS (DVT) OF LOWER EXTREMITY: ICD-10-CM

## 2020-06-25 LAB
INR PPP: 1.9 (ref 0.8–1.2)
PROTHROMBIN TIME: 20.8 SEC (ref 9–12.5)

## 2020-06-25 PROCEDURE — 93793 PR ANTICOAGULANT MGMT FOR PT TAKING WARFARIN: ICD-10-PCS | Mod: ,,, | Performed by: PHARMACIST

## 2020-06-25 PROCEDURE — 93793 ANTICOAG MGMT PT WARFARIN: CPT | Mod: ,,, | Performed by: PHARMACIST

## 2020-06-25 PROCEDURE — 85610 PROTHROMBIN TIME: CPT

## 2020-06-25 PROCEDURE — 36415 COLL VENOUS BLD VENIPUNCTURE: CPT

## 2020-07-02 ENCOUNTER — LAB VISIT (OUTPATIENT)
Dept: LAB | Facility: HOSPITAL | Age: 65
End: 2020-07-02
Attending: INTERNAL MEDICINE
Payer: MEDICAID

## 2020-07-02 ENCOUNTER — ANTI-COAG VISIT (OUTPATIENT)
Dept: CARDIOLOGY | Facility: CLINIC | Age: 65
End: 2020-07-02

## 2020-07-02 ENCOUNTER — HOSPITAL ENCOUNTER (EMERGENCY)
Facility: HOSPITAL | Age: 65
Discharge: HOME | End: 2020-07-02
Attending: EMERGENCY MEDICINE | Admitting: HOSPITALIST
Payer: MEDICAID

## 2020-07-02 VITALS
WEIGHT: 185 LBS | OXYGEN SATURATION: 100 % | HEART RATE: 65 BPM | RESPIRATION RATE: 17 BRPM | SYSTOLIC BLOOD PRESSURE: 179 MMHG | HEIGHT: 71 IN | TEMPERATURE: 98 F | BODY MASS INDEX: 25.9 KG/M2 | DIASTOLIC BLOOD PRESSURE: 88 MMHG

## 2020-07-02 DIAGNOSIS — I82.401 DVT, LOWER EXTREMITY, RECURRENT, RIGHT: ICD-10-CM

## 2020-07-02 DIAGNOSIS — R22.42 MASS OF LEFT FOOT: ICD-10-CM

## 2020-07-02 DIAGNOSIS — R07.9 CHEST PAIN: ICD-10-CM

## 2020-07-02 DIAGNOSIS — N32.89 BLADDER WALL THICKENING: ICD-10-CM

## 2020-07-02 DIAGNOSIS — R76.0 LUPUS ANTICOAGULANT POSITIVE: ICD-10-CM

## 2020-07-02 DIAGNOSIS — D68.52 PROTHROMBIN GENE MUTATION: ICD-10-CM

## 2020-07-02 DIAGNOSIS — Z78.9 FAILURE OF OUTPATIENT TREATMENT: ICD-10-CM

## 2020-07-02 DIAGNOSIS — R07.89 CHEST PAIN, NON-CARDIAC: Primary | ICD-10-CM

## 2020-07-02 DIAGNOSIS — M79.89 FOOT SWELLING: ICD-10-CM

## 2020-07-02 DIAGNOSIS — N32.0 BLADDER OUTLET OBSTRUCTION: ICD-10-CM

## 2020-07-02 DIAGNOSIS — N32.89 DISTENDED BLADDER: ICD-10-CM

## 2020-07-02 DIAGNOSIS — I25.10 CORONARY ARTERY DISEASE INVOLVING NATIVE CORONARY ARTERY OF NATIVE HEART WITHOUT ANGINA PECTORIS: ICD-10-CM

## 2020-07-02 DIAGNOSIS — Z72.0 TOBACCO ABUSE: ICD-10-CM

## 2020-07-02 DIAGNOSIS — R79.89 ELEVATED TROPONIN: ICD-10-CM

## 2020-07-02 DIAGNOSIS — Z86.718 HISTORY OF DEEP VEIN THROMBOSIS (DVT) OF LOWER EXTREMITY: ICD-10-CM

## 2020-07-02 DIAGNOSIS — Z86.010 HX OF COLONIC POLYP: ICD-10-CM

## 2020-07-02 DIAGNOSIS — R33.9 URINARY RETENTION: ICD-10-CM

## 2020-07-02 LAB
ALBUMIN SERPL BCP-MCNC: 4.4 G/DL (ref 3.5–5.2)
ALP SERPL-CCNC: 59 U/L (ref 55–135)
ALT SERPL W/O P-5'-P-CCNC: 26 U/L (ref 10–44)
ANION GAP SERPL CALC-SCNC: 10 MMOL/L (ref 8–16)
AORTIC ROOT ANNULUS: 3.48 CM
AORTIC VALVE CUSP SEPERATION: 2.28 CM
ASCENDING AORTA: 3.06 CM
AST SERPL-CCNC: 49 U/L (ref 10–40)
AV INDEX (PROSTH): 0.74
AV MEAN GRADIENT: 4 MMHG
AV PEAK GRADIENT: 9 MMHG
AV VALVE AREA: 2.64 CM2
AV VELOCITY RATIO: 0.71
BASOPHILS # BLD AUTO: 0.09 K/UL (ref 0–0.2)
BASOPHILS NFR BLD: 1.4 % (ref 0–1.9)
BILIRUB SERPL-MCNC: 1.1 MG/DL (ref 0.1–1)
BNP SERPL-MCNC: 92 PG/ML (ref 0–99)
BSA FOR ECHO PROCEDURE: 2.05 M2
BUN SERPL-MCNC: 11 MG/DL (ref 8–23)
CALCIUM SERPL-MCNC: 8.9 MG/DL (ref 8.7–10.5)
CHLORIDE SERPL-SCNC: 105 MMOL/L (ref 95–110)
CO2 SERPL-SCNC: 25 MMOL/L (ref 23–29)
CREAT SERPL-MCNC: 0.8 MG/DL (ref 0.5–1.4)
CV ECHO LV RWT: 0.48 CM
DIFFERENTIAL METHOD: ABNORMAL
DOP CALC AO PEAK VEL: 1.46 M/S
DOP CALC AO VTI: 33.95 CM
DOP CALC LVOT AREA: 3.6 CM2
DOP CALC LVOT DIAMETER: 2.13 CM
DOP CALC LVOT PEAK VEL: 1.03 M/S
DOP CALC LVOT STROKE VOLUME: 89.5 CM3
DOP CALCLVOT PEAK VEL VTI: 25.13 CM
E WAVE DECELERATION TIME: 217.06 MSEC
E/A RATIO: 1.09
E/E' RATIO: 10.73 M/S
ECHO LV POSTERIOR WALL: 1.29 CM (ref 0.6–1.1)
EOSINOPHIL # BLD AUTO: 0.1 K/UL (ref 0–0.5)
EOSINOPHIL NFR BLD: 1.9 % (ref 0–8)
ERYTHROCYTE [DISTWIDTH] IN BLOOD BY AUTOMATED COUNT: 13.5 % (ref 11.5–14.5)
EST. GFR  (AFRICAN AMERICAN): >60 ML/MIN/1.73 M^2
EST. GFR  (NON AFRICAN AMERICAN): >60 ML/MIN/1.73 M^2
FRACTIONAL SHORTENING: 39 % (ref 28–44)
GLUCOSE SERPL-MCNC: 86 MG/DL (ref 70–110)
HCT VFR BLD AUTO: 42.9 % (ref 40–54)
HGB BLD-MCNC: 13.8 G/DL (ref 14–18)
IMM GRANULOCYTES # BLD AUTO: 0.03 K/UL (ref 0–0.04)
IMM GRANULOCYTES NFR BLD AUTO: 0.5 % (ref 0–0.5)
INR PPP: 2.7 (ref 0.8–1.2)
INTERVENTRICULAR SEPTUM: 1.33 CM (ref 0.6–1.1)
IVRT: 161.48 MSEC
LA MAJOR: 5.04 CM
LA MINOR: 4.65 CM
LA WIDTH: 4.07 CM
LEFT ATRIUM SIZE: 3.99 CM
LEFT ATRIUM VOLUME INDEX: 32.7 ML/M2
LEFT ATRIUM VOLUME: 66.77 CM3
LEFT INTERNAL DIMENSION IN SYSTOLE: 3.27 CM (ref 2.1–4)
LEFT VENTRICLE DIASTOLIC VOLUME INDEX: 67.52 ML/M2
LEFT VENTRICLE DIASTOLIC VOLUME: 137.75 ML
LEFT VENTRICLE MASS INDEX: 144 G/M2
LEFT VENTRICLE SYSTOLIC VOLUME INDEX: 21.1 ML/M2
LEFT VENTRICLE SYSTOLIC VOLUME: 43.14 ML
LEFT VENTRICULAR INTERNAL DIMENSION IN DIASTOLE: 5.34 CM (ref 3.5–6)
LEFT VENTRICULAR MASS: 293.53 G
LV LATERAL E/E' RATIO: 9.83 M/S
LV SEPTAL E/E' RATIO: 11.8 M/S
LYMPHOCYTES # BLD AUTO: 1.6 K/UL (ref 1–4.8)
LYMPHOCYTES NFR BLD: 24.1 % (ref 18–48)
MCH RBC QN AUTO: 31.2 PG (ref 27–31)
MCHC RBC AUTO-ENTMCNC: 32.2 G/DL (ref 32–36)
MCV RBC AUTO: 97 FL (ref 82–98)
MONOCYTES # BLD AUTO: 0.6 K/UL (ref 0.3–1)
MONOCYTES NFR BLD: 9.5 % (ref 4–15)
MV PEAK A VEL: 0.54 M/S
MV PEAK E VEL: 0.59 M/S
MV STENOSIS PRESSURE HALF TIME: 62.95 MS
MV VALVE AREA P 1/2 METHOD: 3.49 CM2
NEUTROPHILS # BLD AUTO: 4 K/UL (ref 1.8–7.7)
NEUTROPHILS NFR BLD: 62.6 % (ref 38–73)
NRBC BLD-RTO: 0 /100 WBC
PLATELET # BLD AUTO: 260 K/UL (ref 150–350)
PMV BLD AUTO: 10.9 FL (ref 9.2–12.9)
POTASSIUM SERPL-SCNC: 4 MMOL/L (ref 3.5–5.1)
PROT SERPL-MCNC: 7.6 G/DL (ref 6–8.4)
PROTHROMBIN TIME: 29.4 SEC (ref 9–12.5)
PULM VEIN S/D RATIO: 1.79
PV PEAK D VEL: 0.28 M/S
PV PEAK S VEL: 0.5 M/S
PV PEAK VELOCITY: 0.98 CM/S
RA MAJOR: 5.03 CM
RA PRESSURE: 3 MMHG
RA WIDTH: 4.7 CM
RBC # BLD AUTO: 4.42 M/UL (ref 4.6–6.2)
RIGHT VENTRICULAR END-DIASTOLIC DIMENSION: 4.09 CM
RV TISSUE DOPPLER FREE WALL SYSTOLIC VELOCITY 1 (APICAL 4 CHAMBER VIEW): 14.91 CM/S
SARS-COV-2 RDRP RESP QL NAA+PROBE: NEGATIVE
SINUS: 3.33 CM
SODIUM SERPL-SCNC: 140 MMOL/L (ref 136–145)
STJ: 2.6 CM
TDI LATERAL: 0.06 M/S
TDI SEPTAL: 0.05 M/S
TDI: 0.06 M/S
TRICUSPID ANNULAR PLANE SYSTOLIC EXCURSION: 2.29 CM
TROPONIN I SERPL DL<=0.01 NG/ML-MCNC: 0.07 NG/ML (ref 0–0.03)
TROPONIN I SERPL DL<=0.01 NG/ML-MCNC: 0.09 NG/ML (ref 0–0.03)
WBC # BLD AUTO: 6.43 K/UL (ref 3.9–12.7)

## 2020-07-02 PROCEDURE — 85610 PROTHROMBIN TIME: CPT

## 2020-07-02 PROCEDURE — G0378 HOSPITAL OBSERVATION PER HR: HCPCS

## 2020-07-02 PROCEDURE — 80053 COMPREHEN METABOLIC PANEL: CPT

## 2020-07-02 PROCEDURE — 93010 EKG 12-LEAD: ICD-10-PCS | Mod: ,,, | Performed by: INTERNAL MEDICINE

## 2020-07-02 PROCEDURE — U0002 COVID-19 LAB TEST NON-CDC: HCPCS

## 2020-07-02 PROCEDURE — 85025 COMPLETE CBC W/AUTO DIFF WBC: CPT

## 2020-07-02 PROCEDURE — 99285 EMERGENCY DEPT VISIT HI MDM: CPT | Mod: 25

## 2020-07-02 PROCEDURE — 93005 ELECTROCARDIOGRAM TRACING: CPT

## 2020-07-02 PROCEDURE — 84484 ASSAY OF TROPONIN QUANT: CPT

## 2020-07-02 PROCEDURE — 93010 ELECTROCARDIOGRAM REPORT: CPT | Mod: ,,, | Performed by: INTERNAL MEDICINE

## 2020-07-02 PROCEDURE — 83880 ASSAY OF NATRIURETIC PEPTIDE: CPT

## 2020-07-02 PROCEDURE — 99220 PR INITIAL OBSERVATION CARE,LEVL III: CPT | Mod: 25,,, | Performed by: INTERNAL MEDICINE

## 2020-07-02 PROCEDURE — 36415 COLL VENOUS BLD VENIPUNCTURE: CPT

## 2020-07-02 PROCEDURE — 99220 PR INITIAL OBSERVATION CARE,LEVL III: ICD-10-PCS | Mod: 25,,, | Performed by: INTERNAL MEDICINE

## 2020-07-02 RX ORDER — FINASTERIDE 5 MG/1
5 TABLET, FILM COATED ORAL DAILY
Status: DISCONTINUED | OUTPATIENT
Start: 2020-07-03 | End: 2020-07-02 | Stop reason: HOSPADM

## 2020-07-02 RX ORDER — ACETAMINOPHEN 325 MG/1
650 TABLET ORAL EVERY 8 HOURS PRN
Status: DISCONTINUED | OUTPATIENT
Start: 2020-07-02 | End: 2020-07-02 | Stop reason: HOSPADM

## 2020-07-02 RX ORDER — ONDANSETRON 2 MG/ML
4 INJECTION INTRAMUSCULAR; INTRAVENOUS EVERY 8 HOURS PRN
Status: DISCONTINUED | OUTPATIENT
Start: 2020-07-02 | End: 2020-07-02 | Stop reason: HOSPADM

## 2020-07-02 RX ORDER — ATORVASTATIN CALCIUM 40 MG/1
40 TABLET, FILM COATED ORAL DAILY
Status: DISCONTINUED | OUTPATIENT
Start: 2020-07-03 | End: 2020-07-02 | Stop reason: HOSPADM

## 2020-07-02 RX ORDER — SODIUM CHLORIDE 0.9 % (FLUSH) 0.9 %
10 SYRINGE (ML) INJECTION
Status: DISCONTINUED | OUTPATIENT
Start: 2020-07-02 | End: 2020-07-02 | Stop reason: HOSPADM

## 2020-07-02 RX ORDER — MORPHINE SULFATE 10 MG/ML
2 INJECTION INTRAMUSCULAR; INTRAVENOUS; SUBCUTANEOUS EVERY 4 HOURS PRN
Status: CANCELLED | OUTPATIENT
Start: 2020-07-02

## 2020-07-02 RX ORDER — TAMSULOSIN HYDROCHLORIDE 0.4 MG/1
0.4 CAPSULE ORAL DAILY
Status: DISCONTINUED | OUTPATIENT
Start: 2020-07-03 | End: 2020-07-02 | Stop reason: HOSPADM

## 2020-07-02 RX ORDER — MORPHINE SULFATE 10 MG/ML
4 INJECTION INTRAMUSCULAR; INTRAVENOUS; SUBCUTANEOUS EVERY 4 HOURS PRN
Status: CANCELLED | OUTPATIENT
Start: 2020-07-02

## 2020-07-02 NOTE — ED PROVIDER NOTES
Encounter Date: 7/2/2020    SCRIBE #1 NOTE: I, Matthew Sahni, am scribing for, and in the presence of, Matt Vaughan PA-C.       History     Chief Complaint   Patient presents with    bruising     pt taking blood thinners states he woke up Tuesday morning with bruising to left arm and abdomen. Denies any injury trauma or falls. Denies pain        Time seen by provider: 11:49 PM on 07/02/2020    Carlos A Ruff is a 65 y.o. male who presents to the ED with sudden onset of left lower chest pain lasting 30 seconds and occurring approximately three hours ago. He describes this as a muscle spasm. Patient reports noticing bruising and swelling developing to the right upper abdomen and left lower bicep over the past two days. He denies any fever, runny nose, sore throat, cough, shortness of breath, nausea, vomiting, dizziness, weakness,  Past medical history includes prior MI. Surgical history of cardiac stent placement.    The history is provided by the patient.     Review of patient's allergies indicates:  No Known Allergies  Past Medical History:   Diagnosis Date    Anticoagulant long-term use     Colon polyps     Coronary artery disease     Hypercholesteremia     Hypertension     MI (myocardial infarction)      Past Surgical History:   Procedure Laterality Date    blood clots      cardiac stents      COLONOSCOPY N/A 2/1/2019    Procedure: COLONOSCOPY;  Surgeon: Blanca Fenton MD;  Location: [facility] ENDO;  Service: Endoscopy;  Laterality: N/A;  RX PLAVIX/XARELTO ok to stop (7 days, 3 days) per Dr. Cordero see scan    CYSTOSCOPY WITH URODYNAMIC TESTING N/A 8/30/2019    Procedure: CYSTOSCOPY,WITH URODYNAMIC TESTING;  Surgeon: Felecia Stiles MD;  Location: [facility] OR;  Service: Urology;  Laterality: N/A;  RN PRE OP 8-27-19     No family history on file.  Social History     Tobacco Use    Smoking status: Current Some Day Smoker     Packs/day: 0.25     Types: Cigarettes    Smokeless tobacco: Never Used   Substance  Use Topics    Alcohol use: Yes     Comment: occasionally     Drug use: Never     Review of Systems   Constitutional: Negative for chills and fever.   HENT: Negative for sore throat.    Respiratory: Negative for cough and shortness of breath.    Cardiovascular: Positive for chest pain.   Gastrointestinal: Negative for nausea.   Genitourinary: Negative for dysuria.   Musculoskeletal: Negative for back pain, joint swelling and myalgias.   Skin: Positive for color change. Negative for rash.   Neurological: Negative for dizziness, syncope, weakness, light-headedness and numbness.   Hematological: Does not bruise/bleed easily.       Physical Exam     Initial Vitals [07/02/20 1022]   BP Pulse Resp Temp SpO2   (!) 188/84 (!) 59 18 98.9 °F (37.2 °C) 100 %      MAP       --         Physical Exam    Nursing note and vitals reviewed.  Constitutional: He appears well-developed and well-nourished. He is not diaphoretic. No distress.   HENT:   Head: Normocephalic and atraumatic.   Eyes: Conjunctivae and EOM are normal.   Neck: Neck supple.   Cardiovascular: Normal rate, regular rhythm and normal heart sounds.   Pulmonary/Chest: Effort normal and breath sounds normal. No stridor. No respiratory distress. He has no decreased breath sounds. He has no wheezes. He has no rhonchi. He has no rales.   Musculoskeletal:      Cervical back: Normal.      Thoracic back: Normal.      Lumbar back: Normal.   Neurological: He is alert and oriented to person, place, and time. No cranial nerve deficit.   Skin: Skin is warm and dry. No pallor.   Psychiatric: He has a normal mood and affect. Thought content normal.         ED Course   Procedures  Labs Reviewed   CBC W/ AUTO DIFFERENTIAL - Abnormal; Notable for the following components:       Result Value    RBC 4.42 (*)     Hemoglobin 13.8 (*)     Mean Corpuscular Hemoglobin 31.2 (*)     All other components within normal limits   COMPREHENSIVE METABOLIC PANEL - Abnormal; Notable for the following  components:    Total Bilirubin 1.1 (*)     AST 49 (*)     All other components within normal limits   TROPONIN I - Abnormal; Notable for the following components:    Troponin I 0.085 (*)     All other components within normal limits   TROPONIN I - Abnormal; Notable for the following components:    Troponin I 0.072 (*)     All other components within normal limits   B-TYPE NATRIURETIC PEPTIDE   SARS-COV-2 RNA AMPLIFICATION, QUAL        ECG Results          EKG 12-lead (In process)  Result time 07/02/20 12:58:15    In process by Interface, Lab In [facility] (07/02/20 12:58:15)                 Narrative:    Test Reason : R07.9,    Vent. Rate : 050 BPM     Atrial Rate : 050 BPM     P-R Int : 148 ms          QRS Dur : 080 ms      QT Int : 522 ms       P-R-T Axes : 047 016 047 degrees     QTc Int : 475 ms    Sinus bradycardia  Otherwise normal ECG  When compared with ECG of 02-JUL-2020 11:34,  No significant change was found    Referred By: [email or web address]   SELF           Confirmed By:                   In process by Interface, Lab In [facility] (07/02/20 12:56:33)                 Narrative:    Test Reason : R07.9,    Vent. Rate : 050 BPM     Atrial Rate : 050 BPM     P-R Int : 148 ms          QRS Dur : 080 ms      QT Int : 522 ms       P-R-T Axes : 047 016 047 degrees     QTc Int : 475 ms    Sinus bradycardia  Otherwise normal ECG  When compared with ECG of 02-JUL-2020 11:34,  No significant change was found    Referred By: [email or web address]   SELF           Confirmed By:                             Imaging Results          X-Ray Chest AP Portable (Final result)  Result time 07/02/20 11:55:58    Final result by Quinten Albrecht MD (07/02/20 11:55:58)                 Impression:      See above      Electronically signed by: Quinten Albrecht MD  Date:    07/02/2020  Time:    11:55             Narrative:    EXAMINATION:  XR CHEST AP PORTABLE    CLINICAL HISTORY:  Chest Pain;    TECHNIQUE:  Single frontal view of the chest was  performed.    COMPARISON:  N 02/07/2019 one    FINDINGS:  Heart size normal.  The lungs are clear.  No pleural effusion                                 Medical Decision Making:   Clinical Tests:   Lab Tests: Ordered and Reviewed  Radiological Study: Ordered and Reviewed  Medical Tests: Ordered and Reviewed  ED Management:  Hemodynamically stable.  Nontoxic and in no acute distress.  Patient is overall well-appearing, pleasant, conversational.  Small hematoma and mild bruising to the left upper extremity and epigastric.  The does start no STEMI with no ischemic changes.  Troponin elevated at 0.085.  Patient states that he has been chest pain-free since 8:00 a.m. this morning.  Discussed patient with cardiology on-call who recommends observation. Will place in observation for further evaluation, treatment and management.     This is doctor Raygoza dictating.   saw the patient in the emergency room and feels that the patient could be discharged to outpatient evaluation and treatment.  His 2 troponins were flat.  An echocardiogram was performed by Cardiology.  I will accept the recommendation of Cardiology to discharge to outpatient evaluation and treatment.        Additional MDM:   Heart Score:    History:          Slightly suspicious.  ECG:             Nonspecific repolarisation disturbance  Age:               45-65 years  Risk factors: >= 3 risk factors or history of atherosclerotic disease  Troponin:       >2x normal limit  Final Score: 6             Scribe Attestation:   Scribe #1: I performed the above scribed service and the documentation accurately describes the services I performed. I attest to the accuracy of the note.                          Clinical Impression:       ICD-10-CM ICD-9-CM   1. Chest pain, non-cardiac  R07.89 786.59   2. Chest pain  R07.9 786.50   3. Coronary artery disease involving native coronary artery of native heart without angina pectoris  I25.10 414.01   4. History of deep vein  thrombosis (DVT) of lower extremity  Z86.718 V12.51   5. Mass of left foot  R22.42 782.2   6. Tobacco abuse  Z72.0 305.1   7. Elevated troponin  R79.89 790.6   8. DVT, lower extremity, recurrent, right  I82.401 453.40   9. Prothrombin gene mutation  D68.52 289.81   10. Lupus anticoagulant positive  R76.0 795.79   11. Foot swelling  M79.89 729.81   12. Failure of outpatient treatment  Z78.9 V49.89   13. Distended bladder  N32.89 596.89   14. Bladder outlet obstruction  N32.0 596.0   15. Bladder wall thickening  N32.89 596.89   16. Hx of colonic polyp  Z86.010 V12.72   17. Urinary retention  R33.9 788.20         Disposition:   Disposition: Placed in Observation  Condition: Stable     ED Disposition Condition    Observation            I, JOSY CRAMER, personally performed the services described in this documentation. All medical record entries made by the scribe were at my direction and in my presence. I have reviewed the chart and agree that the record reflects my personal performance and is accurate and complete.    I am cosigning this chart. I was available for the mid level provider during the visit.  Unless otherwise documented by the provider, I may not have performed an examination with face time or reviewed the presentation with the mid level provider before discharge.              Josy Cramer PA-C  07/02/20 1986       Quinten Raygoza MD  07/02/20 2244

## 2020-07-02 NOTE — ED TRIAGE NOTES
Pt arrived to the ED due epigastric and LUE bruising that started Tuesday when the pt woke up that morning. Pt denies any recent injuries and is currently taking blood thinners. Pt states left anterior pain this morning that has now ceased

## 2020-07-02 NOTE — ASSESSMENT & PLAN NOTE
Atypical chest pain, chronic issue in a patient with known coronary artery disease.  Last stress test in 2018 did not show any significant ischemia.  Troponins are minimally elevated and flat.  Patient states that his chest pain is chronic and has not changed over the past few months.  Stress test has been ordered by Dr. Cordero, patient has not had a chance to schedule it and get it performed now.  Patient not interested in staying for further observation wants to go home.  Promises that he will follow-up with Dr. Coredro next week and schedule his stress test.  Also states that he will come back the ER if he has recurrence of chest pain.  Currently chest pain free.  EKG does not show acute ischemic changes, echo does not show any large wall motion abnormalities.  Continue medical management.

## 2020-07-02 NOTE — PLAN OF CARE
Discharge planning assessment completed with patient's assistance.  Patient from home alone and independent.  Patient has no DMe.  Patient is on Coumadin which is monitored by Dr. Cordero.  Patient has labs drawn at [facility].  Patient prefers morning visits and his preferred pharmacy is [facility] in [place] on [address] [facility].  Case Management will continue to assess and assist with discharge planning.       07/02/20 1547   Discharge Assessment   Assessment Type Discharge Planning Assessment   Confirmed/corrected address and phone number on facesheet? Yes   Assessment information obtained from? Patient   Expected Length of Stay (days) 1  (OBS)   Communicated expected length of stay with patient/caregiver yes   Prior to hospitilization cognitive status: Alert/Oriented   Prior to hospitalization functional status: Independent   Current cognitive status: Alert/Oriented   Current Functional Status: Independent   Facility Arrived From: home   Lives With alone   Able to Return to Prior Arrangements yes   Is patient able to care for self after discharge? Yes   Who are your caregiver(s) and their phone number(s)? Emergency contacts:  Bonnie Ruff; [phone]; Carlos A Ruff Jr; [phone]   Patient's perception of discharge disposition home or selfcare   Readmission Within the Last 30 Days no previous admission in last 30 days   Patient currently being followed by outpatient case management? No   Patient currently receives any other outside agency services? No   Equipment Currently Used at Home none   Part D Coverage n/a   Do you have any problems affording any of your prescribed medications? No   Is the patient taking medications as prescribed? yes   Does the patient have transportation home? Yes   Transportation Anticipated car, drives self   Dialysis Name and Scheduled days n/a   Does the patient receive services at the [facility]in Clinic? Yes   Discharge Plan A Home   Discharge Plan B Home   DME Needed  Upon Discharge  none   Patient/Family in Agreement with Plan yes   Amee Sutherland LMSW, CCM  7/2/20

## 2020-07-02 NOTE — SUBJECTIVE & OBJECTIVE
Past Medical History:   Diagnosis Date    Anticoagulant long-term use     Colon polyps     Coronary artery disease     Hypercholesteremia     Hypertension     MI (myocardial infarction)        Past Surgical History:   Procedure Laterality Date    blood clots      cardiac stents      COLONOSCOPY N/A 2/1/2019    Procedure: COLONOSCOPY;  Surgeon: Blanca Fenton MD;  Location: [facility] ENDO;  Service: Endoscopy;  Laterality: N/A;  RX PLAVIX/XARELTO ok to stop (7 days, 3 days) per Dr. Cordero see scan    CYSTOSCOPY WITH URODYNAMIC TESTING N/A 8/30/2019    Procedure: CYSTOSCOPY,WITH URODYNAMIC TESTING;  Surgeon: Felecia Stiles MD;  Location: [facility] OR;  Service: Urology;  Laterality: N/A;  RN PRE OP 8-27-19       Review of patient's allergies indicates:  No Known Allergies    No current facility-administered medications on file prior to encounter.      Current Outpatient Medications on File Prior to Encounter   Medication Sig    atorvastatin (LIPITOR) 40 MG tablet Take 40 mg by mouth once daily.     ergocalciferol (VITAMIN D2) 50,000 unit Cap Take 50,000 Units by mouth every 7 days.    finasteride (PROSCAR) 5 mg tablet Take 1 tablet (5 mg total) by mouth once daily.    tamsulosin (FLOMAX) 0.4 mg Cap Take 1 capsule (0.4 mg total) by mouth once daily.    warfarin (COUMADIN) 10 MG tablet Take 1 to 1.5 tablets by mouth daily as directed by coumadin clinic.    isosorbide mononitrate (IMDUR) 30 MG 24 hr tablet TAKE 1 TABLET BY MOUTH ONCE DAILY (Patient not taking: Reported on 3/12/2020)    losartan-hydrochlorothiazide 50-12.5 mg (HYZAAR) 50-12.5 mg per tablet Take 1 tablet by mouth once daily.    nitroGLYCERIN (NITROSTAT) 0.4 MG SL tablet Place 0.4 mg under the tongue every 5 (five) minutes as needed for Chest pain.     Family History     None        Tobacco Use    Smoking status: Current Some Day Smoker     Packs/day: 0.25     Types: Cigarettes    Smokeless tobacco: Never Used   Substance and Sexual Activity     Alcohol use: Yes     Comment: occasionally     Drug use: Never    Sexual activity: Not Currently     Review of Systems   Constitution: Negative.   HENT: Negative.    Eyes: Negative.    Cardiovascular: Positive for chest pain.   Respiratory: Negative.    Endocrine: Negative.    Hematologic/Lymphatic: Negative.    Skin: Negative.         Bruising   Musculoskeletal: Negative.    Gastrointestinal: Negative.    Genitourinary: Negative.    Neurological: Negative.    Psychiatric/Behavioral: Negative.    Allergic/Immunologic: Negative.      Objective:     Vital Signs (Most Recent):  Temp: 98.1 °F (36.7 °C) (07/02/20 1602)  Pulse: 65 (07/02/20 1604)  Resp: 17 (07/02/20 1602)  BP: (!) 179/88 (07/02/20 1602)  SpO2: 100 % (07/02/20 1602) Vital Signs (24h Range):  Temp:  [98.1 °F (36.7 °C)-98.9 °F (37.2 °C)] 98.1 °F (36.7 °C)  Pulse:  [48-65] 65  Resp:  [17-18] 17  SpO2:  [99 %-100 %] 100 %  BP: (173-188)/(84-96) 179/88     Weight: 83.9 kg (185 lb)  Body mass index is 25.8 kg/m².    SpO2: 100 %  O2 Device (Oxygen Therapy): room air    No intake or output data in the 24 hours ending 07/02/20 1738    Lines/Drains/Airways     Peripheral Intravenous Line                 Peripheral IV - Single Lumen 07/02/20 20 G Right Antecubital less than 1 day                Physical Exam   Constitutional: He is oriented to person, place, and time. He appears well-developed and well-nourished.   HENT:   Head: Normocephalic.   Eyes: Pupils are equal, round, and reactive to light. Conjunctivae are normal.   Neck: Normal range of motion. Neck supple.   Cardiovascular: Normal rate, regular rhythm and normal heart sounds.   Pulmonary/Chest: Effort normal and breath sounds normal.   Bruising noted on chest wall as well as left arm   Abdominal: Soft. Bowel sounds are normal.   Neurological: He is alert and oriented to person, place, and time.   Skin: Skin is warm.   Vitals reviewed.      Significant Labs:   BMP:   Recent Labs   Lab 07/02/20  1130    GLU 86      K 4.0      CO2 25   BUN 11   CREATININE 0.8   CALCIUM 8.9   , CMP   Recent Labs   Lab 07/02/20  1130      K 4.0      CO2 25   GLU 86   BUN 11   CREATININE 0.8   CALCIUM 8.9   PROT 7.6   ALBUMIN 4.4   BILITOT 1.1*   ALKPHOS 59   AST 49*   ALT 26   ANIONGAP 10   ESTGFRAFRICA >60   EGFRNONAA >60   , CBC   Recent Labs   Lab 07/02/20  1130   WBC 6.43   HGB 13.8*   HCT 42.9      , INR   Recent Labs   Lab 07/02/20  1009   INR 2.7*   , Lipid Panel No results for input(s): CHOL, HDL, LDLCALC, TRIG, CHOLHDL in the last 48 hours., Troponin   Recent Labs   Lab 07/02/20  1130 07/02/20  1437   TROPONINI 0.085* 0.072*    and All pertinent lab results from the last 24 hours have been reviewed.    Significant Imaging: Echocardiogram:   Transthoracic echo (TTE) complete (Cupid Only):   Results for orders placed or performed during the hospital encounter of 07/02/20   Echo Color Flow Doppler? Yes   Result Value Ref Range    Ascending aorta 3.06 cm    STJ 2.60 cm    AV mean gradient 4 mmHg    Ao peak marcos 1.46 m/s    Ao VTI 33.95 cm    IVRT 161.48 msec    IVS 1.33 (A) 0.6 - 1.1 cm    LA size 3.99 cm    Left Atrium Major Axis 5.04 cm    Left Atrium Minor Axis 4.65 cm    LVIDD 5.34 3.5 - 6.0 cm    LVIDS 3.27 2.1 - 4.0 cm    LVOT diameter 2.13 cm    LVOT peak VTI 25.13 cm    PW 1.29 (A) 0.6 - 1.1 cm    MV Peak A Marcos 0.54 m/s    E wave decelartion time 217.06 msec    MV Peak E Marcos 0.59 m/s    PV Peak D Marcos 0.28 m/s    PV Peak S Marcos 0.50 m/s    RA Major Axis 5.03 cm    RA Width 4.70 cm    RVDD 4.09 cm    Sinus 3.33 cm    TAPSE 2.29 cm    TDI LATERAL 0.06 m/s    TDI SEPTAL 0.05 m/s    LA WIDTH 4.07 cm    Ao root annulus 3.48 cm    AORTIC VALVE CUSP SEPERATION 2.28 cm    PV PEAK VELOCITY 0.98 cm/s    MV stenosis pressure 1/2 time 62.95 ms    LV Diastolic Volume 137.75 mL    LV Systolic Volume 43.14 mL    RV S' 14.91 cm/s    LVOT peak marcos 1.03 m/s    LV LATERAL E/E' RATIO 9.83 m/s    LV SEPTAL E/E'  RATIO 11.80 m/s    FS 39 %    LA volume 66.77 cm3    LV mass 293.53 g    Left Ventricle Relative Wall Thickness 0.48 cm    AV valve area 2.64 cm2    AV Velocity Ratio 0.71     AV index (prosthetic) 0.74     MV valve area p 1/2 method 3.49 cm2    E/A ratio 1.09     Mean e' 0.06 m/s    Pulm vein S/D ratio 1.79     LVOT area 3.6 cm2    LVOT stroke volume 89.50 cm3    AV peak gradient 9 mmHg    E/E' ratio 10.73 m/s    LV Systolic Volume Index 21.1 mL/m2    LV Diastolic Volume Index 67.52 mL/m2    LA Volume Index 32.7 mL/m2    LV Mass Index 144 g/m2    BSA 2.05 m2    Right Atrial Pressure (from IVC) 3 mmHg    Narrative    · Normal left ventricular systolic function. The estimated ejection   fraction is 55%.  · Mild concentric left ventricular hypertrophy.  · Grade I (mild) left ventricular diastolic dysfunction consistent with   impaired relaxation.  · Normal right ventricular systolic function.  · Moderate left atrial enlargement.  · Mild right atrial enlargement.  · Normal central venous pressure (3 mmHg).

## 2020-07-02 NOTE — ASSESSMENT & PLAN NOTE
Status post PCI in the past.  States that his current chest pain is different than pain he had prior to his PCI.

## 2020-07-02 NOTE — HPI
Patient is a patient of Dr. Cordero.  Has a past medical history significant for coronary artery disease status post PCI.  Last stress test was in 2018 which did not show any significant ischemia.  Recently presented to Dr. Cordero is office because of on and off chest pain.  A stress test was ordered, but he could not get it done because of the pandemic and scheduling issues..  Patient states that he came for blood work today.  He noticed some bruises on his chest and decided to come to the ER for evaluation.  Had some pinpoint sharp left-sided chest pain.  This is his chronic chest pain for which he saw Dr. Cordero a few months ago.  States is different from his chest pain prior to his PCI.  He states because of that pain his troponins were checked.  Two sets of troponins have been checked and are found to be flat and minimally elevated.  He denies any worsening of his chest pain as compared to his chronic chest pain.  Denies orthopnea, PND, swelling of feet.  EKG done, does not show any acute ischemic changes.  Echocardiogram done today in the ER does not show any large wall motion abnormalities.  Patient is requesting to be discharged home because he states he has to go to his job tomorrow.  He promises to follow-up with Dr. Cordero next week as well as to set up his stress test next week.    On chronic Coumadin because of DVT.    · Normal left ventricular systolic function. The estimated ejection fraction is 55%.  · Mild concentric left ventricular hypertrophy.  · Grade I (mild) left ventricular diastolic dysfunction consistent with impaired relaxation.  · Normal right ventricular systolic function.  · Moderate left atrial enlargement.  · Mild right atrial enlargement.  · Normal central venous pressure (3 mmHg).

## 2020-07-02 NOTE — DISCHARGE INSTRUCTIONS
Please follow-up with your cardiologist, or the cardiologist above this week..  Tylenol for pain.  Rest.  Usual medicines.  Return immediately if you get worse or if new problems develop.

## 2020-07-02 NOTE — CONSULTS
[facility]  Cardiology  Consult Note    Patient Name: Carlos A Ruff  MRN: [id number]  Admission Date: 7/2/2020  Hospital Length of Stay: 0 days  Code Status: Full Code   Attending Provider: Lizbeth Kent MD   Consulting Provider: Danica Bolivar MD  Primary Care Physician: Khalif Hassan MD  Principal Problem:Chest pain    Patient information was obtained from patient and ER records.     Inpatient consult to Cardiology  Consult performed by: Danica Bolivar MD  Consult ordered by: Matt Vaughan PA-C    Inpatient consult to Cardiology  Consult performed by: Danica Bolivar MD  Consult ordered by: Matt Vaughan PA-C        Subjective:     Chief Complaint:  Chest pain     HPI:   Patient is a patient of Dr. Cordero.  Has a past medical history significant for coronary artery disease status post PCI.  Last stress test was in 2018 which did not show any significant ischemia.  Recently presented to Dr. Cordero is office because of on and off chest pain.  A stress test was ordered, but he could not get it done because of the pandemic and scheduling issues..  Patient states that he came for blood work today.  He noticed some bruises on his chest and decided to come to the ER for evaluation.  Had some pinpoint sharp left-sided chest pain.  This is his chronic chest pain for which he saw Dr. Cordero a few months ago.  States is different from his chest pain prior to his PCI.  He states because of that pain his troponins were checked.  Two sets of troponins have been checked and are found to be flat and minimally elevated.  He denies any worsening of his chest pain as compared to his chronic chest pain.  Denies orthopnea, PND, swelling of feet.  EKG done, does not show any acute ischemic changes.  Echocardiogram done today in the ER does not show any large wall motion abnormalities.  Patient is requesting to be discharged home because he states he has to go to his job tomorrow.  He promises to follow-up  with Dr. Cordero next week as well as to set up his stress test next week.    On chronic Coumadin because of DVT.    · Normal left ventricular systolic function. The estimated ejection fraction is 55%.  · Mild concentric left ventricular hypertrophy.  · Grade I (mild) left ventricular diastolic dysfunction consistent with impaired relaxation.  · Normal right ventricular systolic function.  · Moderate left atrial enlargement.  · Mild right atrial enlargement.  · Normal central venous pressure (3 mmHg).        Past Medical History:   Diagnosis Date    Anticoagulant long-term use     Colon polyps     Coronary artery disease     Hypercholesteremia     Hypertension     MI (myocardial infarction)        Past Surgical History:   Procedure Laterality Date    blood clots      cardiac stents      COLONOSCOPY N/A 2/1/2019    Procedure: COLONOSCOPY;  Surgeon: Blanca Fenton MD;  Location: [facility] ENDO;  Service: Endoscopy;  Laterality: N/A;  RX PLAVIX/XARELTO ok to stop (7 days, 3 days) per Dr. Cordero see scan    CYSTOSCOPY WITH URODYNAMIC TESTING N/A 8/30/2019    Procedure: CYSTOSCOPY,WITH URODYNAMIC TESTING;  Surgeon: Felecia Stiles MD;  Location: [facility] OR;  Service: Urology;  Laterality: N/A;  RN PRE OP 8-27-19       Review of patient's allergies indicates:  No Known Allergies    No current facility-administered medications on file prior to encounter.      Current Outpatient Medications on File Prior to Encounter   Medication Sig    atorvastatin (LIPITOR) 40 MG tablet Take 40 mg by mouth once daily.     ergocalciferol (VITAMIN D2) 50,000 unit Cap Take 50,000 Units by mouth every 7 days.    finasteride (PROSCAR) 5 mg tablet Take 1 tablet (5 mg total) by mouth once daily.    tamsulosin (FLOMAX) 0.4 mg Cap Take 1 capsule (0.4 mg total) by mouth once daily.    warfarin (COUMADIN) 10 MG tablet Take 1 to 1.5 tablets by mouth daily as directed by coumadin clinic.    isosorbide mononitrate (IMDUR) 30 MG 24 hr tablet  TAKE 1 TABLET BY MOUTH ONCE DAILY (Patient not taking: Reported on 3/12/2020)    losartan-hydrochlorothiazide 50-12.5 mg (HYZAAR) 50-12.5 mg per tablet Take 1 tablet by mouth once daily.    nitroGLYCERIN (NITROSTAT) 0.4 MG SL tablet Place 0.4 mg under the tongue every 5 (five) minutes as needed for Chest pain.     Family History     None        Tobacco Use    Smoking status: Current Some Day Smoker     Packs/day: 0.25     Types: Cigarettes    Smokeless tobacco: Never Used   Substance and Sexual Activity    Alcohol use: Yes     Comment: occasionally     Drug use: Never    Sexual activity: Not Currently     Review of Systems   Constitution: Negative.   HENT: Negative.    Eyes: Negative.    Cardiovascular: Positive for chest pain.   Respiratory: Negative.    Endocrine: Negative.    Hematologic/Lymphatic: Negative.    Skin: Negative.         Bruising   Musculoskeletal: Negative.    Gastrointestinal: Negative.    Genitourinary: Negative.    Neurological: Negative.    Psychiatric/Behavioral: Negative.    Allergic/Immunologic: Negative.      Objective:     Vital Signs (Most Recent):  Temp: 98.1 °F (36.7 °C) (07/02/20 1602)  Pulse: 65 (07/02/20 1604)  Resp: 17 (07/02/20 1602)  BP: (!) 179/88 (07/02/20 1602)  SpO2: 100 % (07/02/20 1602) Vital Signs (24h Range):  Temp:  [98.1 °F (36.7 °C)-98.9 °F (37.2 °C)] 98.1 °F (36.7 °C)  Pulse:  [48-65] 65  Resp:  [17-18] 17  SpO2:  [99 %-100 %] 100 %  BP: (173-188)/(84-96) 179/88     Weight: 83.9 kg (185 lb)  Body mass index is 25.8 kg/m².    SpO2: 100 %  O2 Device (Oxygen Therapy): room air    No intake or output data in the 24 hours ending 07/02/20 1738    Lines/Drains/Airways     Peripheral Intravenous Line                 Peripheral IV - Single Lumen 07/02/20 20 G Right Antecubital less than 1 day                Physical Exam   Constitutional: He is oriented to person, place, and time. He appears well-developed and well-nourished.   HENT:   Head: Normocephalic.   Eyes: Pupils  are equal, round, and reactive to light. Conjunctivae are normal.   Neck: Normal range of motion. Neck supple.   Cardiovascular: Normal rate, regular rhythm and normal heart sounds.   Pulmonary/Chest: Effort normal and breath sounds normal.   Bruising noted on chest wall as well as left arm   Abdominal: Soft. Bowel sounds are normal.   Neurological: He is alert and oriented to person, place, and time.   Skin: Skin is warm.   Vitals reviewed.      Significant Labs:   BMP:   Recent Labs   Lab 07/02/20  1130   GLU 86      K 4.0      CO2 25   BUN 11   CREATININE 0.8   CALCIUM 8.9   , CMP   Recent Labs   Lab 07/02/20  1130      K 4.0      CO2 25   GLU 86   BUN 11   CREATININE 0.8   CALCIUM 8.9   PROT 7.6   ALBUMIN 4.4   BILITOT 1.1*   ALKPHOS 59   AST 49*   ALT 26   ANIONGAP 10   ESTGFRAFRICA >60   EGFRNONAA >60   , CBC   Recent Labs   Lab 07/02/20  1130   WBC 6.43   HGB 13.8*   HCT 42.9      , INR   Recent Labs   Lab 07/02/20  1009   INR 2.7*   , Lipid Panel No results for input(s): CHOL, HDL, LDLCALC, TRIG, CHOLHDL in the last 48 hours., Troponin   Recent Labs   Lab 07/02/20  1130 07/02/20  1437   TROPONINI 0.085* 0.072*    and All pertinent lab results from the last 24 hours have been reviewed.    Significant Imaging: Echocardiogram:   Transthoracic echo (TTE) complete (Cupid Only):   Results for orders placed or performed during the hospital encounter of 07/02/20   Echo Color Flow Doppler? Yes   Result Value Ref Range    Ascending aorta 3.06 cm    STJ 2.60 cm    AV mean gradient 4 mmHg    Ao peak marcos 1.46 m/s    Ao VTI 33.95 cm    IVRT 161.48 msec    IVS 1.33 (A) 0.6 - 1.1 cm    LA size 3.99 cm    Left Atrium Major Axis 5.04 cm    Left Atrium Minor Axis 4.65 cm    LVIDD 5.34 3.5 - 6.0 cm    LVIDS 3.27 2.1 - 4.0 cm    LVOT diameter 2.13 cm    LVOT peak VTI 25.13 cm    PW 1.29 (A) 0.6 - 1.1 cm    MV Peak A Marcos 0.54 m/s    E wave decelartion time 217.06 msec    MV Peak E Marcos 0.59 m/s    PV  Peak D Marcos 0.28 m/s    PV Peak S Marcos 0.50 m/s    RA Major Axis 5.03 cm    RA Width 4.70 cm    RVDD 4.09 cm    Sinus 3.33 cm    TAPSE 2.29 cm    TDI LATERAL 0.06 m/s    TDI SEPTAL 0.05 m/s    LA WIDTH 4.07 cm    Ao root annulus 3.48 cm    AORTIC VALVE CUSP SEPERATION 2.28 cm    PV PEAK VELOCITY 0.98 cm/s    MV stenosis pressure 1/2 time 62.95 ms    LV Diastolic Volume 137.75 mL    LV Systolic Volume 43.14 mL    RV S' 14.91 cm/s    LVOT peak marcos 1.03 m/s    LV LATERAL E/E' RATIO 9.83 m/s    LV SEPTAL E/E' RATIO 11.80 m/s    FS 39 %    LA volume 66.77 cm3    LV mass 293.53 g    Left Ventricle Relative Wall Thickness 0.48 cm    AV valve area 2.64 cm2    AV Velocity Ratio 0.71     AV index (prosthetic) 0.74     MV valve area p 1/2 method 3.49 cm2    E/A ratio 1.09     Mean e' 0.06 m/s    Pulm vein S/D ratio 1.79     LVOT area 3.6 cm2    LVOT stroke volume 89.50 cm3    AV peak gradient 9 mmHg    E/E' ratio 10.73 m/s    LV Systolic Volume Index 21.1 mL/m2    LV Diastolic Volume Index 67.52 mL/m2    LA Volume Index 32.7 mL/m2    LV Mass Index 144 g/m2    BSA 2.05 m2    Right Atrial Pressure (from IVC) 3 mmHg    Narrative    · Normal left ventricular systolic function. The estimated ejection   fraction is 55%.  · Mild concentric left ventricular hypertrophy.  · Grade I (mild) left ventricular diastolic dysfunction consistent with   impaired relaxation.  · Normal right ventricular systolic function.  · Moderate left atrial enlargement.  · Mild right atrial enlargement.  · Normal central venous pressure (3 mmHg).        Assessment and Plan:     * Chest pain  Atypical chest pain, chronic issue in a patient with known coronary artery disease.  Last stress test in 2018 did not show any significant ischemia.  Troponins are minimally elevated and flat.  Patient states that his chest pain is chronic and has not changed over the past few months.  Stress test has been ordered by Dr. Cordero, patient has not had a chance to schedule it  and get it performed now.  Patient not interested in staying for further observation wants to go home.  Promises that he will follow-up with Dr. Cordero next week and schedule his stress test.  Also states that he will come back the ER if he has recurrence of chest pain.  Currently chest pain free.  EKG does not show acute ischemic changes, echo does not show any large wall motion abnormalities.  Continue medical management.    Elevated troponin  As above    Tobacco abuse        History of deep vein thrombosis (DVT) of lower extremity  On Coumadin    Coronary artery disease involving native coronary artery of native heart without angina pectoris  Status post PCI in the past.  States that his current chest pain is different than pain he had prior to his PCI.        VTE Risk Mitigation (From admission, onward)         Ordered     IP VTE HIGH RISK PATIENT  Once      07/02/20 1536     Place sequential compression device  Until discontinued      07/02/20 1536     Place AMILCAR hose  Until discontinued      07/02/20 1536                Thank you for your consult. I will follow-up with patient. Please contact us if you have any additional questions.    Danica Bolivar MD  Cardiology   [facility]

## 2020-07-07 ENCOUNTER — ANTI-COAG VISIT (OUTPATIENT)
Dept: CARDIOLOGY | Facility: CLINIC | Age: 65
End: 2020-07-07
Payer: MEDICAID

## 2020-07-07 ENCOUNTER — LAB VISIT (OUTPATIENT)
Dept: LAB | Facility: HOSPITAL | Age: 65
End: 2020-07-07
Attending: INTERNAL MEDICINE
Payer: MEDICAID

## 2020-07-07 DIAGNOSIS — Z86.718 HISTORY OF DEEP VEIN THROMBOSIS (DVT) OF LOWER EXTREMITY: ICD-10-CM

## 2020-07-07 DIAGNOSIS — I82.401 DVT, LOWER EXTREMITY, RECURRENT, RIGHT: ICD-10-CM

## 2020-07-07 DIAGNOSIS — R76.0 LUPUS ANTICOAGULANT POSITIVE: ICD-10-CM

## 2020-07-07 DIAGNOSIS — D68.52 PROTHROMBIN GENE MUTATION: ICD-10-CM

## 2020-07-07 LAB
INR PPP: 1.7 (ref 0.8–1.2)
PROTHROMBIN TIME: 18.7 SEC (ref 9–12.5)

## 2020-07-07 PROCEDURE — 36415 COLL VENOUS BLD VENIPUNCTURE: CPT

## 2020-07-07 PROCEDURE — 93793 PR ANTICOAGULANT MGMT FOR PT TAKING WARFARIN: ICD-10-PCS | Mod: ,,, | Performed by: PHARMACIST

## 2020-07-07 PROCEDURE — 93793 ANTICOAG MGMT PT WARFARIN: CPT | Mod: ,,, | Performed by: PHARMACIST

## 2020-07-07 PROCEDURE — 85610 PROTHROMBIN TIME: CPT

## 2020-07-07 NOTE — PROGRESS NOTES
Confirmed taking correct dose of coumadin.   Reports eating iceberg salad SAT  NO other changes

## 2020-07-10 ENCOUNTER — OFFICE VISIT (OUTPATIENT)
Dept: FAMILY MEDICINE | Facility: CLINIC | Age: 65
End: 2020-07-10
Payer: MEDICAID

## 2020-07-10 VITALS
BODY MASS INDEX: 25.22 KG/M2 | TEMPERATURE: 98 F | RESPIRATION RATE: 17 BRPM | HEIGHT: 71 IN | WEIGHT: 180.13 LBS | SYSTOLIC BLOOD PRESSURE: 151 MMHG | DIASTOLIC BLOOD PRESSURE: 85 MMHG | OXYGEN SATURATION: 97 % | HEART RATE: 63 BPM

## 2020-07-10 DIAGNOSIS — M17.0 PRIMARY OSTEOARTHRITIS OF BOTH KNEES: ICD-10-CM

## 2020-07-10 DIAGNOSIS — I25.10 CORONARY ARTERY DISEASE INVOLVING NATIVE CORONARY ARTERY OF NATIVE HEART WITHOUT ANGINA PECTORIS: Primary | ICD-10-CM

## 2020-07-10 DIAGNOSIS — I10 BENIGN ESSENTIAL HTN: ICD-10-CM

## 2020-07-10 PROCEDURE — 99999 PR PBB SHADOW E&M-EST. PATIENT-LVL III: CPT | Mod: PBBFAC,,, | Performed by: FAMILY MEDICINE

## 2020-07-10 PROCEDURE — 99214 OFFICE O/P EST MOD 30 MIN: CPT | Mod: S$PBB,,, | Performed by: FAMILY MEDICINE

## 2020-07-10 PROCEDURE — 99213 OFFICE O/P EST LOW 20 MIN: CPT | Mod: PBBFAC,PN | Performed by: FAMILY MEDICINE

## 2020-07-10 PROCEDURE — 99999 PR PBB SHADOW E&M-EST. PATIENT-LVL III: ICD-10-PCS | Mod: PBBFAC,,, | Performed by: FAMILY MEDICINE

## 2020-07-10 PROCEDURE — 99214 PR OFFICE/OUTPT VISIT, EST, LEVL IV, 30-39 MIN: ICD-10-PCS | Mod: S$PBB,,, | Performed by: FAMILY MEDICINE

## 2020-07-10 RX ORDER — NITROGLYCERIN 0.4 MG/1
0.4 TABLET SUBLINGUAL
COMMUNITY

## 2020-07-10 RX ORDER — ACETAMINOPHEN AND CODEINE PHOSPHATE 300; 30 MG/1; MG/1
1 TABLET ORAL EVERY 8 HOURS PRN
Qty: 21 TABLET | Refills: 0 | Status: SHIPPED | OUTPATIENT
Start: 2020-07-10 | End: 2020-07-20

## 2020-07-10 RX ORDER — ATORVASTATIN CALCIUM 40 MG/1
40 TABLET, FILM COATED ORAL DAILY
Qty: 30 TABLET | Refills: 5 | Status: SHIPPED | OUTPATIENT
Start: 2020-07-10

## 2020-07-10 RX ORDER — LOSARTAN POTASSIUM AND HYDROCHLOROTHIAZIDE 12.5; 5 MG/1; MG/1
1 TABLET ORAL DAILY
Qty: 90 TABLET | Refills: 3 | Status: SHIPPED | OUTPATIENT
Start: 2020-07-10 | End: 2023-01-01 | Stop reason: SDUPTHER

## 2020-07-10 NOTE — PROGRESS NOTES
Routine Office Visit    Patient Name: Carlos A Ruff    : 1955  MRN: [id number]    Subjective:  Carlos A is a 65 y.o. male who presents today for:    1. Htn/cad  Patient following up today for HTN and hyperlipidemia.  He has been out of all his medications for HTN for over a year.  He denies any chest pain, weakness, numbness, slurred speech, or shortness of breath.  He states he thought I had told him to stop taking the medication, so he did.  There has been no recent hospitalizations.      2. OA  Patient has OA in bilateral knees.  He was seen by ortho in February, but never followed up after losing his job.  He continues to have significant pain in both knees at the end of each day.  He states that he can't take NSAIDS due to cardiac history and being on warfarin.  He states the pain does improve with rest.  He has xrays in February that did show DJD in the knees with small effusions.  There is no redness or warmth over the joints per patient.    Past Medical History  Past Medical History:   Diagnosis Date    Anticoagulant long-term use     Colon polyps     Coronary artery disease     Hypercholesteremia     Hypertension     MI (myocardial infarction)        Past Surgical History  Past Surgical History:   Procedure Laterality Date    blood clots      cardiac stents      COLONOSCOPY N/A 2019    Procedure: COLONOSCOPY;  Surgeon: Blanca Fenton MD;  Location: [facility] ENDO;  Service: Endoscopy;  Laterality: N/A;  RX PLAVIX/XARELTO ok to stop (7 days, 3 days) per Dr. Cordero see scan    CYSTOSCOPY WITH URODYNAMIC TESTING N/A 2019    Procedure: CYSTOSCOPY,WITH URODYNAMIC TESTING;  Surgeon: Felecia Stiles MD;  Location: [facility] OR;  Service: Urology;  Laterality: N/A;  RN PRE OP 19       Family History  History reviewed. No pertinent family history.    Social History  Social History     Socioeconomic History    Marital status: Single     Spouse name: Not on file    Number of children: Not on file     Years of education: Not on file    Highest education level: Not on file   Occupational History    Not on file   Social Needs    Financial resource strain: Not on file    Food insecurity     Worry: Not on file     Inability: Not on file    Transportation needs     Medical: Not on file     Non-medical: Not on file   Tobacco Use    Smoking status: Current Some Day Smoker     Packs/day: 0.25     Types: Cigarettes    Smokeless tobacco: Never Used   Substance and Sexual Activity    Alcohol use: Yes     Comment: occasionally     Drug use: Never    Sexual activity: Not Currently   Lifestyle    Physical activity     Days per week: Not on file     Minutes per session: Not on file    Stress: Not on file   Relationships    Social connections     Talks on phone: Not on file     Gets together: Not on file     Attends Yarsanism service: Not on file     Active member of club or organization: Not on file     Attends meetings of clubs or organizations: Not on file     Relationship status: Not on file   Other Topics Concern    Not on file   Social History Narrative    Not on file       Current Medications  Current Outpatient Medications on File Prior to Visit   Medication Sig Dispense Refill    ergocalciferol (VITAMIN D2) 50,000 unit Cap Take 50,000 Units by mouth every 7 days.      finasteride (PROSCAR) 5 mg tablet Take 1 tablet (5 mg total) by mouth once daily. 30 tablet 11    isosorbide mononitrate (IMDUR) 30 MG 24 hr tablet TAKE 1 TABLET BY MOUTH ONCE DAILY (Patient not taking: Reported on 3/12/2020) 30 tablet 11    nitroGLYCERIN (NITROSTAT) 0.4 MG SL tablet Place 0.4 mg under the tongue every 5 (five) minutes as needed for Chest pain.      nitroGLYCERIN (NITROSTAT) 0.4 MG SL tablet Place 0.4 mg under the tongue.      tamsulosin (FLOMAX) 0.4 mg Cap Take 1 capsule (0.4 mg total) by mouth once daily. 30 capsule 11    warfarin (COUMADIN) 10 MG tablet Take 1 to 1.5 tablets by mouth daily as directed by  "coumadin clinic. 50 tablet 11    [DISCONTINUED] atorvastatin (LIPITOR) 40 MG tablet Take 40 mg by mouth once daily.       [DISCONTINUED] losartan-hydrochlorothiazide 50-12.5 mg (HYZAAR) 50-12.5 mg per tablet Take 1 tablet by mouth once daily. 90 tablet 3     No current facility-administered medications on file prior to visit.        Allergies   Review of patient's allergies indicates:  No Known Allergies    Review of Systems (Pertinent positives)  Review of Systems   Constitutional: Negative.    HENT: Negative.    Eyes: Negative.    Respiratory: Negative.    Cardiovascular: Negative.    Gastrointestinal: Negative.    Musculoskeletal: Positive for joint pain.   Skin: Negative.    Neurological: Negative.          BP (!) 151/85 (BP Location: Left arm, Patient Position: Sitting, BP Method: Medium (Automatic))   Pulse 63   Temp 98 °F (36.7 °C) (Temporal)   Resp 17   Ht 5' 10.98" (1.803 m)   Wt 81.7 kg (180 lb 1.9 oz)   SpO2 97%   BMI 25.13 kg/m²     GENERAL APPEARANCE: in no apparent distress and well developed and well nourished  HEENT: PERRL, EOMI, Sclera clear, anicteric, Oropharynx clear, no lesions, Neck supple with midline trachea  NECK: normal, supple, no adenopathy, thyroid normal in size  RESPIRATORY: appears well, vitals normal, no respiratory distress, acyanotic, normal RR, chest clear, no wheezing, crepitations, rhonchi, normal symmetric air entry  HEART: regular rate and rhythm, S1, S2 normal, no murmur, click, rub or gallop.    ABDOMEN: abdomen is soft without tenderness, no masses, no hernias, no organomegaly, no rebound, no guarding. Suprapubic tenderness absent. No CVA tenderness.  Extremities: warm/well perfused.  No abnormal hair patterns.  No clubbing, cyanosis or edema.  no muscular tenderness noted, full range of motion without pain.  Mild effusion noted to bilateral knees  SKIN: no rashes, no wounds, no other lesions  PSYCH: Alert, oriented x 3, thought content appropriate, speech normal, " pleasant and cooperative, good eye contact, well groomed    Assessment/Plan:  Carlos A Ruff is a 65 y.o. male who presents today for :    Carlos A was seen today for follow-up.    Diagnoses and all orders for this visit:    Coronary artery disease involving native coronary artery of native heart without angina pectoris  -     losartan-hydrochlorothiazide 50-12.5 mg (HYZAAR) 50-12.5 mg per tablet; Take 1 tablet by mouth once daily.  -     atorvastatin (LIPITOR) 40 MG tablet; Take 1 tablet (40 mg total) by mouth once daily.    Primary osteoarthritis of both knees  -     acetaminophen-codeine 300-30mg (TYLENOL #3) 300-30 mg Tab; Take 1 tablet by mouth every 8 (eight) hours as needed.    Benign essential HTN        1.  Resume hyzaar  2.  Resume lipitor  3.  Tylenol 3 for pain as he is on chronic anticoagulation and has hx or CAD  4.  Follow up 2 weeks for knee injections      Khalif Hassan MD

## 2020-07-15 ENCOUNTER — LAB VISIT (OUTPATIENT)
Dept: LAB | Facility: HOSPITAL | Age: 65
End: 2020-07-15
Attending: INTERNAL MEDICINE
Payer: MEDICAID

## 2020-07-15 ENCOUNTER — ANTI-COAG VISIT (OUTPATIENT)
Dept: CARDIOLOGY | Facility: CLINIC | Age: 65
End: 2020-07-15
Payer: MEDICAID

## 2020-07-15 DIAGNOSIS — I82.401 DVT, LOWER EXTREMITY, RECURRENT, RIGHT: ICD-10-CM

## 2020-07-15 DIAGNOSIS — R76.0 LUPUS ANTICOAGULANT POSITIVE: ICD-10-CM

## 2020-07-15 DIAGNOSIS — D68.52 PROTHROMBIN GENE MUTATION: ICD-10-CM

## 2020-07-15 DIAGNOSIS — Z86.718 HISTORY OF DEEP VEIN THROMBOSIS (DVT) OF LOWER EXTREMITY: ICD-10-CM

## 2020-07-15 LAB
INR PPP: 2.7 (ref 0.8–1.2)
PROTHROMBIN TIME: 29.9 SEC (ref 9–12.5)

## 2020-07-15 PROCEDURE — 36415 COLL VENOUS BLD VENIPUNCTURE: CPT

## 2020-07-15 PROCEDURE — 93793 PR ANTICOAGULANT MGMT FOR PT TAKING WARFARIN: ICD-10-PCS | Mod: ,,, | Performed by: PHARMACIST

## 2020-07-15 PROCEDURE — 85610 PROTHROMBIN TIME: CPT

## 2020-07-15 PROCEDURE — 93793 ANTICOAG MGMT PT WARFARIN: CPT | Mod: ,,, | Performed by: PHARMACIST

## 2020-07-22 ENCOUNTER — ANTI-COAG VISIT (OUTPATIENT)
Dept: CARDIOLOGY | Facility: CLINIC | Age: 65
End: 2020-07-22
Payer: MEDICAID

## 2020-07-22 ENCOUNTER — LAB VISIT (OUTPATIENT)
Dept: LAB | Facility: HOSPITAL | Age: 65
End: 2020-07-22
Attending: INTERNAL MEDICINE
Payer: MEDICAID

## 2020-07-22 DIAGNOSIS — D68.52 PROTHROMBIN GENE MUTATION: ICD-10-CM

## 2020-07-22 DIAGNOSIS — Z86.718 HISTORY OF DEEP VEIN THROMBOSIS (DVT) OF LOWER EXTREMITY: ICD-10-CM

## 2020-07-22 DIAGNOSIS — R76.0 LUPUS ANTICOAGULANT POSITIVE: ICD-10-CM

## 2020-07-22 DIAGNOSIS — I82.401 DVT, LOWER EXTREMITY, RECURRENT, RIGHT: ICD-10-CM

## 2020-07-22 LAB
INR PPP: 3 (ref 0.8–1.2)
PROTHROMBIN TIME: 32.9 SEC (ref 9–12.5)

## 2020-07-22 PROCEDURE — 93793 PR ANTICOAGULANT MGMT FOR PT TAKING WARFARIN: ICD-10-PCS | Mod: ,,, | Performed by: PHARMACIST

## 2020-07-22 PROCEDURE — 93793 ANTICOAG MGMT PT WARFARIN: CPT | Mod: ,,, | Performed by: PHARMACIST

## 2020-07-22 PROCEDURE — 85610 PROTHROMBIN TIME: CPT

## 2020-07-22 PROCEDURE — 36415 COLL VENOUS BLD VENIPUNCTURE: CPT

## 2020-07-29 ENCOUNTER — LAB VISIT (OUTPATIENT)
Dept: LAB | Facility: HOSPITAL | Age: 65
End: 2020-07-29
Attending: INTERNAL MEDICINE
Payer: MEDICAID

## 2020-07-29 ENCOUNTER — ANTI-COAG VISIT (OUTPATIENT)
Dept: CARDIOLOGY | Facility: CLINIC | Age: 65
End: 2020-07-29
Payer: MEDICAID

## 2020-07-29 DIAGNOSIS — R76.0 LUPUS ANTICOAGULANT POSITIVE: ICD-10-CM

## 2020-07-29 DIAGNOSIS — Z86.718 HISTORY OF DEEP VEIN THROMBOSIS (DVT) OF LOWER EXTREMITY: ICD-10-CM

## 2020-07-29 DIAGNOSIS — D68.52 PROTHROMBIN GENE MUTATION: ICD-10-CM

## 2020-07-29 DIAGNOSIS — I82.401 DVT, LOWER EXTREMITY, RECURRENT, RIGHT: ICD-10-CM

## 2020-07-29 LAB
INR PPP: 2 (ref 0.8–1.2)
PROTHROMBIN TIME: 22.3 SEC (ref 9–12.5)

## 2020-07-29 PROCEDURE — 85610 PROTHROMBIN TIME: CPT

## 2020-07-29 PROCEDURE — 93793 ANTICOAG MGMT PT WARFARIN: CPT | Mod: ,,,

## 2020-07-29 PROCEDURE — 36415 COLL VENOUS BLD VENIPUNCTURE: CPT

## 2020-07-29 PROCEDURE — 93793 PR ANTICOAGULANT MGMT FOR PT TAKING WARFARIN: ICD-10-PCS | Mod: ,,,

## 2020-07-29 NOTE — PROGRESS NOTES
INR at lower end of goal. Medications and chart reviewed. Resume dose per calendar. Recheck INR in 1 week.

## 2020-08-06 ENCOUNTER — ANTI-COAG VISIT (OUTPATIENT)
Dept: CARDIOLOGY | Facility: CLINIC | Age: 65
End: 2020-08-06
Payer: MEDICAID

## 2020-08-06 ENCOUNTER — LAB VISIT (OUTPATIENT)
Dept: LAB | Facility: HOSPITAL | Age: 65
End: 2020-08-06
Attending: INTERNAL MEDICINE
Payer: MEDICAID

## 2020-08-06 DIAGNOSIS — Z86.718 HISTORY OF DEEP VEIN THROMBOSIS (DVT) OF LOWER EXTREMITY: ICD-10-CM

## 2020-08-06 DIAGNOSIS — R76.0 LUPUS ANTICOAGULANT POSITIVE: ICD-10-CM

## 2020-08-06 DIAGNOSIS — D68.52 PROTHROMBIN GENE MUTATION: ICD-10-CM

## 2020-08-06 DIAGNOSIS — I82.401 DVT, LOWER EXTREMITY, RECURRENT, RIGHT: ICD-10-CM

## 2020-08-06 LAB
INR PPP: 3.7 (ref 0.8–1.2)
PROTHROMBIN TIME: 41.5 SEC (ref 9–12.5)

## 2020-08-06 PROCEDURE — 93793 ANTICOAG MGMT PT WARFARIN: CPT | Mod: ,,, | Performed by: PHARMACIST

## 2020-08-06 PROCEDURE — 85610 PROTHROMBIN TIME: CPT

## 2020-08-06 PROCEDURE — 36415 COLL VENOUS BLD VENIPUNCTURE: CPT

## 2020-08-06 PROCEDURE — 93793 PR ANTICOAGULANT MGMT FOR PT TAKING WARFARIN: ICD-10-PCS | Mod: ,,, | Performed by: PHARMACIST

## 2020-08-06 NOTE — PROGRESS NOTES
Confirmed taking correct dose of coumadin  Beer on MON & pronograde juice recently    NO other changes

## 2020-08-14 ENCOUNTER — ANTI-COAG VISIT (OUTPATIENT)
Dept: CARDIOLOGY | Facility: CLINIC | Age: 65
End: 2020-08-14
Payer: MEDICAID

## 2020-08-14 ENCOUNTER — LAB VISIT (OUTPATIENT)
Dept: LAB | Facility: HOSPITAL | Age: 65
End: 2020-08-14
Attending: INTERNAL MEDICINE
Payer: MEDICAID

## 2020-08-14 DIAGNOSIS — I82.401 DVT, LOWER EXTREMITY, RECURRENT, RIGHT: ICD-10-CM

## 2020-08-14 DIAGNOSIS — R76.0 LUPUS ANTICOAGULANT POSITIVE: ICD-10-CM

## 2020-08-14 DIAGNOSIS — Z86.718 HISTORY OF DEEP VEIN THROMBOSIS (DVT) OF LOWER EXTREMITY: ICD-10-CM

## 2020-08-14 DIAGNOSIS — D68.52 PROTHROMBIN GENE MUTATION: ICD-10-CM

## 2020-08-14 LAB
INR PPP: 2.8 (ref 0.8–1.2)
PROTHROMBIN TIME: 31.4 SEC (ref 9–12.5)

## 2020-08-14 PROCEDURE — 36415 COLL VENOUS BLD VENIPUNCTURE: CPT

## 2020-08-14 PROCEDURE — 93793 ANTICOAG MGMT PT WARFARIN: CPT | Mod: ,,,

## 2020-08-14 PROCEDURE — 93793 PR ANTICOAGULANT MGMT FOR PT TAKING WARFARIN: ICD-10-PCS | Mod: ,,,

## 2020-08-14 PROCEDURE — 85610 PROTHROMBIN TIME: CPT

## 2020-08-20 ENCOUNTER — LAB VISIT (OUTPATIENT)
Dept: LAB | Facility: HOSPITAL | Age: 65
End: 2020-08-20
Attending: INTERNAL MEDICINE
Payer: MEDICAID

## 2020-08-20 ENCOUNTER — ANTI-COAG VISIT (OUTPATIENT)
Dept: CARDIOLOGY | Facility: CLINIC | Age: 65
End: 2020-08-20
Payer: MEDICAID

## 2020-08-20 DIAGNOSIS — Z86.718 HISTORY OF DEEP VEIN THROMBOSIS (DVT) OF LOWER EXTREMITY: ICD-10-CM

## 2020-08-20 DIAGNOSIS — R76.0 LUPUS ANTICOAGULANT POSITIVE: ICD-10-CM

## 2020-08-20 DIAGNOSIS — I82.401 DVT, LOWER EXTREMITY, RECURRENT, RIGHT: ICD-10-CM

## 2020-08-20 DIAGNOSIS — D68.52 PROTHROMBIN GENE MUTATION: ICD-10-CM

## 2020-08-20 LAB
INR PPP: 3.4 (ref 0.8–1.2)
PROTHROMBIN TIME: 38.1 SEC (ref 9–12.5)

## 2020-08-20 PROCEDURE — 93793 PR ANTICOAGULANT MGMT FOR PT TAKING WARFARIN: ICD-10-PCS | Mod: ,,, | Performed by: PHARMACIST

## 2020-08-20 PROCEDURE — 85610 PROTHROMBIN TIME: CPT

## 2020-08-20 PROCEDURE — 93793 ANTICOAG MGMT PT WARFARIN: CPT | Mod: ,,, | Performed by: PHARMACIST

## 2020-08-20 PROCEDURE — 36415 COLL VENOUS BLD VENIPUNCTURE: CPT

## 2020-08-20 NOTE — PROGRESS NOTES
Confirmed taking correct dose of coumadin  pronograde juice WED; took OTC daniel kurt VASQUEZ   NO other changes

## 2020-09-01 ENCOUNTER — HOSPITAL ENCOUNTER (EMERGENCY)
Facility: HOSPITAL | Age: 65
Discharge: HOME | End: 2020-09-01
Attending: EMERGENCY MEDICINE
Payer: MEDICAID

## 2020-09-01 ENCOUNTER — ANTI-COAG VISIT (OUTPATIENT)
Dept: CARDIOLOGY | Facility: CLINIC | Age: 65
End: 2020-09-01
Payer: MEDICAID

## 2020-09-01 ENCOUNTER — LAB VISIT (OUTPATIENT)
Dept: LAB | Facility: HOSPITAL | Age: 65
End: 2020-09-01
Attending: INTERNAL MEDICINE
Payer: MEDICAID

## 2020-09-01 VITALS
HEIGHT: 71 IN | HEART RATE: 63 BPM | SYSTOLIC BLOOD PRESSURE: 164 MMHG | RESPIRATION RATE: 20 BRPM | WEIGHT: 185 LBS | TEMPERATURE: 99 F | BODY MASS INDEX: 25.9 KG/M2 | OXYGEN SATURATION: 98 % | DIASTOLIC BLOOD PRESSURE: 79 MMHG

## 2020-09-01 DIAGNOSIS — Z86.718 HISTORY OF DEEP VEIN THROMBOSIS (DVT) OF LOWER EXTREMITY: ICD-10-CM

## 2020-09-01 DIAGNOSIS — D68.52 PROTHROMBIN GENE MUTATION: ICD-10-CM

## 2020-09-01 DIAGNOSIS — M17.10 ARTHRITIS OF KNEE: Primary | ICD-10-CM

## 2020-09-01 DIAGNOSIS — R76.0 LUPUS ANTICOAGULANT POSITIVE: ICD-10-CM

## 2020-09-01 DIAGNOSIS — I82.401 DVT, LOWER EXTREMITY, RECURRENT, RIGHT: ICD-10-CM

## 2020-09-01 LAB
INR PPP: 1.5 (ref 0.8–1.2)
PROTHROMBIN TIME: 17.1 SEC (ref 9–12.5)

## 2020-09-01 PROCEDURE — 93793 PR ANTICOAGULANT MGMT FOR PT TAKING WARFARIN: ICD-10-PCS | Mod: ,,, | Performed by: PHARMACIST

## 2020-09-01 PROCEDURE — 99283 EMERGENCY DEPT VISIT LOW MDM: CPT

## 2020-09-01 PROCEDURE — 85610 PROTHROMBIN TIME: CPT

## 2020-09-01 PROCEDURE — 36415 COLL VENOUS BLD VENIPUNCTURE: CPT

## 2020-09-01 PROCEDURE — 25000003 PHARM REV CODE 250: Performed by: EMERGENCY MEDICINE

## 2020-09-01 PROCEDURE — 93793 ANTICOAG MGMT PT WARFARIN: CPT | Mod: ,,, | Performed by: PHARMACIST

## 2020-09-01 RX ORDER — ACETAMINOPHEN 325 MG/1
650 TABLET ORAL
Status: COMPLETED | OUTPATIENT
Start: 2020-09-01 | End: 2020-09-01

## 2020-09-01 RX ORDER — ACETAMINOPHEN 325 MG/1
650 TABLET ORAL EVERY 6 HOURS PRN
Qty: 13 TABLET | Refills: 0 | Status: SHIPPED | OUTPATIENT
Start: 2020-09-01

## 2020-09-01 RX ADMIN — ACETAMINOPHEN 650 MG: 325 TABLET ORAL at 12:09

## 2020-09-01 NOTE — Clinical Note
Carlos A Speedy was seen and treated in our emergency department on 9/1/2020.  He may return to work on 09/02/2020.       If you have any questions or concerns, please don't hesitate to call.      Gunner Miller MD

## 2020-09-01 NOTE — ED TRIAGE NOTES
The patient reports bilateral knee pain x 2-3 weeks. Patient states that the more he walks around, the more it hurts. Denies numbness/tingling to extremities. Patient states that he has been taking tylenol otc for the pain at home.

## 2020-09-01 NOTE — PROGRESS NOTES
Confirmed taking 10mg daily of coumadin; missed dose 8/31  Mustard greens 8/29 & 31  NO other changes

## 2020-09-01 NOTE — DISCHARGE INSTRUCTIONS
Thank you for coming to our Emergency Department today. It is important to remember that some problems are difficult to diagnose and may not be found during your first visit. Be sure to follow up with your primary care doctor and review any labs/imaging that was performed with them. If you do not have a primary care doctor, you may contact the one listed on your discharge paperwork or you may also call the [facility] Appointment Desk at [phone] to schedule an appointment with one.     All medications may potentially have side effects and it is impossible to predict which medications may give you side effects. If you feel that you are having a negative effect of any medication you should immediately stop taking them and seek medical attention.    Return to the ER with any questions/concerns, new/concerning symptoms, worsening or failure to improve. Do not drive or make any important decisions for 24 hours if you have received any pain medications, sedatives or mood altering drugs during your ER visit.

## 2020-09-01 NOTE — ED PROVIDER NOTES
"Encounter Date: 9/1/2020    SCRIBE #1 NOTE: I, Daryl Zepeda, am scribing for, and in the presence of,  Gunner Miller MD. I have scribed the following portions of the note - Other sections scribed: HPI, ROS, PE.       History     Chief Complaint   Patient presents with    Knee Pain     alex knee pain started x 1 week     This 65 y.o M with a hx of CAD, HTN and MI presents to the ED c/o constant bilateral knee pain x2 weeks. He describes his pain as "pressure." The pt works at [employer] and is on his feet for the majority of his shift. No recent falls or trauma. He denies fever, chills, diaphoresis, ankle pain, hip pain, chest pain, SOB, dysuria, rash and any other associated symptoms. No prior tx.     The history is provided by the patient.     Review of patient's allergies indicates:  No Known Allergies  Past Medical History:   Diagnosis Date    Anticoagulant long-term use     Colon polyps     Coronary artery disease     Hypercholesteremia     Hypertension     MI (myocardial infarction)      Past Surgical History:   Procedure Laterality Date    blood clots      cardiac stents      COLONOSCOPY N/A 2/1/2019    Procedure: COLONOSCOPY;  Surgeon: Blanca Fenton MD;  Location: [facility] ENDO;  Service: Endoscopy;  Laterality: N/A;  RX PLAVIX/XARELTO ok to stop (7 days, 3 days) per Dr. Cordero see scan    CYSTOSCOPY WITH URODYNAMIC TESTING N/A 8/30/2019    Procedure: CYSTOSCOPY,WITH URODYNAMIC TESTING;  Surgeon: Felecia Stiles MD;  Location: [facility] OR;  Service: Urology;  Laterality: N/A;  RN PRE OP 8-27-19     History reviewed. No pertinent family history.  Social History     Tobacco Use    Smoking status: Current Some Day Smoker     Packs/day: 0.25     Types: Cigarettes    Smokeless tobacco: Never Used   Substance Use Topics    Alcohol use: Yes     Comment: occasionally     Drug use: Never     Review of Systems   Constitutional: Negative for chills and fever.   HENT: Negative for rhinorrhea and sore throat.  "   Eyes: Negative for redness.   Respiratory: Negative for cough and shortness of breath.    Cardiovascular: Negative for chest pain.   Gastrointestinal: Negative for abdominal pain, diarrhea, nausea and vomiting.   Genitourinary: Negative for dysuria.   Musculoskeletal: Positive for arthralgias (bilateral knee pain). Negative for back pain.        (-) ankle pain  (-) hip pain   Skin: Negative for color change.   Neurological: Negative for syncope and weakness.   Psychiatric/Behavioral: The patient is not nervous/anxious.        Physical Exam     Initial Vitals [09/01/20 1158]   BP Pulse Resp Temp SpO2   (!) 164/79 63 20 98.6 °F (37 °C) 98 %      MAP       --         Physical Exam    Nursing note and vitals reviewed.  Constitutional: He appears well-developed and well-nourished.   HENT:   Head: Normocephalic and atraumatic.   Mouth/Throat: Mucous membranes are normal.   Patent   Eyes: Conjunctivae and EOM are normal. Pupils are equal, round, and reactive to light. Right conjunctiva is not injected. Left conjunctiva is not injected.   sclera anicteric   Neck: Full passive range of motion without pain. Neck supple.   Cardiovascular: Regular rhythm, S1 normal, S2 normal and normal heart sounds. Exam reveals no gallop and no friction rub.    No murmur heard.  Pulses:       Radial pulses are 2+ on the right side and 2+ on the left side.   Pulmonary/Chest: Effort normal and breath sounds normal. No respiratory distress.   Abdominal: Soft. Normal appearance. He exhibits no distension. There is no abdominal tenderness.   Musculoskeletal:      Comments: good active ROM of all extremities, no lower extremity edema or cyanosis   Neurological: He is alert. No cranial nerve deficit or sensory deficit. Gait normal.   A&Ox4, normal speech   Skin: Skin is warm. No ecchymosis and no rash noted.   Psychiatric: He has a normal mood and affect. Thought content normal.         ED Course   Procedures  Labs Reviewed - No data to display        Imaging Results    None          Medical Decision Making:   Initial Assessment:   Old male presenting secondary to chronic knee pain for the past 2 weeks.  No signs of cellulitis or abscess.  Not consistent with gout or pseudogout.  No unilateral leg swelling.  Do not think this is acute DVT.  Likely osteoarthritis.  Tylenol for pain.  Is on Coumadin.  Want stay away from NSAIDs.  Patient also already has compression stocking for his knees.  Instructed patient please use rice technique. I discussed with the patient/family the diagnosis, treatment plan, indications for return to the emergency department, and for expected follow-up. The patient/family verbalized an understanding. The patient/family is asked if there are any questions or concerns. We discuss the case, until all issues are addressed to the patient/familys satisfaction. Patient/family understands and is agreeable to the plan.   Gunner Miller                Scribe Attestation:   Scribe #1: I performed the above scribed service and the documentation accurately describes the services I performed. I attest to the accuracy of the note.                          Clinical Impression:       ICD-10-CM ICD-9-CM   1. Arthritis of knee  M17.10 716.96             ED Disposition Condition    Discharge Stable        ED Prescriptions     Medication Sig Dispense Start Date End Date Auth. Provider    acetaminophen (TYLENOL) 325 MG tablet Take 2 tablets (650 mg total) by mouth every 6 (six) hours as needed. 13 tablet 9/1/2020  Gunner Miller MD        Follow-up Information     Follow up With Specialties Details Why Contact Info    Khalif Hassan MD Family Medicine, Wound Care Schedule an appointment as soon as possible for a visit in 2 days  [address] [facility] [address]  [phone]                                I, Gunner Miller, personally performed the services described in this documentation. All medical record entries made by the scribe were at my direction  and in my presence. I have reviewed the chart and agree that the record reflects my personal performance and is accurate and complete.         Gunner Miller MD  09/01/20 [id number]

## 2020-09-09 ENCOUNTER — LAB VISIT (OUTPATIENT)
Dept: LAB | Facility: HOSPITAL | Age: 65
End: 2020-09-09
Attending: INTERNAL MEDICINE
Payer: MEDICAID

## 2020-09-09 ENCOUNTER — ANTI-COAG VISIT (OUTPATIENT)
Dept: CARDIOLOGY | Facility: CLINIC | Age: 65
End: 2020-09-09
Payer: MEDICAID

## 2020-09-09 DIAGNOSIS — I82.401 DVT, LOWER EXTREMITY, RECURRENT, RIGHT: ICD-10-CM

## 2020-09-09 DIAGNOSIS — R76.0 LUPUS ANTICOAGULANT POSITIVE: ICD-10-CM

## 2020-09-09 DIAGNOSIS — Z86.718 HISTORY OF DEEP VEIN THROMBOSIS (DVT) OF LOWER EXTREMITY: ICD-10-CM

## 2020-09-09 DIAGNOSIS — D68.52 PROTHROMBIN GENE MUTATION: ICD-10-CM

## 2020-09-09 LAB
INR PPP: 1.2 (ref 0.8–1.2)
PROTHROMBIN TIME: 13.5 SEC (ref 9–12.5)

## 2020-09-09 PROCEDURE — 93793 ANTICOAG MGMT PT WARFARIN: CPT | Mod: ,,, | Performed by: PHARMACIST

## 2020-09-09 PROCEDURE — 85610 PROTHROMBIN TIME: CPT

## 2020-09-09 PROCEDURE — 36415 COLL VENOUS BLD VENIPUNCTURE: CPT

## 2020-09-09 PROCEDURE — 93793 PR ANTICOAGULANT MGMT FOR PT TAKING WARFARIN: ICD-10-PCS | Mod: ,,, | Performed by: PHARMACIST

## 2020-09-16 ENCOUNTER — LAB VISIT (OUTPATIENT)
Dept: LAB | Facility: HOSPITAL | Age: 65
End: 2020-09-16
Attending: INTERNAL MEDICINE
Payer: MEDICAID

## 2020-09-16 ENCOUNTER — ANTI-COAG VISIT (OUTPATIENT)
Dept: CARDIOLOGY | Facility: CLINIC | Age: 65
End: 2020-09-16
Payer: MEDICAID

## 2020-09-16 DIAGNOSIS — D68.52 PROTHROMBIN GENE MUTATION: ICD-10-CM

## 2020-09-16 DIAGNOSIS — R76.0 LUPUS ANTICOAGULANT POSITIVE: ICD-10-CM

## 2020-09-16 DIAGNOSIS — Z86.718 HISTORY OF DEEP VEIN THROMBOSIS (DVT) OF LOWER EXTREMITY: ICD-10-CM

## 2020-09-16 DIAGNOSIS — I82.401 DVT, LOWER EXTREMITY, RECURRENT, RIGHT: ICD-10-CM

## 2020-09-16 LAB
INR PPP: 2.9 (ref 0.8–1.2)
PROTHROMBIN TIME: 31.6 SEC (ref 9–12.5)

## 2020-09-16 PROCEDURE — 93793 ANTICOAG MGMT PT WARFARIN: CPT | Mod: ,,, | Performed by: PHARMACIST

## 2020-09-16 PROCEDURE — 36415 COLL VENOUS BLD VENIPUNCTURE: CPT

## 2020-09-16 PROCEDURE — 93793 PR ANTICOAGULANT MGMT FOR PT TAKING WARFARIN: ICD-10-PCS | Mod: ,,, | Performed by: PHARMACIST

## 2020-09-16 PROCEDURE — 85610 PROTHROMBIN TIME: CPT

## 2020-09-24 ENCOUNTER — OFFICE VISIT (OUTPATIENT)
Dept: VASCULAR SURGERY | Facility: CLINIC | Age: 65
End: 2020-09-24
Payer: MEDICAID

## 2020-09-24 ENCOUNTER — LAB VISIT (OUTPATIENT)
Dept: LAB | Facility: HOSPITAL | Age: 65
End: 2020-09-24
Attending: INTERNAL MEDICINE
Payer: MEDICAID

## 2020-09-24 ENCOUNTER — ANTI-COAG VISIT (OUTPATIENT)
Dept: CARDIOLOGY | Facility: CLINIC | Age: 65
End: 2020-09-24
Payer: MEDICAID

## 2020-09-24 VITALS
WEIGHT: 173.06 LBS | SYSTOLIC BLOOD PRESSURE: 180 MMHG | HEIGHT: 71 IN | DIASTOLIC BLOOD PRESSURE: 100 MMHG | BODY MASS INDEX: 24.23 KG/M2

## 2020-09-24 DIAGNOSIS — I82.401 DVT, LOWER EXTREMITY, RECURRENT, RIGHT: ICD-10-CM

## 2020-09-24 DIAGNOSIS — I82.511 CHRONIC DEEP VEIN THROMBOSIS (DVT) OF RIGHT FEMORAL VEIN: Primary | ICD-10-CM

## 2020-09-24 DIAGNOSIS — I87.001 POSTTHROMBOTIC SYNDROME OF RIGHT LOWER EXTREMITY: ICD-10-CM

## 2020-09-24 DIAGNOSIS — Z86.718 HISTORY OF DEEP VEIN THROMBOSIS (DVT) OF LOWER EXTREMITY: ICD-10-CM

## 2020-09-24 DIAGNOSIS — R76.0 LUPUS ANTICOAGULANT POSITIVE: ICD-10-CM

## 2020-09-24 DIAGNOSIS — D68.52 PROTHROMBIN GENE MUTATION: ICD-10-CM

## 2020-09-24 DIAGNOSIS — I87.301 VENOUS HYPERTENSION OF LOWER EXTREMITY, RIGHT: ICD-10-CM

## 2020-09-24 LAB
INR PPP: 1.2 (ref 0.8–1.2)
PROTHROMBIN TIME: 13.8 SEC (ref 9–12.5)

## 2020-09-24 PROCEDURE — 85610 PROTHROMBIN TIME: CPT

## 2020-09-24 PROCEDURE — 99999 PR PBB SHADOW E&M-EST. PATIENT-LVL III: ICD-10-PCS | Mod: PBBFAC,,, | Performed by: SURGERY

## 2020-09-24 PROCEDURE — 36415 COLL VENOUS BLD VENIPUNCTURE: CPT

## 2020-09-24 PROCEDURE — 99214 PR OFFICE/OUTPT VISIT, EST, LEVL IV, 30-39 MIN: ICD-10-PCS | Mod: S$PBB,,, | Performed by: SURGERY

## 2020-09-24 PROCEDURE — 99213 OFFICE O/P EST LOW 20 MIN: CPT | Mod: PBBFAC | Performed by: SURGERY

## 2020-09-24 PROCEDURE — 93793 PR ANTICOAGULANT MGMT FOR PT TAKING WARFARIN: ICD-10-PCS | Mod: ,,, | Performed by: PHARMACIST

## 2020-09-24 PROCEDURE — 93793 ANTICOAG MGMT PT WARFARIN: CPT | Mod: ,,, | Performed by: PHARMACIST

## 2020-09-24 PROCEDURE — 99214 OFFICE O/P EST MOD 30 MIN: CPT | Mod: S$PBB,,, | Performed by: SURGERY

## 2020-09-24 PROCEDURE — 99999 PR PBB SHADOW E&M-EST. PATIENT-LVL III: CPT | Mod: PBBFAC,,, | Performed by: SURGERY

## 2020-09-24 NOTE — PROGRESS NOTES
Wesley Bentley MD, RPVI                                 [facility]                                                        09/24/2020    HPI:  Carlos A Ruff is a 65 y.o. male with   Patient Active Problem List   Diagnosis    Coronary artery disease involving native coronary artery of native heart without angina pectoris    History of deep vein thrombosis (DVT) of lower extremity    Mass of left foot    Tobacco abuse    Elevated troponin    Chest pain, non-cardiac    DVT, lower extremity, recurrent, right    Prothrombin gene mutation    Lupus anticoagulant positive    Foot swelling    Failure of outpatient treatment    Distended bladder    Bladder outlet obstruction    Bladder wall thickening    Hx of colonic polyp    Urinary retention    Chest pain    being managed by PCP and specialists who is here today for evaluation of BLE edema.  H/o RLE DVT 2018 and 2019 treated with anticoagulation; prior US showed chronic R femoral and popliteal DVT with acute peroneal and he was admitted and continued on AC.  Found to have abnormal prothrombin gene, currently on Coumadin.  No claudication.  Patient states location is below knee bilaterally occurring for 2-3 yrs.  Associated signs and symptoms include decreased ROM.  Quality is tight and severity is 10/10 at worst.  Symptoms began 2-3 yrs ago.  Alleviating factors include elevation and rest.  Worsening factors include dependency.  Patient is wearing compression stockings daily, OTC.  No FH of venous disease.  Symptoms do limit patient's functional status and daily activities.  + DVT history.  No venous interventions.  + low sodium diet.  + excessive water intake.    Migraine with aura: no  PFO/ASD/right to left shunt: no  Pregnant: no  Breastfeeding: no    no MI  no Stroke  Tobacco use: 5 cig / day    9/2020:  C/o RLE pain and edema.  Wearing OTC compression.      Past Medical History:   Diagnosis Date    Anticoagulant long-term  use     Colon polyps     Coronary artery disease     Hypercholesteremia     Hypertension     MI (myocardial infarction)      Past Surgical History:   Procedure Laterality Date    blood clots      cardiac stents      COLONOSCOPY N/A 2/1/2019    Procedure: COLONOSCOPY;  Surgeon: Blanca Fenton MD;  Location: [facility] ENDO;  Service: Endoscopy;  Laterality: N/A;  RX PLAVIX/XARELTO ok to stop (7 days, 3 days) per Dr. Cordero see scan    CYSTOSCOPY WITH URODYNAMIC TESTING N/A 8/30/2019    Procedure: CYSTOSCOPY,WITH URODYNAMIC TESTING;  Surgeon: Felecia Stiles MD;  Location: [facility] OR;  Service: Urology;  Laterality: N/A;  RN PRE OP 8-27-19     No family history on file.  Social History     Socioeconomic History    Marital status: Single     Spouse name: Not on file    Number of children: Not on file    Years of education: Not on file    Highest education level: Not on file   Occupational History    Not on file   Social Needs    Financial resource strain: Not on file    Food insecurity     Worry: Not on file     Inability: Not on file    Transportation needs     Medical: Not on file     Non-medical: Not on file   Tobacco Use    Smoking status: Current Some Day Smoker     Packs/day: 0.25     Types: Cigarettes    Smokeless tobacco: Never Used   Substance and Sexual Activity    Alcohol use: Yes     Comment: occasionally     Drug use: Never    Sexual activity: Not Currently   Lifestyle    Physical activity     Days per week: Not on file     Minutes per session: Not on file    Stress: Not on file   Relationships    Social connections     Talks on phone: Not on file     Gets together: Not on file     Attends Hinduism service: Not on file     Active member of club or organization: Not on file     Attends meetings of clubs or organizations: Not on file     Relationship status: Not on file   Other Topics Concern    Not on file   Social History Narrative    Not on file       Current Outpatient  Medications:     atorvastatin (LIPITOR) 40 MG tablet, Take 1 tablet (40 mg total) by mouth once daily., Disp: 30 tablet, Rfl: 5    ergocalciferol (VITAMIN D2) 50,000 unit Cap, Take 50,000 Units by mouth every 7 days., Disp: , Rfl:     isosorbide mononitrate (IMDUR) 30 MG 24 hr tablet, TAKE 1 TABLET BY MOUTH ONCE DAILY, Disp: 30 tablet, Rfl: 11    losartan-hydrochlorothiazide 50-12.5 mg (HYZAAR) 50-12.5 mg per tablet, Take 1 tablet by mouth once daily., Disp: 90 tablet, Rfl: 3    tamsulosin (FLOMAX) 0.4 mg Cap, Take 1 capsule (0.4 mg total) by mouth once daily., Disp: 30 capsule, Rfl: 11    warfarin (COUMADIN) 10 MG tablet, Take 1 to 1.5 tablets by mouth daily as directed by coumadin clinic., Disp: 50 tablet, Rfl: 11    acetaminophen (TYLENOL) 325 MG tablet, Take 2 tablets (650 mg total) by mouth every 6 (six) hours as needed. (Patient not taking: Reported on 9/24/2020), Disp: 13 tablet, Rfl: 0    finasteride (PROSCAR) 5 mg tablet, Take 1 tablet (5 mg total) by mouth once daily., Disp: 30 tablet, Rfl: 11    nitroGLYCERIN (NITROSTAT) 0.4 MG SL tablet, Place 0.4 mg under the tongue every 5 (five) minutes as needed for Chest pain., Disp: , Rfl:     nitroGLYCERIN (NITROSTAT) 0.4 MG SL tablet, Place 0.4 mg under the tongue., Disp: , Rfl:     REVIEW OF SYSTEMS:  General: No fevers or chills; ENT: No sore throat; Allergy and Immunology: no persistent infections; Hematological and Lymphatic: No history of bleeding or easy bruising; Endocrine: negative; Respiratory: no cough, shortness of breath, or wheezing; Cardiovascular: no chest pain or dyspnea on exertion; Gastrointestinal: no abdominal pain/back, change in bowel habits, or bloody stools; Genito-Urinary: no dysuria, trouble voiding, or hematuria; Musculoskeletal: edema and pain; Neurological: no TIA or stroke symptoms; Psychiatric: no nervousness, anxiety or depression.    PHYSICAL EXAM:                General appearance:  Alert, well-appearing, and in no  distress.  Oriented to person, place, and time                    Neurological: Normal speech, no focal findings noted; CN II - XII grossly intact. RLE with sensation to light touch, LLE with sensation to light touch.            Musculoskeletal: Digits/nail without cyanosis/clubbing.  Strength 5/5 BLE.                    Neck: Supple, no significant adenopathy                  Chest:  No wheezes, symmetric air entry. No use of accessory muscles               Cardiac: Normal rate and regular rhythm            Abdomen: Soft, nontender, nondistended      Extremities:   2+ R DP pulse, 2+ L DP pulse      1+ RLE edema, 1+ LLE edema    Skin:  RLE no ulcer; LLE no ulcer      RLE no spider veins, LLE no spider veins      RLE no varicose veins, LLE no varicose veins    CEAP 3/3    LAB RESULTS:  No results found for: CBC  Lab Results   Component Value Date    LABPROT 13.8 (H) 09/24/2020    INR 1.2 09/24/2020     Lab Results   Component Value Date     07/02/2020    K 4.0 07/02/2020     07/02/2020    CO2 25 07/02/2020    GLU 86 07/02/2020    BUN 11 07/02/2020    CREATININE 0.8 07/02/2020    CALCIUM 8.9 07/02/2020    ANIONGAP 10 07/02/2020    EGFRNONAA >60 07/02/2020     Lab Results   Component Value Date    WBC 6.43 07/02/2020    RBC 4.42 (L) 07/02/2020    HGB 13.8 (L) 07/02/2020    HCT 42.9 07/02/2020    MCV 97 07/02/2020    MCH 31.2 (H) 07/02/2020    MCHC 32.2 07/02/2020    RDW 13.5 07/02/2020     07/02/2020    MPV 10.9 07/02/2020    GRAN 4.0 07/02/2020    GRAN 62.6 07/02/2020    LYMPH 1.6 07/02/2020    LYMPH 24.1 07/02/2020    MONO 0.6 07/02/2020    MONO 9.5 07/02/2020    EOS 0.1 07/02/2020    BASO 0.09 07/02/2020    EOSINOPHIL 1.9 07/02/2020    BASOPHIL 1.4 07/02/2020    DIFFMETHOD Automated 07/02/2020     .  Lab Results   Component Value Date    HGBA1C 5.1 11/29/2017       IMAGING:  All pertinent imaging has been reviewed and interpreted independently.    Venous US 11/2019 Impression:  Chronic R femoral,  popliteal, peroneal thrombus    No venous reflux or DVT 3/2020    No PVD on US / GEE 3/2020    IMP/PLAN:  65 y.o. male with   Patient Active Problem List   Diagnosis    Coronary artery disease involving native coronary artery of native heart without angina pectoris    History of deep vein thrombosis (DVT) of lower extremity    Mass of left foot    Tobacco abuse    Elevated troponin    Chest pain, non-cardiac    DVT, lower extremity, recurrent, right    Prothrombin gene mutation    Lupus anticoagulant positive    Foot swelling    Failure of outpatient treatment    Distended bladder    Bladder outlet obstruction    Bladder wall thickening    Hx of colonic polyp    Urinary retention    Chest pain    being managed by PCP and specialists who is here today for evaluation of BLE edema and pain.    -recommend compression with Rx stockings, cont elevation, dietary changes associated with water and sodium intake discussed at length with patient  -Exercise   -Cont mgmt of DVT and hypercoagulable state by PCP and Hematology  -RTC 6 months for further evaluation    I spent 6 minutes evaluating this patient and greater than 50% of the time was spent counseling, coordinator care and discussing the plan of care.  All questions were answered and patient stated understanding with agreement with the above treatment plan.    Wesley Bentley MD [facility]  Vascular and Endovascular Surgery

## 2020-09-24 NOTE — PROGRESS NOTES
Wesley Bentley MD, RPVI                                 [facility]                                                        09/24/2020    HPI:  Carlos A Ruff is a 65 y.o. male with   Patient Active Problem List   Diagnosis    Coronary artery disease involving native coronary artery of native heart without angina pectoris    History of deep vein thrombosis (DVT) of lower extremity    Mass of left foot    Tobacco abuse    Elevated troponin    Chest pain, non-cardiac    DVT, lower extremity, recurrent, right    Prothrombin gene mutation    Lupus anticoagulant positive    Foot swelling    Failure of outpatient treatment    Distended bladder    Bladder outlet obstruction    Bladder wall thickening    Hx of colonic polyp    Urinary retention    Chest pain    being managed by PCP and specialists who is here today for evaluation of BLE edema.  H/o RLE DVT 2018 and 2019 treated with anticoagulation; prior US showed chronic R femoral and popliteal DVT with acute peroneal and he was admitted and continued on AC.  Found to have abnormal prothrombin gene, currently on Coumadin.  No claudication.  Patient states location is below knee bilaterally occurring for 2-3 yrs.  Associated signs and symptoms include decreased ROM.  Quality is tight and severity is 10/10 at worst.  Symptoms began 2-3 yrs ago.  Alleviating factors include elevation and rest.  Worsening factors include dependency.  Patient is wearing compression stockings daily, OTC.  No FH of venous disease.  Symptoms do limit patient's functional status and daily activities.  + DVT history.  No venous interventions.  + low sodium diet.  + excessive water intake.    Migraine with aura: no  PFO/ASD/right to left shunt: no  Pregnant: no  Breastfeeding: no    no MI  no Stroke  Tobacco use: 5 cig / day    Past Medical History:   Diagnosis Date    Anticoagulant long-term use     Colon polyps     Coronary artery disease      Hypercholesteremia     Hypertension     MI (myocardial infarction)      Past Surgical History:   Procedure Laterality Date    blood clots      cardiac stents      COLONOSCOPY N/A 2/1/2019    Procedure: COLONOSCOPY;  Surgeon: Blanca Fenton MD;  Location: [facility] ENDO;  Service: Endoscopy;  Laterality: N/A;  RX PLAVIX/XARELTO ok to stop (7 days, 3 days) per Dr. Cordero see scan    CYSTOSCOPY WITH URODYNAMIC TESTING N/A 8/30/2019    Procedure: CYSTOSCOPY,WITH URODYNAMIC TESTING;  Surgeon: Felecia Stiles MD;  Location: [facility] OR;  Service: Urology;  Laterality: N/A;  RN PRE OP 8-27-19     History reviewed. No pertinent family history.  Social History     Socioeconomic History    Marital status: Single     Spouse name: Not on file    Number of children: Not on file    Years of education: Not on file    Highest education level: Not on file   Occupational History    Not on file   Social Needs    Financial resource strain: Not on file    Food insecurity     Worry: Not on file     Inability: Not on file    Transportation needs     Medical: Not on file     Non-medical: Not on file   Tobacco Use    Smoking status: Current Some Day Smoker     Packs/day: 0.25     Types: Cigarettes    Smokeless tobacco: Never Used   Substance and Sexual Activity    Alcohol use: Yes     Comment: occasionally     Drug use: Never    Sexual activity: Not Currently   Lifestyle    Physical activity     Days per week: Not on file     Minutes per session: Not on file    Stress: Not on file   Relationships    Social connections     Talks on phone: Not on file     Gets together: Not on file     Attends Baptism service: Not on file     Active member of club or organization: Not on file     Attends meetings of clubs or organizations: Not on file     Relationship status: Not on file   Other Topics Concern    Not on file   Social History Narrative    Not on file       Current Outpatient Medications:     atorvastatin (LIPITOR) 40 MG  tablet, Take 1 tablet (40 mg total) by mouth once daily., Disp: 30 tablet, Rfl: 5    ergocalciferol (VITAMIN D2) 50,000 unit Cap, Take 50,000 Units by mouth every 7 days., Disp: , Rfl:     isosorbide mononitrate (IMDUR) 30 MG 24 hr tablet, TAKE 1 TABLET BY MOUTH ONCE DAILY, Disp: 30 tablet, Rfl: 11    losartan-hydrochlorothiazide 50-12.5 mg (HYZAAR) 50-12.5 mg per tablet, Take 1 tablet by mouth once daily., Disp: 90 tablet, Rfl: 3    tamsulosin (FLOMAX) 0.4 mg Cap, Take 1 capsule (0.4 mg total) by mouth once daily., Disp: 30 capsule, Rfl: 11    warfarin (COUMADIN) 10 MG tablet, Take 1 to 1.5 tablets by mouth daily as directed by coumadin clinic., Disp: 50 tablet, Rfl: 11    acetaminophen (TYLENOL) 325 MG tablet, Take 2 tablets (650 mg total) by mouth every 6 (six) hours as needed. (Patient not taking: Reported on 9/24/2020), Disp: 13 tablet, Rfl: 0    finasteride (PROSCAR) 5 mg tablet, Take 1 tablet (5 mg total) by mouth once daily., Disp: 30 tablet, Rfl: 11    nitroGLYCERIN (NITROSTAT) 0.4 MG SL tablet, Place 0.4 mg under the tongue every 5 (five) minutes as needed for Chest pain., Disp: , Rfl:     nitroGLYCERIN (NITROSTAT) 0.4 MG SL tablet, Place 0.4 mg under the tongue., Disp: , Rfl:     REVIEW OF SYSTEMS:  General: No fevers or chills; ENT: No sore throat; Allergy and Immunology: no persistent infections; Hematological and Lymphatic: No history of bleeding or easy bruising; Endocrine: negative; Respiratory: no cough, shortness of breath, or wheezing; Cardiovascular: no chest pain or dyspnea on exertion; Gastrointestinal: no abdominal pain/back, change in bowel habits, or bloody stools; Genito-Urinary: no dysuria, trouble voiding, or hematuria; Musculoskeletal: edema and pain; Neurological: no TIA or stroke symptoms; Psychiatric: no nervousness, anxiety or depression.    PHYSICAL EXAM:                General appearance:  Alert, well-appearing, and in no distress.  Oriented to person, place, and time                     Neurological: Normal speech, no focal findings noted; CN II - XII grossly intact. RLE with sensation to light touch, LLE with sensation to light touch.            Musculoskeletal: Digits/nail without cyanosis/clubbing.  Strength 5/5 BLE.                    Neck: Supple, no significant adenopathy                  Chest:  No wheezes, symmetric air entry. No use of accessory muscles               Cardiac: Normal rate and regular rhythm            Abdomen: Soft, nontender, nondistended      Extremities:   2+ R DP pulse, 2+ L DP pulse      1+ RLE edema, 1+ LLE edema    Skin:  RLE no ulcer; LLE no ulcer      RLE no spider veins, LLE no spider veins      RLE no varicose veins, LLE no varicose veins    CEAP 3/3    LAB RESULTS:  No results found for: CBC  Lab Results   Component Value Date    LABPROT 13.8 (H) 09/24/2020    INR 1.2 09/24/2020     Lab Results   Component Value Date     07/02/2020    K 4.0 07/02/2020     07/02/2020    CO2 25 07/02/2020    GLU 86 07/02/2020    BUN 11 07/02/2020    CREATININE 0.8 07/02/2020    CALCIUM 8.9 07/02/2020    ANIONGAP 10 07/02/2020    EGFRNONAA >60 07/02/2020     Lab Results   Component Value Date    WBC 6.43 07/02/2020    RBC 4.42 (L) 07/02/2020    HGB 13.8 (L) 07/02/2020    HCT 42.9 07/02/2020    MCV 97 07/02/2020    MCH 31.2 (H) 07/02/2020    MCHC 32.2 07/02/2020    RDW 13.5 07/02/2020     07/02/2020    MPV 10.9 07/02/2020    GRAN 4.0 07/02/2020    GRAN 62.6 07/02/2020    LYMPH 1.6 07/02/2020    LYMPH 24.1 07/02/2020    MONO 0.6 07/02/2020    MONO 9.5 07/02/2020    EOS 0.1 07/02/2020    BASO 0.09 07/02/2020    EOSINOPHIL 1.9 07/02/2020    BASOPHIL 1.4 07/02/2020    DIFFMETHOD Automated 07/02/2020     .  Lab Results   Component Value Date    HGBA1C 5.1 11/29/2017       IMAGING:  All pertinent imaging has been reviewed and interpreted independently.    Venous US 11/2019 Impression:  Chronic R femoral, popliteal, peroneal thrombus    IMP/PLAN:  65 y.o.  male with   Patient Active Problem List   Diagnosis    Coronary artery disease involving native coronary artery of native heart without angina pectoris    History of deep vein thrombosis (DVT) of lower extremity    Mass of left foot    Tobacco abuse    Elevated troponin    Chest pain, non-cardiac    DVT, lower extremity, recurrent, right    Prothrombin gene mutation    Lupus anticoagulant positive    Foot swelling    Failure of outpatient treatment    Distended bladder    Bladder outlet obstruction    Bladder wall thickening    Hx of colonic polyp    Urinary retention    Chest pain    being managed by PCP and specialists who is here today for evaluation of BLE edema and pain.    -recommend compression with Rx stockings, elevation, dietary changes associated with water and sodium intake discussed at length with patient  -Exercise   -RTC 3 months for further evaluation    I spent 20 minutes evaluating this patient and greater than 50% of the time was spent counseling, coordinator care and discussing the plan of care.  All questions were answered and patient stated understanding with agreement with the above treatment plan.    Wesley Bentley MD [facility]  Vascular and Endovascular Surgery

## 2020-09-24 NOTE — LETTER
September 24, 2020        Khalif Hassan MD  [address] [facility] [address]             [facility]  [address]  [facility] [address]  Phone: [phone]  Fax: [phone]   Patient: Carlos A Ruff   MR Number: [id number]   YOB: 1955   Date of Visit: 9/24/2020       Dear Dr. Hassan:    Thank you for referring Carlos A Ruff to me for evaluation. Below are the relevant portions of my assessment and plan of care.            If you have questions, please do not hesitate to call me. I look forward to following Carlos A along with you.    Sincerely,      Wesley Bentley MD           CC  No Recipients

## 2020-09-30 ENCOUNTER — ANTI-COAG VISIT (OUTPATIENT)
Dept: CARDIOLOGY | Facility: CLINIC | Age: 65
End: 2020-09-30
Payer: MEDICAID

## 2020-09-30 ENCOUNTER — OFFICE VISIT (OUTPATIENT)
Dept: FAMILY MEDICINE | Facility: CLINIC | Age: 65
End: 2020-09-30
Payer: MEDICAID

## 2020-09-30 VITALS
WEIGHT: 173.31 LBS | DIASTOLIC BLOOD PRESSURE: 74 MMHG | SYSTOLIC BLOOD PRESSURE: 126 MMHG | TEMPERATURE: 99 F | HEART RATE: 72 BPM | OXYGEN SATURATION: 98 % | BODY MASS INDEX: 24.17 KG/M2 | RESPIRATION RATE: 18 BRPM

## 2020-09-30 DIAGNOSIS — D68.52 PROTHROMBIN GENE MUTATION: ICD-10-CM

## 2020-09-30 DIAGNOSIS — Z86.718 HISTORY OF DEEP VEIN THROMBOSIS (DVT) OF LOWER EXTREMITY: ICD-10-CM

## 2020-09-30 DIAGNOSIS — I82.401 DVT, LOWER EXTREMITY, RECURRENT, RIGHT: ICD-10-CM

## 2020-09-30 DIAGNOSIS — Z23 NEED FOR INFLUENZA VACCINATION: ICD-10-CM

## 2020-09-30 DIAGNOSIS — R76.0 LUPUS ANTICOAGULANT POSITIVE: ICD-10-CM

## 2020-09-30 DIAGNOSIS — M17.0 OSTEOARTHRITIS OF BOTH KNEES, UNSPECIFIED OSTEOARTHRITIS TYPE: Primary | ICD-10-CM

## 2020-09-30 PROCEDURE — 93793 ANTICOAG MGMT PT WARFARIN: CPT | Mod: ,,, | Performed by: PHARMACIST

## 2020-09-30 PROCEDURE — 93793 PR ANTICOAGULANT MGMT FOR PT TAKING WARFARIN: ICD-10-PCS | Mod: ,,, | Performed by: PHARMACIST

## 2020-09-30 PROCEDURE — 99214 OFFICE O/P EST MOD 30 MIN: CPT | Mod: PBBFAC,PN | Performed by: NURSE PRACTITIONER

## 2020-09-30 PROCEDURE — 99214 PR OFFICE/OUTPT VISIT, EST, LEVL IV, 30-39 MIN: ICD-10-PCS | Mod: S$PBB,,, | Performed by: NURSE PRACTITIONER

## 2020-09-30 PROCEDURE — 90471 IMMUNIZATION ADMIN: CPT | Mod: PBBFAC,PN

## 2020-09-30 PROCEDURE — 99999 PR PBB SHADOW E&M-EST. PATIENT-LVL IV: ICD-10-PCS | Mod: PBBFAC,,, | Performed by: NURSE PRACTITIONER

## 2020-09-30 PROCEDURE — 99214 OFFICE O/P EST MOD 30 MIN: CPT | Mod: S$PBB,,, | Performed by: NURSE PRACTITIONER

## 2020-09-30 PROCEDURE — 99999 PR PBB SHADOW E&M-EST. PATIENT-LVL IV: CPT | Mod: PBBFAC,,, | Performed by: NURSE PRACTITIONER

## 2020-09-30 RX ORDER — ACETAMINOPHEN AND CODEINE PHOSPHATE 300; 30 MG/1; MG/1
1 TABLET ORAL EVERY 8 HOURS PRN
Qty: 21 TABLET | Refills: 0 | Status: SHIPPED | OUTPATIENT
Start: 2020-09-30 | End: 2020-10-07

## 2020-09-30 NOTE — PROGRESS NOTES
Routine Office Visit    Patient Name: Carlos A Ruff    : 1955  MRN: [id number]    Chief Complaint:  Knee pain    Subjective:  Carlos A is a 65 y.o. male who presents today for:    1.  Knee pain - patient reports to the clinic today to discuss bilateral knee pain.  He has been having intermittent knee pain for the last few months without any specific inciting event or injury.  The pain is located on the anterior portions of both knees and does not radiate anywhere.  I have seen him for this problem before.  He has had imaging of the knees which shows bilateral arthritis.  He was set up to see a Bone & Joint Specialist, but was not able to make this appointment due to the COVID pandemic.  He is interested in seeing Orthopedics for this problem.  He has tried Tylenol, Aspercreme, and Tylenol 3 with varying relief.  He denies any numbness, tingling, or weakness of the knees.  He denies any decreased range of motion of the knees.  He states the pain and swelling worsens throughout the day as he walks and moves around.  Resting and elevating his legs does help with the knee swelling and pain.  He cannot take NSAIDs as he is on chronic warfarin for anticoagulation.    Past Medical History  Past Medical History:   Diagnosis Date    Anticoagulant long-term use     Colon polyps     Coronary artery disease     Hypercholesteremia     Hypertension     MI (myocardial infarction)        Past Surgical History  Past Surgical History:   Procedure Laterality Date    blood clots      cardiac stents      COLONOSCOPY N/A 2019    Procedure: COLONOSCOPY;  Surgeon: Blanca Fenton MD;  Location: [facility] ENDO;  Service: Endoscopy;  Laterality: N/A;  RX PLAVIX/XARELTO ok to stop (7 days, 3 days) per Dr. Cordero see scan    CYSTOSCOPY WITH URODYNAMIC TESTING N/A 2019    Procedure: CYSTOSCOPY,WITH URODYNAMIC TESTING;  Surgeon: Felecia Stiles MD;  Location: [facility] OR;  Service: Urology;  Laterality: N/A;  RN PRE OP 19        Family History  No family history on file.    Social History  Social History     Socioeconomic History    Marital status: Single     Spouse name: Not on file    Number of children: Not on file    Years of education: Not on file    Highest education level: Not on file   Occupational History    Not on file   Social Needs    Financial resource strain: Not on file    Food insecurity     Worry: Not on file     Inability: Not on file    Transportation needs     Medical: Not on file     Non-medical: Not on file   Tobacco Use    Smoking status: Current Some Day Smoker     Packs/day: 0.25     Types: Cigarettes    Smokeless tobacco: Never Used   Substance and Sexual Activity    Alcohol use: Yes     Comment: occasionally     Drug use: Never    Sexual activity: Not Currently   Lifestyle    Physical activity     Days per week: Not on file     Minutes per session: Not on file    Stress: Not on file   Relationships    Social connections     Talks on phone: Not on file     Gets together: Not on file     Attends Cheondoism service: Not on file     Active member of club or organization: Not on file     Attends meetings of clubs or organizations: Not on file     Relationship status: Not on file   Other Topics Concern    Not on file   Social History Narrative    Not on file       Current Medications  Current Outpatient Medications on File Prior to Visit   Medication Sig Dispense Refill    acetaminophen (TYLENOL) 325 MG tablet Take 2 tablets (650 mg total) by mouth every 6 (six) hours as needed. (Patient not taking: Reported on 9/24/2020) 13 tablet 0    atorvastatin (LIPITOR) 40 MG tablet Take 1 tablet (40 mg total) by mouth once daily. 30 tablet 5    ergocalciferol (VITAMIN D2) 50,000 unit Cap Take 50,000 Units by mouth every 7 days.      finasteride (PROSCAR) 5 mg tablet Take 1 tablet (5 mg total) by mouth once daily. 30 tablet 11    isosorbide mononitrate (IMDUR) 30 MG 24 hr tablet TAKE 1 TABLET BY MOUTH  ONCE DAILY 30 tablet 11    losartan-hydrochlorothiazide 50-12.5 mg (HYZAAR) 50-12.5 mg per tablet Take 1 tablet by mouth once daily. 90 tablet 3    nitroGLYCERIN (NITROSTAT) 0.4 MG SL tablet Place 0.4 mg under the tongue every 5 (five) minutes as needed for Chest pain.      nitroGLYCERIN (NITROSTAT) 0.4 MG SL tablet Place 0.4 mg under the tongue.      tamsulosin (FLOMAX) 0.4 mg Cap Take 1 capsule (0.4 mg total) by mouth once daily. 30 capsule 11    warfarin (COUMADIN) 10 MG tablet Take 1 to 1.5 tablets by mouth daily as directed by coumadin clinic. 50 tablet 11     No current facility-administered medications on file prior to visit.        Allergies   Review of patient's allergies indicates:  No Known Allergies    Review of Systems (Pertinent positives)  Review of Systems   Constitutional: Negative for chills and fever.   HENT: Negative.    Eyes: Negative.    Respiratory: Negative.    Cardiovascular: Positive for leg swelling (Intermittent). Negative for chest pain, palpitations, orthopnea, claudication and PND.   Gastrointestinal: Negative.    Genitourinary: Negative.    Musculoskeletal: Positive for joint pain. Negative for back pain, falls, myalgias and neck pain.   Skin: Negative.    Neurological: Negative.    Endo/Heme/Allergies: Negative.    Psychiatric/Behavioral: Negative.        /74 (BP Location: Left arm, Patient Position: Sitting, BP Method: Medium (Automatic))   Pulse 72   Temp 98.6 °F (37 °C) (Oral)   Resp 18   Wt 78.6 kg (173 lb 4.5 oz)   SpO2 98%   BMI 24.17 kg/m²     Physical Exam  Vitals signs reviewed.   Constitutional:       General: He is not in acute distress.     Appearance: Normal appearance. He is not ill-appearing, toxic-appearing or diaphoretic.   Cardiovascular:      Rate and Rhythm: Normal rate and regular rhythm.      Pulses: Normal pulses.      Heart sounds: Normal heart sounds. No murmur. No friction rub. No gallop.    Pulmonary:      Effort: Pulmonary effort is  normal. No respiratory distress.      Breath sounds: Normal breath sounds. No stridor. No wheezing, rhonchi or rales.   Chest:      Chest wall: No tenderness.   Abdominal:      General: Abdomen is flat. Bowel sounds are normal.      Palpations: Abdomen is soft.   Musculoskeletal:      Right knee: He exhibits effusion. He exhibits normal range of motion, no swelling, no ecchymosis, no deformity, no laceration, no erythema, normal alignment, no LCL laxity, normal patellar mobility, no bony tenderness, normal meniscus and no MCL laxity. Tenderness found. Medial joint line and lateral joint line tenderness noted. No MCL, no LCL and no patellar tendon tenderness noted.      Left knee: He exhibits effusion. He exhibits normal range of motion, no swelling, no ecchymosis, no deformity, no laceration, no erythema, normal alignment, no LCL laxity, normal patellar mobility, no bony tenderness, normal meniscus and no MCL laxity. Tenderness found. Medial joint line and lateral joint line tenderness noted. No MCL, no LCL and no patellar tendon tenderness noted.      Right lower leg: No edema.      Left lower leg: No edema.   Skin:     General: Skin is warm and dry.      Capillary Refill: Capillary refill takes less than 2 seconds.   Neurological:      General: No focal deficit present.      Mental Status: He is alert and oriented to person, place, and time.          Assessment/Plan:  Carlos A Ruff is a 65 y.o. male who presents today for :    Carlos A was seen today for knee pain.    Diagnoses and all orders for this visit:    Osteoarthritis of both knees, unspecified osteoarthritis type  -     Ambulatory referral/consult to Orthopedics; Future  -     acetaminophen-codeine 300-30mg (TYLENOL #3) 300-30 mg Tab; Take 1 tablet by mouth every 8 (eight) hours as needed.    Need for influenza vaccination  -     Influenza - Quadrivalent *Preferred* (6 months+) (PF)     Patient with osteoarthritis of both knees exhibited via x-ray.  He cannot  take NSAIDs due to chronic warfarin therapy.  Checked . He has had relief with Tylenol 3 in the past.  I will provide him with a short course of this, no longer than 7 days.  I will refer him to orthopedics as he may benefit from steroid injections into the knees.  He is not interested in physical therapy at this time, however he states that he will be amenable to this in the future if he needs this.  He has been wearing knee sleeves switch I encouraged him to continue using.  Potential side effects of the Tylenol 3 were discussed.  He is also due for the flu shot today so I will provide this.  He can follow-up to the clinic as needed.  Patient verbalized understanding of instructions.        This office note has been dictated.  This dictation has been generated using M-Modal Fluency Direct dictation; some phonetic errors may occur.   My collaborating physician is Dr. Mark Thornton.

## 2020-10-14 ENCOUNTER — LAB VISIT (OUTPATIENT)
Dept: LAB | Facility: HOSPITAL | Age: 65
End: 2020-10-14
Attending: INTERNAL MEDICINE
Payer: MEDICAID

## 2020-10-14 ENCOUNTER — ANTI-COAG VISIT (OUTPATIENT)
Dept: CARDIOLOGY | Facility: CLINIC | Age: 65
End: 2020-10-14
Payer: MEDICAID

## 2020-10-14 DIAGNOSIS — R76.0 LUPUS ANTICOAGULANT POSITIVE: ICD-10-CM

## 2020-10-14 DIAGNOSIS — I82.401 DVT, LOWER EXTREMITY, RECURRENT, RIGHT: ICD-10-CM

## 2020-10-14 DIAGNOSIS — Z86.718 HISTORY OF DEEP VEIN THROMBOSIS (DVT) OF LOWER EXTREMITY: ICD-10-CM

## 2020-10-14 DIAGNOSIS — D68.52 PROTHROMBIN GENE MUTATION: ICD-10-CM

## 2020-10-14 LAB
INR PPP: 1.3 (ref 0.8–1.2)
PROTHROMBIN TIME: 14.7 SEC (ref 9–12.5)

## 2020-10-14 PROCEDURE — 85610 PROTHROMBIN TIME: CPT

## 2020-10-14 PROCEDURE — 93793 PR ANTICOAGULANT MGMT FOR PT TAKING WARFARIN: ICD-10-PCS | Mod: ,,, | Performed by: PHARMACIST

## 2020-10-14 PROCEDURE — 36415 COLL VENOUS BLD VENIPUNCTURE: CPT

## 2020-10-14 PROCEDURE — 93793 ANTICOAG MGMT PT WARFARIN: CPT | Mod: ,,, | Performed by: PHARMACIST

## 2020-10-14 NOTE — PROGRESS NOTES
Patient denies any changes in diet, medications, or health that would effect warfarin therapy.  I will increase weekly coumadin dose more aggressively but will watch closely since previous dose increases have resulted in elevated INRs

## 2020-10-22 ENCOUNTER — LAB VISIT (OUTPATIENT)
Dept: LAB | Facility: HOSPITAL | Age: 65
End: 2020-10-22
Attending: INTERNAL MEDICINE
Payer: MEDICAID

## 2020-10-22 ENCOUNTER — ANTI-COAG VISIT (OUTPATIENT)
Dept: CARDIOLOGY | Facility: CLINIC | Age: 65
End: 2020-10-22
Payer: MEDICAID

## 2020-10-22 DIAGNOSIS — D68.52 PROTHROMBIN GENE MUTATION: ICD-10-CM

## 2020-10-22 DIAGNOSIS — I82.401 DVT, LOWER EXTREMITY, RECURRENT, RIGHT: ICD-10-CM

## 2020-10-22 DIAGNOSIS — Z86.718 HISTORY OF DEEP VEIN THROMBOSIS (DVT) OF LOWER EXTREMITY: ICD-10-CM

## 2020-10-22 DIAGNOSIS — R76.0 LUPUS ANTICOAGULANT POSITIVE: ICD-10-CM

## 2020-10-22 LAB
INR PPP: 1.3 (ref 0.8–1.2)
PROTHROMBIN TIME: 13.9 SEC (ref 9–12.5)

## 2020-10-22 PROCEDURE — 93793 PR ANTICOAGULANT MGMT FOR PT TAKING WARFARIN: ICD-10-PCS | Mod: ,,, | Performed by: PHARMACIST

## 2020-10-22 PROCEDURE — 93793 ANTICOAG MGMT PT WARFARIN: CPT | Mod: ,,, | Performed by: PHARMACIST

## 2020-10-22 PROCEDURE — 36415 COLL VENOUS BLD VENIPUNCTURE: CPT

## 2020-10-22 PROCEDURE — 85610 PROTHROMBIN TIME: CPT

## 2020-10-22 NOTE — PROGRESS NOTES
Patient states he missed Tuesday dose, has swelling in both legs (been about 2 months) and correct dose of coumadin

## 2020-11-02 ENCOUNTER — ANTI-COAG VISIT (OUTPATIENT)
Dept: CARDIOLOGY | Facility: CLINIC | Age: 65
End: 2020-11-02
Payer: MEDICAID

## 2020-11-02 ENCOUNTER — LAB VISIT (OUTPATIENT)
Dept: LAB | Facility: HOSPITAL | Age: 65
End: 2020-11-02
Attending: INTERNAL MEDICINE
Payer: MEDICAID

## 2020-11-02 DIAGNOSIS — D68.52 PROTHROMBIN GENE MUTATION: ICD-10-CM

## 2020-11-02 DIAGNOSIS — Z86.718 HISTORY OF DEEP VEIN THROMBOSIS (DVT) OF LOWER EXTREMITY: ICD-10-CM

## 2020-11-02 DIAGNOSIS — I82.401 DVT, LOWER EXTREMITY, RECURRENT, RIGHT: ICD-10-CM

## 2020-11-02 DIAGNOSIS — R76.0 LUPUS ANTICOAGULANT POSITIVE: ICD-10-CM

## 2020-11-02 LAB
INR PPP: 1.8 (ref 0.8–1.2)
PROTHROMBIN TIME: 20.1 SEC (ref 9–12.5)

## 2020-11-02 PROCEDURE — 36415 COLL VENOUS BLD VENIPUNCTURE: CPT

## 2020-11-02 PROCEDURE — 85610 PROTHROMBIN TIME: CPT

## 2020-11-02 PROCEDURE — 93793 PR ANTICOAGULANT MGMT FOR PT TAKING WARFARIN: ICD-10-PCS | Mod: ,,, | Performed by: PHARMACIST

## 2020-11-02 PROCEDURE — 93793 ANTICOAG MGMT PT WARFARIN: CPT | Mod: ,,, | Performed by: PHARMACIST

## 2020-11-10 ENCOUNTER — ANTI-COAG VISIT (OUTPATIENT)
Dept: CARDIOLOGY | Facility: CLINIC | Age: 65
End: 2020-11-10
Payer: MEDICAID

## 2020-11-10 ENCOUNTER — LAB VISIT (OUTPATIENT)
Dept: LAB | Facility: HOSPITAL | Age: 65
End: 2020-11-10
Attending: INTERNAL MEDICINE
Payer: MEDICAID

## 2020-11-10 DIAGNOSIS — I82.401 DVT, LOWER EXTREMITY, RECURRENT, RIGHT: ICD-10-CM

## 2020-11-10 DIAGNOSIS — Z86.718 HISTORY OF DEEP VEIN THROMBOSIS (DVT) OF LOWER EXTREMITY: ICD-10-CM

## 2020-11-10 DIAGNOSIS — R76.0 LUPUS ANTICOAGULANT POSITIVE: ICD-10-CM

## 2020-11-10 DIAGNOSIS — D68.52 PROTHROMBIN GENE MUTATION: ICD-10-CM

## 2020-11-10 LAB
INR PPP: 1.8 (ref 0.8–1.2)
PROTHROMBIN TIME: 20 SEC (ref 9–12.5)

## 2020-11-10 PROCEDURE — 93793 PR ANTICOAGULANT MGMT FOR PT TAKING WARFARIN: ICD-10-PCS | Mod: ,,,

## 2020-11-10 PROCEDURE — 36415 COLL VENOUS BLD VENIPUNCTURE: CPT

## 2020-11-10 PROCEDURE — 93793 ANTICOAG MGMT PT WARFARIN: CPT | Mod: ,,,

## 2020-11-10 PROCEDURE — 85610 PROTHROMBIN TIME: CPT

## 2020-11-23 ENCOUNTER — LAB VISIT (OUTPATIENT)
Dept: LAB | Facility: HOSPITAL | Age: 65
End: 2020-11-23
Attending: INTERNAL MEDICINE
Payer: MEDICAID

## 2020-11-23 ENCOUNTER — ANTI-COAG VISIT (OUTPATIENT)
Dept: CARDIOLOGY | Facility: CLINIC | Age: 65
End: 2020-11-23
Payer: MEDICAID

## 2020-11-23 DIAGNOSIS — I82.401 DVT, LOWER EXTREMITY, RECURRENT, RIGHT: ICD-10-CM

## 2020-11-23 DIAGNOSIS — R76.0 LUPUS ANTICOAGULANT POSITIVE: ICD-10-CM

## 2020-11-23 DIAGNOSIS — D68.52 PROTHROMBIN GENE MUTATION: ICD-10-CM

## 2020-11-23 DIAGNOSIS — Z86.718 HISTORY OF DEEP VEIN THROMBOSIS (DVT) OF LOWER EXTREMITY: ICD-10-CM

## 2020-11-23 LAB
INR PPP: 1.1 (ref 0.8–1.2)
PROTHROMBIN TIME: 12 SEC (ref 9–12.5)

## 2020-11-23 PROCEDURE — 85610 PROTHROMBIN TIME: CPT

## 2020-11-23 PROCEDURE — 93793 PR ANTICOAGULANT MGMT FOR PT TAKING WARFARIN: ICD-10-PCS | Mod: ,,, | Performed by: PHARMACIST

## 2020-11-23 PROCEDURE — 36415 COLL VENOUS BLD VENIPUNCTURE: CPT

## 2020-11-23 PROCEDURE — 93793 ANTICOAG MGMT PT WARFARIN: CPT | Mod: ,,, | Performed by: PHARMACIST

## 2020-11-23 NOTE — PROGRESS NOTES
Patient reports missing his coumadin dose on Saturday. Otherwise, Patient denies any changes in diet, medications, or health that would effect warfarin therapy.

## 2020-12-01 ENCOUNTER — LAB VISIT (OUTPATIENT)
Dept: LAB | Facility: HOSPITAL | Age: 65
End: 2020-12-01
Attending: INTERNAL MEDICINE
Payer: MEDICAID

## 2020-12-01 ENCOUNTER — ANTI-COAG VISIT (OUTPATIENT)
Dept: CARDIOLOGY | Facility: CLINIC | Age: 65
End: 2020-12-01
Payer: MEDICAID

## 2020-12-01 DIAGNOSIS — Z86.718 HISTORY OF DEEP VEIN THROMBOSIS (DVT) OF LOWER EXTREMITY: ICD-10-CM

## 2020-12-01 DIAGNOSIS — R76.0 LUPUS ANTICOAGULANT POSITIVE: ICD-10-CM

## 2020-12-01 DIAGNOSIS — D68.52 PROTHROMBIN GENE MUTATION: ICD-10-CM

## 2020-12-01 DIAGNOSIS — I82.401 DVT, LOWER EXTREMITY, RECURRENT, RIGHT: ICD-10-CM

## 2020-12-01 LAB
INR PPP: 1.7 (ref 0.8–1.2)
PROTHROMBIN TIME: 18.7 SEC (ref 9–12.5)

## 2020-12-01 PROCEDURE — 93793 ANTICOAG MGMT PT WARFARIN: CPT | Mod: ,,, | Performed by: PHARMACIST

## 2020-12-01 PROCEDURE — 85610 PROTHROMBIN TIME: CPT

## 2020-12-01 PROCEDURE — 93793 PR ANTICOAGULANT MGMT FOR PT TAKING WARFARIN: ICD-10-PCS | Mod: ,,, | Performed by: PHARMACIST

## 2020-12-01 PROCEDURE — 36415 COLL VENOUS BLD VENIPUNCTURE: CPT

## 2020-12-08 ENCOUNTER — LAB VISIT (OUTPATIENT)
Dept: LAB | Facility: HOSPITAL | Age: 65
End: 2020-12-08
Attending: INTERNAL MEDICINE
Payer: MEDICAID

## 2020-12-08 ENCOUNTER — ANTI-COAG VISIT (OUTPATIENT)
Dept: CARDIOLOGY | Facility: CLINIC | Age: 65
End: 2020-12-08
Payer: MEDICAID

## 2020-12-08 DIAGNOSIS — R76.0 LUPUS ANTICOAGULANT POSITIVE: ICD-10-CM

## 2020-12-08 DIAGNOSIS — I82.401 DVT, LOWER EXTREMITY, RECURRENT, RIGHT: ICD-10-CM

## 2020-12-08 DIAGNOSIS — Z86.718 HISTORY OF DEEP VEIN THROMBOSIS (DVT) OF LOWER EXTREMITY: ICD-10-CM

## 2020-12-08 DIAGNOSIS — D68.52 PROTHROMBIN GENE MUTATION: ICD-10-CM

## 2020-12-08 LAB
INR PPP: 1.6 (ref 0.8–1.2)
PROTHROMBIN TIME: 17.4 SEC (ref 9–12.5)

## 2020-12-08 PROCEDURE — 85610 PROTHROMBIN TIME: CPT

## 2020-12-08 PROCEDURE — 36415 COLL VENOUS BLD VENIPUNCTURE: CPT

## 2020-12-08 PROCEDURE — 93793 ANTICOAG MGMT PT WARFARIN: CPT | Mod: ,,, | Performed by: PHARMACIST

## 2020-12-08 PROCEDURE — 93793 PR ANTICOAGULANT MGMT FOR PT TAKING WARFARIN: ICD-10-PCS | Mod: ,,, | Performed by: PHARMACIST

## 2020-12-17 ENCOUNTER — ANTI-COAG VISIT (OUTPATIENT)
Dept: CARDIOLOGY | Facility: CLINIC | Age: 65
End: 2020-12-17
Payer: MEDICAID

## 2020-12-17 ENCOUNTER — LAB VISIT (OUTPATIENT)
Dept: LAB | Facility: HOSPITAL | Age: 65
End: 2020-12-17
Attending: INTERNAL MEDICINE
Payer: MEDICAID

## 2020-12-17 DIAGNOSIS — Z86.718 HISTORY OF DEEP VEIN THROMBOSIS (DVT) OF LOWER EXTREMITY: ICD-10-CM

## 2020-12-17 DIAGNOSIS — R76.0 LUPUS ANTICOAGULANT POSITIVE: ICD-10-CM

## 2020-12-17 DIAGNOSIS — D68.52 PROTHROMBIN GENE MUTATION: ICD-10-CM

## 2020-12-17 DIAGNOSIS — I82.401 DVT, LOWER EXTREMITY, RECURRENT, RIGHT: ICD-10-CM

## 2020-12-17 LAB
INR PPP: 1 (ref 0.8–1.2)
PROTHROMBIN TIME: 11 SEC (ref 9–12.5)

## 2020-12-17 PROCEDURE — 93793 ANTICOAG MGMT PT WARFARIN: CPT | Mod: ,,, | Performed by: PHARMACIST

## 2020-12-17 PROCEDURE — 36415 COLL VENOUS BLD VENIPUNCTURE: CPT

## 2020-12-17 PROCEDURE — 93793 PR ANTICOAGULANT MGMT FOR PT TAKING WARFARIN: ICD-10-PCS | Mod: ,,, | Performed by: PHARMACIST

## 2020-12-17 PROCEDURE — 85610 PROTHROMBIN TIME: CPT

## 2020-12-17 NOTE — PROGRESS NOTES
"Patient stated that he missed 12/13 dose of coumadin, he also reports swelling in knees, and could not verify dose of coumadin. I suspect more than one missed dose of coumadin. "This note will not be shared with the patient."    "

## 2020-12-17 NOTE — PROGRESS NOTES
Patient called and he was advised of coumadin instructions and redraw date, verbalized understanding, appointment booked

## 2020-12-28 ENCOUNTER — ANTI-COAG VISIT (OUTPATIENT)
Dept: CARDIOLOGY | Facility: CLINIC | Age: 65
End: 2020-12-28
Payer: MEDICAID

## 2020-12-28 ENCOUNTER — LAB VISIT (OUTPATIENT)
Dept: LAB | Facility: HOSPITAL | Age: 65
End: 2020-12-28
Attending: INTERNAL MEDICINE
Payer: MEDICAID

## 2020-12-28 DIAGNOSIS — Z86.718 HISTORY OF DEEP VEIN THROMBOSIS (DVT) OF LOWER EXTREMITY: ICD-10-CM

## 2020-12-28 DIAGNOSIS — R76.0 LUPUS ANTICOAGULANT POSITIVE: ICD-10-CM

## 2020-12-28 DIAGNOSIS — D68.52 PROTHROMBIN GENE MUTATION: ICD-10-CM

## 2020-12-28 DIAGNOSIS — I82.401 DVT, LOWER EXTREMITY, RECURRENT, RIGHT: ICD-10-CM

## 2020-12-28 LAB
INR PPP: 1.2 (ref 0.8–1.2)
PROTHROMBIN TIME: 12.9 SEC (ref 9–12.5)

## 2020-12-28 PROCEDURE — 36415 COLL VENOUS BLD VENIPUNCTURE: CPT

## 2020-12-28 PROCEDURE — 93793 PR ANTICOAGULANT MGMT FOR PT TAKING WARFARIN: ICD-10-PCS | Mod: ,,, | Performed by: PHARMACIST

## 2020-12-28 PROCEDURE — 85610 PROTHROMBIN TIME: CPT

## 2020-12-28 PROCEDURE — 93793 ANTICOAG MGMT PT WARFARIN: CPT | Mod: ,,, | Performed by: PHARMACIST

## 2020-12-30 ENCOUNTER — OFFICE VISIT (OUTPATIENT)
Dept: FAMILY MEDICINE | Facility: CLINIC | Age: 65
End: 2020-12-30
Payer: MEDICAID

## 2020-12-30 VITALS
BODY MASS INDEX: 24.57 KG/M2 | OXYGEN SATURATION: 99 % | WEIGHT: 175.5 LBS | TEMPERATURE: 97 F | HEIGHT: 71 IN | DIASTOLIC BLOOD PRESSURE: 76 MMHG | SYSTOLIC BLOOD PRESSURE: 128 MMHG | HEART RATE: 61 BPM

## 2020-12-30 DIAGNOSIS — M17.0 OSTEOARTHRITIS OF BOTH KNEES, UNSPECIFIED OSTEOARTHRITIS TYPE: Primary | ICD-10-CM

## 2020-12-30 PROBLEM — Z78.9 FAILURE OF OUTPATIENT TREATMENT: Status: RESOLVED | Noted: 2019-04-23 | Resolved: 2020-12-30

## 2020-12-30 PROCEDURE — 99215 OFFICE O/P EST HI 40 MIN: CPT | Mod: PBBFAC,PN | Performed by: NURSE PRACTITIONER

## 2020-12-30 PROCEDURE — 99213 OFFICE O/P EST LOW 20 MIN: CPT | Mod: S$PBB,,, | Performed by: NURSE PRACTITIONER

## 2020-12-30 PROCEDURE — 99999 PR PBB SHADOW E&M-EST. PATIENT-LVL V: ICD-10-PCS | Mod: PBBFAC,,, | Performed by: NURSE PRACTITIONER

## 2020-12-30 PROCEDURE — 99999 PR PBB SHADOW E&M-EST. PATIENT-LVL V: CPT | Mod: PBBFAC,,, | Performed by: NURSE PRACTITIONER

## 2020-12-30 PROCEDURE — 99213 PR OFFICE/OUTPT VISIT, EST, LEVL III, 20-29 MIN: ICD-10-PCS | Mod: S$PBB,,, | Performed by: NURSE PRACTITIONER

## 2020-12-30 NOTE — PROGRESS NOTES
Routine Office Visit    Patient Name: Carlos A Ruff    : 1955  MRN: [id number]    Chief Complaint:  Bilateral knee pain    Subjective:  Carlos A is a 65 y.o. male who presents today for:    1.  Bilateral knee pain - patient with a history of hypertension, DVT, CAD, arthritis of bilateral knees reports today for evaluation of bilateral knee pain.  I have seen him multiple times for this problem.  He is still having bilateral knee pain which he describes as a pressure-like pain worse throughout the day associated stiffness towards the end of the day.  He used to work at [employer] but has not worked in 2 months because of the knee pain.  He is applying for social security and after the new year plans on calling to see if he can get his old job back.  He has taken Tylenol as well as Tylenol 3 for the pain with only partial relief, however the symptoms recur.  He denies any associated numbness, tingling, or weakness of the bilateral legs.  The pain is 3/10 today and does not radiate anywhere.  I had referred him to orthopedics but he was unable to make that appointment.    Past Medical History  Past Medical History:   Diagnosis Date    Anticoagulant long-term use     Colon polyps     Coronary artery disease     Hypercholesteremia     Hypertension     MI (myocardial infarction)        Past Surgical History  Past Surgical History:   Procedure Laterality Date    blood clots      cardiac stents      COLONOSCOPY N/A 2019    Procedure: COLONOSCOPY;  Surgeon: Blanca Fenton MD;  Location: [facility] ENDO;  Service: Endoscopy;  Laterality: N/A;  RX PLAVIX/XARELTO ok to stop (7 days, 3 days) per Dr. Cordero see scan    CYSTOSCOPY WITH URODYNAMIC TESTING N/A 2019    Procedure: CYSTOSCOPY,WITH URODYNAMIC TESTING;  Surgeon: Felecia Stiles MD;  Location: [facility] OR;  Service: Urology;  Laterality: N/A;  RN PRE OP 19       Family History  No family history on file.    Social History  Social History     Socioeconomic  History    Marital status: Single     Spouse name: Not on file    Number of children: Not on file    Years of education: Not on file    Highest education level: Not on file   Occupational History    Not on file   Social Needs    Financial resource strain: Not on file    Food insecurity     Worry: Not on file     Inability: Not on file    Transportation needs     Medical: Not on file     Non-medical: Not on file   Tobacco Use    Smoking status: Current Some Day Smoker     Packs/day: 0.25     Types: Cigarettes    Smokeless tobacco: Never Used   Substance and Sexual Activity    Alcohol use: Yes     Comment: occasionally     Drug use: Never    Sexual activity: Not Currently   Lifestyle    Physical activity     Days per week: Not on file     Minutes per session: Not on file    Stress: Not on file   Relationships    Social connections     Talks on phone: Not on file     Gets together: Not on file     Attends Synagogue service: Not on file     Active member of club or organization: Not on file     Attends meetings of clubs or organizations: Not on file     Relationship status: Not on file   Other Topics Concern    Not on file   Social History Narrative    Not on file       Current Medications  Current Outpatient Medications on File Prior to Visit   Medication Sig Dispense Refill    acetaminophen (TYLENOL) 325 MG tablet Take 2 tablets (650 mg total) by mouth every 6 (six) hours as needed. 13 tablet 0    atorvastatin (LIPITOR) 40 MG tablet Take 1 tablet (40 mg total) by mouth once daily. 30 tablet 5    ergocalciferol (VITAMIN D2) 50,000 unit Cap Take 50,000 Units by mouth every 7 days.      isosorbide mononitrate (IMDUR) 30 MG 24 hr tablet TAKE 1 TABLET BY MOUTH ONCE DAILY 30 tablet 11    losartan-hydrochlorothiazide 50-12.5 mg (HYZAAR) 50-12.5 mg per tablet Take 1 tablet by mouth once daily. 90 tablet 3    nitroGLYCERIN (NITROSTAT) 0.4 MG SL tablet Place 0.4 mg under the tongue every 5 (five) minutes  "as needed for Chest pain.      nitroGLYCERIN (NITROSTAT) 0.4 MG SL tablet Place 0.4 mg under the tongue.      warfarin (COUMADIN) 10 MG tablet Take 1 to 1.5 tablets by mouth daily as directed by coumadin clinic. 50 tablet 11    finasteride (PROSCAR) 5 mg tablet Take 1 tablet (5 mg total) by mouth once daily. 30 tablet 11    tamsulosin (FLOMAX) 0.4 mg Cap Take 1 capsule (0.4 mg total) by mouth once daily. 30 capsule 11     No current facility-administered medications on file prior to visit.        Allergies   Review of patient's allergies indicates:  No Known Allergies    Review of Systems (Pertinent positives)  Review of Systems   Constitutional: Negative.    HENT: Negative.    Eyes: Negative.    Respiratory: Negative.    Cardiovascular: Negative.    Gastrointestinal: Negative.    Genitourinary: Negative.    Musculoskeletal: Positive for joint pain (Bilateral knees). Negative for back pain, falls, myalgias and neck pain.   Skin: Negative.    Neurological: Negative.    Endo/Heme/Allergies: Negative.    Psychiatric/Behavioral: Negative.        /76 (BP Location: Right arm, Patient Position: Sitting, BP Method: Medium (Manual))   Pulse 61   Temp 97 °F (36.1 °C) (Oral)   Ht 5' 11" (1.803 m)   Wt 79.6 kg (175 lb 7.8 oz)   SpO2 99%   BMI 24.48 kg/m²     Physical Exam  Constitutional:       General: He is not in acute distress.     Appearance: Normal appearance. He is not ill-appearing, toxic-appearing or diaphoretic.   HENT:      Head: Normocephalic and atraumatic.   Cardiovascular:      Rate and Rhythm: Normal rate and regular rhythm.      Pulses: Normal pulses.      Heart sounds: Normal heart sounds. No murmur. No friction rub. No gallop.    Pulmonary:      Effort: Pulmonary effort is normal. No respiratory distress.      Breath sounds: Normal breath sounds. No stridor. No wheezing, rhonchi or rales.   Chest:      Chest wall: No tenderness.   Musculoskeletal: Normal range of motion.         General: No " swelling or tenderness.      Right knee: Normal. He exhibits normal range of motion, no swelling, no effusion and no deformity. No tenderness found.      Left knee: Normal. He exhibits normal range of motion, no swelling, no effusion and no deformity. No tenderness found.   Skin:     General: Skin is warm.      Capillary Refill: Capillary refill takes less than 2 seconds.   Neurological:      General: No focal deficit present.      Mental Status: He is alert and oriented to person, place, and time.   Psychiatric:         Mood and Affect: Mood normal.         Behavior: Behavior normal.          Assessment/Plan:  Carlos A Ruff is a 65 y.o. male who presents today for :    Carlos A was seen today for knee pain.    Diagnoses and all orders for this visit:    Osteoarthritis of both knees, unspecified osteoarthritis type  -     Ambulatory referral/consult to Orthopedics; Future  -     Ambulatory referral/consult to Physical/Occupational Therapy; Future    Stressed to patient the importance of orthopedic follow up.   Will avoid further prescriptions with narcotics.  Recommended Tylenol for symptoms.  Also recommended physical therapy which the patient is amenable to.  All questions answered.  Patient verbalized understanding.        This office note has been dictated.  This dictation has been generated using M-Modal Fluency Direct dictation; some phonetic errors may occur.   My collaborating physician is Dr. Mark Thornton.

## 2021-01-05 ENCOUNTER — LAB VISIT (OUTPATIENT)
Dept: LAB | Facility: HOSPITAL | Age: 66
End: 2021-01-05
Attending: INTERNAL MEDICINE
Payer: MEDICAID

## 2021-01-05 ENCOUNTER — ANTI-COAG VISIT (OUTPATIENT)
Dept: CARDIOLOGY | Facility: CLINIC | Age: 66
End: 2021-01-05
Payer: MEDICAID

## 2021-01-05 DIAGNOSIS — D68.52 PROTHROMBIN GENE MUTATION: ICD-10-CM

## 2021-01-05 DIAGNOSIS — R76.0 LUPUS ANTICOAGULANT POSITIVE: ICD-10-CM

## 2021-01-05 DIAGNOSIS — Z86.718 HISTORY OF DEEP VEIN THROMBOSIS (DVT) OF LOWER EXTREMITY: ICD-10-CM

## 2021-01-05 DIAGNOSIS — I82.401 DVT, LOWER EXTREMITY, RECURRENT, RIGHT: ICD-10-CM

## 2021-01-05 LAB
INR PPP: 2.5 (ref 0.8–1.2)
PROTHROMBIN TIME: 27.3 SEC (ref 9–12.5)

## 2021-01-05 PROCEDURE — 93793 ANTICOAG MGMT PT WARFARIN: CPT | Mod: ,,, | Performed by: PHARMACIST

## 2021-01-05 PROCEDURE — 36415 COLL VENOUS BLD VENIPUNCTURE: CPT

## 2021-01-05 PROCEDURE — 93793 PR ANTICOAGULANT MGMT FOR PT TAKING WARFARIN: ICD-10-PCS | Mod: ,,, | Performed by: PHARMACIST

## 2021-01-05 PROCEDURE — 85610 PROTHROMBIN TIME: CPT

## 2021-01-13 ENCOUNTER — LAB VISIT (OUTPATIENT)
Dept: LAB | Facility: HOSPITAL | Age: 66
End: 2021-01-13
Attending: INTERNAL MEDICINE
Payer: MEDICAID

## 2021-01-13 ENCOUNTER — ANTI-COAG VISIT (OUTPATIENT)
Dept: CARDIOLOGY | Facility: CLINIC | Age: 66
End: 2021-01-13
Payer: MEDICAID

## 2021-01-13 DIAGNOSIS — D68.52 PROTHROMBIN GENE MUTATION: ICD-10-CM

## 2021-01-13 DIAGNOSIS — R76.0 LUPUS ANTICOAGULANT POSITIVE: ICD-10-CM

## 2021-01-13 DIAGNOSIS — I82.401 DVT, LOWER EXTREMITY, RECURRENT, RIGHT: ICD-10-CM

## 2021-01-13 DIAGNOSIS — Z86.718 HISTORY OF DEEP VEIN THROMBOSIS (DVT) OF LOWER EXTREMITY: ICD-10-CM

## 2021-01-13 LAB
INR PPP: 3.6 (ref 0.8–1.2)
PROTHROMBIN TIME: 35.9 SEC (ref 9–12.5)

## 2021-01-13 PROCEDURE — 93793 PR ANTICOAGULANT MGMT FOR PT TAKING WARFARIN: ICD-10-PCS | Mod: ,,,

## 2021-01-13 PROCEDURE — 93793 ANTICOAG MGMT PT WARFARIN: CPT | Mod: ,,,

## 2021-01-13 PROCEDURE — 85610 PROTHROMBIN TIME: CPT

## 2021-01-13 PROCEDURE — 36415 COLL VENOUS BLD VENIPUNCTURE: CPT

## 2021-01-20 ENCOUNTER — ANTI-COAG VISIT (OUTPATIENT)
Dept: CARDIOLOGY | Facility: CLINIC | Age: 66
End: 2021-01-20
Payer: MEDICAID

## 2021-01-20 ENCOUNTER — LAB VISIT (OUTPATIENT)
Dept: LAB | Facility: HOSPITAL | Age: 66
End: 2021-01-20
Attending: INTERNAL MEDICINE
Payer: MEDICAID

## 2021-01-20 DIAGNOSIS — R76.0 LUPUS ANTICOAGULANT POSITIVE: ICD-10-CM

## 2021-01-20 DIAGNOSIS — D68.52 PROTHROMBIN GENE MUTATION: ICD-10-CM

## 2021-01-20 DIAGNOSIS — Z86.718 HISTORY OF DEEP VEIN THROMBOSIS (DVT) OF LOWER EXTREMITY: ICD-10-CM

## 2021-01-20 DIAGNOSIS — I82.401 DVT, LOWER EXTREMITY, RECURRENT, RIGHT: ICD-10-CM

## 2021-01-20 LAB
INR PPP: 2.9 (ref 0.8–1.2)
PROTHROMBIN TIME: 29.4 SEC (ref 9–12.5)

## 2021-01-20 PROCEDURE — 36415 COLL VENOUS BLD VENIPUNCTURE: CPT

## 2021-01-20 PROCEDURE — 85610 PROTHROMBIN TIME: CPT

## 2021-01-20 PROCEDURE — 93793 PR ANTICOAGULANT MGMT FOR PT TAKING WARFARIN: ICD-10-PCS | Mod: ,,, | Performed by: PHARMACIST

## 2021-01-20 PROCEDURE — 93793 ANTICOAG MGMT PT WARFARIN: CPT | Mod: ,,, | Performed by: PHARMACIST

## 2021-01-28 ENCOUNTER — HOSPITAL ENCOUNTER (EMERGENCY)
Facility: HOSPITAL | Age: 66
Discharge: HOME | End: 2021-01-28
Attending: EMERGENCY MEDICINE
Payer: MEDICAID

## 2021-01-28 VITALS
DIASTOLIC BLOOD PRESSURE: 84 MMHG | BODY MASS INDEX: 24.08 KG/M2 | WEIGHT: 172 LBS | HEART RATE: 67 BPM | HEIGHT: 71 IN | RESPIRATION RATE: 18 BRPM | SYSTOLIC BLOOD PRESSURE: 149 MMHG | TEMPERATURE: 98 F | OXYGEN SATURATION: 100 %

## 2021-01-28 DIAGNOSIS — R00.2 PALPITATIONS: ICD-10-CM

## 2021-01-28 PROCEDURE — 93010 ELECTROCARDIOGRAM REPORT: CPT | Mod: ,,, | Performed by: INTERNAL MEDICINE

## 2021-01-28 PROCEDURE — 93005 ELECTROCARDIOGRAM TRACING: CPT

## 2021-01-28 PROCEDURE — 99283 EMERGENCY DEPT VISIT LOW MDM: CPT | Mod: 25

## 2021-01-28 PROCEDURE — 93010 EKG 12-LEAD: ICD-10-PCS | Mod: ,,, | Performed by: INTERNAL MEDICINE

## 2021-01-30 ENCOUNTER — HOSPITAL ENCOUNTER (EMERGENCY)
Facility: HOSPITAL | Age: 66
Discharge: HOME | End: 2021-01-30
Attending: EMERGENCY MEDICINE
Payer: MEDICAID

## 2021-01-30 VITALS
WEIGHT: 178 LBS | OXYGEN SATURATION: 100 % | BODY MASS INDEX: 24.92 KG/M2 | TEMPERATURE: 99 F | HEART RATE: 56 BPM | DIASTOLIC BLOOD PRESSURE: 91 MMHG | RESPIRATION RATE: 18 BRPM | HEIGHT: 71 IN | SYSTOLIC BLOOD PRESSURE: 166 MMHG

## 2021-01-30 DIAGNOSIS — R79.1 SUPRATHERAPEUTIC INTERNATIONAL NORMALIZED RATIO (INR): ICD-10-CM

## 2021-01-30 DIAGNOSIS — S09.93XA FACIAL INJURY, INITIAL ENCOUNTER: Primary | ICD-10-CM
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
## 2021-01-30 LAB
ALBUMIN SERPL BCP-MCNC: 4 G/DL (ref 3.5–5.2)
ALP SERPL-CCNC: 77 U/L (ref 55–135)
ALT SERPL W/O P-5'-P-CCNC: 18 U/L (ref 10–44)
ANION GAP SERPL CALC-SCNC: 15 MMOL/L (ref 8–16)
AST SERPL-CCNC: 33 U/L (ref 10–40)
BASOPHILS # BLD AUTO: 0.1 K/UL (ref 0–0.2)
BASOPHILS NFR BLD: 1.2 % (ref 0–1.9)
BILIRUB SERPL-MCNC: 1 MG/DL (ref 0.1–1)
BUN SERPL-MCNC: 10 MG/DL (ref 8–23)
CALCIUM SERPL-MCNC: 8.5 MG/DL (ref 8.7–10.5)
CHLORIDE SERPL-SCNC: 106 MMOL/L (ref 95–110)
CO2 SERPL-SCNC: 20 MMOL/L (ref 23–29)
CREAT SERPL-MCNC: 0.9 MG/DL (ref 0.5–1.4)
CTP QC/QA: YES
DIFFERENTIAL METHOD: ABNORMAL
EOSINOPHIL # BLD AUTO: 0.2 K/UL (ref 0–0.5)
EOSINOPHIL NFR BLD: 2.6 % (ref 0–8)
ERYTHROCYTE [DISTWIDTH] IN BLOOD BY AUTOMATED COUNT: 12.7 % (ref 11.5–14.5)
EST. GFR  (AFRICAN AMERICAN): >60 ML/MIN/1.73 M^2
EST. GFR  (NON AFRICAN AMERICAN): >60 ML/MIN/1.73 M^2
FECAL OCCULT BLOOD, POC: POSITIVE
GLUCOSE SERPL-MCNC: 93 MG/DL (ref 70–110)
HCT VFR BLD AUTO: 41.8 % (ref 40–54)
HGB BLD-MCNC: 14.2 G/DL (ref 14–18)
IMM GRANULOCYTES # BLD AUTO: 0.03 K/UL (ref 0–0.04)
IMM GRANULOCYTES NFR BLD AUTO: 0.4 % (ref 0–0.5)
INR PPP: 6.2 (ref 0.8–1.2)
LYMPHOCYTES # BLD AUTO: 2 K/UL (ref 1–4.8)
LYMPHOCYTES NFR BLD: 24 % (ref 18–48)
MCH RBC QN AUTO: 31.6 PG (ref 27–31)
MCHC RBC AUTO-ENTMCNC: 34 G/DL (ref 32–36)
MCV RBC AUTO: 93 FL (ref 82–98)
MONOCYTES # BLD AUTO: 0.6 K/UL (ref 0.3–1)
MONOCYTES NFR BLD: 7.5 % (ref 4–15)
NEUTROPHILS # BLD AUTO: 5.3 K/UL (ref 1.8–7.7)
NEUTROPHILS NFR BLD: 64.3 % (ref 38–73)
NRBC BLD-RTO: 0 /100 WBC
PLATELET # BLD AUTO: 279 K/UL (ref 150–350)
PMV BLD AUTO: 11.2 FL (ref 9.2–12.9)
POTASSIUM SERPL-SCNC: 4 MMOL/L (ref 3.5–5.1)
PROT SERPL-MCNC: 7.7 G/DL (ref 6–8.4)
PROTHROMBIN TIME: 60.4 SEC (ref 9–12.5)
RBC # BLD AUTO: 4.5 M/UL (ref 4.6–6.2)
SODIUM SERPL-SCNC: 141 MMOL/L (ref 136–145)
WBC # BLD AUTO: 8.17 K/UL (ref 3.9–12.7)

## 2021-01-30 PROCEDURE — 80053 COMPREHEN METABOLIC PANEL: CPT

## 2021-01-30 PROCEDURE — 99284 EMERGENCY DEPT VISIT MOD MDM: CPT | Mod: 25

## 2021-01-30 PROCEDURE — 85610 PROTHROMBIN TIME: CPT

## 2021-01-30 PROCEDURE — 85025 COMPLETE CBC W/AUTO DIFF WBC: CPT

## 2021-01-30 PROCEDURE — 25000003 PHARM REV CODE 250: Performed by: NURSE PRACTITIONER

## 2021-01-30 RX ORDER — AMOXICILLIN AND CLAVULANATE POTASSIUM 875; 125 MG/1; MG/1
1 TABLET, FILM COATED ORAL
Status: COMPLETED | OUTPATIENT
Start: 2021-01-30 | End: 2021-01-30

## 2021-01-30 RX ORDER — AMOXICILLIN AND CLAVULANATE POTASSIUM 875; 125 MG/1; MG/1
1 TABLET, FILM COATED ORAL 2 TIMES DAILY
Qty: 14 TABLET | Refills: 0 | Status: SHIPPED | OUTPATIENT
Start: 2021-01-30 | End: 2023-01-01

## 2021-01-30 RX ORDER — HYDROCODONE BITARTRATE AND ACETAMINOPHEN 5; 325 MG/1; MG/1
1 TABLET ORAL EVERY 6 HOURS PRN
Qty: 10 TABLET | Refills: 0 | Status: SHIPPED | OUTPATIENT
Start: 2021-01-30

## 2021-01-30 RX ORDER — ACETAMINOPHEN 500 MG
1000 TABLET ORAL
Status: COMPLETED | OUTPATIENT
Start: 2021-01-30 | End: 2021-01-30

## 2021-01-30 RX ADMIN — ACETAMINOPHEN 1000 MG: 500 TABLET ORAL at 07:01

## 2021-01-30 RX ADMIN — AMOXICILLIN AND CLAVULANATE POTASSIUM 1 TABLET: 875; 125 TABLET, FILM COATED ORAL at 07:01

## 2021-02-01 ENCOUNTER — ANTI-COAG VISIT (OUTPATIENT)
Dept: CARDIOLOGY | Facility: CLINIC | Age: 66
End: 2021-02-01
Payer: MEDICAID

## 2021-02-01 ENCOUNTER — LAB VISIT (OUTPATIENT)
Dept: LAB | Facility: HOSPITAL | Age: 66
End: 2021-02-01
Attending: INTERNAL MEDICINE
Payer: MEDICAID

## 2021-02-01 DIAGNOSIS — I82.401 DVT, LOWER EXTREMITY, RECURRENT, RIGHT: Primary | ICD-10-CM

## 2021-02-01 DIAGNOSIS — D68.52 PROTHROMBIN GENE MUTATION: ICD-10-CM

## 2021-02-01 DIAGNOSIS — Z86.718 HISTORY OF DEEP VEIN THROMBOSIS (DVT) OF LOWER EXTREMITY: ICD-10-CM

## 2021-02-01 DIAGNOSIS — R76.0 LUPUS ANTICOAGULANT POSITIVE: ICD-10-CM

## 2021-02-01 DIAGNOSIS — I82.401 DVT, LOWER EXTREMITY, RECURRENT, RIGHT: ICD-10-CM

## 2021-02-01 LAB
INR PPP: 1.3 (ref 0.8–1.2)
PROTHROMBIN TIME: 13.9 SEC (ref 9–12.5)

## 2021-02-01 PROCEDURE — 36415 COLL VENOUS BLD VENIPUNCTURE: CPT

## 2021-02-01 PROCEDURE — 85610 PROTHROMBIN TIME: CPT

## 2021-02-01 PROCEDURE — 93793 ANTICOAG MGMT PT WARFARIN: CPT | Mod: ,,, | Performed by: PHARMACIST

## 2021-02-01 PROCEDURE — 93793 PR ANTICOAGULANT MGMT FOR PT TAKING WARFARIN: ICD-10-PCS | Mod: ,,, | Performed by: PHARMACIST

## 2021-02-05 ENCOUNTER — LAB VISIT (OUTPATIENT)
Dept: LAB | Facility: HOSPITAL | Age: 66
End: 2021-02-05
Attending: INTERNAL MEDICINE
Payer: MEDICAID

## 2021-02-05 ENCOUNTER — ANTI-COAG VISIT (OUTPATIENT)
Dept: CARDIOLOGY | Facility: CLINIC | Age: 66
End: 2021-02-05
Payer: MEDICAID

## 2021-02-05 DIAGNOSIS — D68.52 PROTHROMBIN GENE MUTATION: ICD-10-CM

## 2021-02-05 DIAGNOSIS — Z86.718 HISTORY OF DEEP VEIN THROMBOSIS (DVT) OF LOWER EXTREMITY: ICD-10-CM

## 2021-02-05 DIAGNOSIS — R76.0 LUPUS ANTICOAGULANT POSITIVE: ICD-10-CM

## 2021-02-05 DIAGNOSIS — I82.401 DVT, LOWER EXTREMITY, RECURRENT, RIGHT: ICD-10-CM

## 2021-02-05 DIAGNOSIS — I82.401 DVT, LOWER EXTREMITY, RECURRENT, RIGHT: Primary | ICD-10-CM

## 2021-02-05 LAB
INR PPP: 2.7 (ref 0.8–1.2)
PROTHROMBIN TIME: 27.7 SEC (ref 9–12.5)

## 2021-02-05 PROCEDURE — 85610 PROTHROMBIN TIME: CPT

## 2021-02-05 PROCEDURE — 93793 PR ANTICOAGULANT MGMT FOR PT TAKING WARFARIN: ICD-10-PCS | Mod: ,,, | Performed by: PHARMACIST

## 2021-02-05 PROCEDURE — 36415 COLL VENOUS BLD VENIPUNCTURE: CPT

## 2021-02-05 PROCEDURE — 93793 ANTICOAG MGMT PT WARFARIN: CPT | Mod: ,,, | Performed by: PHARMACIST

## 2021-02-13 NOTE — ED NOTES
Attempted to call report. Accepting nursewill call back.  
Bed: 16main  Expected date:   Expected time:   Means of arrival:   Comments:  32  
Cardiologist Dr. Bolivar reports patient is okay to be discharged with follow up.    CAT Caban  Was notified that she will no longer be receiving this patient.  
Pt advised to stay NPO at this time. He states he verbalizes understanding.   
Report received from Pham  
Tele tech aware pt. Is on [phone]  
negative...

## 2021-02-17 ENCOUNTER — LAB VISIT (OUTPATIENT)
Dept: LAB | Facility: HOSPITAL | Age: 66
End: 2021-02-17
Attending: INTERNAL MEDICINE
Payer: MEDICAID

## 2021-02-17 ENCOUNTER — ANTI-COAG VISIT (OUTPATIENT)
Dept: CARDIOLOGY | Facility: CLINIC | Age: 66
End: 2021-02-17
Payer: MEDICAID

## 2021-02-17 DIAGNOSIS — Z86.718 HISTORY OF DEEP VEIN THROMBOSIS (DVT) OF LOWER EXTREMITY: ICD-10-CM

## 2021-02-17 DIAGNOSIS — I82.401 DVT, LOWER EXTREMITY, RECURRENT, RIGHT: Primary | ICD-10-CM

## 2021-02-17 DIAGNOSIS — D68.52 PROTHROMBIN GENE MUTATION: ICD-10-CM

## 2021-02-17 DIAGNOSIS — R76.0 LUPUS ANTICOAGULANT POSITIVE: ICD-10-CM

## 2021-02-17 DIAGNOSIS — I82.401 DVT, LOWER EXTREMITY, RECURRENT, RIGHT: ICD-10-CM

## 2021-02-17 LAB
INR PPP: 1.9 (ref 0.8–1.2)
PROTHROMBIN TIME: 19.5 SEC (ref 9–12.5)

## 2021-02-17 PROCEDURE — 36415 COLL VENOUS BLD VENIPUNCTURE: CPT

## 2021-02-17 PROCEDURE — 85610 PROTHROMBIN TIME: CPT

## 2021-02-17 PROCEDURE — 93793 ANTICOAG MGMT PT WARFARIN: CPT | Mod: ,,, | Performed by: PHARMACIST

## 2021-02-17 PROCEDURE — 93793 PR ANTICOAGULANT MGMT FOR PT TAKING WARFARIN: ICD-10-PCS | Mod: ,,, | Performed by: PHARMACIST

## 2021-02-24 ENCOUNTER — LAB VISIT (OUTPATIENT)
Dept: LAB | Facility: HOSPITAL | Age: 66
End: 2021-02-24
Attending: INTERNAL MEDICINE
Payer: MEDICAID

## 2021-02-24 ENCOUNTER — ANTI-COAG VISIT (OUTPATIENT)
Dept: CARDIOLOGY | Facility: CLINIC | Age: 66
End: 2021-02-24
Payer: MEDICAID

## 2021-02-24 DIAGNOSIS — R76.0 LUPUS ANTICOAGULANT POSITIVE: ICD-10-CM

## 2021-02-24 DIAGNOSIS — I82.401 DVT, LOWER EXTREMITY, RECURRENT, RIGHT: Primary | ICD-10-CM

## 2021-02-24 DIAGNOSIS — Z86.718 HISTORY OF DEEP VEIN THROMBOSIS (DVT) OF LOWER EXTREMITY: ICD-10-CM

## 2021-02-24 DIAGNOSIS — D68.52 PROTHROMBIN GENE MUTATION: ICD-10-CM

## 2021-02-24 DIAGNOSIS — I82.401 DVT, LOWER EXTREMITY, RECURRENT, RIGHT: ICD-10-CM

## 2021-02-24 LAB
INR PPP: 2.4 (ref 0.8–1.2)
PROTHROMBIN TIME: 23.9 SEC (ref 9–12.5)

## 2021-02-24 PROCEDURE — 36415 COLL VENOUS BLD VENIPUNCTURE: CPT

## 2021-02-24 PROCEDURE — 93793 PR ANTICOAGULANT MGMT FOR PT TAKING WARFARIN: ICD-10-PCS | Mod: ,,, | Performed by: PHARMACIST

## 2021-02-24 PROCEDURE — 85610 PROTHROMBIN TIME: CPT

## 2021-02-24 PROCEDURE — 93793 ANTICOAG MGMT PT WARFARIN: CPT | Mod: ,,, | Performed by: PHARMACIST

## 2021-03-10 ENCOUNTER — LAB VISIT (OUTPATIENT)
Dept: LAB | Facility: HOSPITAL | Age: 66
End: 2021-03-10
Attending: INTERNAL MEDICINE
Payer: MEDICAID

## 2021-03-10 ENCOUNTER — ANTI-COAG VISIT (OUTPATIENT)
Dept: CARDIOLOGY | Facility: CLINIC | Age: 66
End: 2021-03-10
Payer: MEDICAID

## 2021-03-10 DIAGNOSIS — D68.52 PROTHROMBIN GENE MUTATION: ICD-10-CM

## 2021-03-10 DIAGNOSIS — I82.401 DVT, LOWER EXTREMITY, RECURRENT, RIGHT: Primary | ICD-10-CM

## 2021-03-10 DIAGNOSIS — Z86.718 HISTORY OF DEEP VEIN THROMBOSIS (DVT) OF LOWER EXTREMITY: ICD-10-CM

## 2021-03-10 DIAGNOSIS — R76.0 LUPUS ANTICOAGULANT POSITIVE: ICD-10-CM

## 2021-03-10 DIAGNOSIS — I82.401 DVT, LOWER EXTREMITY, RECURRENT, RIGHT: ICD-10-CM

## 2021-03-10 LAB
INR PPP: 1.6 (ref 0.8–1.2)
PROTHROMBIN TIME: 16.3 SEC (ref 9–12.5)

## 2021-03-10 PROCEDURE — 93793 PR ANTICOAGULANT MGMT FOR PT TAKING WARFARIN: ICD-10-PCS | Mod: ,,,

## 2021-03-10 PROCEDURE — 85610 PROTHROMBIN TIME: CPT | Performed by: INTERNAL MEDICINE

## 2021-03-10 PROCEDURE — 36415 COLL VENOUS BLD VENIPUNCTURE: CPT | Performed by: INTERNAL MEDICINE

## 2021-03-10 PROCEDURE — 93793 ANTICOAG MGMT PT WARFARIN: CPT | Mod: ,,,

## 2021-03-23 ENCOUNTER — ANTI-COAG VISIT (OUTPATIENT)
Dept: CARDIOLOGY | Facility: CLINIC | Age: 66
End: 2021-03-23
Payer: MEDICAID

## 2021-03-23 ENCOUNTER — LAB VISIT (OUTPATIENT)
Dept: LAB | Facility: HOSPITAL | Age: 66
End: 2021-03-23
Attending: INTERNAL MEDICINE
Payer: MEDICAID

## 2021-03-23 DIAGNOSIS — D68.52 PROTHROMBIN GENE MUTATION: ICD-10-CM

## 2021-03-23 DIAGNOSIS — I82.401 DVT, LOWER EXTREMITY, RECURRENT, RIGHT: Primary | ICD-10-CM

## 2021-03-23 DIAGNOSIS — Z86.718 HISTORY OF DEEP VEIN THROMBOSIS (DVT) OF LOWER EXTREMITY: ICD-10-CM

## 2021-03-23 DIAGNOSIS — R76.0 LUPUS ANTICOAGULANT POSITIVE: ICD-10-CM

## 2021-03-23 DIAGNOSIS — I82.401 DVT, LOWER EXTREMITY, RECURRENT, RIGHT: ICD-10-CM

## 2021-03-23 LAB
INR PPP: 1.1 (ref 0.8–1.2)
PROTHROMBIN TIME: 12.1 SEC (ref 9–12.5)

## 2021-03-23 PROCEDURE — 93793 PR ANTICOAGULANT MGMT FOR PT TAKING WARFARIN: ICD-10-PCS | Mod: ,,, | Performed by: PHARMACIST

## 2021-03-23 PROCEDURE — 85610 PROTHROMBIN TIME: CPT | Performed by: INTERNAL MEDICINE

## 2021-03-23 PROCEDURE — 36415 COLL VENOUS BLD VENIPUNCTURE: CPT | Performed by: INTERNAL MEDICINE

## 2021-03-23 PROCEDURE — 93793 ANTICOAG MGMT PT WARFARIN: CPT | Mod: ,,, | Performed by: PHARMACIST

## 2021-03-30 ENCOUNTER — ANTI-COAG VISIT (OUTPATIENT)
Dept: CARDIOLOGY | Facility: CLINIC | Age: 66
End: 2021-03-30
Payer: MEDICAID

## 2021-03-30 ENCOUNTER — LAB VISIT (OUTPATIENT)
Dept: LAB | Facility: HOSPITAL | Age: 66
End: 2021-03-30
Attending: INTERNAL MEDICINE
Payer: MEDICAID

## 2021-03-30 DIAGNOSIS — D68.52 PROTHROMBIN GENE MUTATION: ICD-10-CM

## 2021-03-30 DIAGNOSIS — I82.401 DVT, LOWER EXTREMITY, RECURRENT, RIGHT: Primary | ICD-10-CM

## 2021-03-30 DIAGNOSIS — R76.0 LUPUS ANTICOAGULANT POSITIVE: ICD-10-CM

## 2021-03-30 DIAGNOSIS — Z86.718 HISTORY OF DEEP VEIN THROMBOSIS (DVT) OF LOWER EXTREMITY: ICD-10-CM

## 2021-03-30 DIAGNOSIS — I82.401 DVT, LOWER EXTREMITY, RECURRENT, RIGHT: ICD-10-CM

## 2021-03-30 LAB
INR PPP: 1.5 (ref 0.8–1.2)
PROTHROMBIN TIME: 16 SEC (ref 9–12.5)

## 2021-03-30 PROCEDURE — 36415 COLL VENOUS BLD VENIPUNCTURE: CPT | Performed by: INTERNAL MEDICINE

## 2021-03-30 PROCEDURE — 93793 ANTICOAG MGMT PT WARFARIN: CPT | Mod: ,,, | Performed by: PHARMACIST

## 2021-03-30 PROCEDURE — 85610 PROTHROMBIN TIME: CPT | Performed by: INTERNAL MEDICINE

## 2021-03-30 PROCEDURE — 93793 PR ANTICOAGULANT MGMT FOR PT TAKING WARFARIN: ICD-10-PCS | Mod: ,,, | Performed by: PHARMACIST

## 2021-04-07 ENCOUNTER — LAB VISIT (OUTPATIENT)
Dept: LAB | Facility: HOSPITAL | Age: 66
End: 2021-04-07
Attending: INTERNAL MEDICINE
Payer: MEDICAID

## 2021-04-07 ENCOUNTER — ANTI-COAG VISIT (OUTPATIENT)
Dept: CARDIOLOGY | Facility: CLINIC | Age: 66
End: 2021-04-07
Payer: MEDICAID

## 2021-04-07 DIAGNOSIS — Z86.718 HISTORY OF DEEP VEIN THROMBOSIS (DVT) OF LOWER EXTREMITY: ICD-10-CM

## 2021-04-07 DIAGNOSIS — D68.52 PROTHROMBIN GENE MUTATION: ICD-10-CM

## 2021-04-07 DIAGNOSIS — I82.401 DVT, LOWER EXTREMITY, RECURRENT, RIGHT: Primary | ICD-10-CM

## 2021-04-07 DIAGNOSIS — R76.0 LUPUS ANTICOAGULANT POSITIVE: ICD-10-CM

## 2021-04-07 DIAGNOSIS — I82.401 DVT, LOWER EXTREMITY, RECURRENT, RIGHT: ICD-10-CM

## 2021-04-07 LAB
INR PPP: 1.4 (ref 0.8–1.2)
PROTHROMBIN TIME: 14.7 SEC (ref 9–12.5)

## 2021-04-07 PROCEDURE — 93793 ANTICOAG MGMT PT WARFARIN: CPT | Mod: ,,, | Performed by: PHARMACIST

## 2021-04-07 PROCEDURE — 93793 PR ANTICOAGULANT MGMT FOR PT TAKING WARFARIN: ICD-10-PCS | Mod: ,,, | Performed by: PHARMACIST

## 2021-04-07 PROCEDURE — 36415 COLL VENOUS BLD VENIPUNCTURE: CPT | Performed by: INTERNAL MEDICINE

## 2021-04-07 PROCEDURE — 85610 PROTHROMBIN TIME: CPT | Performed by: INTERNAL MEDICINE

## 2021-04-22 ENCOUNTER — ANTI-COAG VISIT (OUTPATIENT)
Dept: CARDIOLOGY | Facility: CLINIC | Age: 66
End: 2021-04-22
Payer: MEDICAID

## 2021-04-22 ENCOUNTER — LAB VISIT (OUTPATIENT)
Dept: LAB | Facility: HOSPITAL | Age: 66
End: 2021-04-22
Attending: INTERNAL MEDICINE
Payer: MEDICAID

## 2021-04-22 DIAGNOSIS — R76.0 LUPUS ANTICOAGULANT POSITIVE: ICD-10-CM

## 2021-04-22 DIAGNOSIS — D68.52 PROTHROMBIN GENE MUTATION: ICD-10-CM

## 2021-04-22 DIAGNOSIS — Z86.718 HISTORY OF DEEP VEIN THROMBOSIS (DVT) OF LOWER EXTREMITY: ICD-10-CM

## 2021-04-22 DIAGNOSIS — I82.401 DVT, LOWER EXTREMITY, RECURRENT, RIGHT: Primary | ICD-10-CM

## 2021-04-22 DIAGNOSIS — I82.401 DVT, LOWER EXTREMITY, RECURRENT, RIGHT: ICD-10-CM

## 2021-04-22 LAB
INR PPP: 1.8 (ref 0.8–1.2)
PROTHROMBIN TIME: 18.3 SEC (ref 9–12.5)

## 2021-04-22 PROCEDURE — 36415 COLL VENOUS BLD VENIPUNCTURE: CPT | Performed by: INTERNAL MEDICINE

## 2021-04-22 PROCEDURE — 85610 PROTHROMBIN TIME: CPT | Performed by: INTERNAL MEDICINE

## 2021-04-22 PROCEDURE — 93793 PR ANTICOAGULANT MGMT FOR PT TAKING WARFARIN: ICD-10-PCS | Mod: ,,, | Performed by: PHARMACIST

## 2021-04-22 PROCEDURE — 93793 ANTICOAG MGMT PT WARFARIN: CPT | Mod: ,,, | Performed by: PHARMACIST

## 2021-07-12 DIAGNOSIS — N40.1 BPH WITH OBSTRUCTION/LOWER URINARY TRACT SYMPTOMS: ICD-10-CM

## 2021-07-12 DIAGNOSIS — N13.8 BPH WITH OBSTRUCTION/LOWER URINARY TRACT SYMPTOMS: ICD-10-CM

## 2021-07-12 DIAGNOSIS — R33.9 INCOMPLETE BLADDER EMPTYING: ICD-10-CM

## 2021-07-12 RX ORDER — TAMSULOSIN HYDROCHLORIDE 0.4 MG/1
CAPSULE ORAL
Qty: 30 CAPSULE | Refills: 0 | Status: SHIPPED | OUTPATIENT
Start: 2021-07-12 | End: 2021-11-03

## 2021-07-26 ENCOUNTER — LAB VISIT (OUTPATIENT)
Dept: LAB | Facility: HOSPITAL | Age: 66
End: 2021-07-26
Attending: INTERNAL MEDICINE
Payer: MEDICAID

## 2021-07-26 ENCOUNTER — ANTI-COAG VISIT (OUTPATIENT)
Dept: CARDIOLOGY | Facility: CLINIC | Age: 66
End: 2021-07-26
Payer: MEDICAID

## 2021-07-26 DIAGNOSIS — D68.52 PROTHROMBIN GENE MUTATION: ICD-10-CM

## 2021-07-26 DIAGNOSIS — I82.401 DVT, LOWER EXTREMITY, RECURRENT, RIGHT: ICD-10-CM

## 2021-07-26 DIAGNOSIS — I82.401 DVT, LOWER EXTREMITY, RECURRENT, RIGHT: Primary | ICD-10-CM

## 2021-07-26 DIAGNOSIS — R76.0 LUPUS ANTICOAGULANT POSITIVE: ICD-10-CM

## 2021-07-26 DIAGNOSIS — Z86.718 HISTORY OF DEEP VEIN THROMBOSIS (DVT) OF LOWER EXTREMITY: ICD-10-CM

## 2021-07-26 LAB
INR PPP: 1 (ref 0.8–1.2)
PROTHROMBIN TIME: 10.5 SEC (ref 9–12.5)

## 2021-07-26 PROCEDURE — 93793 PR ANTICOAGULANT MGMT FOR PT TAKING WARFARIN: ICD-10-PCS | Mod: ,,, | Performed by: PHARMACIST

## 2021-07-26 PROCEDURE — 93793 ANTICOAG MGMT PT WARFARIN: CPT | Mod: ,,, | Performed by: PHARMACIST

## 2021-07-26 PROCEDURE — 85610 PROTHROMBIN TIME: CPT | Performed by: INTERNAL MEDICINE

## 2021-07-26 PROCEDURE — 36415 COLL VENOUS BLD VENIPUNCTURE: CPT | Performed by: INTERNAL MEDICINE

## 2021-08-03 ENCOUNTER — LAB VISIT (OUTPATIENT)
Dept: LAB | Facility: HOSPITAL | Age: 66
End: 2021-08-03
Attending: INTERNAL MEDICINE
Payer: MEDICAID

## 2021-08-03 ENCOUNTER — ANTI-COAG VISIT (OUTPATIENT)
Dept: CARDIOLOGY | Facility: CLINIC | Age: 66
End: 2021-08-03
Payer: MEDICAID

## 2021-08-03 DIAGNOSIS — I82.401 DVT, LOWER EXTREMITY, RECURRENT, RIGHT: Primary | ICD-10-CM

## 2021-08-03 DIAGNOSIS — R76.0 LUPUS ANTICOAGULANT POSITIVE: ICD-10-CM

## 2021-08-03 DIAGNOSIS — I82.401 DVT, LOWER EXTREMITY, RECURRENT, RIGHT: ICD-10-CM

## 2021-08-03 DIAGNOSIS — D68.52 PROTHROMBIN GENE MUTATION: ICD-10-CM

## 2021-08-03 DIAGNOSIS — Z86.718 HISTORY OF DEEP VEIN THROMBOSIS (DVT) OF LOWER EXTREMITY: ICD-10-CM

## 2021-08-03 LAB
INR PPP: 2.9 (ref 0.8–1.2)
PROTHROMBIN TIME: 29.3 SEC (ref 9–12.5)

## 2021-08-03 PROCEDURE — 93793 PR ANTICOAGULANT MGMT FOR PT TAKING WARFARIN: ICD-10-PCS | Mod: ,,, | Performed by: PHARMACIST

## 2021-08-03 PROCEDURE — 36415 COLL VENOUS BLD VENIPUNCTURE: CPT | Performed by: INTERNAL MEDICINE

## 2021-08-03 PROCEDURE — 85610 PROTHROMBIN TIME: CPT | Performed by: INTERNAL MEDICINE

## 2021-08-03 PROCEDURE — 93793 ANTICOAG MGMT PT WARFARIN: CPT | Mod: ,,, | Performed by: PHARMACIST

## 2021-08-10 ENCOUNTER — LAB VISIT (OUTPATIENT)
Dept: LAB | Facility: HOSPITAL | Age: 66
End: 2021-08-10
Attending: INTERNAL MEDICINE
Payer: MEDICAID

## 2021-08-10 ENCOUNTER — ANTI-COAG VISIT (OUTPATIENT)
Dept: CARDIOLOGY | Facility: CLINIC | Age: 66
End: 2021-08-10
Payer: MEDICAID

## 2021-08-10 DIAGNOSIS — D68.52 PROTHROMBIN GENE MUTATION: ICD-10-CM

## 2021-08-10 DIAGNOSIS — Z86.718 HISTORY OF DEEP VEIN THROMBOSIS (DVT) OF LOWER EXTREMITY: ICD-10-CM

## 2021-08-10 DIAGNOSIS — R76.0 LUPUS ANTICOAGULANT POSITIVE: ICD-10-CM

## 2021-08-10 DIAGNOSIS — I82.401 DVT, LOWER EXTREMITY, RECURRENT, RIGHT: ICD-10-CM

## 2021-08-10 DIAGNOSIS — I82.401 DVT, LOWER EXTREMITY, RECURRENT, RIGHT: Primary | ICD-10-CM

## 2021-08-10 LAB
INR PPP: 1.5 (ref 0.8–1.2)
PROTHROMBIN TIME: 15.7 SEC (ref 9–12.5)

## 2021-08-10 PROCEDURE — 93793 PR ANTICOAGULANT MGMT FOR PT TAKING WARFARIN: ICD-10-PCS | Mod: ,,, | Performed by: PHARMACIST

## 2021-08-10 PROCEDURE — 85610 PROTHROMBIN TIME: CPT | Performed by: INTERNAL MEDICINE

## 2021-08-10 PROCEDURE — 36415 COLL VENOUS BLD VENIPUNCTURE: CPT | Performed by: INTERNAL MEDICINE

## 2021-08-10 PROCEDURE — 93793 ANTICOAG MGMT PT WARFARIN: CPT | Mod: ,,, | Performed by: PHARMACIST

## 2021-08-16 ENCOUNTER — ANTI-COAG VISIT (OUTPATIENT)
Dept: CARDIOLOGY | Facility: CLINIC | Age: 66
End: 2021-08-16
Payer: MEDICAID

## 2021-08-16 ENCOUNTER — LAB VISIT (OUTPATIENT)
Dept: LAB | Facility: HOSPITAL | Age: 66
End: 2021-08-16
Attending: INTERNAL MEDICINE
Payer: MEDICAID

## 2021-08-16 DIAGNOSIS — I82.401 DVT, LOWER EXTREMITY, RECURRENT, RIGHT: ICD-10-CM

## 2021-08-16 DIAGNOSIS — I82.401 DVT, LOWER EXTREMITY, RECURRENT, RIGHT: Primary | ICD-10-CM

## 2021-08-16 DIAGNOSIS — D68.52 PROTHROMBIN GENE MUTATION: ICD-10-CM

## 2021-08-16 DIAGNOSIS — R76.0 LUPUS ANTICOAGULANT POSITIVE: ICD-10-CM

## 2021-08-16 DIAGNOSIS — Z86.718 HISTORY OF DEEP VEIN THROMBOSIS (DVT) OF LOWER EXTREMITY: ICD-10-CM

## 2021-08-16 LAB
INR PPP: 1.3 (ref 0.8–1.2)
PROTHROMBIN TIME: 13.4 SEC (ref 9–12.5)

## 2021-08-16 PROCEDURE — 93793 ANTICOAG MGMT PT WARFARIN: CPT | Mod: ,,, | Performed by: PHARMACIST

## 2021-08-16 PROCEDURE — 36415 COLL VENOUS BLD VENIPUNCTURE: CPT | Performed by: INTERNAL MEDICINE

## 2021-08-16 PROCEDURE — 93793 PR ANTICOAGULANT MGMT FOR PT TAKING WARFARIN: ICD-10-PCS | Mod: ,,, | Performed by: PHARMACIST

## 2021-08-16 PROCEDURE — 85610 PROTHROMBIN TIME: CPT | Performed by: INTERNAL MEDICINE

## 2021-08-23 ENCOUNTER — LAB VISIT (OUTPATIENT)
Dept: LAB | Facility: HOSPITAL | Age: 66
End: 2021-08-23
Attending: INTERNAL MEDICINE
Payer: MEDICAID

## 2021-08-23 ENCOUNTER — ANTI-COAG VISIT (OUTPATIENT)
Dept: CARDIOLOGY | Facility: CLINIC | Age: 66
End: 2021-08-23
Payer: MEDICAID

## 2021-08-23 DIAGNOSIS — Z86.718 HISTORY OF DEEP VEIN THROMBOSIS (DVT) OF LOWER EXTREMITY: ICD-10-CM

## 2021-08-23 DIAGNOSIS — D68.52 PROTHROMBIN GENE MUTATION: ICD-10-CM

## 2021-08-23 DIAGNOSIS — R76.0 LUPUS ANTICOAGULANT POSITIVE: ICD-10-CM

## 2021-08-23 DIAGNOSIS — I82.401 DVT, LOWER EXTREMITY, RECURRENT, RIGHT: ICD-10-CM

## 2021-08-23 DIAGNOSIS — I82.401 DVT, LOWER EXTREMITY, RECURRENT, RIGHT: Primary | ICD-10-CM

## 2021-08-23 LAB
INR PPP: 1.6 (ref 0.8–1.2)
PROTHROMBIN TIME: 16.6 SEC (ref 9–12.5)

## 2021-08-23 PROCEDURE — 85610 PROTHROMBIN TIME: CPT | Performed by: INTERNAL MEDICINE

## 2021-08-23 PROCEDURE — 93793 ANTICOAG MGMT PT WARFARIN: CPT | Mod: ,,, | Performed by: PHARMACIST

## 2021-08-23 PROCEDURE — 36415 COLL VENOUS BLD VENIPUNCTURE: CPT | Performed by: INTERNAL MEDICINE

## 2021-08-23 PROCEDURE — 93793 PR ANTICOAGULANT MGMT FOR PT TAKING WARFARIN: ICD-10-PCS | Mod: ,,, | Performed by: PHARMACIST

## 2021-09-17 ENCOUNTER — TELEPHONE (OUTPATIENT)
Dept: ADMINISTRATIVE | Facility: OTHER | Age: 66
End: 2021-09-17

## 2021-11-02 ENCOUNTER — ANTI-COAG VISIT (OUTPATIENT)
Dept: CARDIOLOGY | Facility: CLINIC | Age: 66
End: 2021-11-02
Payer: MEDICAID

## 2021-11-02 ENCOUNTER — LAB VISIT (OUTPATIENT)
Dept: LAB | Facility: HOSPITAL | Age: 66
End: 2021-11-02
Attending: INTERNAL MEDICINE
Payer: MEDICAID

## 2021-11-02 DIAGNOSIS — I82.401 DVT, LOWER EXTREMITY, RECURRENT, RIGHT: Primary | ICD-10-CM

## 2021-11-02 DIAGNOSIS — D68.52 PROTHROMBIN GENE MUTATION: ICD-10-CM

## 2021-11-02 DIAGNOSIS — R76.0 LUPUS ANTICOAGULANT POSITIVE: ICD-10-CM

## 2021-11-02 DIAGNOSIS — Z86.718 HISTORY OF DEEP VEIN THROMBOSIS (DVT) OF LOWER EXTREMITY: ICD-10-CM

## 2021-11-02 DIAGNOSIS — I82.401 DVT, LOWER EXTREMITY, RECURRENT, RIGHT: ICD-10-CM

## 2021-11-02 LAB
INR PPP: 1 (ref 0.8–1.2)
PROTHROMBIN TIME: 10.3 SEC (ref 9–12.5)

## 2021-11-02 PROCEDURE — 93793 PR ANTICOAGULANT MGMT FOR PT TAKING WARFARIN: ICD-10-PCS | Mod: ,,, | Performed by: PHARMACIST

## 2021-11-02 PROCEDURE — 36415 COLL VENOUS BLD VENIPUNCTURE: CPT | Performed by: INTERNAL MEDICINE

## 2021-11-02 PROCEDURE — 85610 PROTHROMBIN TIME: CPT | Performed by: INTERNAL MEDICINE

## 2021-11-02 PROCEDURE — 93793 ANTICOAG MGMT PT WARFARIN: CPT | Mod: ,,, | Performed by: PHARMACIST

## 2021-11-03 DIAGNOSIS — N13.8 BPH WITH OBSTRUCTION/LOWER URINARY TRACT SYMPTOMS: ICD-10-CM

## 2021-11-03 DIAGNOSIS — N40.1 BPH WITH OBSTRUCTION/LOWER URINARY TRACT SYMPTOMS: ICD-10-CM

## 2021-11-03 DIAGNOSIS — R33.9 INCOMPLETE BLADDER EMPTYING: ICD-10-CM

## 2021-11-03 RX ORDER — TAMSULOSIN HYDROCHLORIDE 0.4 MG/1
CAPSULE ORAL
Qty: 30 CAPSULE | Refills: 0 | Status: SHIPPED | OUTPATIENT
Start: 2021-11-03 | End: 2022-02-07

## 2021-12-21 ENCOUNTER — ANTI-COAG VISIT (OUTPATIENT)
Dept: CARDIOLOGY | Facility: CLINIC | Age: 66
End: 2021-12-21
Payer: MEDICAID

## 2021-12-21 ENCOUNTER — LAB VISIT (OUTPATIENT)
Dept: LAB | Facility: HOSPITAL | Age: 66
End: 2021-12-21
Attending: INTERNAL MEDICINE
Payer: MEDICAID

## 2021-12-21 DIAGNOSIS — R76.0 LUPUS ANTICOAGULANT POSITIVE: ICD-10-CM

## 2021-12-21 DIAGNOSIS — D68.52 PROTHROMBIN GENE MUTATION: ICD-10-CM

## 2021-12-21 DIAGNOSIS — Z86.718 HISTORY OF DEEP VEIN THROMBOSIS (DVT) OF LOWER EXTREMITY: ICD-10-CM

## 2021-12-21 DIAGNOSIS — I82.401 DVT, LOWER EXTREMITY, RECURRENT, RIGHT: Primary | ICD-10-CM

## 2021-12-21 DIAGNOSIS — I82.401 DVT, LOWER EXTREMITY, RECURRENT, RIGHT: ICD-10-CM

## 2021-12-21 LAB
INR PPP: 0.9 (ref 0.8–1.2)
PROTHROMBIN TIME: 10.2 SEC (ref 9–12.5)

## 2021-12-21 PROCEDURE — 36415 COLL VENOUS BLD VENIPUNCTURE: CPT | Performed by: INTERNAL MEDICINE

## 2021-12-21 PROCEDURE — 85610 PROTHROMBIN TIME: CPT | Performed by: INTERNAL MEDICINE

## 2021-12-21 PROCEDURE — 93793 ANTICOAG MGMT PT WARFARIN: CPT | Mod: ,,, | Performed by: PHARMACIST

## 2021-12-21 PROCEDURE — 93793 PR ANTICOAGULANT MGMT FOR PT TAKING WARFARIN: ICD-10-PCS | Mod: ,,, | Performed by: PHARMACIST

## 2022-01-01 ENCOUNTER — LAB VISIT (OUTPATIENT)
Dept: LAB | Facility: HOSPITAL | Age: 67
End: 2022-01-01
Attending: INTERNAL MEDICINE
Payer: MEDICARE

## 2022-01-01 ENCOUNTER — ANTI-COAG VISIT (OUTPATIENT)
Dept: CARDIOLOGY | Facility: CLINIC | Age: 67
End: 2022-01-01
Payer: MEDICARE

## 2022-01-01 ENCOUNTER — OFFICE VISIT (OUTPATIENT)
Dept: CARDIOLOGY | Facility: CLINIC | Age: 67
End: 2022-01-01
Payer: MEDICARE

## 2022-01-01 VITALS
BODY MASS INDEX: 25.81 KG/M2 | SYSTOLIC BLOOD PRESSURE: 159 MMHG | DIASTOLIC BLOOD PRESSURE: 88 MMHG | RESPIRATION RATE: 16 BRPM | HEART RATE: 64 BPM | OXYGEN SATURATION: 99 % | WEIGHT: 185.06 LBS

## 2022-01-01 DIAGNOSIS — D68.52 PROTHROMBIN GENE MUTATION: ICD-10-CM

## 2022-01-01 DIAGNOSIS — R76.0 LUPUS ANTICOAGULANT POSITIVE: ICD-10-CM

## 2022-01-01 DIAGNOSIS — Z86.718 HISTORY OF DEEP VEIN THROMBOSIS (DVT) OF LOWER EXTREMITY: ICD-10-CM

## 2022-01-01 DIAGNOSIS — I82.401 DVT, LOWER EXTREMITY, RECURRENT, RIGHT: ICD-10-CM

## 2022-01-01 DIAGNOSIS — I82.401 DVT, LOWER EXTREMITY, RECURRENT, RIGHT: Primary | ICD-10-CM

## 2022-01-01 DIAGNOSIS — R33.9 INCOMPLETE BLADDER EMPTYING: ICD-10-CM

## 2022-01-01 DIAGNOSIS — D68.52 PROTHROMBIN GENE MUTATION: Primary | ICD-10-CM

## 2022-01-01 DIAGNOSIS — I82.409 DVT, LOWER EXTREMITY: ICD-10-CM

## 2022-01-01 DIAGNOSIS — R07.9 CHEST PAIN, UNSPECIFIED TYPE: ICD-10-CM

## 2022-01-01 DIAGNOSIS — N40.1 BPH WITH OBSTRUCTION/LOWER URINARY TRACT SYMPTOMS: ICD-10-CM

## 2022-01-01 DIAGNOSIS — I25.10 CORONARY ARTERY DISEASE INVOLVING NATIVE CORONARY ARTERY OF NATIVE HEART WITHOUT ANGINA PECTORIS: Primary | ICD-10-CM

## 2022-01-01 DIAGNOSIS — N13.8 BPH WITH OBSTRUCTION/LOWER URINARY TRACT SYMPTOMS: ICD-10-CM

## 2022-01-01 LAB
INR PPP: 1.1 (ref 0.8–1.2)
INR PPP: 1.2 (ref 0.8–1.2)
INR PPP: 1.2 (ref 0.8–1.2)
INR PPP: 1.3 (ref 0.8–1.2)
INR PPP: 1.6 (ref 0.8–1.2)
INR PPP: 1.9 (ref 0.8–1.2)
INR PPP: 2.1 (ref 0.8–1.2)
INR PPP: 2.2 (ref 0.8–1.2)
INR PPP: 3.3 (ref 0.8–1.2)
INR PPP: 3.5 (ref 0.8–1.2)
INR PPP: 3.7 (ref 0.8–1.2)
INR PPP: 7.9 (ref 0.8–1.2)
INR PPP: >10 (ref 0.8–1.2)
PROTHROMBIN TIME: 11.9 SEC (ref 9–12.5)
PROTHROMBIN TIME: 12.4 SEC (ref 9–12.5)
PROTHROMBIN TIME: 12.6 SEC (ref 9–12.5)
PROTHROMBIN TIME: 13.4 SEC (ref 9–12.5)
PROTHROMBIN TIME: 16.6 SEC (ref 9–12.5)
PROTHROMBIN TIME: 19.5 SEC (ref 9–12.5)
PROTHROMBIN TIME: 21.7 SEC (ref 9–12.5)
PROTHROMBIN TIME: 22.9 SEC (ref 9–12.5)
PROTHROMBIN TIME: 33.5 SEC (ref 9–12.5)
PROTHROMBIN TIME: 35.3 SEC (ref 9–12.5)
PROTHROMBIN TIME: 37.3 SEC (ref 9–12.5)
PROTHROMBIN TIME: 76.2 SEC (ref 9–12.5)
PROTHROMBIN TIME: >100 SEC (ref 9–12.5)

## 2022-01-01 PROCEDURE — 36415 COLL VENOUS BLD VENIPUNCTURE: CPT | Performed by: INTERNAL MEDICINE

## 2022-01-01 PROCEDURE — 93793 ANTICOAG MGMT PT WARFARIN: CPT | Mod: S$GLB,,, | Performed by: PHARMACIST

## 2022-01-01 PROCEDURE — 93793 PR ANTICOAGULANT MGMT FOR PT TAKING WARFARIN: ICD-10-PCS | Mod: S$GLB,,, | Performed by: PHARMACIST

## 2022-01-01 PROCEDURE — 3008F BODY MASS INDEX DOCD: CPT | Mod: CPTII,S$GLB,, | Performed by: INTERNAL MEDICINE

## 2022-01-01 PROCEDURE — 85610 PROTHROMBIN TIME: CPT | Performed by: INTERNAL MEDICINE

## 2022-01-01 PROCEDURE — 3288F PR FALLS RISK ASSESSMENT DOCUMENTED: ICD-10-PCS | Mod: CPTII,S$GLB,, | Performed by: INTERNAL MEDICINE

## 2022-01-01 PROCEDURE — 99999 PR PBB SHADOW E&M-EST. PATIENT-LVL III: ICD-10-PCS | Mod: PBBFAC,,, | Performed by: INTERNAL MEDICINE

## 2022-01-01 PROCEDURE — 93793 ANTICOAG MGMT PT WARFARIN: CPT | Mod: S$GLB,,,

## 2022-01-01 PROCEDURE — 93793 PR ANTICOAGULANT MGMT FOR PT TAKING WARFARIN: ICD-10-PCS | Mod: S$GLB,,,

## 2022-01-01 PROCEDURE — 3077F PR MOST RECENT SYSTOLIC BLOOD PRESSURE >= 140 MM HG: ICD-10-PCS | Mod: CPTII,S$GLB,, | Performed by: INTERNAL MEDICINE

## 2022-01-01 PROCEDURE — 3008F PR BODY MASS INDEX (BMI) DOCUMENTED: ICD-10-PCS | Mod: CPTII,S$GLB,, | Performed by: INTERNAL MEDICINE

## 2022-01-01 PROCEDURE — 1159F PR MEDICATION LIST DOCUMENTED IN MEDICAL RECORD: ICD-10-PCS | Mod: CPTII,S$GLB,, | Performed by: INTERNAL MEDICINE

## 2022-01-01 PROCEDURE — 3079F PR MOST RECENT DIASTOLIC BLOOD PRESSURE 80-89 MM HG: ICD-10-PCS | Mod: CPTII,S$GLB,, | Performed by: INTERNAL MEDICINE

## 2022-01-01 PROCEDURE — 99499 RISK ADDL DX/OHS AUDIT: ICD-10-PCS | Mod: S$GLB,,, | Performed by: INTERNAL MEDICINE

## 2022-01-01 PROCEDURE — 1101F PT FALLS ASSESS-DOCD LE1/YR: CPT | Mod: CPTII,S$GLB,, | Performed by: INTERNAL MEDICINE

## 2022-01-01 PROCEDURE — 93000 EKG 12-LEAD: ICD-10-PCS | Mod: S$GLB,,, | Performed by: INTERNAL MEDICINE

## 2022-01-01 PROCEDURE — 3079F DIAST BP 80-89 MM HG: CPT | Mod: CPTII,S$GLB,, | Performed by: INTERNAL MEDICINE

## 2022-01-01 PROCEDURE — 99499 UNLISTED E&M SERVICE: CPT | Mod: S$GLB,,, | Performed by: INTERNAL MEDICINE

## 2022-01-01 PROCEDURE — 99214 PR OFFICE/OUTPT VISIT, EST, LEVL IV, 30-39 MIN: ICD-10-PCS | Mod: S$GLB,,, | Performed by: INTERNAL MEDICINE

## 2022-01-01 PROCEDURE — 3288F FALL RISK ASSESSMENT DOCD: CPT | Mod: CPTII,S$GLB,, | Performed by: INTERNAL MEDICINE

## 2022-01-01 PROCEDURE — 93000 ELECTROCARDIOGRAM COMPLETE: CPT | Mod: S$GLB,,, | Performed by: INTERNAL MEDICINE

## 2022-01-01 PROCEDURE — 1101F PR PT FALLS ASSESS DOC 0-1 FALLS W/OUT INJ PAST YR: ICD-10-PCS | Mod: CPTII,S$GLB,, | Performed by: INTERNAL MEDICINE

## 2022-01-01 PROCEDURE — 99999 PR PBB SHADOW E&M-EST. PATIENT-LVL III: CPT | Mod: PBBFAC,,, | Performed by: INTERNAL MEDICINE

## 2022-01-01 PROCEDURE — 3077F SYST BP >= 140 MM HG: CPT | Mod: CPTII,S$GLB,, | Performed by: INTERNAL MEDICINE

## 2022-01-01 PROCEDURE — 99214 OFFICE O/P EST MOD 30 MIN: CPT | Mod: S$GLB,,, | Performed by: INTERNAL MEDICINE

## 2022-01-01 PROCEDURE — 1159F MED LIST DOCD IN RCRD: CPT | Mod: CPTII,S$GLB,, | Performed by: INTERNAL MEDICINE

## 2022-01-01 RX ORDER — NITROGLYCERIN 0.4 MG/1
0.4 TABLET SUBLINGUAL EVERY 5 MIN PRN
Qty: 25 TABLET | Refills: 11 | Status: SHIPPED | OUTPATIENT
Start: 2022-01-01 | End: 2023-12-27

## 2022-01-01 RX ORDER — TAMSULOSIN HYDROCHLORIDE 0.4 MG/1
CAPSULE ORAL
Qty: 30 CAPSULE | Refills: 0 | Status: SHIPPED | OUTPATIENT
Start: 2022-01-01 | End: 2023-01-01 | Stop reason: SDUPTHER

## 2022-01-04 ENCOUNTER — LAB VISIT (OUTPATIENT)
Dept: LAB | Facility: HOSPITAL | Age: 67
End: 2022-01-04
Attending: INTERNAL MEDICINE
Payer: MEDICAID

## 2022-01-04 ENCOUNTER — ANTI-COAG VISIT (OUTPATIENT)
Dept: CARDIOLOGY | Facility: CLINIC | Age: 67
End: 2022-01-04
Payer: MEDICAID

## 2022-01-04 DIAGNOSIS — I82.401 DVT, LOWER EXTREMITY, RECURRENT, RIGHT: Primary | ICD-10-CM

## 2022-01-04 DIAGNOSIS — I82.401 DVT, LOWER EXTREMITY, RECURRENT, RIGHT: ICD-10-CM

## 2022-01-04 DIAGNOSIS — Z86.718 HISTORY OF DEEP VEIN THROMBOSIS (DVT) OF LOWER EXTREMITY: ICD-10-CM

## 2022-01-04 DIAGNOSIS — D68.52 PROTHROMBIN GENE MUTATION: ICD-10-CM

## 2022-01-04 DIAGNOSIS — R76.0 LUPUS ANTICOAGULANT POSITIVE: ICD-10-CM

## 2022-01-04 LAB
INR PPP: 1 (ref 0.8–1.2)
PROTHROMBIN TIME: 10.7 SEC (ref 9–12.5)

## 2022-01-04 PROCEDURE — 85610 PROTHROMBIN TIME: CPT | Performed by: INTERNAL MEDICINE

## 2022-01-04 PROCEDURE — 36415 COLL VENOUS BLD VENIPUNCTURE: CPT | Performed by: INTERNAL MEDICINE

## 2022-01-04 PROCEDURE — 93793 PR ANTICOAGULANT MGMT FOR PT TAKING WARFARIN: ICD-10-PCS | Mod: ,,, | Performed by: PHARMACIST

## 2022-01-04 PROCEDURE — 93793 ANTICOAG MGMT PT WARFARIN: CPT | Mod: ,,, | Performed by: PHARMACIST

## 2022-01-12 ENCOUNTER — LAB VISIT (OUTPATIENT)
Dept: LAB | Facility: HOSPITAL | Age: 67
End: 2022-01-12
Attending: INTERNAL MEDICINE
Payer: MEDICAID

## 2022-01-12 ENCOUNTER — ANTI-COAG VISIT (OUTPATIENT)
Dept: CARDIOLOGY | Facility: CLINIC | Age: 67
End: 2022-01-12
Payer: MEDICAID

## 2022-01-12 DIAGNOSIS — Z86.718 HISTORY OF DEEP VEIN THROMBOSIS (DVT) OF LOWER EXTREMITY: ICD-10-CM

## 2022-01-12 DIAGNOSIS — I82.401 DVT, LOWER EXTREMITY, RECURRENT, RIGHT: ICD-10-CM

## 2022-01-12 DIAGNOSIS — R76.0 LUPUS ANTICOAGULANT POSITIVE: ICD-10-CM

## 2022-01-12 DIAGNOSIS — I82.401 DVT, LOWER EXTREMITY, RECURRENT, RIGHT: Primary | ICD-10-CM

## 2022-01-12 DIAGNOSIS — D68.52 PROTHROMBIN GENE MUTATION: ICD-10-CM

## 2022-01-12 LAB
INR PPP: 1.3 (ref 0.8–1.2)
PROTHROMBIN TIME: 13.6 SEC (ref 9–12.5)

## 2022-01-12 PROCEDURE — 93793 PR ANTICOAGULANT MGMT FOR PT TAKING WARFARIN: ICD-10-PCS | Mod: ,,, | Performed by: PHARMACIST

## 2022-01-12 PROCEDURE — 93793 ANTICOAG MGMT PT WARFARIN: CPT | Mod: ,,, | Performed by: PHARMACIST

## 2022-01-12 PROCEDURE — 36415 COLL VENOUS BLD VENIPUNCTURE: CPT | Performed by: INTERNAL MEDICINE

## 2022-01-12 PROCEDURE — 85610 PROTHROMBIN TIME: CPT | Performed by: INTERNAL MEDICINE

## 2022-01-12 NOTE — PROGRESS NOTES
Patient took coumadin 15mg on Saturday instead of 20 mg. Denies any other changes. INR not at goal. Medications, chart, and patient findings reviewed. See calendar for adjustments to dose and follow up plan.

## 2022-01-18 ENCOUNTER — LAB VISIT (OUTPATIENT)
Dept: LAB | Facility: HOSPITAL | Age: 67
End: 2022-01-18
Attending: INTERNAL MEDICINE
Payer: MEDICAID

## 2022-01-18 ENCOUNTER — ANTI-COAG VISIT (OUTPATIENT)
Dept: CARDIOLOGY | Facility: CLINIC | Age: 67
End: 2022-01-18
Payer: MEDICAID

## 2022-01-18 DIAGNOSIS — Z86.718 HISTORY OF DEEP VEIN THROMBOSIS (DVT) OF LOWER EXTREMITY: ICD-10-CM

## 2022-01-18 DIAGNOSIS — R76.0 LUPUS ANTICOAGULANT POSITIVE: ICD-10-CM

## 2022-01-18 DIAGNOSIS — D68.52 PROTHROMBIN GENE MUTATION: ICD-10-CM

## 2022-01-18 DIAGNOSIS — I82.401 DVT, LOWER EXTREMITY, RECURRENT, RIGHT: ICD-10-CM

## 2022-01-18 DIAGNOSIS — I82.401 DVT, LOWER EXTREMITY, RECURRENT, RIGHT: Primary | ICD-10-CM

## 2022-01-18 LAB
INR PPP: 4.2 (ref 0.8–1.2)
PROTHROMBIN TIME: 42.1 SEC (ref 9–12.5)

## 2022-01-18 PROCEDURE — 93793 ANTICOAG MGMT PT WARFARIN: CPT | Mod: ,,,

## 2022-01-18 PROCEDURE — 36415 COLL VENOUS BLD VENIPUNCTURE: CPT | Performed by: INTERNAL MEDICINE

## 2022-01-18 PROCEDURE — 85610 PROTHROMBIN TIME: CPT | Performed by: INTERNAL MEDICINE

## 2022-01-18 PROCEDURE — 93793 PR ANTICOAGULANT MGMT FOR PT TAKING WARFARIN: ICD-10-PCS | Mod: ,,,

## 2022-01-26 ENCOUNTER — LAB VISIT (OUTPATIENT)
Dept: LAB | Facility: HOSPITAL | Age: 67
End: 2022-01-26
Attending: INTERNAL MEDICINE
Payer: MEDICAID

## 2022-01-26 ENCOUNTER — ANTI-COAG VISIT (OUTPATIENT)
Dept: CARDIOLOGY | Facility: CLINIC | Age: 67
End: 2022-01-26
Payer: MEDICAID

## 2022-01-26 DIAGNOSIS — R76.0 LUPUS ANTICOAGULANT POSITIVE: ICD-10-CM

## 2022-01-26 DIAGNOSIS — Z86.718 HISTORY OF DEEP VEIN THROMBOSIS (DVT) OF LOWER EXTREMITY: ICD-10-CM

## 2022-01-26 DIAGNOSIS — D68.52 PROTHROMBIN GENE MUTATION: ICD-10-CM

## 2022-01-26 DIAGNOSIS — I82.401 DVT, LOWER EXTREMITY, RECURRENT, RIGHT: Primary | ICD-10-CM

## 2022-01-26 DIAGNOSIS — I82.401 DVT, LOWER EXTREMITY, RECURRENT, RIGHT: ICD-10-CM

## 2022-01-26 LAB
INR PPP: 1.6 (ref 0.8–1.2)
PROTHROMBIN TIME: 17.1 SEC (ref 9–12.5)

## 2022-01-26 PROCEDURE — 85610 PROTHROMBIN TIME: CPT | Performed by: INTERNAL MEDICINE

## 2022-01-26 PROCEDURE — 36415 COLL VENOUS BLD VENIPUNCTURE: CPT | Performed by: INTERNAL MEDICINE

## 2022-01-26 PROCEDURE — 93793 ANTICOAG MGMT PT WARFARIN: CPT | Mod: ,,, | Performed by: PHARMACIST

## 2022-01-26 PROCEDURE — 93793 PR ANTICOAGULANT MGMT FOR PT TAKING WARFARIN: ICD-10-PCS | Mod: ,,, | Performed by: PHARMACIST

## 2022-02-01 ENCOUNTER — ANTI-COAG VISIT (OUTPATIENT)
Dept: CARDIOLOGY | Facility: CLINIC | Age: 67
End: 2022-02-01
Payer: MEDICAID

## 2022-02-01 ENCOUNTER — LAB VISIT (OUTPATIENT)
Dept: LAB | Facility: HOSPITAL | Age: 67
End: 2022-02-01
Attending: INTERNAL MEDICINE
Payer: MEDICAID

## 2022-02-01 DIAGNOSIS — Z86.718 HISTORY OF DEEP VEIN THROMBOSIS (DVT) OF LOWER EXTREMITY: ICD-10-CM

## 2022-02-01 DIAGNOSIS — I82.401 DVT, LOWER EXTREMITY, RECURRENT, RIGHT: Primary | ICD-10-CM

## 2022-02-01 DIAGNOSIS — I82.401 DVT, LOWER EXTREMITY, RECURRENT, RIGHT: ICD-10-CM

## 2022-02-01 DIAGNOSIS — D68.52 PROTHROMBIN GENE MUTATION: ICD-10-CM

## 2022-02-01 DIAGNOSIS — R76.0 LUPUS ANTICOAGULANT POSITIVE: ICD-10-CM

## 2022-02-01 LAB
INR PPP: 3.9 (ref 0.8–1.2)
PROTHROMBIN TIME: 38.9 SEC (ref 9–12.5)

## 2022-02-01 PROCEDURE — 93793 PR ANTICOAGULANT MGMT FOR PT TAKING WARFARIN: ICD-10-PCS | Mod: ,,, | Performed by: PHARMACIST

## 2022-02-01 PROCEDURE — 36415 COLL VENOUS BLD VENIPUNCTURE: CPT | Performed by: INTERNAL MEDICINE

## 2022-02-01 PROCEDURE — 93793 ANTICOAG MGMT PT WARFARIN: CPT | Mod: ,,, | Performed by: PHARMACIST

## 2022-02-01 PROCEDURE — 85610 PROTHROMBIN TIME: CPT | Performed by: INTERNAL MEDICINE

## 2022-02-03 NOTE — PROGRESS NOTES
Patient denies any changes in diet, medications, or health that would effect warfarin therapy.  OF NOTE, INR is from 2 days ago. Difficult to dose as INr may be higher now 2 days later. I will aggressively decrease but will need to repeat INR Monday AM STAT

## 2022-02-07 ENCOUNTER — ANTI-COAG VISIT (OUTPATIENT)
Dept: CARDIOLOGY | Facility: CLINIC | Age: 67
End: 2022-02-07
Payer: MEDICAID

## 2022-02-07 ENCOUNTER — LAB VISIT (OUTPATIENT)
Dept: LAB | Facility: HOSPITAL | Age: 67
End: 2022-02-07
Attending: INTERNAL MEDICINE
Payer: MEDICAID

## 2022-02-07 DIAGNOSIS — D68.52 PROTHROMBIN GENE MUTATION: ICD-10-CM

## 2022-02-07 DIAGNOSIS — R76.0 LUPUS ANTICOAGULANT POSITIVE: ICD-10-CM

## 2022-02-07 DIAGNOSIS — Z86.718 HISTORY OF DEEP VEIN THROMBOSIS (DVT) OF LOWER EXTREMITY: ICD-10-CM

## 2022-02-07 DIAGNOSIS — N40.1 BPH WITH OBSTRUCTION/LOWER URINARY TRACT SYMPTOMS: ICD-10-CM

## 2022-02-07 DIAGNOSIS — N13.8 BPH WITH OBSTRUCTION/LOWER URINARY TRACT SYMPTOMS: ICD-10-CM

## 2022-02-07 DIAGNOSIS — R33.9 INCOMPLETE BLADDER EMPTYING: ICD-10-CM

## 2022-02-07 DIAGNOSIS — I82.401 DVT, LOWER EXTREMITY, RECURRENT, RIGHT: ICD-10-CM

## 2022-02-07 DIAGNOSIS — I82.401 DVT, LOWER EXTREMITY, RECURRENT, RIGHT: Primary | ICD-10-CM

## 2022-02-07 LAB
INR PPP: 2.3 (ref 0.8–1.2)
PROTHROMBIN TIME: 24.1 SEC (ref 9–12.5)

## 2022-02-07 PROCEDURE — 36415 COLL VENOUS BLD VENIPUNCTURE: CPT | Performed by: INTERNAL MEDICINE

## 2022-02-07 PROCEDURE — 93793 ANTICOAG MGMT PT WARFARIN: CPT | Mod: ,,, | Performed by: PHARMACIST

## 2022-02-07 PROCEDURE — 93793 PR ANTICOAGULANT MGMT FOR PT TAKING WARFARIN: ICD-10-PCS | Mod: ,,, | Performed by: PHARMACIST

## 2022-02-07 PROCEDURE — 85610 PROTHROMBIN TIME: CPT | Performed by: INTERNAL MEDICINE

## 2022-02-07 RX ORDER — TAMSULOSIN HYDROCHLORIDE 0.4 MG/1
CAPSULE ORAL
Qty: 30 CAPSULE | Refills: 0 | Status: SHIPPED | OUTPATIENT
Start: 2022-02-07 | End: 2022-04-27

## 2022-02-07 NOTE — PROGRESS NOTES
Patient reports holding coumadin 2/3 then 20mg daily since. Patient was re-educated on situations that would require placing a call to the coumadin clinic, including bleeding or unusual bruising issues, changes in health, diet or medications, upcoming procedures that require warfarin interruption, and missed coumadin dose(s). Patient expressed understanding that avoidance of consistency with these parameters could cause fluctuations in INR, leading to more frequent visits and increase risk of adverse events.

## 2022-02-11 ENCOUNTER — LAB VISIT (OUTPATIENT)
Dept: LAB | Facility: HOSPITAL | Age: 67
End: 2022-02-11
Attending: INTERNAL MEDICINE
Payer: MEDICAID

## 2022-02-11 ENCOUNTER — ANTI-COAG VISIT (OUTPATIENT)
Dept: CARDIOLOGY | Facility: CLINIC | Age: 67
End: 2022-02-11
Payer: MEDICAID

## 2022-02-11 DIAGNOSIS — Z86.718 HISTORY OF DEEP VEIN THROMBOSIS (DVT) OF LOWER EXTREMITY: ICD-10-CM

## 2022-02-11 DIAGNOSIS — D68.52 PROTHROMBIN GENE MUTATION: ICD-10-CM

## 2022-02-11 DIAGNOSIS — R76.0 LUPUS ANTICOAGULANT POSITIVE: ICD-10-CM

## 2022-02-11 DIAGNOSIS — I82.401 DVT, LOWER EXTREMITY, RECURRENT, RIGHT: Primary | ICD-10-CM

## 2022-02-11 DIAGNOSIS — I82.401 DVT, LOWER EXTREMITY, RECURRENT, RIGHT: ICD-10-CM

## 2022-02-11 LAB
INR PPP: 1.2 (ref 0.8–1.2)
PROTHROMBIN TIME: 12.4 SEC (ref 9–12.5)

## 2022-02-11 PROCEDURE — 36415 COLL VENOUS BLD VENIPUNCTURE: CPT | Performed by: INTERNAL MEDICINE

## 2022-02-11 PROCEDURE — 93793 PR ANTICOAGULANT MGMT FOR PT TAKING WARFARIN: ICD-10-PCS | Mod: ,,,

## 2022-02-11 PROCEDURE — 85610 PROTHROMBIN TIME: CPT | Performed by: INTERNAL MEDICINE

## 2022-02-11 PROCEDURE — 93793 ANTICOAG MGMT PT WARFARIN: CPT | Mod: ,,,

## 2022-02-15 ENCOUNTER — ANTI-COAG VISIT (OUTPATIENT)
Dept: CARDIOLOGY | Facility: CLINIC | Age: 67
End: 2022-02-15
Payer: MEDICAID

## 2022-02-15 ENCOUNTER — LAB VISIT (OUTPATIENT)
Dept: LAB | Facility: HOSPITAL | Age: 67
End: 2022-02-15
Attending: INTERNAL MEDICINE
Payer: MEDICAID

## 2022-02-15 DIAGNOSIS — R76.0 LUPUS ANTICOAGULANT POSITIVE: ICD-10-CM

## 2022-02-15 DIAGNOSIS — Z86.718 HISTORY OF DEEP VEIN THROMBOSIS (DVT) OF LOWER EXTREMITY: ICD-10-CM

## 2022-02-15 DIAGNOSIS — D68.52 PROTHROMBIN GENE MUTATION: ICD-10-CM

## 2022-02-15 DIAGNOSIS — I82.401 DVT, LOWER EXTREMITY, RECURRENT, RIGHT: ICD-10-CM

## 2022-02-15 DIAGNOSIS — I82.401 DVT, LOWER EXTREMITY, RECURRENT, RIGHT: Primary | ICD-10-CM

## 2022-02-15 LAB
INR PPP: 2.9 (ref 0.8–1.2)
PROTHROMBIN TIME: 29.3 SEC (ref 9–12.5)

## 2022-02-15 PROCEDURE — 93793 ANTICOAG MGMT PT WARFARIN: CPT | Mod: S$PBB,,, | Performed by: PHARMACIST

## 2022-02-15 PROCEDURE — 93793 PR ANTICOAGULANT MGMT FOR PT TAKING WARFARIN: ICD-10-PCS | Mod: S$PBB,,, | Performed by: PHARMACIST

## 2022-02-15 PROCEDURE — 85610 PROTHROMBIN TIME: CPT | Performed by: INTERNAL MEDICINE

## 2022-02-15 PROCEDURE — 36415 COLL VENOUS BLD VENIPUNCTURE: CPT | Performed by: INTERNAL MEDICINE

## 2022-02-21 ENCOUNTER — LAB VISIT (OUTPATIENT)
Dept: LAB | Facility: HOSPITAL | Age: 67
End: 2022-02-21
Attending: INTERNAL MEDICINE
Payer: MEDICAID

## 2022-02-21 ENCOUNTER — ANTI-COAG VISIT (OUTPATIENT)
Dept: CARDIOLOGY | Facility: CLINIC | Age: 67
End: 2022-02-21
Payer: MEDICAID

## 2022-02-21 DIAGNOSIS — R76.0 LUPUS ANTICOAGULANT POSITIVE: ICD-10-CM

## 2022-02-21 DIAGNOSIS — D68.52 PROTHROMBIN GENE MUTATION: ICD-10-CM

## 2022-02-21 DIAGNOSIS — I82.401 DVT, LOWER EXTREMITY, RECURRENT, RIGHT: ICD-10-CM

## 2022-02-21 DIAGNOSIS — Z86.718 HISTORY OF DEEP VEIN THROMBOSIS (DVT) OF LOWER EXTREMITY: ICD-10-CM

## 2022-02-21 DIAGNOSIS — I82.401 DVT, LOWER EXTREMITY, RECURRENT, RIGHT: Primary | ICD-10-CM

## 2022-02-21 LAB
INR PPP: 1.6 (ref 0.8–1.2)
PROTHROMBIN TIME: 16.8 SEC (ref 9–12.5)

## 2022-02-21 PROCEDURE — 36415 COLL VENOUS BLD VENIPUNCTURE: CPT | Performed by: INTERNAL MEDICINE

## 2022-02-21 PROCEDURE — 93793 PR ANTICOAGULANT MGMT FOR PT TAKING WARFARIN: ICD-10-PCS | Mod: S$PBB,,, | Performed by: PHARMACIST

## 2022-02-21 PROCEDURE — 85610 PROTHROMBIN TIME: CPT | Performed by: INTERNAL MEDICINE

## 2022-02-21 PROCEDURE — 93793 ANTICOAG MGMT PT WARFARIN: CPT | Mod: S$PBB,,, | Performed by: PHARMACIST

## 2022-03-22 ENCOUNTER — ANTI-COAG VISIT (OUTPATIENT)
Dept: CARDIOLOGY | Facility: CLINIC | Age: 67
End: 2022-03-22
Payer: MEDICAID

## 2022-03-22 ENCOUNTER — LAB VISIT (OUTPATIENT)
Dept: LAB | Facility: HOSPITAL | Age: 67
End: 2022-03-22
Attending: INTERNAL MEDICINE
Payer: MEDICAID

## 2022-03-22 DIAGNOSIS — D68.52 PROTHROMBIN GENE MUTATION: ICD-10-CM

## 2022-03-22 DIAGNOSIS — Z86.718 HISTORY OF DEEP VEIN THROMBOSIS (DVT) OF LOWER EXTREMITY: ICD-10-CM

## 2022-03-22 DIAGNOSIS — I82.401 DVT, LOWER EXTREMITY, RECURRENT, RIGHT: Primary | ICD-10-CM

## 2022-03-22 DIAGNOSIS — R76.0 LUPUS ANTICOAGULANT POSITIVE: ICD-10-CM

## 2022-03-22 DIAGNOSIS — I82.401 DVT, LOWER EXTREMITY, RECURRENT, RIGHT: ICD-10-CM

## 2022-03-22 LAB
INR PPP: 1 (ref 0.8–1.2)
PROTHROMBIN TIME: 10.4 SEC (ref 9–12.5)

## 2022-03-22 PROCEDURE — 93793 ANTICOAG MGMT PT WARFARIN: CPT | Mod: ,,, | Performed by: PHARMACIST

## 2022-03-22 PROCEDURE — 93793 PR ANTICOAGULANT MGMT FOR PT TAKING WARFARIN: ICD-10-PCS | Mod: ,,, | Performed by: PHARMACIST

## 2022-03-22 PROCEDURE — 85610 PROTHROMBIN TIME: CPT | Performed by: INTERNAL MEDICINE

## 2022-03-22 PROCEDURE — 36415 COLL VENOUS BLD VENIPUNCTURE: CPT | Performed by: INTERNAL MEDICINE

## 2022-03-22 NOTE — PROGRESS NOTES
Pt confirms he has been taking (Warfarin 15mg daily). He has not been taking (20mg daily,except 15 SAturday)the correct dosage.

## 2022-03-28 ENCOUNTER — LAB VISIT (OUTPATIENT)
Dept: LAB | Facility: HOSPITAL | Age: 67
End: 2022-03-28
Attending: INTERNAL MEDICINE
Payer: MEDICAID

## 2022-03-28 ENCOUNTER — ANTI-COAG VISIT (OUTPATIENT)
Dept: CARDIOLOGY | Facility: CLINIC | Age: 67
End: 2022-03-28
Payer: MEDICAID

## 2022-03-28 DIAGNOSIS — I82.401 DVT, LOWER EXTREMITY, RECURRENT, RIGHT: ICD-10-CM

## 2022-03-28 DIAGNOSIS — I82.401 DVT, LOWER EXTREMITY, RECURRENT, RIGHT: Primary | ICD-10-CM

## 2022-03-28 DIAGNOSIS — R76.0 LUPUS ANTICOAGULANT POSITIVE: ICD-10-CM

## 2022-03-28 DIAGNOSIS — Z86.718 HISTORY OF DEEP VEIN THROMBOSIS (DVT) OF LOWER EXTREMITY: ICD-10-CM

## 2022-03-28 DIAGNOSIS — D68.52 PROTHROMBIN GENE MUTATION: ICD-10-CM

## 2022-03-28 LAB
INR PPP: 1.2 (ref 0.8–1.2)
PROTHROMBIN TIME: 12.8 SEC (ref 9–12.5)

## 2022-03-28 PROCEDURE — 36415 COLL VENOUS BLD VENIPUNCTURE: CPT | Performed by: INTERNAL MEDICINE

## 2022-03-28 PROCEDURE — 93793 ANTICOAG MGMT PT WARFARIN: CPT | Mod: ,,, | Performed by: PHARMACIST

## 2022-03-28 PROCEDURE — 85610 PROTHROMBIN TIME: CPT | Performed by: INTERNAL MEDICINE

## 2022-03-28 PROCEDURE — 93793 PR ANTICOAGULANT MGMT FOR PT TAKING WARFARIN: ICD-10-PCS | Mod: ,,, | Performed by: PHARMACIST

## 2022-03-28 NOTE — PROGRESS NOTES
Patient Confirmed taking warfarin 20mg SAT; 15mg all other days, which is a lower than prescribed dose  One salad recently w/ cucumbers  NO other new changes

## 2022-04-01 ENCOUNTER — ANTI-COAG VISIT (OUTPATIENT)
Dept: CARDIOLOGY | Facility: CLINIC | Age: 67
End: 2022-04-01
Payer: MEDICAID

## 2022-04-01 ENCOUNTER — LAB VISIT (OUTPATIENT)
Dept: LAB | Facility: HOSPITAL | Age: 67
End: 2022-04-01
Attending: FAMILY MEDICINE
Payer: MEDICAID

## 2022-04-01 DIAGNOSIS — I82.401 DVT, LOWER EXTREMITY, RECURRENT, RIGHT: ICD-10-CM

## 2022-04-01 DIAGNOSIS — I82.401 DVT, LOWER EXTREMITY, RECURRENT, RIGHT: Primary | ICD-10-CM

## 2022-04-01 DIAGNOSIS — D68.52 PROTHROMBIN GENE MUTATION: ICD-10-CM

## 2022-04-01 DIAGNOSIS — R76.0 LUPUS ANTICOAGULANT POSITIVE: ICD-10-CM

## 2022-04-01 DIAGNOSIS — Z86.718 HISTORY OF DEEP VEIN THROMBOSIS (DVT) OF LOWER EXTREMITY: ICD-10-CM

## 2022-04-01 LAB
INR PPP: 2.1 (ref 0.8–1.2)
PROTHROMBIN TIME: 21.4 SEC (ref 9–12.5)

## 2022-04-01 PROCEDURE — 93793 PR ANTICOAGULANT MGMT FOR PT TAKING WARFARIN: ICD-10-PCS | Mod: ,,,

## 2022-04-01 PROCEDURE — 36415 COLL VENOUS BLD VENIPUNCTURE: CPT | Performed by: INTERNAL MEDICINE

## 2022-04-01 PROCEDURE — 93793 ANTICOAG MGMT PT WARFARIN: CPT | Mod: ,,,

## 2022-04-01 PROCEDURE — 85610 PROTHROMBIN TIME: CPT | Performed by: INTERNAL MEDICINE

## 2022-04-05 ENCOUNTER — ANTI-COAG VISIT (OUTPATIENT)
Dept: CARDIOLOGY | Facility: CLINIC | Age: 67
End: 2022-04-05
Payer: MEDICAID

## 2022-04-05 ENCOUNTER — LAB VISIT (OUTPATIENT)
Dept: LAB | Facility: HOSPITAL | Age: 67
End: 2022-04-05
Attending: INTERNAL MEDICINE
Payer: MEDICAID

## 2022-04-05 DIAGNOSIS — I82.401 DVT, LOWER EXTREMITY, RECURRENT, RIGHT: Primary | ICD-10-CM

## 2022-04-05 DIAGNOSIS — Z86.718 HISTORY OF DEEP VEIN THROMBOSIS (DVT) OF LOWER EXTREMITY: ICD-10-CM

## 2022-04-05 DIAGNOSIS — D68.52 PROTHROMBIN GENE MUTATION: ICD-10-CM

## 2022-04-05 DIAGNOSIS — R76.0 LUPUS ANTICOAGULANT POSITIVE: ICD-10-CM

## 2022-04-05 DIAGNOSIS — I82.401 DVT, LOWER EXTREMITY, RECURRENT, RIGHT: ICD-10-CM

## 2022-04-05 LAB
INR PPP: 3.5 (ref 0.8–1.2)
PROTHROMBIN TIME: 35.5 SEC (ref 9–12.5)

## 2022-04-05 PROCEDURE — 36415 COLL VENOUS BLD VENIPUNCTURE: CPT | Performed by: INTERNAL MEDICINE

## 2022-04-05 PROCEDURE — 85610 PROTHROMBIN TIME: CPT | Performed by: INTERNAL MEDICINE

## 2022-04-05 PROCEDURE — 93793 PR ANTICOAGULANT MGMT FOR PT TAKING WARFARIN: ICD-10-PCS | Mod: ,,, | Performed by: PHARMACIST

## 2022-04-05 PROCEDURE — 93793 ANTICOAG MGMT PT WARFARIN: CPT | Mod: ,,, | Performed by: PHARMACIST

## 2022-04-21 NOTE — ED NOTES
Bed: 02main  Expected date:   Expected time:   Means of arrival:   Comments:  MICHELLE Gaona, RN  02/07/19 [id number]    
OBTAINING ECG IN TRIAGE ROOM.    
yes
yes

## 2022-04-26 ENCOUNTER — ANTI-COAG VISIT (OUTPATIENT)
Dept: CARDIOLOGY | Facility: CLINIC | Age: 67
End: 2022-04-26
Payer: MEDICAID

## 2022-04-26 ENCOUNTER — LAB VISIT (OUTPATIENT)
Dept: LAB | Facility: HOSPITAL | Age: 67
End: 2022-04-26
Attending: INTERNAL MEDICINE
Payer: MEDICAID

## 2022-04-26 DIAGNOSIS — Z86.718 HISTORY OF DEEP VEIN THROMBOSIS (DVT) OF LOWER EXTREMITY: ICD-10-CM

## 2022-04-26 DIAGNOSIS — D68.52 PROTHROMBIN GENE MUTATION: ICD-10-CM

## 2022-04-26 DIAGNOSIS — R76.0 LUPUS ANTICOAGULANT POSITIVE: ICD-10-CM

## 2022-04-26 DIAGNOSIS — I82.401 DVT, LOWER EXTREMITY, RECURRENT, RIGHT: Primary | ICD-10-CM

## 2022-04-26 DIAGNOSIS — I82.401 DVT, LOWER EXTREMITY, RECURRENT, RIGHT: ICD-10-CM

## 2022-04-26 LAB
INR PPP: 1.1 (ref 0.8–1.2)
PROTHROMBIN TIME: 11.4 SEC (ref 9–12.5)

## 2022-04-26 PROCEDURE — 93793 ANTICOAG MGMT PT WARFARIN: CPT | Mod: ,,, | Performed by: PHARMACIST

## 2022-04-26 PROCEDURE — 85610 PROTHROMBIN TIME: CPT | Performed by: INTERNAL MEDICINE

## 2022-04-26 PROCEDURE — 36415 COLL VENOUS BLD VENIPUNCTURE: CPT | Performed by: INTERNAL MEDICINE

## 2022-04-26 PROCEDURE — 93793 PR ANTICOAGULANT MGMT FOR PT TAKING WARFARIN: ICD-10-PCS | Mod: ,,, | Performed by: PHARMACIST

## 2022-04-26 NOTE — PROGRESS NOTES
Patient confirms taking coumadin 15mg daily, which is a lower than prescribed dose of coumadin.

## 2022-04-27 DIAGNOSIS — N40.1 BPH WITH OBSTRUCTION/LOWER URINARY TRACT SYMPTOMS: ICD-10-CM

## 2022-04-27 DIAGNOSIS — N13.8 BPH WITH OBSTRUCTION/LOWER URINARY TRACT SYMPTOMS: ICD-10-CM

## 2022-04-27 DIAGNOSIS — R33.9 INCOMPLETE BLADDER EMPTYING: ICD-10-CM

## 2022-04-27 RX ORDER — TAMSULOSIN HYDROCHLORIDE 0.4 MG/1
CAPSULE ORAL
Qty: 30 CAPSULE | Refills: 0 | Status: SHIPPED | OUTPATIENT
Start: 2022-04-27 | End: 2022-01-01

## 2022-05-02 ENCOUNTER — LAB VISIT (OUTPATIENT)
Dept: LAB | Facility: HOSPITAL | Age: 67
End: 2022-05-02
Attending: INTERNAL MEDICINE
Payer: MEDICAID

## 2022-05-02 ENCOUNTER — ANTI-COAG VISIT (OUTPATIENT)
Dept: CARDIOLOGY | Facility: CLINIC | Age: 67
End: 2022-05-02
Payer: MEDICARE

## 2022-05-02 DIAGNOSIS — I82.401 DVT, LOWER EXTREMITY, RECURRENT, RIGHT: ICD-10-CM

## 2022-05-02 DIAGNOSIS — I82.401 DVT, LOWER EXTREMITY, RECURRENT, RIGHT: Primary | ICD-10-CM

## 2022-05-02 DIAGNOSIS — R76.0 LUPUS ANTICOAGULANT POSITIVE: ICD-10-CM

## 2022-05-02 DIAGNOSIS — Z86.718 HISTORY OF DEEP VEIN THROMBOSIS (DVT) OF LOWER EXTREMITY: ICD-10-CM

## 2022-05-02 DIAGNOSIS — D68.52 PROTHROMBIN GENE MUTATION: ICD-10-CM

## 2022-05-02 LAB
INR PPP: 2.4 (ref 0.8–1.2)
PROTHROMBIN TIME: 24.6 SEC (ref 9–12.5)

## 2022-05-02 PROCEDURE — 85610 PROTHROMBIN TIME: CPT | Performed by: INTERNAL MEDICINE

## 2022-05-02 PROCEDURE — 93793 PR ANTICOAGULANT MGMT FOR PT TAKING WARFARIN: ICD-10-PCS | Mod: ,,, | Performed by: PHARMACIST

## 2022-05-02 PROCEDURE — 36415 COLL VENOUS BLD VENIPUNCTURE: CPT | Performed by: INTERNAL MEDICINE

## 2022-05-02 PROCEDURE — 93793 ANTICOAG MGMT PT WARFARIN: CPT | Mod: ,,, | Performed by: PHARMACIST

## 2022-05-09 ENCOUNTER — ANTI-COAG VISIT (OUTPATIENT)
Dept: CARDIOLOGY | Facility: CLINIC | Age: 67
End: 2022-05-09
Payer: MEDICARE

## 2022-05-09 ENCOUNTER — LAB VISIT (OUTPATIENT)
Dept: LAB | Facility: HOSPITAL | Age: 67
End: 2022-05-09
Attending: INTERNAL MEDICINE
Payer: MEDICARE

## 2022-05-09 DIAGNOSIS — R76.0 LUPUS ANTICOAGULANT POSITIVE: ICD-10-CM

## 2022-05-09 DIAGNOSIS — Z86.718 HISTORY OF DEEP VEIN THROMBOSIS (DVT) OF LOWER EXTREMITY: ICD-10-CM

## 2022-05-09 DIAGNOSIS — D68.52 PROTHROMBIN GENE MUTATION: ICD-10-CM

## 2022-05-09 DIAGNOSIS — I82.401 DVT, LOWER EXTREMITY, RECURRENT, RIGHT: Primary | ICD-10-CM

## 2022-05-09 DIAGNOSIS — I82.401 DVT, LOWER EXTREMITY, RECURRENT, RIGHT: ICD-10-CM

## 2022-05-09 LAB
INR PPP: 1.3 (ref 0.8–1.2)
PROTHROMBIN TIME: 13.7 SEC (ref 9–12.5)

## 2022-05-09 PROCEDURE — 85610 PROTHROMBIN TIME: CPT | Performed by: INTERNAL MEDICINE

## 2022-05-09 PROCEDURE — 93793 ANTICOAG MGMT PT WARFARIN: CPT | Mod: ,,, | Performed by: PHARMACIST

## 2022-05-09 PROCEDURE — 36415 COLL VENOUS BLD VENIPUNCTURE: CPT | Performed by: INTERNAL MEDICINE

## 2022-05-09 PROCEDURE — 93793 PR ANTICOAGULANT MGMT FOR PT TAKING WARFARIN: ICD-10-PCS | Mod: ,,, | Performed by: PHARMACIST

## 2022-05-13 NOTE — PROGRESS NOTES
Above INR is from 4 days ago. Patient's warfarin dose was boosted yesterday (5/12). I will not make any further adjustments due to outdated INR. Repeat INR Monday 4/16

## 2022-05-19 ENCOUNTER — ANTI-COAG VISIT (OUTPATIENT)
Dept: CARDIOLOGY | Facility: CLINIC | Age: 67
End: 2022-05-19
Payer: MEDICARE

## 2022-05-19 ENCOUNTER — LAB VISIT (OUTPATIENT)
Dept: LAB | Facility: HOSPITAL | Age: 67
End: 2022-05-19
Attending: INTERNAL MEDICINE
Payer: MEDICARE

## 2022-05-19 DIAGNOSIS — D68.52 PROTHROMBIN GENE MUTATION: ICD-10-CM

## 2022-05-19 DIAGNOSIS — R76.0 LUPUS ANTICOAGULANT POSITIVE: ICD-10-CM

## 2022-05-19 DIAGNOSIS — Z86.718 HISTORY OF DEEP VEIN THROMBOSIS (DVT) OF LOWER EXTREMITY: ICD-10-CM

## 2022-05-19 DIAGNOSIS — I82.401 DVT, LOWER EXTREMITY, RECURRENT, RIGHT: Primary | ICD-10-CM

## 2022-05-19 DIAGNOSIS — I82.401 DVT, LOWER EXTREMITY, RECURRENT, RIGHT: ICD-10-CM

## 2022-05-19 LAB
INR PPP: 3.7 (ref 0.8–1.2)
PROTHROMBIN TIME: 37.4 SEC (ref 9–12.5)

## 2022-05-19 PROCEDURE — 36415 COLL VENOUS BLD VENIPUNCTURE: CPT | Performed by: INTERNAL MEDICINE

## 2022-05-19 PROCEDURE — 85610 PROTHROMBIN TIME: CPT | Performed by: INTERNAL MEDICINE

## 2022-05-19 PROCEDURE — 93793 PR ANTICOAGULANT MGMT FOR PT TAKING WARFARIN: ICD-10-PCS | Mod: ,,, | Performed by: PHARMACIST

## 2022-05-19 PROCEDURE — 93793 ANTICOAG MGMT PT WARFARIN: CPT | Mod: ,,, | Performed by: PHARMACIST

## 2022-05-24 ENCOUNTER — ANTI-COAG VISIT (OUTPATIENT)
Dept: CARDIOLOGY | Facility: CLINIC | Age: 67
End: 2022-05-24
Payer: MEDICARE

## 2022-05-24 ENCOUNTER — LAB VISIT (OUTPATIENT)
Dept: LAB | Facility: HOSPITAL | Age: 67
End: 2022-05-24
Attending: INTERNAL MEDICINE
Payer: MEDICARE

## 2022-05-24 DIAGNOSIS — I82.401 DVT, LOWER EXTREMITY, RECURRENT, RIGHT: ICD-10-CM

## 2022-05-24 DIAGNOSIS — Z86.718 HISTORY OF DEEP VEIN THROMBOSIS (DVT) OF LOWER EXTREMITY: ICD-10-CM

## 2022-05-24 DIAGNOSIS — D68.52 PROTHROMBIN GENE MUTATION: ICD-10-CM

## 2022-05-24 DIAGNOSIS — I82.401 DVT, LOWER EXTREMITY, RECURRENT, RIGHT: Primary | ICD-10-CM

## 2022-05-24 DIAGNOSIS — R76.0 LUPUS ANTICOAGULANT POSITIVE: ICD-10-CM

## 2022-05-24 LAB
INR PPP: 2.9 (ref 0.8–1.2)
PROTHROMBIN TIME: 29.1 SEC (ref 9–12.5)

## 2022-05-24 PROCEDURE — 93793 PR ANTICOAGULANT MGMT FOR PT TAKING WARFARIN: ICD-10-PCS | Mod: ,,, | Performed by: PHARMACIST

## 2022-05-24 PROCEDURE — 85610 PROTHROMBIN TIME: CPT | Performed by: INTERNAL MEDICINE

## 2022-05-24 PROCEDURE — 36415 COLL VENOUS BLD VENIPUNCTURE: CPT | Performed by: INTERNAL MEDICINE

## 2022-05-24 PROCEDURE — 93793 ANTICOAG MGMT PT WARFARIN: CPT | Mod: ,,, | Performed by: PHARMACIST

## 2022-05-31 ENCOUNTER — LAB VISIT (OUTPATIENT)
Dept: LAB | Facility: HOSPITAL | Age: 67
End: 2022-05-31
Attending: INTERNAL MEDICINE
Payer: MEDICARE

## 2022-05-31 ENCOUNTER — ANTI-COAG VISIT (OUTPATIENT)
Dept: CARDIOLOGY | Facility: CLINIC | Age: 67
End: 2022-05-31
Payer: MEDICARE

## 2022-05-31 DIAGNOSIS — I82.401 DVT, LOWER EXTREMITY, RECURRENT, RIGHT: ICD-10-CM

## 2022-05-31 DIAGNOSIS — Z86.718 HISTORY OF DEEP VEIN THROMBOSIS (DVT) OF LOWER EXTREMITY: ICD-10-CM

## 2022-05-31 DIAGNOSIS — D68.52 PROTHROMBIN GENE MUTATION: ICD-10-CM

## 2022-05-31 DIAGNOSIS — I82.401 DVT, LOWER EXTREMITY, RECURRENT, RIGHT: Primary | ICD-10-CM

## 2022-05-31 DIAGNOSIS — R76.0 LUPUS ANTICOAGULANT POSITIVE: ICD-10-CM

## 2022-05-31 LAB
INR PPP: 2 (ref 0.8–1.2)
PROTHROMBIN TIME: 20.8 SEC (ref 9–12.5)

## 2022-05-31 PROCEDURE — 93793 PR ANTICOAGULANT MGMT FOR PT TAKING WARFARIN: ICD-10-PCS | Mod: S$GLB,,, | Performed by: PHARMACIST

## 2022-05-31 PROCEDURE — 93793 ANTICOAG MGMT PT WARFARIN: CPT | Mod: S$GLB,,, | Performed by: PHARMACIST

## 2022-05-31 PROCEDURE — 85610 PROTHROMBIN TIME: CPT | Performed by: INTERNAL MEDICINE

## 2022-05-31 PROCEDURE — 36415 COLL VENOUS BLD VENIPUNCTURE: CPT | Performed by: INTERNAL MEDICINE

## 2022-06-09 ENCOUNTER — ANTI-COAG VISIT (OUTPATIENT)
Dept: CARDIOLOGY | Facility: CLINIC | Age: 67
End: 2022-06-09
Payer: MEDICARE

## 2022-06-09 ENCOUNTER — LAB VISIT (OUTPATIENT)
Dept: LAB | Facility: HOSPITAL | Age: 67
End: 2022-06-09
Attending: INTERNAL MEDICINE
Payer: MEDICARE

## 2022-06-09 DIAGNOSIS — Z86.718 HISTORY OF DEEP VEIN THROMBOSIS (DVT) OF LOWER EXTREMITY: ICD-10-CM

## 2022-06-09 DIAGNOSIS — I82.401 DVT, LOWER EXTREMITY, RECURRENT, RIGHT: Primary | ICD-10-CM

## 2022-06-09 DIAGNOSIS — R76.0 LUPUS ANTICOAGULANT POSITIVE: ICD-10-CM

## 2022-06-09 DIAGNOSIS — D68.52 PROTHROMBIN GENE MUTATION: ICD-10-CM

## 2022-06-09 DIAGNOSIS — I82.401 DVT, LOWER EXTREMITY, RECURRENT, RIGHT: ICD-10-CM

## 2022-06-09 LAB
INR PPP: 6.2 (ref 0.8–1.2)
PROTHROMBIN TIME: 60 SEC (ref 9–12.5)

## 2022-06-09 PROCEDURE — 93793 ANTICOAG MGMT PT WARFARIN: CPT | Mod: S$GLB,,, | Performed by: PHARMACIST

## 2022-06-09 PROCEDURE — 36415 COLL VENOUS BLD VENIPUNCTURE: CPT | Performed by: INTERNAL MEDICINE

## 2022-06-09 PROCEDURE — 93793 PR ANTICOAGULANT MGMT FOR PT TAKING WARFARIN: ICD-10-PCS | Mod: S$GLB,,, | Performed by: PHARMACIST

## 2022-06-09 PROCEDURE — 85610 PROTHROMBIN TIME: CPT | Performed by: INTERNAL MEDICINE

## 2022-06-09 NOTE — PROGRESS NOTES
Patient denies any changes in diet, medications, or health that would effect warfarin therapy.  Patient was re-educated on situations that would require placing a call to the coumadin clinic, including bleeding or unusual bruising issues, changes in health, diet or medications, upcoming procedures that require warfarin interruption, and missed coumadin dose(s). Patient expressed understanding that avoidance of consistency with these parameters could cause fluctuations in INR, leading to more frequent visits and increase risk of adverse events.     INRs have been very unpredictable with and INR of 2.0 on the same weekly coumadin dose that resulted in an INR of 6.2 today, with no reported changes.

## 2022-06-14 NOTE — PROGRESS NOTES
Patient reports holding coumadin on 6/9-6/10 then took 15mg daily thereafter. In addition he had greens.

## 2022-07-28 NOTE — TELEPHONE ENCOUNTER
Last seen almost 3 years ago. Requesting refills. Refill x 1 given. Please schedule f/u appt me or Berkley (PVR check at visit).

## 2022-09-29 NOTE — PROGRESS NOTES
INR not at goal. Medications, chart, and patient findings reviewed. See calendar for adjustments to dose and follow up plan.

## 2022-09-29 NOTE — PROGRESS NOTES
Patient called and he was given warfarin instructions and redraw date, he verbalized understanding

## 2022-10-13 NOTE — PROGRESS NOTES
Patient missed 1 dose of coumadin. INR not at goal. Medications, chart, and patient findings reviewed. See calendar for adjustments to dose and follow up plan.  Patient was re-educated on situations that would require placing a call to the coumadin clinic, including bleeding or unusual bruising issues, changes in health, diet or medications, upcoming procedures that require warfarin interruption, and missed coumadin dose(s). Patient expressed understanding that avoidance of consistency with these parameters could cause fluctuations in INR, leading to more frequent visits and increase risk of adverse events.

## 2022-11-23 NOTE — PROGRESS NOTES
INR not at goal. Medications, chart, and patient findings reviewed. See calendar for adjustments to dose and follow up plan. Patient has been taking 15mg daily, will adjust weekly regimen to this. Will not reduce today's dose since patient missed 11/21 dose. Recheck INR 11/30.

## 2022-12-02 NOTE — PROGRESS NOTES
Danie with [facility] called in a verbal result as: >10 INR / >100 PT dated today 12/02/22

## 2022-12-02 NOTE — PROGRESS NOTES
Patient questioned regarding elevated INR. Patient confirmed proper warfarin dosing; however, he reported that he took Advil 400mg,  on 11/30 & 12/1, for a headache. In addition, the patient drank one beer last night, 12/1. Patient denied any other changes in medications, diet and health at this time. NO bleeding issues reported. We will hold coumadin throughout the weekend and repeat INR on Monday AM. We will advise that he eat a serving of dark greens to help move the INR in the right direction and to report to ER/URGENT CARE if any bleeding issues arise. Patient was re-educated on situations that would require placing a call to the coumadin clinic, including bleeding or unusual bruising issues, changes in health, diet or medications, upcoming procedures that require warfarin interruption, and missed coumadin dose(s). Patient expressed understanding that avoidance of consistency with these parameters could cause fluctuations in INR, leading to more frequent visits and increase risk of adverse events.

## 2022-12-05 NOTE — PROGRESS NOTES
Bette with [facility] called in a verbal result as: INR 07.93 / PT -76.2 dated today 12/05/22

## 2022-12-05 NOTE — PROGRESS NOTES
Patient questioned regarding elevated INR. Patient confirmed proper warfarin dosing; and denied any changes in medications, diet, and health at this time. He confirms holding coumadin. Patient also denies any s/s of bleeding.  Patient was re-educated on situations that would require placing a call to the coumadin clinic, including bleeding or unusual bruising issues, changes in health, diet or medications, upcoming procedures that require warfarin interruption, and missed coumadin dose(s). Patient expressed understanding that avoidance of consistency with these parameters could cause fluctuations in INR, leading to more frequent visits and increase risk of adverse events.       He will be advised to eat a serving of dark green today. Hold coumadin 12/5-12/6, repeat INR 12/7

## 2022-12-08 NOTE — PROGRESS NOTES
Patient has been holding his coumadin due to elevated INRs. We will restart his coumadin today at a lower dose with close follow up. Patient was re-educated on situations that would require placing a call to the coumadin clinic, including bleeding or unusual bruising issues, changes in health, diet or medications, upcoming procedures that require warfarin interruption, and missed coumadin dose(s). Patient expressed understanding that avoidance of consistency with these parameters could cause fluctuations in INR, leading to more frequent visits and increase risk of adverse events.

## 2022-12-27 NOTE — PROGRESS NOTES
"Subjective:    Patient ID:  Carlos A Ruff is a 67 y.o. male who presents for follow-up of No chief complaint on file.      HPI    CAD - stent LAD, HTN, HLD, recurrent RLE DVT - chronic coumadin     5/17/18 LHC - EDP 15, LAD stent widely patent, normal vessels otherwise        Admitted 1/24/18  Patient is 63 yo male smoker with HTN, HLD, CAD s/p PCI who presents to ED with complaint of CP. Per patient has been with intermittent chest pain over the last 2 weeks mostly with exertion but occasionally at rest. He describes pain as "tight, stabbing" and reports lasts about a minute or so before resolving. He endorses associated diaphoresis and radiation to L arm and L neck. He reports pain worse this am thus causing presentation. Had cardiac stent placed about 2 years ago and no issues since then. He has been with a cold recently but otherwise denies fever/chills, SOB, abdominal pain, N/V, LE swelling and pain. On presentation patient with mildly elevated troponin at .05. Non ischemic EKG and CXR unremarkable. CTA obtained to r/o PE in ED which noted calcinosis to LAD. Placed in observation for ACS r/o.         Hospital Course:   Patient admitted with chest pain and ruled out for acute coronary syndrome. EKG is non ischemic. Serial troponin levels mildly elevated but flat with downward trend. BNP normal. CXR without acute cardiothoracic processes. Echocardiogram performed and did not show evidence of systolic/diastolic/or valvular dysfunction. NST performed and is without evidence of myocardial ischemia. Cardiology consulted and after evaluation has cleared the patient for discharge from a CV standpoint. Chest pain likely atypical in nature. Patient is stable and will discharge home at this time with instructions to follow up with PCP and cardiology in one week.      Echo 7/2/20  Normal left ventricular systolic function. The estimated ejection fraction is 55%.  Mild concentric left ventricular hypertrophy.  Grade I (mild) " left ventricular diastolic dysfunction consistent with impaired relaxation.  Normal right ventricular systolic function.  Moderate left atrial enlargement.  Mild right atrial enlargement.  Normal central venous pressure (3 mmHg).     Stress test 1/25/18  LVEF: 50 %  Impression: NORMAL MYOCARDIAL PERFUSION  1. The perfusion scan is free of evidence for myocardial ischemia or injury.   2. There is a mild intensity fixed defect in the inferior wall of the left ventricle, secondary to diaphragm attenuation.   3. Resting wall motion is physiologic.   4. Resting LV function is normal.      4/11/18  I discussed a LHC for recurrent CP - he is agreeable but wants to wait until school breaks for the summer  Will add toprol XL 25 qd and imdur 30 qd     5/31/18 Did well after LHC - still some atypical CP     Went to the ER 2/7/19  HPI: This 63 y.o. male with a past medical history of Anticoagulant long-term use, Coronary artery disease, Hypercholesteremia, Hypertension, and MI, presents to the ED complaining of an acute onset of L pectoral chest pain that is well localized for last 2 days. He was initially getting this pain every hr lasting for 30-40 seconds at a time, which became more frequent yesterday every 30 minutes now. Pain is sharp and stabbing. Pain is not worse with deep breathing. He had similar chest pain 6-8 months ago and he was diagnosed with muscle spasms to his chest. He had colonoscopy done last Friday that went well. He reports no relief in his chest pain with Tylenol. Current tobacco use.        Medical decision making:  Given the above, this patient presents to the emergency room with well localized sharp stabbing pain in the left pectoral chest, coming and going in very brief episodes, all less than a minute. The pain is sharp stabbing.  It occurs, and then your stops, and then recurs.  It does not radiate.  The patient is not short of breath. The patient arrived with a tachycardia but resolved.  During  the physical examination is heart rate was 60.  I doubt pulmonary embolus.  The patient has a history of coronary artery disease and has a stent.  His troponin was slightly elevated.  Consultation was obtained with Cardiology, Dr. Cordero, who asked that a 2nd troponin be performed.  If the 2nd troponin was substantially higher, the patient will require admission.  Sec troponin was lower.  I will discharge this patient outpatient evaluation and treatment.  Clinically this is noncardiac chest pain.     Troponin 0.065 to 0.057  EKG 2/7/10 sinus tachycardia 108 LAE - otherwise ok     2/13/19 Still with sharp CP both at rest and with exertion   Increase toprol XL 50 qd  Echo and lexiscan myoview for recurrent CP with known CAD and mildly elevated troponin     3/12/20 Saw Dr Hassan 3/9/20 for leg pain - venous and arterial US ordered - not done yet  Intermittent sharp CP for the last 2 weeks with mild HERNANDES  EKG NSR PRWP otherwise ok   Echo and lexiscan myoview for CP and CAD      12/27/22 Occasional CP- takes tylenol. Denies SOB  EKG NSR NSSTT changes  BP elevated - controlled by outside readings    Review of Systems   Constitutional: Negative for decreased appetite.   HENT:  Negative for ear discharge.    Eyes:  Negative for blurred vision.   Endocrine: Negative for polyphagia.   Skin:  Negative for nail changes.   Genitourinary:  Negative for bladder incontinence.   Neurological:  Negative for aphonia.   Psychiatric/Behavioral:  Negative for hallucinations.    Allergic/Immunologic: Negative for hives.      Objective:    Physical Exam  Constitutional:       Appearance: He is well-developed.   HENT:      Head: Normocephalic and atraumatic.   Eyes:      Conjunctiva/sclera: Conjunctivae normal.      Pupils: Pupils are equal, round, and reactive to light.   Cardiovascular:      Rate and Rhythm: Normal rate.      Pulses: Intact distal pulses.      Heart sounds: Normal heart sounds.   Pulmonary:      Effort: Pulmonary effort is  normal.      Breath sounds: Normal breath sounds.   Abdominal:      General: Bowel sounds are normal.      Palpations: Abdomen is soft.   Musculoskeletal:         General: Normal range of motion.      Cervical back: Normal range of motion and neck supple.   Skin:     General: Skin is warm and dry.   Neurological:      Mental Status: He is alert and oriented to person, place, and time.         Assessment:       1. Coronary artery disease involving native coronary artery of native heart without angina pectoris    2. Chest pain, unspecified type    3. DVT, lower extremity, recurrent, right         Plan:       Lexiscan myoview for CAD and CAD  Continue Rx for DVT, CAD, HTN, HLD               Cyclophosphamide Counseling:  I discussed with the patient the risks of cyclophosphamide including but not limited to hair loss, hormonal abnormalities, decreased fertility, abdominal pain, diarrhea, nausea and vomiting, bone marrow suppression and infection. The patient understands that monitoring is required while taking this medication.

## 2022-12-28 NOTE — PROGRESS NOTES
INR not at goal. Medications, chart, and patient findings reviewed. Of note, patient reported increased green intake 12/25 and 12/26. Recommend 20mg 12/28 and resume per calendar. See calendar for adjustments to dose and follow up plan.

## 2023-01-01 ENCOUNTER — HOSPITAL ENCOUNTER (OUTPATIENT)
Dept: RADIOLOGY | Facility: HOSPITAL | Age: 68
Discharge: HOME | End: 2023-01-09
Attending: INTERNAL MEDICINE
Payer: MEDICARE

## 2023-01-01 ENCOUNTER — LAB VISIT (OUTPATIENT)
Dept: LAB | Facility: HOSPITAL | Age: 68
End: 2023-01-01
Attending: INTERNAL MEDICINE
Payer: MEDICARE

## 2023-01-01 ENCOUNTER — ANTI-COAG VISIT (OUTPATIENT)
Dept: CARDIOLOGY | Facility: CLINIC | Age: 68
End: 2023-01-01
Payer: MEDICARE

## 2023-01-01 ENCOUNTER — HOSPITAL ENCOUNTER (EMERGENCY)
Facility: HOSPITAL | Age: 68
Discharge: HOME | End: 2023-03-17
Attending: EMERGENCY MEDICINE
Payer: MEDICARE

## 2023-01-01 ENCOUNTER — HOSPITAL ENCOUNTER (OUTPATIENT)
Dept: CARDIOLOGY | Facility: HOSPITAL | Age: 68
Discharge: HOME | End: 2023-01-09
Attending: INTERNAL MEDICINE
Payer: MEDICARE

## 2023-01-01 ENCOUNTER — HOSPITAL ENCOUNTER (EMERGENCY)
Facility: HOSPITAL | Age: 68
Discharge: HOME | End: 2023-03-06
Attending: EMERGENCY MEDICINE
Payer: MEDICARE

## 2023-01-01 ENCOUNTER — HOSPITAL ENCOUNTER (EMERGENCY)
Facility: HOSPITAL | Age: 68
Discharge: HOME | End: 2023-06-09
Attending: EMERGENCY MEDICINE
Payer: MEDICARE

## 2023-01-01 ENCOUNTER — TELEPHONE (OUTPATIENT)
Dept: FAMILY MEDICINE | Facility: CLINIC | Age: 68
End: 2023-01-01
Payer: MEDICARE

## 2023-01-01 ENCOUNTER — OFFICE VISIT (OUTPATIENT)
Dept: UROLOGY | Facility: CLINIC | Age: 68
End: 2023-01-01
Payer: MEDICARE

## 2023-01-01 ENCOUNTER — TELEPHONE (OUTPATIENT)
Dept: CARDIOLOGY | Facility: CLINIC | Age: 68
End: 2023-01-01
Payer: MEDICARE

## 2023-01-01 ENCOUNTER — OFFICE VISIT (OUTPATIENT)
Dept: CARDIOLOGY | Facility: CLINIC | Age: 68
End: 2023-01-01
Payer: MEDICARE

## 2023-01-01 VITALS
OXYGEN SATURATION: 99 % | RESPIRATION RATE: 18 BRPM | DIASTOLIC BLOOD PRESSURE: 84 MMHG | BODY MASS INDEX: 26.15 KG/M2 | WEIGHT: 187.06 LBS | WEIGHT: 186.75 LBS | HEART RATE: 60 BPM | SYSTOLIC BLOOD PRESSURE: 174 MMHG | HEIGHT: 71 IN | BODY MASS INDEX: 26.09 KG/M2

## 2023-01-01 VITALS
DIASTOLIC BLOOD PRESSURE: 78 MMHG | SYSTOLIC BLOOD PRESSURE: 159 MMHG | HEIGHT: 71 IN | BODY MASS INDEX: 25.9 KG/M2 | RESPIRATION RATE: 18 BRPM | WEIGHT: 185 LBS | HEART RATE: 54 BPM | OXYGEN SATURATION: 100 % | TEMPERATURE: 98 F

## 2023-01-01 VITALS
HEART RATE: 75 BPM | RESPIRATION RATE: 18 BRPM | DIASTOLIC BLOOD PRESSURE: 72 MMHG | SYSTOLIC BLOOD PRESSURE: 120 MMHG | HEIGHT: 71 IN | BODY MASS INDEX: 26.73 KG/M2 | OXYGEN SATURATION: 95 % | WEIGHT: 190.94 LBS

## 2023-01-01 VITALS
TEMPERATURE: 99 F | HEART RATE: 60 BPM | RESPIRATION RATE: 21 BRPM | HEIGHT: 71 IN | RESPIRATION RATE: 23 BRPM | DIASTOLIC BLOOD PRESSURE: 73 MMHG | DIASTOLIC BLOOD PRESSURE: 82 MMHG | TEMPERATURE: 98 F | OXYGEN SATURATION: 100 % | SYSTOLIC BLOOD PRESSURE: 142 MMHG | WEIGHT: 185 LBS | BODY MASS INDEX: 25.9 KG/M2 | HEART RATE: 56 BPM | OXYGEN SATURATION: 98 % | HEIGHT: 71 IN | WEIGHT: 185 LBS | BODY MASS INDEX: 25.9 KG/M2 | SYSTOLIC BLOOD PRESSURE: 111 MMHG

## 2023-01-01 DIAGNOSIS — R25.2 LEG CRAMPS: Primary | ICD-10-CM

## 2023-01-01 DIAGNOSIS — I26.99 PULMONARY EMBOLISM, UNSPECIFIED CHRONICITY, UNSPECIFIED PULMONARY EMBOLISM TYPE, UNSPECIFIED WHETHER ACUTE COR PULMONALE PRESENT: ICD-10-CM

## 2023-01-01 DIAGNOSIS — I26.99 BILATERAL PULMONARY EMBOLISM: ICD-10-CM

## 2023-01-01 DIAGNOSIS — I25.10 CORONARY ARTERY DISEASE INVOLVING NATIVE CORONARY ARTERY OF NATIVE HEART WITHOUT ANGINA PECTORIS: ICD-10-CM

## 2023-01-01 DIAGNOSIS — D68.52 PROTHROMBIN GENE MUTATION: ICD-10-CM

## 2023-01-01 DIAGNOSIS — N39.0 URINARY TRACT INFECTION WITHOUT HEMATURIA, SITE UNSPECIFIED: Primary | ICD-10-CM

## 2023-01-01 DIAGNOSIS — R76.0 LUPUS ANTICOAGULANT POSITIVE: ICD-10-CM

## 2023-01-01 DIAGNOSIS — I82.401 DVT, LOWER EXTREMITY, RECURRENT, RIGHT: Primary | ICD-10-CM

## 2023-01-01 DIAGNOSIS — Z86.718 HISTORY OF DEEP VEIN THROMBOSIS (DVT) OF LOWER EXTREMITY: ICD-10-CM

## 2023-01-01 DIAGNOSIS — I82.401 DVT, LOWER EXTREMITY, RECURRENT, RIGHT: ICD-10-CM

## 2023-01-01 DIAGNOSIS — R07.9 CHEST PAIN, UNSPECIFIED TYPE: ICD-10-CM

## 2023-01-01 DIAGNOSIS — I26.99 PULMONARY EMBOLISM: ICD-10-CM

## 2023-01-01 DIAGNOSIS — R76.0 LUPUS ANTICOAGULANT POSITIVE: Primary | ICD-10-CM

## 2023-01-01 DIAGNOSIS — I25.10 CORONARY ARTERY DISEASE INVOLVING NATIVE CORONARY ARTERY OF NATIVE HEART WITHOUT ANGINA PECTORIS: Primary | ICD-10-CM

## 2023-01-01 DIAGNOSIS — M79.604 LEG PAIN, BILATERAL: ICD-10-CM

## 2023-01-01 DIAGNOSIS — R53.1 WEAKNESS: ICD-10-CM

## 2023-01-01 DIAGNOSIS — N13.8 BPH WITH OBSTRUCTION/LOWER URINARY TRACT SYMPTOMS: Primary | ICD-10-CM

## 2023-01-01 DIAGNOSIS — R00.2 PALPITATIONS: ICD-10-CM

## 2023-01-01 DIAGNOSIS — N12 PYELONEPHRITIS: Primary | ICD-10-CM

## 2023-01-01 DIAGNOSIS — R06.02 SHORTNESS OF BREATH: ICD-10-CM

## 2023-01-01 DIAGNOSIS — R20.2 PARESTHESIA OF BOTH LOWER EXTREMITIES: ICD-10-CM

## 2023-01-01 DIAGNOSIS — I82.409 DVT (DEEP VENOUS THROMBOSIS): ICD-10-CM

## 2023-01-01 DIAGNOSIS — M79.605 LEG PAIN, BILATERAL: ICD-10-CM

## 2023-01-01 DIAGNOSIS — N40.1 BPH WITH OBSTRUCTION/LOWER URINARY TRACT SYMPTOMS: Primary | ICD-10-CM

## 2023-01-01 DIAGNOSIS — R33.9 INCOMPLETE BLADDER EMPTYING: ICD-10-CM

## 2023-01-01 LAB
ALBUMIN SERPL BCP-MCNC: 2.9 G/DL (ref 3.5–5.2)
ALBUMIN SERPL BCP-MCNC: 3.8 G/DL (ref 3.5–5.2)
ALBUMIN SERPL BCP-MCNC: 4 G/DL (ref 3.5–5.2)
ALP SERPL-CCNC: 58 U/L (ref 55–135)
ALP SERPL-CCNC: 63 U/L (ref 55–135)
ALP SERPL-CCNC: 92 U/L (ref 55–135)
ALT SERPL W/O P-5'-P-CCNC: 12 U/L (ref 10–44)
ALT SERPL W/O P-5'-P-CCNC: 19 U/L (ref 10–44)
ALT SERPL W/O P-5'-P-CCNC: 40 U/L (ref 10–44)
AMPHET+METHAMPHET UR QL: NEGATIVE
ANION GAP SERPL CALC-SCNC: 12 MMOL/L (ref 8–16)
ANION GAP SERPL CALC-SCNC: 8 MMOL/L (ref 8–16)
ANION GAP SERPL CALC-SCNC: 8 MMOL/L (ref 8–16)
ANION GAP SERPL CALC-SCNC: 9 MMOL/L (ref 8–16)
AORTIC ROOT ANNULUS: 3.06 CM
AORTIC VALVE CUSP SEPERATION: 2.19 CM
APTT BLDCRRT: 28.6 SEC (ref 21–32)
ASCENDING AORTA: 2.8 CM
AST SERPL-CCNC: 15 U/L (ref 10–40)
AST SERPL-CCNC: 23 U/L (ref 10–40)
AST SERPL-CCNC: 28 U/L (ref 10–40)
AV INDEX (PROSTH): 0.7
AV MEAN GRADIENT: 5 MMHG
AV PEAK GRADIENT: 9 MMHG
AV VALVE AREA: 2.74 CM2
AV VELOCITY RATIO: 0.68
BACTERIA #/AREA URNS HPF: ABNORMAL /HPF
BACTERIA #/AREA URNS HPF: ABNORMAL /HPF
BACTERIA UR CULT: ABNORMAL
BARBITURATES UR QL SCN>200 NG/ML: NEGATIVE
BASOPHILS # BLD AUTO: 0.06 K/UL (ref 0–0.2)
BASOPHILS # BLD AUTO: 0.06 K/UL (ref 0–0.2)
BASOPHILS # BLD AUTO: 0.14 K/UL (ref 0–0.2)
BASOPHILS NFR BLD: 0.4 % (ref 0–1.9)
BASOPHILS NFR BLD: 0.4 % (ref 0–1.9)
BASOPHILS NFR BLD: 1.4 % (ref 0–1.9)
BENZODIAZ UR QL SCN>200 NG/ML: NEGATIVE
BILIRUB SERPL-MCNC: 0.2 MG/DL (ref 0.1–1)
BILIRUB SERPL-MCNC: 0.2 MG/DL (ref 0.1–1)
BILIRUB SERPL-MCNC: 0.8 MG/DL (ref 0.1–1)
BILIRUB UR QL STRIP: NEGATIVE
BILIRUB UR QL STRIP: NEGATIVE
BNP SERPL-MCNC: 18 PG/ML (ref 0–99)
BNP SERPL-MCNC: 34 PG/ML (ref 0–99)
BSA FOR ECHO PROCEDURE: 2.05 M2
BUN SERPL-MCNC: 17 MG/DL (ref 8–23)
BUN SERPL-MCNC: 21 MG/DL (ref 8–23)
BUN SERPL-MCNC: 23 MG/DL (ref 8–23)
BUN SERPL-MCNC: 32 MG/DL (ref 8–23)
BZE UR QL SCN: NEGATIVE
CALCIUM SERPL-MCNC: 9.2 MG/DL (ref 8.7–10.5)
CALCIUM SERPL-MCNC: 9.2 MG/DL (ref 8.7–10.5)
CALCIUM SERPL-MCNC: 9.9 MG/DL (ref 8.7–10.5)
CALCIUM SERPL-MCNC: 9.9 MG/DL (ref 8.7–10.5)
CANNABINOIDS UR QL SCN: ABNORMAL
CHLORIDE SERPL-SCNC: 105 MMOL/L (ref 95–110)
CHLORIDE SERPL-SCNC: 105 MMOL/L (ref 95–110)
CHLORIDE SERPL-SCNC: 107 MMOL/L (ref 95–110)
CHLORIDE SERPL-SCNC: 110 MMOL/L (ref 95–110)
CLARITY UR: ABNORMAL
CLARITY UR: ABNORMAL
CO2 SERPL-SCNC: 23 MMOL/L (ref 23–29)
CO2 SERPL-SCNC: 24 MMOL/L (ref 23–29)
CO2 SERPL-SCNC: 24 MMOL/L (ref 23–29)
CO2 SERPL-SCNC: 27 MMOL/L (ref 23–29)
COLOR UR: ABNORMAL
COLOR UR: YELLOW
CREAT SERPL-MCNC: 1 MG/DL (ref 0.5–1.4)
CREAT SERPL-MCNC: 1.2 MG/DL (ref 0.5–1.4)
CREAT SERPL-MCNC: 1.2 MG/DL (ref 0.5–1.4)
CREAT SERPL-MCNC: 1.9 MG/DL (ref 0.5–1.4)
CREAT UR-MCNC: 138.3 MG/DL (ref 23–375)
CTP QC/QA: YES
CTP QC/QA: YES
CV ECHO LV RWT: 0.54 CM
CV STRESS BASE HR: 47 BPM
DIASTOLIC BLOOD PRESSURE: 82 MMHG
DIFFERENTIAL METHOD: ABNORMAL
DOP CALC AO PEAK VEL: 1.5 M/S
DOP CALC AO VTI: 32.6 CM
DOP CALC LVOT AREA: 3.9 CM2
DOP CALC LVOT DIAMETER: 2.23 CM
DOP CALC LVOT PEAK VEL: 1.02 M/S
DOP CALC LVOT STROKE VOLUME: 89.4 CM3
DOP CALCLVOT PEAK VEL VTI: 22.9 CM
E WAVE DECELERATION TIME: 257.52 MSEC
E/A RATIO: 0.81
E/E' RATIO: 5.78 M/S
ECHO LV POSTERIOR WALL: 1.42 CM (ref 0.6–1.1)
EJECTION FRACTION: 55 %
EOSINOPHIL # BLD AUTO: 0.1 K/UL (ref 0–0.5)
EOSINOPHIL # BLD AUTO: 0.3 K/UL (ref 0–0.5)
EOSINOPHIL # BLD AUTO: 0.4 K/UL (ref 0–0.5)
EOSINOPHIL NFR BLD: 0.5 % (ref 0–8)
EOSINOPHIL NFR BLD: 1.9 % (ref 0–8)
EOSINOPHIL NFR BLD: 3.5 % (ref 0–8)
ERYTHROCYTE [DISTWIDTH] IN BLOOD BY AUTOMATED COUNT: 12.4 % (ref 11.5–14.5)
ERYTHROCYTE [DISTWIDTH] IN BLOOD BY AUTOMATED COUNT: 12.8 % (ref 11.5–14.5)
ERYTHROCYTE [DISTWIDTH] IN BLOOD BY AUTOMATED COUNT: 13.6 % (ref 11.5–14.5)
EST. GFR  (NO RACE VARIABLE): 38 ML/MIN/1.73 M^2
EST. GFR  (NO RACE VARIABLE): >60 ML/MIN/1.73 M^2
ETHANOL SERPL-MCNC: <10 MG/DL
FRACTIONAL SHORTENING: 26 % (ref 28–44)
GLUCOSE SERPL-MCNC: 102 MG/DL (ref 70–110)
GLUCOSE SERPL-MCNC: 103 MG/DL (ref 70–110)
GLUCOSE SERPL-MCNC: 105 MG/DL (ref 70–110)
GLUCOSE SERPL-MCNC: 84 MG/DL (ref 70–110)
GLUCOSE UR QL STRIP: NEGATIVE
GLUCOSE UR QL STRIP: NEGATIVE
HCT VFR BLD AUTO: 36.4 % (ref 40–54)
HCT VFR BLD AUTO: 40.8 % (ref 40–54)
HCT VFR BLD AUTO: 40.9 % (ref 40–54)
HGB BLD-MCNC: 12.4 G/DL (ref 14–18)
HGB BLD-MCNC: 13.6 G/DL (ref 14–18)
HGB BLD-MCNC: 13.7 G/DL (ref 14–18)
HGB UR QL STRIP: ABNORMAL
HGB UR QL STRIP: ABNORMAL
HYALINE CASTS #/AREA URNS LPF: 0 /LPF
HYALINE CASTS #/AREA URNS LPF: 3 /LPF
IMM GRANULOCYTES # BLD AUTO: 0.07 K/UL (ref 0–0.04)
IMM GRANULOCYTES # BLD AUTO: 0.08 K/UL (ref 0–0.04)
IMM GRANULOCYTES # BLD AUTO: 0.22 K/UL (ref 0–0.04)
IMM GRANULOCYTES NFR BLD AUTO: 0.5 % (ref 0–0.5)
IMM GRANULOCYTES NFR BLD AUTO: 0.8 % (ref 0–0.5)
IMM GRANULOCYTES NFR BLD AUTO: 1.4 % (ref 0–0.5)
INR PPP: 1 (ref 0.8–1.2)
INR PPP: 1 (ref 0.8–1.2)
INR PPP: 1.1 (ref 0.8–1.2)
INR PPP: 1.2 (ref 0.8–1.2)
INR PPP: 1.5 (ref 0.8–1.2)
INR PPP: 2.5 (ref 0.8–1.2)
INR PPP: 2.8 (ref 0.8–1.2)
INR PPP: 3.5 (ref 0.8–1.2)
INR PPP: 4.4 (ref 0.8–1.2)
INR PPP: 4.9 (ref 0.8–1.2)
INR PPP: 5 (ref 0.8–1.2)
INTERVENTRICULAR SEPTUM: 1.39 CM (ref 0.6–1.1)
IVC DIAMETER: 7 CM
KETONES UR QL STRIP: NEGATIVE
KETONES UR QL STRIP: NEGATIVE
LA MAJOR: 5.31 CM
LA MINOR: 5.35 CM
LA WIDTH: 4.9 CM
LACTATE SERPL-SCNC: 1 MMOL/L (ref 0.5–2.2)
LEFT ATRIUM SIZE: 4.31 CM
LEFT ATRIUM VOLUME INDEX: 46.9 ML/M2
LEFT ATRIUM VOLUME: 95.68 CM3
LEFT INTERNAL DIMENSION IN SYSTOLE: 3.88 CM (ref 2.1–4)
LEFT VENTRICLE DIASTOLIC VOLUME INDEX: 65.17 ML/M2
LEFT VENTRICLE DIASTOLIC VOLUME: 132.95 ML
LEFT VENTRICLE MASS INDEX: 155 G/M2
LEFT VENTRICLE SYSTOLIC VOLUME INDEX: 31.8 ML/M2
LEFT VENTRICLE SYSTOLIC VOLUME: 64.95 ML
LEFT VENTRICULAR INTERNAL DIMENSION IN DIASTOLE: 5.26 CM (ref 3.5–6)
LEFT VENTRICULAR MASS: 316.78 G
LEUKOCYTE ESTERASE UR QL STRIP: ABNORMAL
LEUKOCYTE ESTERASE UR QL STRIP: ABNORMAL
LV LATERAL E/E' RATIO: 4.73 M/S
LV SEPTAL E/E' RATIO: 7.43 M/S
LVOT MG: 2.06 MMHG
LVOT MV: 0.65 CM/S
LYMPHOCYTES # BLD AUTO: 1.8 K/UL (ref 1–4.8)
LYMPHOCYTES # BLD AUTO: 2.2 K/UL (ref 1–4.8)
LYMPHOCYTES # BLD AUTO: 2.7 K/UL (ref 1–4.8)
LYMPHOCYTES NFR BLD: 11.6 % (ref 18–48)
LYMPHOCYTES NFR BLD: 16.2 % (ref 18–48)
LYMPHOCYTES NFR BLD: 27.4 % (ref 18–48)
MAGNESIUM SERPL-MCNC: 2.1 MG/DL (ref 1.6–2.6)
MAGNESIUM SERPL-MCNC: 2.2 MG/DL (ref 1.6–2.6)
MCH RBC QN AUTO: 31.6 PG (ref 27–31)
MCH RBC QN AUTO: 31.9 PG (ref 27–31)
MCH RBC QN AUTO: 32.3 PG (ref 27–31)
MCHC RBC AUTO-ENTMCNC: 33.3 G/DL (ref 32–36)
MCHC RBC AUTO-ENTMCNC: 33.6 G/DL (ref 32–36)
MCHC RBC AUTO-ENTMCNC: 34.1 G/DL (ref 32–36)
MCV RBC AUTO: 94 FL (ref 82–98)
MCV RBC AUTO: 95 FL (ref 82–98)
MCV RBC AUTO: 96 FL (ref 82–98)
METHADONE UR QL SCN>300 NG/ML: NEGATIVE
MICROSCOPIC COMMENT: ABNORMAL
MICROSCOPIC COMMENT: ABNORMAL
MONOCYTES # BLD AUTO: 0.4 K/UL (ref 0.3–1)
MONOCYTES # BLD AUTO: 0.8 K/UL (ref 0.3–1)
MONOCYTES # BLD AUTO: 0.9 K/UL (ref 0.3–1)
MONOCYTES NFR BLD: 4.2 % (ref 4–15)
MONOCYTES NFR BLD: 5.4 % (ref 4–15)
MONOCYTES NFR BLD: 6.7 % (ref 4–15)
MV PEAK A VEL: 0.64 M/S
MV PEAK E VEL: 0.52 M/S
MV STENOSIS PRESSURE HALF TIME: 67.08 MS
MV VALVE AREA P 1/2 METHOD: 3.28 CM2
NEUTROPHILS # BLD AUTO: 10.3 K/UL (ref 1.8–7.7)
NEUTROPHILS # BLD AUTO: 12.4 K/UL (ref 1.8–7.7)
NEUTROPHILS # BLD AUTO: 6.2 K/UL (ref 1.8–7.7)
NEUTROPHILS NFR BLD: 62.7 % (ref 38–73)
NEUTROPHILS NFR BLD: 75.7 % (ref 38–73)
NEUTROPHILS NFR BLD: 79.3 % (ref 38–73)
NITRITE UR QL STRIP: NEGATIVE
NITRITE UR QL STRIP: NEGATIVE
NRBC BLD-RTO: 0 /100 WBC
NUC REST EJECTION FRACTION: 48
OHS CV CPX 85 PERCENT MAX PREDICTED HEART RATE MALE: 132
OHS CV CPX MAX PREDICTED HEART RATE: 155
OHS CV CPX PATIENT IS FEMALE: 0
OHS CV CPX PATIENT IS MALE: 1
OHS CV CPX PEAK DIASTOLIC BLOOD PRESSURE: 96 MMHG
OHS CV CPX PEAK HEAR RATE: 88 BPM
OHS CV CPX PEAK RATE PRESSURE PRODUCT: NORMAL
OHS CV CPX PEAK SYSTOLIC BLOOD PRESSURE: 168 MMHG
OHS CV CPX PERCENT MAX PREDICTED HEART RATE ACHIEVED: 57
OHS CV CPX RATE PRESSURE PRODUCT PRESENTING: 9212
OPIATES UR QL SCN: NEGATIVE
PCP UR QL SCN>25 NG/ML: NEGATIVE
PH UR STRIP: 6 [PH] (ref 5–8)
PH UR STRIP: 6 [PH] (ref 5–8)
PHOSPHATE SERPL-MCNC: 3.2 MG/DL (ref 2.7–4.5)
PISA TR MAX VEL: 2.21 M/S
PLATELET # BLD AUTO: 239 K/UL (ref 150–450)
PLATELET # BLD AUTO: 244 K/UL (ref 150–450)
PLATELET # BLD AUTO: 430 K/UL (ref 150–450)
PMV BLD AUTO: 10.5 FL (ref 9.2–12.9)
PMV BLD AUTO: 11.4 FL (ref 9.2–12.9)
PMV BLD AUTO: 9.2 FL (ref 9.2–12.9)
POC MOLECULAR INFLUENZA A AGN: NEGATIVE
POC MOLECULAR INFLUENZA B AGN: NEGATIVE
POCT GLUCOSE: 103 MG/DL (ref 70–110)
POCT GLUCOSE: 89 MG/DL (ref 70–110)
POTASSIUM SERPL-SCNC: 3.8 MMOL/L (ref 3.5–5.1)
POTASSIUM SERPL-SCNC: 4.4 MMOL/L (ref 3.5–5.1)
POTASSIUM SERPL-SCNC: 4.6 MMOL/L (ref 3.5–5.1)
POTASSIUM SERPL-SCNC: 5 MMOL/L (ref 3.5–5.1)
PROT SERPL-MCNC: 7.4 G/DL (ref 6–8.4)
PROT SERPL-MCNC: 7.6 G/DL (ref 6–8.4)
PROT SERPL-MCNC: 7.8 G/DL (ref 6–8.4)
PROT UR QL STRIP: ABNORMAL
PROT UR QL STRIP: ABNORMAL
PROTHROMBIN TIME: 10.3 SEC (ref 9–12.5)
PROTHROMBIN TIME: 10.5 SEC (ref 9–12.5)
PROTHROMBIN TIME: 11.6 SEC (ref 9–12.5)
PROTHROMBIN TIME: 12.4 SEC (ref 9–12.5)
PROTHROMBIN TIME: 15.4 SEC (ref 9–12.5)
PROTHROMBIN TIME: 25.1 SEC (ref 9–12.5)
PROTHROMBIN TIME: 28.9 SEC (ref 9–12.5)
PROTHROMBIN TIME: 33.7 SEC (ref 9–12.5)
PROTHROMBIN TIME: 42.4 SEC (ref 9–12.5)
PROTHROMBIN TIME: 46.5 SEC (ref 9–12.5)
PROTHROMBIN TIME: 49.1 SEC (ref 9–12.5)
PV PEAK VELOCITY: 0.68 CM/S
RA MAJOR: 4.49 CM
RA PRESSURE: 3 MMHG
RA WIDTH: 4.6 CM
RBC # BLD AUTO: 3.89 M/UL (ref 4.6–6.2)
RBC # BLD AUTO: 4.24 M/UL (ref 4.6–6.2)
RBC # BLD AUTO: 4.3 M/UL (ref 4.6–6.2)
RBC #/AREA URNS HPF: 10 /HPF (ref 0–4)
RBC #/AREA URNS HPF: 37 /HPF (ref 0–4)
RIGHT VENTRICULAR END-DIASTOLIC DIMENSION: 3.67 CM
SARS-COV-2 RDRP RESP QL NAA+PROBE: NEGATIVE
SINUS: 3.31 CM
SODIUM SERPL-SCNC: 138 MMOL/L (ref 136–145)
SODIUM SERPL-SCNC: 140 MMOL/L (ref 136–145)
SODIUM SERPL-SCNC: 142 MMOL/L (ref 136–145)
SODIUM SERPL-SCNC: 142 MMOL/L (ref 136–145)
SP GR UR STRIP: 1.01 (ref 1–1.03)
SP GR UR STRIP: 1.01 (ref 1–1.03)
STJ: 2.49 CM
SYSTOLIC BLOOD PRESSURE: 196 MMHG
T4 FREE SERPL-MCNC: 1 NG/DL (ref 0.71–1.51)
TDI LATERAL: 0.11 M/S
TDI SEPTAL: 0.07 M/S
TDI: 0.09 M/S
TOXICOLOGY INFORMATION: ABNORMAL
TR MAX PG: 20 MMHG
TRICUSPID ANNULAR PLANE SYSTOLIC EXCURSION: 2.27 CM
TROPONIN I SERPL DL<=0.01 NG/ML-MCNC: 0.01 NG/ML (ref 0–0.03)
TROPONIN I SERPL DL<=0.01 NG/ML-MCNC: 0.01 NG/ML (ref 0–0.03)
TSH SERPL DL<=0.005 MIU/L-ACNC: 4.08 UIU/ML (ref 0.4–4)
TV REST PULMONARY ARTERY PRESSURE: 23 MMHG
URN SPEC COLLECT METH UR: ABNORMAL
URN SPEC COLLECT METH UR: ABNORMAL
UROBILINOGEN UR STRIP-ACNC: NEGATIVE EU/DL
UROBILINOGEN UR STRIP-ACNC: NEGATIVE EU/DL
WBC # BLD AUTO: 13.59 K/UL (ref 3.9–12.7)
WBC # BLD AUTO: 15.65 K/UL (ref 3.9–12.7)
WBC # BLD AUTO: 9.89 K/UL (ref 3.9–12.7)
WBC #/AREA URNS HPF: >100 /HPF (ref 0–5)
WBC #/AREA URNS HPF: >100 /HPF (ref 0–5)
WBC CLUMPS URNS QL MICRO: ABNORMAL
WBC CLUMPS URNS QL MICRO: ABNORMAL

## 2023-01-01 PROCEDURE — 3077F PR MOST RECENT SYSTOLIC BLOOD PRESSURE >= 140 MM HG: ICD-10-PCS | Mod: CPTII,S$GLB,, | Performed by: INTERNAL MEDICINE

## 2023-01-01 PROCEDURE — 25000003 PHARM REV CODE 250: Performed by: EMERGENCY MEDICINE

## 2023-01-01 PROCEDURE — 85610 PROTHROMBIN TIME: CPT | Performed by: INTERNAL MEDICINE

## 2023-01-01 PROCEDURE — 82962 GLUCOSE BLOOD TEST: CPT

## 2023-01-01 PROCEDURE — 80053 COMPREHEN METABOLIC PANEL: CPT | Performed by: EMERGENCY MEDICINE

## 2023-01-01 PROCEDURE — 84484 ASSAY OF TROPONIN QUANT: CPT | Performed by: EMERGENCY MEDICINE

## 2023-01-01 PROCEDURE — 99499 RISK ADDL DX/OHS AUDIT: ICD-10-PCS | Mod: S$GLB,,, | Performed by: INTERNAL MEDICINE

## 2023-01-01 PROCEDURE — 36415 COLL VENOUS BLD VENIPUNCTURE: CPT | Performed by: INTERNAL MEDICINE

## 2023-01-01 PROCEDURE — 3288F PR FALLS RISK ASSESSMENT DOCUMENTED: ICD-10-PCS | Mod: CPTII,S$GLB,, | Performed by: UROLOGY

## 2023-01-01 PROCEDURE — 3288F FALL RISK ASSESSMENT DOCD: CPT | Mod: CPTII,S$GLB,, | Performed by: INTERNAL MEDICINE

## 2023-01-01 PROCEDURE — 1159F MED LIST DOCD IN RCRD: CPT | Mod: CPTII,S$GLB,, | Performed by: INTERNAL MEDICINE

## 2023-01-01 PROCEDURE — 87088 URINE BACTERIA CULTURE: CPT | Performed by: EMERGENCY MEDICINE

## 2023-01-01 PROCEDURE — 99999 PR PBB SHADOW E&M-EST. PATIENT-LVL III: CPT | Mod: PBBFAC,,, | Performed by: INTERNAL MEDICINE

## 2023-01-01 PROCEDURE — 93793 PR ANTICOAGULANT MGMT FOR PT TAKING WARFARIN: ICD-10-PCS | Mod: S$GLB,,, | Performed by: PHARMACIST

## 2023-01-01 PROCEDURE — 78452 NUCLEAR STRESS - CARDIOLOGY INTERPRETED (CUPID ONLY): ICD-10-PCS | Mod: 26,,, | Performed by: INTERNAL MEDICINE

## 2023-01-01 PROCEDURE — 99999 PR PBB SHADOW E&M-EST. PATIENT-LVL III: ICD-10-PCS | Mod: PBBFAC,,, | Performed by: INTERNAL MEDICINE

## 2023-01-01 PROCEDURE — 93793 ANTICOAG MGMT PT WARFARIN: CPT | Mod: S$GLB,,, | Performed by: PHARMACIST

## 2023-01-01 PROCEDURE — 96365 THER/PROPH/DIAG IV INF INIT: CPT

## 2023-01-01 PROCEDURE — 1101F PT FALLS ASSESS-DOCD LE1/YR: CPT | Mod: CPTII,S$GLB,, | Performed by: UROLOGY

## 2023-01-01 PROCEDURE — 3288F FALL RISK ASSESSMENT DOCD: CPT | Mod: CPTII,S$GLB,, | Performed by: UROLOGY

## 2023-01-01 PROCEDURE — 1101F PR PT FALLS ASSESS DOC 0-1 FALLS W/OUT INJ PAST YR: ICD-10-PCS | Mod: CPTII,S$GLB,, | Performed by: INTERNAL MEDICINE

## 2023-01-01 PROCEDURE — 1126F AMNT PAIN NOTED NONE PRSNT: CPT | Mod: CPTII,S$GLB,, | Performed by: UROLOGY

## 2023-01-01 PROCEDURE — 99999 PR PBB SHADOW E&M-EST. PATIENT-LVL III: ICD-10-PCS | Mod: PBBFAC,,, | Performed by: UROLOGY

## 2023-01-01 PROCEDURE — 87086 URINE CULTURE/COLONY COUNT: CPT | Performed by: EMERGENCY MEDICINE

## 2023-01-01 PROCEDURE — 80048 BASIC METABOLIC PNL TOTAL CA: CPT | Performed by: INTERNAL MEDICINE

## 2023-01-01 PROCEDURE — 1126F PR PAIN SEVERITY QUANTIFIED, NO PAIN PRESENT: ICD-10-PCS | Mod: CPTII,S$GLB,, | Performed by: INTERNAL MEDICINE

## 2023-01-01 PROCEDURE — 93010 EKG 12-LEAD: ICD-10-PCS | Mod: ,,, | Performed by: INTERNAL MEDICINE

## 2023-01-01 PROCEDURE — 1101F PR PT FALLS ASSESS DOC 0-1 FALLS W/OUT INJ PAST YR: ICD-10-PCS | Mod: CPTII,S$GLB,, | Performed by: UROLOGY

## 2023-01-01 PROCEDURE — 78452 HT MUSCLE IMAGE SPECT MULT: CPT

## 2023-01-01 PROCEDURE — 84439 ASSAY OF FREE THYROXINE: CPT | Performed by: EMERGENCY MEDICINE

## 2023-01-01 PROCEDURE — 93016 CV STRESS TEST SUPVJ ONLY: CPT | Mod: ,,, | Performed by: INTERNAL MEDICINE

## 2023-01-01 PROCEDURE — 84100 ASSAY OF PHOSPHORUS: CPT | Performed by: EMERGENCY MEDICINE

## 2023-01-01 PROCEDURE — 99204 PR OFFICE/OUTPT VISIT, NEW, LEVL IV, 45-59 MIN: ICD-10-PCS | Mod: S$GLB,,, | Performed by: UROLOGY

## 2023-01-01 PROCEDURE — 1159F PR MEDICATION LIST DOCUMENTED IN MEDICAL RECORD: ICD-10-PCS | Mod: CPTII,S$GLB,, | Performed by: INTERNAL MEDICINE

## 2023-01-01 PROCEDURE — 63600175 PHARM REV CODE 636 W HCPCS: Performed by: EMERGENCY MEDICINE

## 2023-01-01 PROCEDURE — 99214 OFFICE O/P EST MOD 30 MIN: CPT | Mod: S$GLB,,, | Performed by: INTERNAL MEDICINE

## 2023-01-01 PROCEDURE — 1126F AMNT PAIN NOTED NONE PRSNT: CPT | Mod: CPTII,S$GLB,, | Performed by: INTERNAL MEDICINE

## 2023-01-01 PROCEDURE — 85025 COMPLETE CBC W/AUTO DIFF WBC: CPT | Performed by: EMERGENCY MEDICINE

## 2023-01-01 PROCEDURE — 85610 PROTHROMBIN TIME: CPT | Performed by: EMERGENCY MEDICINE

## 2023-01-01 PROCEDURE — 99284 PR EMERGENCY DEPT VISIT,LEVEL IV: ICD-10-PCS | Mod: 25,,, | Performed by: INTERNAL MEDICINE

## 2023-01-01 PROCEDURE — 3079F DIAST BP 80-89 MM HG: CPT | Mod: CPTII,S$GLB,, | Performed by: INTERNAL MEDICINE

## 2023-01-01 PROCEDURE — 93010 ELECTROCARDIOGRAM REPORT: CPT | Mod: ,,, | Performed by: INTERNAL MEDICINE

## 2023-01-01 PROCEDURE — 99999 PR PBB SHADOW E&M-EST. PATIENT-LVL IV: ICD-10-PCS | Mod: PBBFAC,,, | Performed by: INTERNAL MEDICINE

## 2023-01-01 PROCEDURE — 87186 SC STD MICRODIL/AGAR DIL: CPT | Performed by: EMERGENCY MEDICINE

## 2023-01-01 PROCEDURE — 3077F SYST BP >= 140 MM HG: CPT | Mod: CPTII,S$GLB,, | Performed by: INTERNAL MEDICINE

## 2023-01-01 PROCEDURE — 3288F PR FALLS RISK ASSESSMENT DOCUMENTED: ICD-10-PCS | Mod: CPTII,S$GLB,, | Performed by: INTERNAL MEDICINE

## 2023-01-01 PROCEDURE — 82077 ASSAY SPEC XCP UR&BREATH IA: CPT | Performed by: EMERGENCY MEDICINE

## 2023-01-01 PROCEDURE — 99999 PR PBB SHADOW E&M-EST. PATIENT-LVL IV: CPT | Mod: PBBFAC,,, | Performed by: INTERNAL MEDICINE

## 2023-01-01 PROCEDURE — 93018 CV STRESS TEST I&R ONLY: CPT | Mod: ,,, | Performed by: INTERNAL MEDICINE

## 2023-01-01 PROCEDURE — 93017 CV STRESS TEST TRACING ONLY: CPT

## 2023-01-01 PROCEDURE — 99214 PR OFFICE/OUTPT VISIT, EST, LEVL IV, 30-39 MIN: ICD-10-PCS | Mod: S$GLB,,, | Performed by: INTERNAL MEDICINE

## 2023-01-01 PROCEDURE — 83880 ASSAY OF NATRIURETIC PEPTIDE: CPT | Performed by: INTERNAL MEDICINE

## 2023-01-01 PROCEDURE — 83735 ASSAY OF MAGNESIUM: CPT | Performed by: EMERGENCY MEDICINE

## 2023-01-01 PROCEDURE — A9502 TC99M TETROFOSMIN: HCPCS

## 2023-01-01 PROCEDURE — 85730 THROMBOPLASTIN TIME PARTIAL: CPT | Performed by: EMERGENCY MEDICINE

## 2023-01-01 PROCEDURE — 93793 ANTICOAG MGMT PT WARFARIN: CPT | Mod: S$GLB,,,

## 2023-01-01 PROCEDURE — 84443 ASSAY THYROID STIM HORMONE: CPT | Performed by: EMERGENCY MEDICINE

## 2023-01-01 PROCEDURE — 99204 OFFICE O/P NEW MOD 45 MIN: CPT | Mod: S$GLB,,, | Performed by: UROLOGY

## 2023-01-01 PROCEDURE — 51701 INSERT BLADDER CATHETER: CPT

## 2023-01-01 PROCEDURE — 81000 URINALYSIS NONAUTO W/SCOPE: CPT | Performed by: EMERGENCY MEDICINE

## 2023-01-01 PROCEDURE — 3008F PR BODY MASS INDEX (BMI) DOCUMENTED: ICD-10-PCS | Mod: CPTII,S$GLB,, | Performed by: INTERNAL MEDICINE

## 2023-01-01 PROCEDURE — 1159F PR MEDICATION LIST DOCUMENTED IN MEDICAL RECORD: ICD-10-PCS | Mod: CPTII,S$GLB,, | Performed by: UROLOGY

## 2023-01-01 PROCEDURE — 1101F PT FALLS ASSESS-DOCD LE1/YR: CPT | Mod: CPTII,S$GLB,, | Performed by: INTERNAL MEDICINE

## 2023-01-01 PROCEDURE — 1126F PR PAIN SEVERITY QUANTIFIED, NO PAIN PRESENT: ICD-10-PCS | Mod: CPTII,S$GLB,, | Performed by: UROLOGY

## 2023-01-01 PROCEDURE — 93005 ELECTROCARDIOGRAM TRACING: CPT

## 2023-01-01 PROCEDURE — 83880 ASSAY OF NATRIURETIC PEPTIDE: CPT | Performed by: EMERGENCY MEDICINE

## 2023-01-01 PROCEDURE — 93016 NUCLEAR STRESS - CARDIOLOGY INTERPRETED (CUPID ONLY): ICD-10-PCS | Mod: ,,, | Performed by: INTERNAL MEDICINE

## 2023-01-01 PROCEDURE — 99499 UNLISTED E&M SERVICE: CPT | Mod: S$GLB,,, | Performed by: INTERNAL MEDICINE

## 2023-01-01 PROCEDURE — 99284 EMERGENCY DEPT VISIT MOD MDM: CPT | Mod: 25,,, | Performed by: INTERNAL MEDICINE

## 2023-01-01 PROCEDURE — 1160F PR REVIEW ALL MEDS BY PRESCRIBER/CLIN PHARMACIST DOCUMENTED: ICD-10-PCS | Mod: CPTII,S$GLB,, | Performed by: UROLOGY

## 2023-01-01 PROCEDURE — 93005 ELECTROCARDIOGRAM TRACING: CPT | Mod: 59

## 2023-01-01 PROCEDURE — 3074F PR MOST RECENT SYSTOLIC BLOOD PRESSURE < 130 MM HG: ICD-10-PCS | Mod: CPTII,S$GLB,, | Performed by: INTERNAL MEDICINE

## 2023-01-01 PROCEDURE — 3008F PR BODY MASS INDEX (BMI) DOCUMENTED: ICD-10-PCS | Mod: CPTII,S$GLB,, | Performed by: UROLOGY

## 2023-01-01 PROCEDURE — 93793 PR ANTICOAGULANT MGMT FOR PT TAKING WARFARIN: ICD-10-PCS | Mod: S$GLB,,,

## 2023-01-01 PROCEDURE — 3074F SYST BP LT 130 MM HG: CPT | Mod: CPTII,S$GLB,, | Performed by: INTERNAL MEDICINE

## 2023-01-01 PROCEDURE — 78452 HT MUSCLE IMAGE SPECT MULT: CPT | Mod: 26,,, | Performed by: INTERNAL MEDICINE

## 2023-01-01 PROCEDURE — 1160F RVW MEDS BY RX/DR IN RCRD: CPT | Mod: CPTII,S$GLB,, | Performed by: UROLOGY

## 2023-01-01 PROCEDURE — 99999 PR PBB SHADOW E&M-EST. PATIENT-LVL III: CPT | Mod: PBBFAC,,, | Performed by: UROLOGY

## 2023-01-01 PROCEDURE — 99285 EMERGENCY DEPT VISIT HI MDM: CPT | Mod: 25

## 2023-01-01 PROCEDURE — 3078F DIAST BP <80 MM HG: CPT | Mod: CPTII,S$GLB,, | Performed by: INTERNAL MEDICINE

## 2023-01-01 PROCEDURE — 99284 EMERGENCY DEPT VISIT MOD MDM: CPT

## 2023-01-01 PROCEDURE — 3008F BODY MASS INDEX DOCD: CPT | Mod: CPTII,S$GLB,, | Performed by: INTERNAL MEDICINE

## 2023-01-01 PROCEDURE — 63600175 PHARM REV CODE 636 W HCPCS: Performed by: INTERNAL MEDICINE

## 2023-01-01 PROCEDURE — 3079F PR MOST RECENT DIASTOLIC BLOOD PRESSURE 80-89 MM HG: ICD-10-PCS | Mod: CPTII,S$GLB,, | Performed by: INTERNAL MEDICINE

## 2023-01-01 PROCEDURE — 96372 THER/PROPH/DIAG INJ SC/IM: CPT | Performed by: EMERGENCY MEDICINE

## 2023-01-01 PROCEDURE — 93018 NUCLEAR STRESS - CARDIOLOGY INTERPRETED (CUPID ONLY): ICD-10-PCS | Mod: ,,, | Performed by: INTERNAL MEDICINE

## 2023-01-01 PROCEDURE — 25500020 PHARM REV CODE 255: Performed by: EMERGENCY MEDICINE

## 2023-01-01 PROCEDURE — 1159F MED LIST DOCD IN RCRD: CPT | Mod: CPTII,S$GLB,, | Performed by: UROLOGY

## 2023-01-01 PROCEDURE — 3008F BODY MASS INDEX DOCD: CPT | Mod: CPTII,S$GLB,, | Performed by: UROLOGY

## 2023-01-01 PROCEDURE — 80307 DRUG TEST PRSMV CHEM ANLYZR: CPT | Performed by: EMERGENCY MEDICINE

## 2023-01-01 PROCEDURE — 3078F PR MOST RECENT DIASTOLIC BLOOD PRESSURE < 80 MM HG: ICD-10-PCS | Mod: CPTII,S$GLB,, | Performed by: INTERNAL MEDICINE

## 2023-01-01 PROCEDURE — 87502 INFLUENZA DNA AMP PROBE: CPT

## 2023-01-01 PROCEDURE — 87077 CULTURE AEROBIC IDENTIFY: CPT | Performed by: EMERGENCY MEDICINE

## 2023-01-01 PROCEDURE — 96361 HYDRATE IV INFUSION ADD-ON: CPT

## 2023-01-01 PROCEDURE — 83605 ASSAY OF LACTIC ACID: CPT | Performed by: EMERGENCY MEDICINE

## 2023-01-01 RX ORDER — METHOCARBAMOL 500 MG/1
1000 TABLET, FILM COATED ORAL 3 TIMES DAILY PRN
Qty: 30 TABLET | Refills: 0 | OUTPATIENT
Start: 2023-01-01 | End: 2023-01-01

## 2023-01-01 RX ORDER — CEPHALEXIN 500 MG/1
500 CAPSULE ORAL EVERY 12 HOURS
Qty: 20 CAPSULE | Refills: 0 | Status: SHIPPED | OUTPATIENT
Start: 2023-01-01 | End: 2023-01-01

## 2023-01-01 RX ORDER — GABAPENTIN 300 MG/1
300 CAPSULE ORAL 3 TIMES DAILY
Qty: 45 CAPSULE | Refills: 0 | Status: SHIPPED | OUTPATIENT
Start: 2023-01-01 | End: 2024-03-16

## 2023-01-01 RX ORDER — CEPHALEXIN 250 MG/1
500 CAPSULE ORAL
Status: COMPLETED | OUTPATIENT
Start: 2023-01-01 | End: 2023-01-01

## 2023-01-01 RX ORDER — METHOCARBAMOL 500 MG/1
1000 TABLET, FILM COATED ORAL 3 TIMES DAILY PRN
Qty: 30 TABLET | Refills: 0 | Status: SHIPPED | OUTPATIENT
Start: 2023-01-01 | End: 2023-01-01

## 2023-01-01 RX ORDER — FINASTERIDE 5 MG/1
5 TABLET, FILM COATED ORAL DAILY
Qty: 90 TABLET | Refills: 3 | Status: SHIPPED | OUTPATIENT
Start: 2023-01-01 | End: 2024-01-10

## 2023-01-01 RX ORDER — SODIUM CHLORIDE 9 MG/ML
1000 INJECTION, SOLUTION INTRAVENOUS
Status: COMPLETED | OUTPATIENT
Start: 2023-01-01 | End: 2023-01-01

## 2023-01-01 RX ORDER — TAMSULOSIN HYDROCHLORIDE 0.4 MG/1
1 CAPSULE ORAL 2 TIMES DAILY
Qty: 180 CAPSULE | Refills: 3 | Status: SHIPPED | OUTPATIENT
Start: 2023-01-01

## 2023-01-01 RX ORDER — ENOXAPARIN SODIUM 100 MG/ML
1 INJECTION SUBCUTANEOUS
Status: COMPLETED | OUTPATIENT
Start: 2023-01-01 | End: 2023-01-01

## 2023-01-01 RX ORDER — CEPHALEXIN 500 MG/1
500 CAPSULE ORAL EVERY 8 HOURS
Qty: 21 CAPSULE | Refills: 0 | Status: SHIPPED | OUTPATIENT
Start: 2023-01-01 | End: 2023-06-16

## 2023-01-01 RX ORDER — REGADENOSON 0.08 MG/ML
0.4 INJECTION, SOLUTION INTRAVENOUS ONCE
Status: COMPLETED | OUTPATIENT
Start: 2023-01-01 | End: 2023-01-01

## 2023-01-01 RX ORDER — LOSARTAN POTASSIUM AND HYDROCHLOROTHIAZIDE 12.5; 5 MG/1; MG/1
1 TABLET ORAL DAILY
Qty: 90 TABLET | Refills: 3 | Status: SHIPPED | OUTPATIENT
Start: 2023-01-01 | End: 2024-01-12

## 2023-01-01 RX ORDER — WARFARIN 10 MG/1
TABLET ORAL
Qty: 60 TABLET | Refills: 11 | OUTPATIENT
Start: 2023-01-01 | End: 2023-01-01

## 2023-01-01 RX ADMIN — ENOXAPARIN SODIUM 80 MG: 100 INJECTION SUBCUTANEOUS at 03:03

## 2023-01-01 RX ADMIN — IOHEXOL 75 ML: 350 INJECTION, SOLUTION INTRAVENOUS at 12:03

## 2023-01-01 RX ADMIN — CEPHALEXIN 500 MG: 250 CAPSULE ORAL at 12:03

## 2023-01-01 RX ADMIN — SODIUM CHLORIDE 1000 ML: 9 INJECTION, SOLUTION INTRAVENOUS at 03:06

## 2023-01-01 RX ADMIN — CEFTRIAXONE 1 G: 1 INJECTION, POWDER, FOR SOLUTION INTRAMUSCULAR; INTRAVENOUS at 03:06

## 2023-01-01 RX ADMIN — REGADENOSON 0.4 MG: 0.08 INJECTION, SOLUTION INTRAVENOUS at 09:01

## 2023-01-09 NOTE — PROGRESS NOTES
Subjective:       Carlos A Ruff is a 67 y.o. male who is a new patient who was self-referred  for evaluation of UR.      Last seen 2019. Previously saw Dr Arndt. Initially seen as inpatient consult for UR. Very large bladder seen on CT. Cummings placed. Known high PVR. On Flomax. +weak stream. He was recommended for TURP but cancelled procedure.     Returns now with similar issues. +Coumadin. Remains on Flomax. +straining, worsening. Not on Proscar.     Cysto/UDS 8/2019:  Findings of the Procedure:  Very large initial PVR.  Uroflow volume not significant for evaluation.  Significant decreased sensation. No IBC/DO. SILVESTRE not assessed.  Very large bladder capacity.  Normal compliance.  No vesicoureteral reflux.  Slow flow with incomplete bladder emptying.  Bladder contraction noted with delayed voiding.  Normal coordination.  Also with some Valsalva voiding.  Cystoscopy without bladder tumor or other lesion, mild intravesical component with bilateral hypertrophy of the prostate.  Recommendations:  Appears to have bladder outlet obstruction.  Very large capacity bladder though the bladder contraction was noted.  Recommend bladder outlet reducing procedure such as TURP.      The following portions of the patient's history were reviewed and updated as appropriate: allergies, current medications, past family history, past medical history, past social history, past surgical history and problem list.    Review of Systems  Twelve point review of systems completed. Pertinent positive and negatives listed in HPI.      Objective:    Vitals: Wt 84.8 kg (187 lb 1 oz)   BMI 26.09 kg/m²     Physical Exam   General: well developed, well nourished in no acute distress  Head: normocephalic, atraumatic  Neck: supple, trachea midline, no obvious enlargement of thyroid  HEENT: EOMI, mucus membranes moist, sclera anicteric, no hearing impairment  Lungs: symmetric expansion, non-labored breathing  Musculoskeletal: no peripheral edema, normal  ROM in bilateral upper and lower extremities  Skin: no rashes or lesions  Neuro: alert and oriented x 3, no gross deficits  Psych: normal judgment and insight, normal mood/affect and non-anxious  Genitourinary: deferred   TANYA: deferred      Lab Review   Urine analysis today in clinic shows - no urine    Lab Results   Component Value Date    WBC 8.17 01/30/2021    HGB 14.2 01/30/2021    HCT 41.8 01/30/2021    MCV 93 01/30/2021     01/30/2021     Lab Results   Component Value Date    CREATININE 0.9 01/30/2021    BUN 10 01/30/2021     Lab Results   Component Value Date    PSA 0.96 07/31/2019     Lab Results   Component Value Date    PSADIAG 1.2 06/05/2018       Imaging  CT reviewed  Reports and images were personally reviewed by me and discussed with the patient today         Assessment/Plan:      1. BPH with obstruction/lower urinary tract symptoms    - Flomax BID (increasing). Restart Proscar.    - Recommended for TURP previously. Declined. May be open to it. Will max medications. Highly concerning for long term UR/LOREN.    - +Coumadin. Unsure if bridging necessary.      2. Incomplete bladder emptying    - Several year history.    - Flomax/Proscar   - Recommended for TURP     3. Weak stream   - Flomax BID/Proscar    Follow up in 3 months with PVR

## 2023-01-10 NOTE — PROGRESS NOTES
Patient called coumadin clinic to report that for the last week pt has been taking warfarin 20mg on Tuesday and Thursday and 15mg all other days. Of concern, several weeks ago a weekly dose lower than this (15mg daily) resulted in an INR >10. We will need close watch for now, and I will lower dose slightly.   Patient was re-educated on situations that would require placing a call to the coumadin clinic, including bleeding or unusual bruising issues, changes in health, diet or medications, upcoming procedures that require warfarin interruption, and missed coumadin dose(s). Patient expressed understanding that avoidance of consistency with these parameters could cause fluctuations in INR, leading to more frequent visits and increase risk of adverse events.

## 2023-01-12 NOTE — PROGRESS NOTES
"Subjective:    Patient ID:  Carlos A Ruff is a 67 y.o. male who presents for follow-up of Follow-up      HPI    CAD - stent LAD, HTN, HLD, recurrent RLE DVT - chronic coumadin     5/17/18 [facility] - EDP 15, LAD stent widely patent, normal vessels otherwise        Admitted 1/24/18  Patient is 63 yo male smoker with HTN, HLD, CAD s/p PCI who presents to ED with complaint of CP. Per patient has been with intermittent chest pain over the last 2 weeks mostly with exertion but occasionally at rest. He describes pain as "tight, stabbing" and reports lasts about a minute or so before resolving. He endorses associated diaphoresis and radiation to L arm and L neck. He reports pain worse this am thus causing presentation. Had cardiac stent placed about 2 years ago and no issues since then. He has been with a cold recently but otherwise denies fever/chills, SOB, abdominal pain, N/V, LE swelling and pain. On presentation patient with mildly elevated troponin at .05. Non ischemic EKG and CXR unremarkable. CTA obtained to r/o PE in ED which noted calcinosis to LAD. Placed in observation for ACS r/o.         Hospital Course:   Patient admitted with chest pain and ruled out for acute coronary syndrome. EKG is non ischemic. Serial troponin levels mildly elevated but flat with downward trend. BNP normal. CXR without acute cardiothoracic processes. Echocardiogram performed and did not show evidence of systolic/diastolic/or valvular dysfunction. NST performed and is without evidence of myocardial ischemia. Cardiology consulted and after evaluation has cleared the patient for discharge from a CV standpoint. Chest pain likely atypical in nature. Patient is stable and will discharge home at this time with instructions to follow up with PCP and cardiology in one week.      Echo 7/2/20  Normal left ventricular systolic function. The estimated ejection fraction is 55%.  Mild concentric left ventricular hypertrophy.  Grade I (mild) left ventricular " diastolic dysfunction consistent with impaired relaxation.  Normal right ventricular systolic function.  Moderate left atrial enlargement.  Mild right atrial enlargement.  Normal central venous pressure (3 mmHg).     Stress test 1/9/23    Abnormal myocardial perfusion scan.    There is a moderate to severe intensity, large sized, mostly fixed perfusion abnormality with mild jalen infarct ischemia in the inferior wall(s).    There are no other significant perfusion abnormalities.    The gated perfusion images showed an ejection fraction of 48% at rest.    The ECG portion of the study is negative for ischemia.    The patient reported no chest pain during the stress test.    There were no arrhythmias during stress.     4/11/18  I discussed a LHC for recurrent CP - he is agreeable but wants to wait until school breaks for the summer  Will add toprol XL 25 qd and imdur 30 qd     5/31/18 Did well after LHC - still some atypical CP     Went to the ER 2/7/19  HPI: This 63 y.o. male with a past medical history of Anticoagulant long-term use, Coronary artery disease, Hypercholesteremia, Hypertension, and MI, presents to the ED complaining of an acute onset of L pectoral chest pain that is well localized for last 2 days. He was initially getting this pain every hr lasting for 30-40 seconds at a time, which became more frequent yesterday every 30 minutes now. Pain is sharp and stabbing. Pain is not worse with deep breathing. He had similar chest pain 6-8 months ago and he was diagnosed with muscle spasms to his chest. He had colonoscopy done last Friday that went well. He reports no relief in his chest pain with Tylenol. Current tobacco use.        Medical decision making:  Given the above, this patient presents to the emergency room with well localized sharp stabbing pain in the left pectoral chest, coming and going in very brief episodes, all less than a minute. The pain is sharp stabbing.  It occurs, and then your stops, and  then recurs.  It does not radiate.  The patient is not short of breath. The patient arrived with a tachycardia but resolved.  During the physical examination is heart rate was 60.  I doubt pulmonary embolus.  The patient has a history of coronary artery disease and has a stent.  His troponin was slightly elevated.  Consultation was obtained with Cardiology, Dr. Cordero, who asked that a 2nd troponin be performed.  If the 2nd troponin was substantially higher, the patient will require admission.  Sec troponin was lower.  I will discharge this patient outpatient evaluation and treatment.  Clinically this is noncardiac chest pain.     Troponin 0.065 to 0.057  EKG 2/7/10 sinus tachycardia 108 LAE - otherwise ok     2/13/19 Still with sharp CP both at rest and with exertion   Increase toprol XL 50 qd  Echo and lexiscan myoview for recurrent CP with known CAD and mildly elevated troponin     3/12/20 Saw Dr Hassan 3/9/20 for leg pain - venous and arterial US ordered - not done yet  Intermittent sharp CP for the last 2 weeks with mild HERNANDES  EKG NSR PRWP otherwise ok   Echo and lexiscan myoview for CP and CAD      12/27/22 Occasional CP- takes tylenol. Denies SOB  EKG NSR NSSTT changes  BP elevated - controlled by outside readings  Lexiscan myoview for CAD and CAD  Continue Rx for DVT, CAD, HTN, HLD      1/12/23 Denies CP or SOB. Has not been taking losartan  BP poorly controlled    Review of Systems   Constitutional: Negative for decreased appetite.   HENT:  Negative for ear discharge.    Eyes:  Negative for blurred vision.   Endocrine: Negative for polyphagia.   Skin:  Negative for nail changes.   Genitourinary:  Negative for bladder incontinence.   Neurological:  Negative for aphonia.   Psychiatric/Behavioral:  Negative for hallucinations.    Allergic/Immunologic: Negative for hives.      Objective:    Physical Exam  Constitutional:       Appearance: He is well-developed.   HENT:      Head: Normocephalic and atraumatic.    Eyes:      Conjunctiva/sclera: Conjunctivae normal.      Pupils: Pupils are equal, round, and reactive to light.   Cardiovascular:      Rate and Rhythm: Normal rate.      Pulses: Intact distal pulses.      Heart sounds: Normal heart sounds.   Pulmonary:      Effort: Pulmonary effort is normal.      Breath sounds: Normal breath sounds.   Abdominal:      General: Bowel sounds are normal.      Palpations: Abdomen is soft.   Musculoskeletal:         General: Normal range of motion.      Cervical back: Normal range of motion and neck supple.   Skin:     General: Skin is warm and dry.   Neurological:      Mental Status: He is alert and oriented to person, place, and time.         Assessment:       1. Coronary artery disease involving native coronary artery of native heart without angina pectoris    2. Chest pain, unspecified type    3. DVT, lower extremity, recurrent, right         Plan:       Stress test with mainly fixed inferior defect - CP improved. Discussed LHC versus medical Rx - we are both in agreement to observe for now  Resume losartan/HCT for HTN  Continue Rx for DVT, CAD, HTN, HLD  OV 3 months with BNP, BMP

## 2023-01-23 NOTE — PROGRESS NOTES
Pt reports drinking 8oz bottle of cranberry juice 2-3 days ago. He had a serving of mustard greens last night  Resumed losartan/HCT 1/13  Confirmed correct warfarin dose  INR not at goal. Medications, chart, and patient findings reviewed. See calendar for adjustments to dose and follow up plan.  Patient was re-educated on situations that would require placing a call to the coumadin clinic, including bleeding or unusual bruising issues, changes in health, diet or medications, upcoming procedures that require warfarin interruption, and missed coumadin dose(s). Patient expressed understanding that avoidance of consistency with these parameters could cause fluctuations in INR, leading to more frequent visits and increase risk of adverse events.

## 2023-01-27 NOTE — PROGRESS NOTES
Pt missed lab 1/26. Sent to give new instructions and r/s INR to 1/30. In the meantime, pt went to lab late. See 1/27 encounter

## 2023-01-27 NOTE — PROGRESS NOTES
INR low. Pt denies changes and confirmed dose as planned. INR was very high on Monday for no apparent reason. Resume dose. Repeat INR Monday or Tuesday

## 2023-01-31 NOTE — PROGRESS NOTES
INR not at goal. Medications, chart, and patient findings reviewed. See calendar for adjustments to dose and follow up plan.Patient was re-educated on situations that would require placing a call to the coumadin clinic, including bleeding or unusual bruising issues, changes in health, diet or medications, upcoming procedures that require warfarin interruption, and missed coumadin dose(s). Patient expressed understanding that avoidance of consistency with these parameters could cause fluctuations in INR, leading to more frequent visits and increase risk of adverse events.

## 2023-02-11 NOTE — PROGRESS NOTES
Sinus problem--regular Robitussin, verified correct coumadin dose but missed 2 days due to feeling sick, appetite a bit low while being sick, reports loose bms, tension in legs--no swelling,      20

## 2023-02-14 NOTE — PROGRESS NOTES
Patient questioned regarding elevated INR. Proper warfarin dosing confirmed. Patient denied any changes in medications, diet, and health at this time. Patient also denied s/sx of bleeding.  INR not at goal. Medications, chart, and patient findings reviewed. See calendar for adjustments to dose and follow up plan.Patient was re-educated on situations that would require placing a call to the coumadin clinic, including bleeding or unusual bruising issues, changes in health, diet or medications, upcoming procedures that require warfarin interruption, and missed coumadin dose(s). Patient expressed understanding that avoidance of consistency with these parameters could cause fluctuations in INR, leading to more frequent visits and increase risk of adverse events.

## 2023-02-22 NOTE — PROGRESS NOTES
Patient verified warfarin: 0mg 2/14, 5mg 2/15 then took 10mg some days and 15 other days--not clear on 15mg day but states he got off track and took 10mg Monday and 15mg yesterday Tuesday, reports R-knee swelled--better today, no redness. INRS VERY ERRATIC.  If swelling returns and or pain occurs then he needs to seek urgent care. We will boost his coumadin doses over the next 2 days and will need INR 2/24 AM STAT

## 2023-03-06 PROBLEM — I26.99 BILATERAL PULMONARY EMBOLISM: Status: ACTIVE | Noted: 2018-01-24

## 2023-03-06 NOTE — ED PROVIDER NOTES
"Encounter Date: 3/6/2023       History     Chief Complaint   Patient presents with    Fatigue     Pt reports to the ED with C/O near syncopal episode that happened yesterday and heaviness in the shoulders that has been going on x 4 days. Pt also reports palpitations and bi lateral leg pains. Pt reports "I was doing community service yesterday and I thought I was going to pass out. My vision went black for about 5 seconds" pt also reports Hx of DVT's and cardiac stents. No edema noted to the legs. Pt placed in RYAN Bed for eval.      67-year-old male presenting secondary to couple episodes of presyncopal event with some shortness of breath over the past couple days.  States that he had 1 or 2 episodes of chest pain over the past couple days but none today.  States that he want to come in and get checked out.  States compliant medications other than today.  States he felt lightheaded.  No vertigo like symptoms.  No fevers chills runny nose cough congestion.  States that he has been mostly feeling symptoms when he has been outside in the heat.  States that he is been tolerating p.o..  Felt better after having some Pedialyte.  States compliance with his Coumadin.    Review of patient's allergies indicates:  No Known Allergies  Past Medical History:   Diagnosis Date    Anticoagulant long-term use     Colon polyps     Coronary artery disease     Hypercholesteremia     Hypertension     MI (myocardial infarction)      Past Surgical History:   Procedure Laterality Date    blood clots      cardiac stents      COLONOSCOPY N/A 2/1/2019    Procedure: COLONOSCOPY;  Surgeon: Blanca Fenton MD;  Location: [facility] ENDO;  Service: Endoscopy;  Laterality: N/A;  RX PLAVIX/XARELTO ok to stop (7 days, 3 days) per Dr. Cordero see scan    CYSTOSCOPY WITH URODYNAMIC TESTING N/A 8/30/2019    Procedure: CYSTOSCOPY,WITH URODYNAMIC TESTING;  Surgeon: Felecia Stiles MD;  Location: [facility] OR;  Service: Urology;  Laterality: N/A;  RN PRE OP 8-27-19 "     History reviewed. No pertinent family history.  Social History     Tobacco Use    Smoking status: Some Days     Packs/day: 0.25     Types: Cigarettes    Smokeless tobacco: Never   Substance Use Topics    Alcohol use: Yes     Comment: occasionally     Drug use: Never     Review of Systems   Constitutional:  Positive for fatigue. Negative for fever.   HENT:  Negative for sore throat.    Respiratory:  Positive for chest tightness and shortness of breath.    Cardiovascular:  Negative for chest pain.   Gastrointestinal:  Negative for nausea.   Genitourinary:  Negative for dysuria.   Musculoskeletal:  Negative for back pain.   Skin:  Negative for rash.   Neurological:  Positive for weakness.   Hematological:  Does not bruise/bleed easily.   Psychiatric/Behavioral:  Negative for agitation.      Physical Exam     Initial Vitals [03/06/23 0848]   BP Pulse Resp Temp SpO2   129/79 71 18 98.9 °F (37.2 °C) 98 %      MAP       --         Physical Exam    Nursing note and vitals reviewed.  Constitutional: He appears well-developed and well-nourished.   HENT:   Head: Normocephalic and atraumatic.   Mouth/Throat: Oropharynx is clear and moist.   Eyes: EOM are normal. Pupils are equal, round, and reactive to light.   Neck:   Normal range of motion.  Cardiovascular:  Normal rate and regular rhythm.           Pulmonary/Chest: Breath sounds normal. No stridor. No respiratory distress. He has no wheezes.   Abdominal: Abdomen is soft. Bowel sounds are normal. He exhibits no distension. There is no abdominal tenderness.   Musculoskeletal:         General: No tenderness or edema. Normal range of motion.      Cervical back: Normal range of motion.     Neurological: He is alert and oriented to person, place, and time. He has normal strength. GCS score is 15. GCS eye subscore is 4. GCS verbal subscore is 5. GCS motor subscore is 6.   Skin: Skin is warm and dry. Capillary refill takes less than 2 seconds.   Psychiatric: He has a normal mood  and affect. Thought content normal.       ED Course   Procedures  Labs Reviewed   CBC W/ AUTO DIFFERENTIAL - Abnormal; Notable for the following components:       Result Value    WBC 13.59 (*)     RBC 4.24 (*)     Hemoglobin 13.7 (*)     MCH 32.3 (*)     Gran # (ANC) 10.3 (*)     Immature Grans (Abs) 0.07 (*)     Gran % 75.7 (*)     Lymph % 16.2 (*)     All other components within normal limits   URINALYSIS, REFLEX TO URINE CULTURE - Abnormal; Notable for the following components:    Appearance, UA Hazy (*)     Protein, UA Trace (*)     Occult Blood UA 1+ (*)     Leukocytes, UA 3+ (*)     All other components within normal limits    Narrative:     Specimen Source->Urine   TSH - Abnormal; Notable for the following components:    TSH 4.081 (*)     All other components within normal limits   URINALYSIS MICROSCOPIC - Abnormal; Notable for the following components:    RBC, UA 10 (*)     WBC, UA >100 (*)     WBC Clumps, UA Many (*)     Bacteria Many (*)     Hyaline Casts, UA 3 (*)     All other components within normal limits    Narrative:     Specimen Source->Urine   CULTURE, URINE   COMPREHENSIVE METABOLIC PANEL   TROPONIN I   B-TYPE NATRIURETIC PEPTIDE   T4, FREE   APTT   PROTIME-INR   SARS-COV-2 RDRP GENE   POCT INFLUENZA A/B MOLECULAR   POCT GLUCOSE   POCT GLUCOSE MONITORING CONTINUOUS     EKG Readings: (Independently Interpreted)   EKG done 8:54 a.m. showing normal sinus rhythm sinus arrhythmia with occasional PVC with a rate of 77.  No ST elevation.  Biphasic T-waves laterally.  Wavy baseline.  Biphasic T-waves inferiorly.  QRS is 84 and QTC is 434.     Imaging Results              CTA Chest Non-Coronary (PE Studies) (Final result)  Result time 03/06/23 13:12:16      Final result by Michel Bunn MD (03/06/23 13:12:16)                   Impression:      1. CTA is positive for acute PE with minimal central pulmonary arterial filling defects in the segmental arteries supplying the right middle lobe and lateral,  posterior and medial basilar segments of the left lower lobe.  No CT evidence for right heart strain.  --findings discussed with ED MD, Dr. Bustso, by phone on 03/06/2023 at 13:10--      Electronically signed by: Michel Oni  Date:    03/06/2023  Time:    13:12               Narrative:    EXAMINATION:  CTA CHEST NON CORONARY (PE STUDIES)    CLINICAL HISTORY:  Pulmonary embolism (PE) suspected, high prob;    TECHNIQUE:  Low dose axial images, sagittal and coronal reformations were obtained from the thoracic inlet to the lung bases following the IV administration of 75 mL of Omnipaque 350.    COMPARISON:  None    FINDINGS:  Vascular    Aorta/great vessels: 3-branch vessel configuration of a left-sided type 2 aortic arch. Origin of the great vessels are patent.  No aneurysmal degeneration, dissection or hemodynamically significant stenosis of the thoracic aorta.    Pulmonary vasculature: Pulmonary arteries are normal in caliber and distribute normally.  Minimal central pulmonary arterial filling defects within the segmental pulmonary artery supplying the posteromedial and posterior basal segments of the left lower lobe, origin of the segmental artery supplying the laterobasal segment of the left lower lobe suggesting acute pulmonary embolus.  There are four pulmonary veins.    Nonvascular    Base of Neck: Imaged portions of the base of the neck are grossly unremarkable.    Heart: No cardiomegaly. No significant pericardial thickening. Mild coronary artery calcifications.    Axilla/Pauline/Mediastinum: No axillary, mediastinal or hilar lymphadenopathy.    Airways: Trachea and mainstem bronchi are patent.    Lungs/Pleura: Symmetric mild apical predominant centrilobular emphysematous changes.  No focal consolidation, overt interstitial edema, sizable pleural effusion or pneumothorax.    Esophagus: Grossly unremarkable on this exam limited by lack of oral contrast.    Thoracic soft tissues: Soft tissues are grossly  unremarkable.    Bones: No acute displaced fracture or dislocation.  No suspicious lytic/blastic osseous lesions.    Upper Abdomen: Imaged portions of the upper abdomen are grossly unremarkable.                                        US Lower Extremity Veins Bilateral (Final result)  Result time 03/06/23 11:47:44   Procedure changed from US Lower Extremity Veins Bilateral     Final result by Michel Bunn MD (03/06/23 11:47:44)                   Impression:      1. Positive for partially occlusive acute appearing DVT in the mid and distal segments of the right superficial femoral, the popliteal and the peroneal veins.  2. No sonographic evidence of DVT in the LLE.  This report was flagged in Epic as abnormal.      Electronically signed by: Michel Bunn  Date:    03/06/2023  Time:    11:47               Narrative:    EXAMINATION:  US LOWER EXTREMITY VEINS BILATERAL    CLINICAL HISTORY:  Pain in right leg    TECHNIQUE:  Duplex and color flow Doppler and dynamic compression was performed of the bilateral lower extremity veins was performed.    COMPARISON:  None    FINDINGS:  Right lower extremity    Partially occlusive thrombus in the mid and distal segments of the right superficial femoral and popliteal veins.  Otherwise, the common femoral, upper greater saphenous and deep femoral veins demonstrate normal compressibility, phasic flow and augmentation response without evidence of filling defect. Partially occlusive thrombus in the paired peroneal veins.  Otherwise, the remainder of the visualized calf veins are patent.    Left lower extremity    Common femoral, superficial femoral, popliteal, upper greater saphenous and deep femoral veins demonstrate normal compressibility, phasic flow and augmentation response without evidence of filling defect. Visualized calf veins are patent.                                       X-Ray Chest AP Portable (Final result)  Result time 03/06/23 10:09:54      Final result by Ranjeet MEI  MD Sharon (03/06/23 10:09:54)                   Impression:      1. No acute cardiopulmonary process.      Electronically signed by: Ranjeet Herrera MD  Date:    03/06/2023  Time:    10:09               Narrative:    EXAMINATION:  XR CHEST AP PORTABLE    CLINICAL HISTORY:  CHF;    TECHNIQUE:  Single frontal view of the chest was performed.    COMPARISON:  07/02/2020    FINDINGS:  The cardiomediastinal silhouette is not enlarged noting tortuosity of the aorta..  There is no pleural effusion.  The trachea is midline.  The lungs are symmetrically expanded bilaterally without evidence of acute parenchymal process. No large focal consolidation seen.  There is no pneumothorax.  The osseous structures are remarkable for degenerative changes..                                       Medications   cephALEXin capsule 500 mg (500 mg Oral Given 3/6/23 1225)   iohexoL (OMNIPAQUE 350) injection 75 mL (75 mLs Intravenous Given 3/6/23 1252)   enoxaparin injection 80 mg (80 mg Subcutaneous Given 3/6/23 1512)     Medical Decision Making:   Initial Assessment:   67-year-old male presenting secondary to multiple complaints.  Differential is wide.  Infection, anemia, dehydration, arrhythmia, PE, DVT all concern the patient.  Vitals reassuring.  Ultrasound lower extremity.  Accompanying back positive will likely consider CTA of the chest.  Patient states he is being compliant with his medications.  Afebrile.  Will look for UTI.    US positive for dvt. Inr normal (should be elevated due to being on coumadin).  Unsure if patient is actually taking his medications.  Cardiology recommended Lovenox.  Given 1 dose here.  Has UTI.  Keflex.  Patient states he is feeling fine.  Cardiology came down evaluated patient.  They state that he does not need to be admitted for his DVT or PE they recommended transitioning him over to Eliquis.  Wrote prescription for Eliquis for the patient.  Vitals reassuring.  Afebrile.  States he feels okay rate to go  home.  Discharged with Keflex and Eliquis. I discussed with the patient/family the diagnosis, treatment plan, indications for return to the emergency department, and for expected follow-up. The patient/family verbalized an understanding. The patient/family is asked if there are any questions or concerns. We discuss the case, until all issues are addressed to the patient/family's satisfaction. Patient/family understands and is agreeable to the plan.   Gunner Miller        Clinical Tests:   Lab Tests: Reviewed  Radiological Study: Ordered and Reviewed  Medical Tests: Reviewed and Ordered                        Clinical Impression:   Final diagnoses:  [R00.2] Palpitations  [R06.02] Shortness of breath  [I82.409] DVT (deep venous thrombosis)  [N39.0] Urinary tract infection without hematuria, site unspecified (Primary)  [I26.99] Pulmonary embolism, unspecified chronicity, unspecified pulmonary embolism type, unspecified whether acute cor pulmonale present        ED Disposition Condition    Discharge Stable          ED Prescriptions       Medication Sig Dispense Start Date End Date Auth. Provider    cephALEXin (KEFLEX) 500 MG capsule Take 1 capsule (500 mg total) by mouth every 12 (twelve) hours. for 10 days 20 capsule 3/6/2023 3/16/2023 Gunner Miller MD    apixaban (ELIQUIS) 5 mg Tab Take 2 tablets (10 mg total) by mouth 2 (two) times daily for 7 days, THEN 1 tablet (5 mg total) 2 (two) times daily. 88 tablet 3/6/2023 4/12/2023 Gunner Miller MD          Follow-up Information       Follow up With Specialties Details Why Contact Info    Khalif Hassan MD Family Medicine, Wound Care Schedule an appointment as soon as possible for a visit in 2 days  [id number] [facility]  [place] [address]  [phone]               Gunner Miller MD  03/06/23 1307

## 2023-03-06 NOTE — ED TRIAGE NOTES
Patient reports feeling fatigued, states had near syncopal episode yesterday with chest tightness and bilateral leg pain. Patient reports no chest pain at present, denies coughing or fever, has had some congestion

## 2023-03-06 NOTE — DISCHARGE INSTRUCTIONS
Please stop taking your coumadin because we are switching you to eliquis    Thank you for coming to our Emergency Department today. It is important to remember that some problems are difficult to diagnose and may not be found during your first visit. Be sure to follow up with your primary care doctor and review any labs/imaging that was performed with them. If you do not have a primary care doctor, you may contact the one listed on your discharge paperwork or you may also call the [facility] Appointment Desk at [phone] to schedule an appointment with one.     All medications may potentially have side effects and it is impossible to predict which medications may give you side effects. If you feel that you are having a negative effect of any medication you should immediately stop taking them and seek medical attention.    Return to the ER with any questions/concerns, new/concerning symptoms, worsening or failure to improve. Do not drive or make any important decisions for 24 hours if you have received any pain medications, sedatives or mood altering drugs during your ER visit.

## 2023-03-07 NOTE — ASSESSMENT & PLAN NOTE
CTA is positive for acute PE with minimal central pulmonary arterial filling defects in the segmental arteries supplying the right middle lobe and lateral, posterior and medial basilar segments of the left lower lobe.  No CT evidence for right heart strain. - agree w/ radiologist interpretation after personally reviewing CT wrt burden/location, and RV/LV ratio. Moreover, TTE demonstrated no RV dysfunction, troponin negative, BLE US showed RLE DVT w/in dFV also small thrombus burden, non-occlusive w/ likely chronic component. Personally reviewed all relevant studies in detail.  -rec medical management only at this time; presently appears to be poor candidate for embolectomy or thrombectomy in terms of criteria, clot burden, and clinical status   -after discussion w/ patient and review of labile INRs, recommend transition to PO eliquis at discharge- 10mg PO BID for 1 week, then 5mg PO BID indefinitely in context of acute on chronic RLE DVT and small bilateral PEs

## 2023-03-07 NOTE — ASSESSMENT & PLAN NOTE
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   -as above, rec PO eliquis loading dose then continuation indefinitely or unless otherwise determined by outpatient cardiologist

## 2023-03-07 NOTE — HOSPITAL COURSE
3/6/23: patient presented to ER, CT PE + for bilateral segmental/subsegmental PEs, Venous duplex + for RLE (distal FV) non-occlusive DVT. Cardiology consulted re management. TTE pending.    See A/p.

## 2023-03-07 NOTE — CONSULTS
"[facility] Emergency Dept  Cardiology  Consult Note    Patient Name: Carlos A Ruff  MRN: [id number]  Admission Date: 3/6/2023  Hospital Length of Stay: 0 days  Code Status: Prior   Attending Provider: No att. providers found   Consulting Provider: Fernando Eisenberg MD  Primary Care Physician: Khalif Hassan MD  Principal Problem:Bilateral pulmonary embolism    Patient information was obtained from patient, past medical records and ER records.     Inpatient consult to Cardiology  Consult performed by: Danica Bolivar MD  Consult ordered by: Gunner Miller MD  Reason for consult: PE/DVT  Assessment/Recommendations: Medical management only at this time.  D/c warfarin. Begin PO Eliquis (10mg PO BID for 1 week then 5mg PO BID indefinitely)  F/u in cardiology clinic with Dr. Danica Bolivar 1-2 weeks following discharge        Subjective:     Chief Complaint:  Near syncope    HPI:     Carlos A Ruff is a 67 y.o.  male who  has a past medical history of Anticoagulant long-term use, Colon polyps, Coronary artery disease, Hypercholesteremia, Hypertension, and MI (myocardial infarction).. The patient presented to [facility] on 3/6/2023 with a primary complaint of Fatigue (Pt reports to the ED with C/O near syncopal episode that happened yesterday and heaviness in the shoulders that has been going on x 4 days. Pt also reports palpitations and bi lateral leg pains. Pt reports "I was doing community service yesterday and I thought I was going to pass out. My vision went black for about 5 seconds" pt also reports Hx of DVT's and cardiac stents. No edema noted to the legs. Pt placed in RYAN Bed for eval. )    At bedside, patient was speaking in full sentences, not requiring supplemental O2, not in distress or even discomfort. Aside from above and as mentioned in A/P, ROS otherwise negative.    Patient has known CAD s/p REEMA to LAD, HTN, HLD, chronic/recurrent RLE DVT on chronic coumadin with labile INRs. Most recent dx angiogram 2018 showed " widely patent LAD stent w/ no other obstructive CAD.     CT PE protocol ordered in ER to further eval cause of symptoms, + for bilateral PE, venous US showed RLE DVT (distal FV, non-occlusive). See a/p for further details.           Past Medical History:   Diagnosis Date    Anticoagulant long-term use     Colon polyps     Coronary artery disease     Hypercholesteremia     Hypertension     MI (myocardial infarction)        Past Surgical History:   Procedure Laterality Date    blood clots      cardiac stents      COLONOSCOPY N/A 2/1/2019    Procedure: COLONOSCOPY;  Surgeon: Blanca Fenton MD;  Location: [facility] ENDO;  Service: Endoscopy;  Laterality: N/A;  RX PLAVIX/XARELTO ok to stop (7 days, 3 days) per Dr. Cordero see scan    CYSTOSCOPY WITH URODYNAMIC TESTING N/A 8/30/2019    Procedure: CYSTOSCOPY,WITH URODYNAMIC TESTING;  Surgeon: Felecia Stiles MD;  Location: [facility] OR;  Service: Urology;  Laterality: N/A;  RN PRE OP 8-27-19       Review of patient's allergies indicates:  No Known Allergies    No current facility-administered medications on file prior to encounter.     Current Outpatient Medications on File Prior to Encounter   Medication Sig    acetaminophen (TYLENOL) 325 MG tablet Take 2 tablets (650 mg total) by mouth every 6 (six) hours as needed.    atorvastatin (LIPITOR) 40 MG tablet Take 1 tablet (40 mg total) by mouth once daily.    ergocalciferol (ERGOCALCIFEROL) 50,000 unit Cap Take 50,000 Units by mouth every 7 days.    finasteride (PROSCAR) 5 mg tablet Take 1 tablet (5 mg total) by mouth once daily.    HYDROcodone-acetaminophen (NORCO) 5-325 mg per tablet Take 1 tablet by mouth every 6 (six) hours as needed for Pain.    isosorbide mononitrate (IMDUR) 30 MG 24 hr tablet TAKE 1 TABLET BY MOUTH ONCE DAILY    losartan-hydrochlorothiazide 50-12.5 mg (HYZAAR) 50-12.5 mg per tablet Take 1 tablet by mouth once daily.    nitroGLYCERIN (NITROSTAT) 0.4 MG SL tablet Place 0.4 mg under the tongue every 5 (five)  minutes as needed for Chest pain.    nitroGLYCERIN (NITROSTAT) 0.4 MG SL tablet Place 0.4 mg under the tongue.    nitroGLYCERIN (NITROSTAT) 0.4 MG SL tablet Place 1 tablet (0.4 mg total) under the tongue every 5 (five) minutes as needed for Chest pain.    sars-cov-2, covid-19, (MODERNA COVID-19) 100 mcg/0.5 ml injection     tamsulosin (FLOMAX) 0.4 mg Cap Take 1 capsule (0.4 mg total) by mouth 2 (two) times a day.    [DISCONTINUED] warfarin (COUMADIN) 10 MG tablet TAKE 1 TO 1 & 1/2 (ONE & ONE-HALF) TABLETS BY MOUTH ONCE DAILY AS  DIRECTED  BY  COUMADIN  CLINIC     Family History    None       Tobacco Use    Smoking status: Some Days     Packs/day: 0.25     Types: Cigarettes    Smokeless tobacco: Never   Substance and Sexual Activity    Alcohol use: Yes     Comment: occasionally     Drug use: Never    Sexual activity: Not Currently     Review of Systems   Constitutional: Negative.   HENT: Negative.     Cardiovascular:  Positive for near-syncope and palpitations. Negative for chest pain, leg swelling, orthopnea and paroxysmal nocturnal dyspnea.   Respiratory:  Negative for shortness of breath.    Endocrine: Negative.    Hematologic/Lymphatic: Negative.    Musculoskeletal:  Positive for back pain and myalgias.   Gastrointestinal: Negative.    Neurological: Negative.    Psychiatric/Behavioral: Negative.     Allergic/Immunologic: Negative.    All other systems reviewed and are negative.  Objective:     Vital Signs (Most Recent):  Temp: 98.6 °F (37 °C) (03/06/23 1503)  Pulse: (!) 56 (03/06/23 1603)  Resp: (!) 23 (03/06/23 1603)  BP: (!) 142/82 (03/06/23 1603)  SpO2: 100 % (03/06/23 1603)   Vital Signs (24h Range):  Temp:  [98.6 °F (37 °C)-98.9 °F (37.2 °C)] 98.6 °F (37 °C)  Pulse:  [56-71] 56  Resp:  [18-24] 23  SpO2:  [98 %-100 %] 100 %  BP: (115-155)/(75-86) 142/82     Weight: 83.9 kg (185 lb)  Body mass index is 25.8 kg/m².    SpO2: 100 %       No intake or output data in the 24 hours ending 03/06/23  1807    Lines/Drains/Airways       None                   Physical Exam  Vitals reviewed.   Constitutional:       Appearance: Normal appearance.   HENT:      Head: Normocephalic and atraumatic.      Nose: Nose normal.      Mouth/Throat:      Mouth: Mucous membranes are moist.   Eyes:      Extraocular Movements: Extraocular movements intact.   Cardiovascular:      Rate and Rhythm: Normal rate and regular rhythm.   Pulmonary:      Effort: Pulmonary effort is normal. No respiratory distress.   Abdominal:      Palpations: Abdomen is soft.   Musculoskeletal:         General: Normal range of motion.      Cervical back: Normal range of motion.      Right lower leg: No edema.      Left lower leg: No edema.   Skin:     General: Skin is warm and dry.      Capillary Refill: Capillary refill takes less than 2 seconds.   Neurological:      General: No focal deficit present.      Mental Status: He is alert.   Psychiatric:         Mood and Affect: Mood normal.       Significant Labs:   Recent Lab Results  (Last 5 results in the past 24 hours)        03/06/23  1452   03/06/23  1337   03/06/23  1119   03/06/23  0949   03/06/23  0949        POC Molecular Influenza A Ag         Negative       POC Molecular Influenza B Ag         Negative       Ao root annulus 3.06               Ascending aorta 2.80               Ao peak evy 1.50               Ao VTI 32.6               Appearance, UA     Hazy           aPTT   28.6  Comment: aPTT therapeutic range = 39-69 seconds             AV valve area 2.74               AORTIC VALVE CUSP SEPERATION 2.19               AV mean gradient 5               AV index (prosthetic) 0.70               AV peak gradient 9               AV Velocity Ratio 0.68               Bacteria, UA     Many           Bilirubin (UA)     Negative           BSA 2.05               Color, UA     Yellow           Left Ventricle Relative Wall Thickness 0.54               E/A ratio 0.81               E/E' ratio 5.78               EF  55               E wave deceleration time 257.52               FS 26               Glucose, UA     Negative           Hyaline Casts, UA     3           INR   1.2  Comment: Coumadin Therapy:  2.0 - 3.0 for INR for all indicators except mechanical heart valves  and antiphospholipid syndromes which should use 2.5 - 3.5.               IVC diameter 7               IVSd 1.39               Ketones, UA     Negative           LA WIDTH 4.90               Left Atrium Major Axis 5.31               Left Atrium Minor Axis 5.35               LA size 4.31               LA volume 95.68               LA vol index 46.9               LVOT area 3.9               Leukocytes, UA     3+           LV LATERAL E/E' RATIO 4.73               LV SEPTAL E/E' RATIO 7.43               LV EDV .95               LV Diastolic Volume Index 65.17               LVIDd 5.26               LVIDs 3.88               LV mass 316.78               LV Mass Index 155               Left Ventricular Outflow Tract Mean Gradient 2.06               Left Ventricular Outflow Tract Mean Velocity 0.65               LVOT diameter 2.23               LVOT peak marcos 1.02               LVOT stroke volume 89.40               LVOT peak VTI 22.90               LV ESV BP 64.95               LV Systolic Volume Index 31.8               Mean e' 0.09               Microscopic Comment     SEE COMMENT  Comment: Other formed elements not mentioned in the report are not   present in the microscopic examination.              MV valve area p 1/2 method 3.28               MV Peak A Marcos 0.64               MV Peak E Marcos 0.52               MV stenosis pressure 1/2 time 67.08               NITRITE UA     Negative           Occult Blood UA     1+           pH, UA     6.0           Protein, UA     Trace  Comment: Recommend a 24 hour urine protein or a urine   protein/creatinine ratio if globulin induced proteinuria is  clinically suspected.             Protime   12.4             PV PEAK VELOCITY  0.68               Posterior Wall 1.42                Acceptable       Yes   Yes       Right Atrial Pressure (from IVC) 3               RA Major Axis 4.49               RA Width 4.60               RBC, UA     10           RVDD 3.67               SARS-CoV-2 RNA, Amplification, Qual       Negative         Sinus 3.31               Specific Springer, UA     1.015           Specimen UA     Urine, Clean Catch           STJ 2.49               TAPSE 2.27               TDI SEPTAL 0.07               TDI LATERAL 0.11               Triscuspid Valve Regurgitation Peak Gradient 20               TR Max Marcos 2.21               TV rest pulmonary artery pressure 23               UROBILINOGEN UA     Negative           WBC Clumps, UA     Many           WBC, UA     >100                                  Significant Imaging:   CTA Chest Non-Coronary (PE Studies)   Final Result      1. CTA is positive for acute PE with minimal central pulmonary arterial filling defects in the segmental arteries supplying the right middle lobe and lateral, posterior and medial basilar segments of the left lower lobe.  No CT evidence for right heart strain.   --findings discussed with ED MD, Dr. Bustos, by phone on 03/06/2023 at 13:10--         Electronically signed by: Michel Bunn   Date:    03/06/2023   Time:    13:12      US Lower Extremity Veins Bilateral   Final Result   Abnormal      1. Positive for partially occlusive acute appearing DVT in the mid and distal segments of the right superficial femoral, the popliteal and the peroneal veins.   2. No sonographic evidence of DVT in the LLE.   This report was flagged in Epic as abnormal.         Electronically signed by: Michel Bunn   Date:    03/06/2023   Time:    11:47      X-Ray Chest AP Portable   Final Result      1. No acute cardiopulmonary process.         Electronically signed by: Ranjeet Herrera MD   Date:    03/06/2023   Time:    10:09            Assessment and Plan:     *  Bilateral pulmonary embolism  CTA is positive for acute PE with minimal central pulmonary arterial filling defects in the segmental arteries supplying the right middle lobe and lateral, posterior and medial basilar segments of the left lower lobe.  No CT evidence for right heart strain. - agree w/ radiologist interpretation after personally reviewing CT wrt burden/location, and RV/LV ratio. Moreover, TTE demonstrated no RV dysfunction, troponin negative, BLE US showed RLE DVT w/in dFV also small thrombus burden, non-occlusive w/ likely chronic component. Personally reviewed all relevant studies in detail.  -rec medical management only at this time; presently appears to be poor candidate for embolectomy or thrombectomy in terms of criteria, clot burden, and clinical status   -after discussion w/ patient and review of labile INRs, recommend transition to PO eliquis at discharge- 10mg PO BID for 1 week, then 5mg PO BID indefinitely in context of acute on chronic RLE DVT and small bilateral PEs         DVT, lower extremity, recurrent, right  Acute on chronic non-occlusive RLE DVT of dFV.   -as above, rec PO eliquis loading dose then continuation indefinitely or unless otherwise determined by outpatient cardiologist    History of deep vein thrombosis (DVT) of lower extremity  As above - almost certainly some chronic component here; also favors medical management only at this time    Coronary artery disease involving native coronary artery of native heart without angina pectoris  No anginal symptoms or equivalent per patient  -no WMA on TTE, troponin negative  -cont. lipitor 40-80 daily, combo ARB/HCTZ         VTE Risk Mitigation (From admission, onward)      None            Thank you for your consult. I will sign off. Please contact us if you have any additional questions.  Follow-up in Cardiology Clinic    MD Danica Han MD (STAFF)  Interventional Cardiology   [facility] - Emergency Dept      Agree  with assessment and plan as discussed above.  Patient seen and examined.  No indication for thrombectomy of asymptomatic, nonocclusive right lower extremity DVT in the distal femoral vein.  No evidence of right heart strain on CT scan or echocardiogram.  No indication for pulmonary thrombectomy either.  INR subtherapeutic, unclear whether related to noncompliance or difficulty in maintaining adequate INR.  Will switch from Coumadin to Eliquis 10 mg b.i.d. for 7 days followed by 5 mg b.i.d. follow-up in Cardiology Clinic and Heme-Onc clinic          Danica Bolivar MD, FAC, [facility]  Interventional Cardiologist  [facility] ([facility])

## 2023-03-07 NOTE — HPI
"  Carlos A Ruff is a 67 y.o.  male who  has a past medical history of Anticoagulant long-term use, Colon polyps, Coronary artery disease, Hypercholesteremia, Hypertension, and MI (myocardial infarction).. The patient presented to [facility] on 3/6/2023 with a primary complaint of Fatigue (Pt reports to the ED with C/O near syncopal episode that happened yesterday and heaviness in the shoulders that has been going on x 4 days. Pt also reports palpitations and bi lateral leg pains. Pt reports "I was doing community service yesterday and I thought I was going to pass out. My vision went black for about 5 seconds" pt also reports Hx of DVT's and cardiac stents. No edema noted to the legs. Pt placed in RYAN Bed for eval. )    At bedside, patient was speaking in full sentences, not requiring supplemental O2, not in distress or even discomfort. Aside from above and as mentioned in A/P, ROS otherwise negative.    Patient has known CAD s/p REEMA to LAD, HTN, HLD, chronic/recurrent RLE DVT on chronic coumadin with labile INRs. Most recent dx angiogram 2018 showed widely patent LAD stent w/ no other obstructive CAD.     CT PE protocol ordered in ER to further eval cause of symptoms, + for bilateral PE, venous US showed RLE DVT (distal FV, non-occlusive). See a/p for further details.       "

## 2023-03-07 NOTE — SUBJECTIVE & OBJECTIVE
Past Medical History:   Diagnosis Date    Anticoagulant long-term use     Colon polyps     Coronary artery disease     Hypercholesteremia     Hypertension     MI (myocardial infarction)        Past Surgical History:   Procedure Laterality Date    blood clots      cardiac stents      COLONOSCOPY N/A 2/1/2019    Procedure: COLONOSCOPY;  Surgeon: Blanca Fneton MD;  Location: [facility] ENDO;  Service: Endoscopy;  Laterality: N/A;  RX PLAVIX/XARELTO ok to stop (7 days, 3 days) per Dr. Cordero see scan    CYSTOSCOPY WITH URODYNAMIC TESTING N/A 8/30/2019    Procedure: CYSTOSCOPY,WITH URODYNAMIC TESTING;  Surgeon: Felecia Stiles MD;  Location: [facility] OR;  Service: Urology;  Laterality: N/A;  RN PRE OP 8-27-19       Review of patient's allergies indicates:  No Known Allergies    No current facility-administered medications on file prior to encounter.     Current Outpatient Medications on File Prior to Encounter   Medication Sig    acetaminophen (TYLENOL) 325 MG tablet Take 2 tablets (650 mg total) by mouth every 6 (six) hours as needed.    atorvastatin (LIPITOR) 40 MG tablet Take 1 tablet (40 mg total) by mouth once daily.    ergocalciferol (ERGOCALCIFEROL) 50,000 unit Cap Take 50,000 Units by mouth every 7 days.    finasteride (PROSCAR) 5 mg tablet Take 1 tablet (5 mg total) by mouth once daily.    HYDROcodone-acetaminophen (NORCO) 5-325 mg per tablet Take 1 tablet by mouth every 6 (six) hours as needed for Pain.    isosorbide mononitrate (IMDUR) 30 MG 24 hr tablet TAKE 1 TABLET BY MOUTH ONCE DAILY    losartan-hydrochlorothiazide 50-12.5 mg (HYZAAR) 50-12.5 mg per tablet Take 1 tablet by mouth once daily.    nitroGLYCERIN (NITROSTAT) 0.4 MG SL tablet Place 0.4 mg under the tongue every 5 (five) minutes as needed for Chest pain.    nitroGLYCERIN (NITROSTAT) 0.4 MG SL tablet Place 0.4 mg under the tongue.    nitroGLYCERIN (NITROSTAT) 0.4 MG SL tablet Place 1 tablet (0.4 mg total) under the tongue every 5 (five) minutes as  needed for Chest pain.    sars-cov-2, covid-19, (MODERNA COVID-19) 100 mcg/0.5 ml injection     tamsulosin (FLOMAX) 0.4 mg Cap Take 1 capsule (0.4 mg total) by mouth 2 (two) times a day.    [DISCONTINUED] warfarin (COUMADIN) 10 MG tablet TAKE 1 TO 1 & 1/2 (ONE & ONE-HALF) TABLETS BY MOUTH ONCE DAILY AS  DIRECTED  BY  COUMADIN  CLINIC     Family History    None       Tobacco Use    Smoking status: Some Days     Packs/day: 0.25     Types: Cigarettes    Smokeless tobacco: Never   Substance and Sexual Activity    Alcohol use: Yes     Comment: occasionally     Drug use: Never    Sexual activity: Not Currently     Review of Systems   Constitutional: Negative.   HENT: Negative.     Cardiovascular:  Positive for near-syncope and palpitations. Negative for chest pain, leg swelling, orthopnea and paroxysmal nocturnal dyspnea.   Respiratory:  Negative for shortness of breath.    Endocrine: Negative.    Hematologic/Lymphatic: Negative.    Musculoskeletal:  Positive for back pain and myalgias.   Gastrointestinal: Negative.    Neurological: Negative.    Psychiatric/Behavioral: Negative.     Allergic/Immunologic: Negative.    All other systems reviewed and are negative.  Objective:     Vital Signs (Most Recent):  Temp: 98.6 °F (37 °C) (03/06/23 1503)  Pulse: (!) 56 (03/06/23 1603)  Resp: (!) 23 (03/06/23 1603)  BP: (!) 142/82 (03/06/23 1603)  SpO2: 100 % (03/06/23 1603)   Vital Signs (24h Range):  Temp:  [98.6 °F (37 °C)-98.9 °F (37.2 °C)] 98.6 °F (37 °C)  Pulse:  [56-71] 56  Resp:  [18-24] 23  SpO2:  [98 %-100 %] 100 %  BP: (115-155)/(75-86) 142/82     Weight: 83.9 kg (185 lb)  Body mass index is 25.8 kg/m².    SpO2: 100 %       No intake or output data in the 24 hours ending 03/06/23 1807    Lines/Drains/Airways       None                   Physical Exam  Vitals reviewed.   Constitutional:       Appearance: Normal appearance.   HENT:      Head: Normocephalic and atraumatic.      Nose: Nose normal.      Mouth/Throat:      Mouth:  Mucous membranes are moist.   Eyes:      Extraocular Movements: Extraocular movements intact.   Cardiovascular:      Rate and Rhythm: Normal rate and regular rhythm.   Pulmonary:      Effort: Pulmonary effort is normal. No respiratory distress.   Abdominal:      Palpations: Abdomen is soft.   Musculoskeletal:         General: Normal range of motion.      Cervical back: Normal range of motion.      Right lower leg: No edema.      Left lower leg: No edema.   Skin:     General: Skin is warm and dry.      Capillary Refill: Capillary refill takes less than 2 seconds.   Neurological:      General: No focal deficit present.      Mental Status: He is alert.   Psychiatric:         Mood and Affect: Mood normal.       Significant Labs:   Recent Lab Results  (Last 5 results in the past 24 hours)        03/06/23  1452   03/06/23  1337   03/06/23  1119   03/06/23  0949   03/06/23  0949        POC Molecular Influenza A Ag         Negative       POC Molecular Influenza B Ag         Negative       Ao root annulus 3.06               Ascending aorta 2.80               Ao peak evy 1.50               Ao VTI 32.6               Appearance, UA     Hazy           aPTT   28.6  Comment: aPTT therapeutic range = 39-69 seconds             AV valve area 2.74               AORTIC VALVE CUSP SEPERATION 2.19               AV mean gradient 5               AV index (prosthetic) 0.70               AV peak gradient 9               AV Velocity Ratio 0.68               Bacteria, UA     Many           Bilirubin (UA)     Negative           BSA 2.05               Color, UA     Yellow           Left Ventricle Relative Wall Thickness 0.54               E/A ratio 0.81               E/E' ratio 5.78               EF 55               E wave deceleration time 257.52               FS 26               Glucose, UA     Negative           Hyaline Casts, UA     3           INR   1.2  Comment: Coumadin Therapy:  2.0 - 3.0 for INR for all indicators except mechanical  heart valves  and antiphospholipid syndromes which should use 2.5 - 3.5.               IVC diameter 7               IVSd 1.39               Ketones, UA     Negative           LA WIDTH 4.90               Left Atrium Major Axis 5.31               Left Atrium Minor Axis 5.35               LA size 4.31               LA volume 95.68               LA vol index 46.9               LVOT area 3.9               Leukocytes, UA     3+           LV LATERAL E/E' RATIO 4.73               LV SEPTAL E/E' RATIO 7.43               LV EDV .95               LV Diastolic Volume Index 65.17               LVIDd 5.26               LVIDs 3.88               LV mass 316.78               LV Mass Index 155               Left Ventricular Outflow Tract Mean Gradient 2.06               Left Ventricular Outflow Tract Mean Velocity 0.65               LVOT diameter 2.23               LVOT peak marcos 1.02               LVOT stroke volume 89.40               LVOT peak VTI 22.90               LV ESV BP 64.95               LV Systolic Volume Index 31.8               Mean e' 0.09               Microscopic Comment     SEE COMMENT  Comment: Other formed elements not mentioned in the report are not   present in the microscopic examination.              MV valve area p 1/2 method 3.28               MV Peak A Marcos 0.64               MV Peak E Marcos 0.52               MV stenosis pressure 1/2 time 67.08               NITRITE UA     Negative           Occult Blood UA     1+           pH, UA     6.0           Protein, UA     Trace  Comment: Recommend a 24 hour urine protein or a urine   protein/creatinine ratio if globulin induced proteinuria is  clinically suspected.             Protime   12.4             PV PEAK VELOCITY 0.68               Posterior Wall 1.42                Acceptable       Yes   Yes       Right Atrial Pressure (from IVC) 3               RA Major Axis 4.49               RA Width 4.60               RBC, UA     10           RVDD  3.67               SARS-CoV-2 RNA, Amplification, Qual       Negative         Sinus 3.31               Specific Dixon, UA     1.015           Specimen UA     Urine, Clean Catch           STJ 2.49               TAPSE 2.27               TDI SEPTAL 0.07               TDI LATERAL 0.11               Triscuspid Valve Regurgitation Peak Gradient 20               TR Max Marcos 2.21               TV rest pulmonary artery pressure 23               UROBILINOGEN UA     Negative           WBC Clumps, UA     Many           WBC, UA     >100                                  Significant Imaging:   CTA Chest Non-Coronary (PE Studies)   Final Result      1. CTA is positive for acute PE with minimal central pulmonary arterial filling defects in the segmental arteries supplying the right middle lobe and lateral, posterior and medial basilar segments of the left lower lobe.  No CT evidence for right heart strain.   --findings discussed with ED MD, Dr. Bustos, by phone on 03/06/2023 at 13:10--         Electronically signed by: Michel Bunn   Date:    03/06/2023   Time:    13:12      US Lower Extremity Veins Bilateral   Final Result   Abnormal      1. Positive for partially occlusive acute appearing DVT in the mid and distal segments of the right superficial femoral, the popliteal and the peroneal veins.   2. No sonographic evidence of DVT in the LLE.   This report was flagged in Epic as abnormal.         Electronically signed by: Michel Bunn   Date:    03/06/2023   Time:    11:47      X-Ray Chest AP Portable   Final Result      1. No acute cardiopulmonary process.         Electronically signed by: Ranjeet Herrera MD   Date:    03/06/2023   Time:    10:09

## 2023-03-07 NOTE — ASSESSMENT & PLAN NOTE
No anginal symptoms or equivalent per patient  -no WMA on TTE, troponin negative  -cont. lipitor 40-80 daily, combo ARB/HCTZ

## 2023-03-07 NOTE — ASSESSMENT & PLAN NOTE
As above - almost certainly some chronic component here; also favors medical management only at this time

## 2023-03-17 NOTE — DISCHARGE INSTRUCTIONS
Please return immediately if you get worse or if new problems develop.  Please have your doctor check the pulses in your left foot.  Medicines as directed for paresthesias in leg cramps.  Please continue your Eliquis.  Please stop your warfarin.  Rest.

## 2023-03-17 NOTE — ED PROVIDER NOTES
Encounter Date: 3/17/2023    SCRIBE #1 NOTE: I, Deisy Rivas, am scribing for, and in the presence of,  Quinten Raygoza MD. I have scribed the following portions of the note - Other sections scribed: HPI, ROS.     History     Chief Complaint   Patient presents with    Leg Pain     Pt c/o of pain to bilateral legs while asleep. Pt stated pain feels like pins and needles like his legs are asleep. Pt has hx of blood clots. No swelling or edema noted. Pt is ambulatory.     Carlos A Ruff is a 67 y.o. male, with a past medical history of HTN and long-term anticoagulant use, who presents to the ED with bilateral leg pain that began last pm. Patient states the pain feels like pins and needles and that his legs are asleep. No attempted treatment. No other exacerbating or alleviating factors. Patient denies vomiting, diarrhea, chest pain, cough, shortness of breath, dysuria, abdominal pain, headache, ear pain, or other associated symptoms. Patient is compliant with 10 mg of warfarin. Patient was prescribed 5 mg of Eliquis 2x/day after 3/6 ED visit. No known allergies.       The history is provided by the patient. No  was used.   Review of patient's allergies indicates:  No Known Allergies  Past Medical History:   Diagnosis Date    Anticoagulant long-term use     Colon polyps     Coronary artery disease     Hypercholesteremia     Hypertension     MI (myocardial infarction)      Past Surgical History:   Procedure Laterality Date    blood clots      cardiac stents      COLONOSCOPY N/A 2/1/2019    Procedure: COLONOSCOPY;  Surgeon: Blanca Fenton MD;  Location: [facility] ENDO;  Service: Endoscopy;  Laterality: N/A;  RX PLAVIX/XARELTO ok to stop (7 days, 3 days) per Dr. Cordero see scan    CYSTOSCOPY WITH URODYNAMIC TESTING N/A 8/30/2019    Procedure: CYSTOSCOPY,WITH URODYNAMIC TESTING;  Surgeon: Felecia Stiles MD;  Location: [facility] OR;  Service: Urology;  Laterality: N/A;  RN PRE OP 8-27-19     History  reviewed. No pertinent family history.  Social History     Tobacco Use    Smoking status: Some Days     Packs/day: 0.25     Types: Cigarettes    Smokeless tobacco: Never   Substance Use Topics    Alcohol use: Not Currently     Comment: occasionally     Drug use: Never     Review of Systems   Constitutional:  Negative for chills, diaphoresis and fever.   HENT:  Negative for ear pain and sore throat.    Eyes:  Negative for pain.   Respiratory:  Negative for cough and shortness of breath.    Cardiovascular:  Negative for chest pain.   Gastrointestinal:  Negative for abdominal pain, diarrhea, nausea and vomiting.   Genitourinary:  Negative for dysuria.   Musculoskeletal:  Negative for back pain.        (+) Bilateral Leg pain    Skin:  Negative for rash.   Neurological:  Negative for headaches.   Psychiatric/Behavioral:  Negative for confusion.      Physical Exam     Initial Vitals [03/17/23 1141]   BP Pulse Resp Temp SpO2   126/72 (!) 56 18 98 °F (36.7 °C) 99 %      MAP       --         Physical Exam  The patient was examined specifically for the following:   General:No significant distress, Good color, Warm and dry. Head and neck:Scalp atraumatic, Neck supple. Neurological:Appropriate conversation, Gross motor deficits. Eyes:Conjugate gaze, Clear corneas. ENT: No epistaxis. Cardiac: Regular rate and rhythm, Grossly normal heart tones. Pulmonary: Wheezing, Rales. Gastrointestinal: Abdominal tenderness, Abdominal distention. Musculoskeletal: Extremity deformity, Apparent pain with range of motion of the joints. Skin: Rash.   The findings on examination were normal.  Lungs are clear.  The heart tones are normal.  The abdomen is soft.  There is no lower extremity swelling or tenderness.  ED Course   Procedures  Labs Reviewed   CBC W/ AUTO DIFFERENTIAL - Abnormal; Notable for the following components:       Result Value    RBC 4.30 (*)     Hemoglobin 13.6 (*)     MCH 31.6 (*)     Immature Granulocytes 0.8 (*)     Immature  Grans (Abs) 0.08 (*)     All other components within normal limits   PROTIME-INR - Abnormal; Notable for the following components:    Prothrombin Time 42.4 (*)     INR 4.4 (*)     All other components within normal limits   MAGNESIUM   COMPREHENSIVE METABOLIC PANEL          Imaging Results    None          Medications - No data to display  Medical Decision Making:   History:   Old Medical Records: I decided to obtain old medical records.  Clinical Tests:   Lab Tests: Ordered and Reviewed     This patient presents emergency with a history of a DVT in the right lower extremity.  The patient is on both Coumadin and Eliquis.  The patient's INR is 4.4.  I will stop the Coumadin.  I will continue the Eliquis.  The patient is on 5 mg twice a day.  Laboratory work is otherwise not revealing for electrolyte abnormalities that might cause muscle cramps or paresthesias.  I will start the patient on Robaxin and gabapentin and have him follow up with primary care.  The patient has a brisk posterior tibial pulse on the right.  He has no palpable pulses in the left foot.  I can Doppler a posterior tibial pulse on the left.  I will have this evaluated by primary care.     Scribe Attestation:   Scribe #1: I performed the above scribed service and the documentation accurately describes the services I performed. I attest to the accuracy of the note.                   Clinical Impression:   Final diagnoses:  [R25.2] Leg cramps (Primary)  [R20.2] Paresthesia of both lower extremities        ED Disposition Condition    Discharge Stable          ED Prescriptions       Medication Sig Dispense Start Date End Date Auth. Provider    gabapentin (NEURONTIN) 300 MG capsule Take 1 capsule (300 mg total) by mouth 3 (three) times daily. 45 capsule 3/17/2023 3/16/2024 Quinten Raygoza MD    methocarbamoL (ROBAXIN) 500 MG Tab Take 2 tablets (1,000 mg total) by mouth 3 (three) times daily as needed (Leg cramps). 30 tablet 3/17/2023 3/22/2023 Quinten GASCA  MD Idalmis          Follow-up Information       Follow up With Specialties Details Why Contact Info    Khalif Hassan MD Family Medicine, Wound Care In 3 days  [address] [facility], [place] [address]  [phone]          I personally performed the services described in this documentation.  All medical record  entries made by the scribe are at my direction and in my presence.  Signed, Dr. Idalmis Raygoza MD  03/17/23 3816

## 2023-03-23 NOTE — TELEPHONE ENCOUNTER
No new care gaps identified.  [facility] Embedded Care Gaps. Reference number: [id number]. 3/23/2023   2:12:36 PM CDT

## 2023-03-23 NOTE — TELEPHONE ENCOUNTER
Patient was given medication when he went to ER on 03/17. Patient appointment scheduled 07/05.  Please advise

## 2023-03-23 NOTE — TELEPHONE ENCOUNTER
----- Message from Key Singh sent at 3/23/2023 12:23 PM CDT -----  Regarding: Refill Request  Type: RX Refill Request      Who Called: Self       Refill or New Rx: refill      RX Name and Strength: methocarbamoL (ROBAXIN) 500 MG Tab      Is this a 30 day or 90 day RX:      Preferred Pharmacy with phone number: [facility] at [address] [place] [address] [employer]   [phone]    Would the patient rather a call back or a response via [facility]? Call       Best Call Back Number:  [phone]

## 2023-03-24 NOTE — TELEPHONE ENCOUNTER
Left voicemail for pt to call the clinic back in regards to medication being denied due to an appointment is needed.

## 2023-04-20 NOTE — PROGRESS NOTES
"Subjective:   Patient ID:  Carlos A Ruff is a 67 y.o. male who presents for follow-up of No chief complaint on file.      HPI    CAD - stent LAD, HTN, HLD, recurrent RLE DVT - on eliquis     5/17/18 [facility] - EDP 15, LAD stent widely patent, normal vessels otherwise        Admitted 1/24/18  Patient is 61 yo male smoker with HTN, HLD, CAD s/p PCI who presents to ED with complaint of CP. Per patient has been with intermittent chest pain over the last 2 weeks mostly with exertion but occasionally at rest. He describes pain as "tight, stabbing" and reports lasts about a minute or so before resolving. He endorses associated diaphoresis and radiation to L arm and L neck. He reports pain worse this am thus causing presentation. Had cardiac stent placed about 2 years ago and no issues since then. He has been with a cold recently but otherwise denies fever/chills, SOB, abdominal pain, N/V, LE swelling and pain. On presentation patient with mildly elevated troponin at .05. Non ischemic EKG and CXR unremarkable. CTA obtained to r/o PE in ED which noted calcinosis to LAD. Placed in observation for ACS r/o.         Hospital Course:   Patient admitted with chest pain and ruled out for acute coronary syndrome. EKG is non ischemic. Serial troponin levels mildly elevated but flat with downward trend. BNP normal. CXR without acute cardiothoracic processes. Echocardiogram performed and did not show evidence of systolic/diastolic/or valvular dysfunction. NST performed and is without evidence of myocardial ischemia. Cardiology consulted and after evaluation has cleared the patient for discharge from a CV standpoint. Chest pain likely atypical in nature. Patient is stable and will discharge home at this time with instructions to follow up with PCP and cardiology in one week.      Echo 3/6/23  The left ventricle is normal in size with mild concentric hypertrophy and normal systolic function.  Moderate to severe left atrial enlargement.  The " estimated ejection fraction is 55%.  Grade I left ventricular diastolic dysfunction.  Normal right ventricular size with normal right ventricular systolic function.  Moderate right atrial enlargement.  Normal central venous pressure (3 mmHg).  The estimated PA systolic pressure is 23 mmHg.     Stress test 1/9/23    Abnormal myocardial perfusion scan.    There is a moderate to severe intensity, large sized, mostly fixed perfusion abnormality with mild jalen infarct ischemia in the inferior wall(s).    There are no other significant perfusion abnormalities.    The gated perfusion images showed an ejection fraction of 48% at rest.    The ECG portion of the study is negative for ischemia.    The patient reported no chest pain during the stress test.    There were no arrhythmias during stress.     4/11/18  I discussed a LHC for recurrent CP - he is agreeable but wants to wait until school breaks for the summer  Will add toprol XL 25 qd and imdur 30 qd     5/31/18 Did well after LHC - still some atypical CP     Went to the ER 2/7/19  HPI: This 63 y.o. male with a past medical history of Anticoagulant long-term use, Coronary artery disease, Hypercholesteremia, Hypertension, and MI, presents to the ED complaining of an acute onset of L pectoral chest pain that is well localized for last 2 days. He was initially getting this pain every hr lasting for 30-40 seconds at a time, which became more frequent yesterday every 30 minutes now. Pain is sharp and stabbing. Pain is not worse with deep breathing. He had similar chest pain 6-8 months ago and he was diagnosed with muscle spasms to his chest. He had colonoscopy done last Friday that went well. He reports no relief in his chest pain with Tylenol. Current tobacco use.        Medical decision making:  Given the above, this patient presents to the emergency room with well localized sharp stabbing pain in the left pectoral chest, coming and going in very brief episodes, all less  than a minute. The pain is sharp stabbing.  It occurs, and then your stops, and then recurs.  It does not radiate.  The patient is not short of breath. The patient arrived with a tachycardia but resolved.  During the physical examination is heart rate was 60.  I doubt pulmonary embolus.  The patient has a history of coronary artery disease and has a stent.  His troponin was slightly elevated.  Consultation was obtained with Cardiology, Dr. Cordero, who asked that a 2nd troponin be performed.  If the 2nd troponin was substantially higher, the patient will require admission.  Sec troponin was lower.  I will discharge this patient outpatient evaluation and treatment.  Clinically this is noncardiac chest pain.     Troponin 0.065 to 0.057  EKG 2/7/10 sinus tachycardia 108 LAE - otherwise ok     2/13/19 Still with sharp CP both at rest and with exertion   Increase toprol XL 50 qd  Echo and lexiscan myoview for recurrent CP with known CAD and mildly elevated troponin     3/12/20 Saw Dr Hassan 3/9/20 for leg pain - venous and arterial US ordered - not done yet  Intermittent sharp CP for the last 2 weeks with mild HERNANDES  EKG NSR PRWP otherwise ok   Echo and lexiscan myoview for CP and CAD      12/27/22 Occasional CP- takes tylenol. Denies SOB  EKG NSR NSSTT changes  BP elevated - controlled by outside readings  Lexiscan myoview for CAD and CAD  Continue Rx for DVT, CAD, HTN, HLD      1/12/23 Denies CP or SOB. Has not been taking losartan  BP poorly controlled  Stress test with mainly fixed inferior defect - CP improved. Discussed LHC versus medical Rx - we are both in agreement to observe for now  Resume losartan/HCT for HTN  Continue Rx for DVT, CAD, HTN, HLD  OV 3 months with BNP, BMP    Labs 4/5/23  K 4.4  Cr 1.2  BNP 34    4/20/23 had PE 3/6/23 - saw Dr Bolivar - coumadin changed to eliquis  Denies CP or SOB  BP improved    Review of Systems   Constitutional: Negative for decreased appetite.   HENT:  Negative for ear discharge.     Eyes:  Negative for blurred vision.   Endocrine: Negative for polyphagia.   Skin:  Negative for nail changes.   Genitourinary:  Negative for bladder incontinence.   Neurological:  Negative for aphonia.   Psychiatric/Behavioral:  Negative for hallucinations.    Allergic/Immunologic: Negative for hives.     Objective:   Physical Exam  Constitutional:       Appearance: He is well-developed.   HENT:      Head: Normocephalic and atraumatic.   Eyes:      Conjunctiva/sclera: Conjunctivae normal.      Pupils: Pupils are equal, round, and reactive to light.   Cardiovascular:      Rate and Rhythm: Normal rate.      Pulses: Intact distal pulses.      Heart sounds: Normal heart sounds.   Pulmonary:      Effort: Pulmonary effort is normal.      Breath sounds: Normal breath sounds.   Abdominal:      General: Bowel sounds are normal.      Palpations: Abdomen is soft.   Musculoskeletal:         General: Normal range of motion.      Cervical back: Normal range of motion and neck supple.   Skin:     General: Skin is warm and dry.   Neurological:      Mental Status: He is alert and oriented to person, place, and time.       Assessment:      1. Coronary artery disease involving native coronary artery of native heart without angina pectoris    2. Chest pain, unspecified type    3. DVT, lower extremity, recurrent, right        Plan:     Continue Rx for DVT/PE, CAD, HTN, HLD  OV 6 months

## 2023-06-05 PROBLEM — I26.99 BILATERAL PULMONARY EMBOLISM: Status: RESOLVED | Noted: 2018-01-24 | Resolved: 2023-01-01

## 2023-06-09 NOTE — ED TRIAGE NOTES
Pt to ED with c/o generalized weakness and decreased appetite starting on Monday. Pt states he has just been feeling very unwell. Pt denies any other symptoms. Pt AAO x 4.

## 2023-06-09 NOTE — ED PROVIDER NOTES
"Encounter Date: 6/9/2023    SCRIBE #1 NOTE: I, Natabridget Mallory, am scribing for, and in the presence of,  Zena Yañez MD. I have scribed the following portions of the note - Other sections scribed: HPI, ROS, PE.     History     Chief Complaint   Patient presents with    Weakness     Patient reports weakness, poor appetite, intermittent right leg numbness and intermittent blurred vision, for the past week,  denies any symptoms at present. . Denies cough or congestion.      A 68-year-old male presents to the ED for light-headedness. He states after walking for a short amount of time he becomes light-headed, has blurry vision and everything appears "bright".  He has associated decrease in appetite and difficulty with urination. He usually has to rest to feel better. He also reports having intermittent "shocks" in the bilateral legs that travel up to his head. He has not attempted to treat his symptoms. He mentions he experiences bilateral shoulder weakness. No injury or trauma noted. He denies urinary incontinence, bowel incontinence, headache, chest pain and shortness of breath. His cardiologist is Dr. Cordero. He did have an appointment with him two months ago and had a negative work-up.     The history is provided by the patient. No  was used.   Review of patient's allergies indicates:  No Known Allergies  Past Medical History:   Diagnosis Date    Anticoagulant long-term use     Colon polyps     Coronary artery disease     Hypercholesteremia     Hypertension     MI (myocardial infarction)      Past Surgical History:   Procedure Laterality Date    blood clots      cardiac stents      COLONOSCOPY N/A 2/1/2019    Procedure: COLONOSCOPY;  Surgeon: Blanca Fenton MD;  Location: [facility];  Service: Endoscopy;  Laterality: N/A;  RX PLAVIX/XARELTO ok to stop (7 days, 3 days) per Dr. Cordero see scan    CYSTOSCOPY WITH URODYNAMIC TESTING N/A 8/30/2019    Procedure: CYSTOSCOPY,WITH URODYNAMIC " TESTING;  Surgeon: Felecia Stiles MD;  Location: [facility] OR;  Service: Urology;  Laterality: N/A;  RN PRE OP 8-27-19     No family history on file.  Social History     Tobacco Use    Smoking status: Some Days     Packs/day: 0.25     Types: Cigarettes    Smokeless tobacco: Never   Substance Use Topics    Alcohol use: Not Currently     Comment: occasionally     Drug use: Never     Review of Systems   Constitutional:  Positive for appetite change. Negative for activity change, chills, fatigue and fever.   HENT:  Negative for congestion, drooling and sore throat.    Eyes:  Negative for pain.   Respiratory:  Negative for cough, chest tightness, shortness of breath and wheezing.    Cardiovascular:  Negative for chest pain, palpitations and leg swelling.   Gastrointestinal:  Negative for abdominal distention, abdominal pain, diarrhea, nausea and vomiting.   Genitourinary:  Positive for difficulty urinating. Negative for dysuria, frequency and urgency.   Musculoskeletal:  Negative for arthralgias, back pain, joint swelling, neck pain and neck stiffness.   Skin:  Negative for rash and wound.   Neurological:  Positive for weakness and light-headedness. Negative for dizziness, tremors, seizures, syncope, speech difficulty, numbness and headaches.   Hematological:  Does not bruise/bleed easily.   Psychiatric/Behavioral:  Negative for agitation and confusion.      Physical Exam     Initial Vitals [06/09/23 1036]   BP Pulse Resp Temp SpO2   104/71 79 18 97.9 °F (36.6 °C) 100 %      MAP       --         Physical Exam    Nursing note and vitals reviewed.  Constitutional: He appears well-developed and well-nourished. He is not diaphoretic. No distress.   HENT:   Head: Normocephalic and atraumatic.   Right Ear: External ear normal.   Left Ear: External ear normal.   Nose: Nose normal.   Mouth/Throat: Oropharynx is clear and moist.   Eyes: Conjunctivae, EOM and lids are normal. Pupils are equal, round, and reactive to light. Right  eye exhibits no discharge. Left eye exhibits no discharge. No scleral icterus.   Neck: Trachea normal. Neck supple. No thyromegaly present. No tracheal deviation present.   Normal range of motion.   Full passive range of motion without pain.     Cardiovascular:  Normal rate, regular rhythm, normal heart sounds and intact distal pulses.     Exam reveals no friction rub.       No murmur heard.  Pulmonary/Chest: Breath sounds normal. No respiratory distress. He has no wheezes. He has no rhonchi. He has no rales.   Abdominal: Abdomen is soft. Bowel sounds are normal. He exhibits no distension. There is no abdominal tenderness.   protruding abdomen  There is no rebound and no guarding.   Musculoskeletal:         General: No tenderness or edema. Normal range of motion.      Cervical back: Full passive range of motion without pain, normal range of motion and neck supple.     Lymphadenopathy:     He has no cervical adenopathy.   Neurological: He is alert and oriented to person, place, and time. He has normal strength. He displays normal reflexes. No cranial nerve deficit or sensory deficit. GCS score is 15. GCS eye subscore is 4. GCS verbal subscore is 5. GCS motor subscore is 6.   Skin: Skin is warm, dry and intact. Capillary refill takes less than 2 seconds. No rash noted. No erythema.   Psychiatric: He has a normal mood and affect. His speech is normal and behavior is normal. Judgment and thought content normal.       ED Course   Procedures  Labs Reviewed   CBC W/ AUTO DIFFERENTIAL - Abnormal; Notable for the following components:       Result Value    WBC 15.65 (*)     RBC 3.89 (*)     Hemoglobin 12.4 (*)     Hematocrit 36.4 (*)     MCH 31.9 (*)     Immature Granulocytes 1.4 (*)     Gran # (ANC) 12.4 (*)     Immature Grans (Abs) 0.22 (*)     Gran % 79.3 (*)     Lymph % 11.6 (*)     All other components within normal limits   URINALYSIS - Abnormal; Notable for the following components:    Color, UA Orange (*)      Appearance, UA Cloudy (*)     Protein, UA 2+ (*)     Occult Blood UA 2+ (*)     Leukocytes, UA 3+ (*)     All other components within normal limits    Narrative:     Specimen Source->Urine   DRUG SCREEN PANEL, URINE EMERGENCY - Abnormal; Notable for the following components:    THC Presumptive Positive (*)     All other components within normal limits    Narrative:     Specimen Source->Urine   COMPREHENSIVE METABOLIC PANEL - Abnormal; Notable for the following components:    BUN 32 (*)     Creatinine 1.9 (*)     Albumin 2.9 (*)     eGFR 38 (*)     All other components within normal limits   URINALYSIS MICROSCOPIC - Abnormal; Notable for the following components:    RBC, UA 37 (*)     WBC, UA >100 (*)     WBC Clumps, UA Many (*)     Bacteria Many (*)     All other components within normal limits    Narrative:     Specimen Source->Urine   ALCOHOL,MEDICAL (ETHANOL)   TROPONIN I    Narrative:     Green tops and Najera hemolyzed DR HOOPER   MAGNESIUM   PHOSPHORUS   LACTIC ACID, PLASMA   POCT GLUCOSE        ECG Results              EKG 12-lead (Final result)  Result time 06/10/23 11:24:24      Final result by Interface, Lab [facility] (06/10/23 11:24:24)                   Narrative:    Test Reason : R53.1,    Vent. Rate : 071 BPM     Atrial Rate : 071 BPM     P-R Int : 126 ms          QRS Dur : 076 ms      QT Int : 416 ms       P-R-T Axes : 000 156 139 degrees     QTc Int : 452 ms    Limb lead reversal  Normal sinus rhythm  Right axis deviation  Low voltage QRS  Nonspecific ST abnormality  Abnormal ECG  When compared with ECG of 17-MAR-2023 12:21,  Significant changes have occurred  Confirmed by Alfred Cordero MD ([id number]) on 6/10/2023 11:24:17 AM    Referred By: [email or web address]   SELF           Confirmed By:Alfred Cordero MD                                  Imaging Results              CT Head Without Contrast (Final result)  Result time 06/09/23 12:22:14      Final result by Sohail Mehta MD (06/09/23 12:22:14)                    Impression:      No CT evidence of acute intracranial abnormality. If the patient has an acute, focal neurological deficit, MRI of the brain may be indicated.    Paranasal sinus disease.      Electronically signed by: Sohail Mehta MD  Date:    06/09/2023  Time:    12:22               Narrative:    EXAMINATION:  CT HEAD WITHOUT CONTRAST    CLINICAL HISTORY:  Transient ischemic attack (TIA);    TECHNIQUE:  Low dose axial images were obtained through the head.  Coronal and sagittal reformations were also performed. Contrast was not administered.    COMPARISON:  01/30/2021.    FINDINGS:  No evidence of acute territorial infarct, hemorrhage, mass effect, or midline shift.    Ventricles are normal in size and configuration.    No displaced calvarial fracture.    Incompletely visualized chronic appearing defect in the right inferior orbital wall.  Patchy mucosal thickening in the sphenoid and ethmoid sinuses.  Mastoid air cells are essentially clear.                                       X-Ray Chest AP Portable (Final result)  Result time 06/09/23 11:38:16      Final result by Quinten Albrecht MD (06/09/23 11:38:16)                   Impression:      See above      Electronically signed by: Quinten Albrecht MD  Date:    06/09/2023  Time:    11:38               Narrative:    EXAMINATION:  XR CHEST AP PORTABLE    CLINICAL HISTORY:  Weakness    TECHNIQUE:  Single frontal view of the chest was performed.    COMPARISON:  N 03/06/2023 one    FINDINGS:  Heart size normal.  The lungs are clear.  No pleural effusion                                    X-Rays:   Independently Interpreted Readings:   Other Readings:  CXR reviewed. No focal consolidation, ptx, effusion or cardiomegaly  Medications   0.9%  NaCl infusion (0 mLs Intravenous Stopped 6/9/23 1637)   cefTRIAXone (ROCEPHIN) 1 g in dextrose 5 % in water (D5W) 5 % 100 mL IVPB (MB+) (0 g Intravenous Stopped 6/9/23 1600)     Medical Decision Making:   ED Management:  67yo M with  h/o multiple medical problems presents to ED c/o lightheadedness and decreased UOP. On exam he is afvss nontoxic appearing. NVI. DDx inlcudes but not limited to dehydration, UTI, electrolyte abnl, anemia, cardiac arrhythmia. I doubt ACS, sepsis, cauda equina syndrome, chf.   Labs and imaging reviewed. There is evidence of UTI and given his sx as well as leukocytosis, I suspect pyelonephritis. Given rocephin. Culture pending.  Ct head negative. Will discharge home. F/u with pcp.Return precautions given.  Zena Yañez M.D.  10:40 AM 6/11/2023          Scribe Attestation:   Scribe #1: I performed the above scribed service and the documentation accurately describes the services I performed. I attest to the accuracy of the note.           I, , personally performed the services described in this documentation. All medical record entries made by the scribe were at my direction and in my presence. I have reviewed the chart and agree that the record reflects my personal performance and is accurate and complete.             Clinical Impression:   Final diagnoses:  [R53.1] Weakness  [N12] Pyelonephritis (Primary)        ED Disposition Condition    Discharge Stable          ED Prescriptions       Medication Sig Dispense Start Date End Date Auth. Provider    cephALEXin (KEFLEX) 500 MG capsule Take 1 capsule (500 mg total) by mouth every 8 (eight) hours. for 7 days 21 capsule 6/9/2023 6/16/2023 Zena Yañez MD          Follow-up Information       Follow up With Specialties Details Why Contact Info    Khalif Hassan MD Family Medicine, Wound Care Schedule an appointment as soon as possible for a visit in 1 week  [address] [facility]  [place] [address]  [phone]               Zena Yañez MD  06/11/23 9478

## 2023-06-09 NOTE — DISCHARGE INSTRUCTIONS
Take medications as directed.  Follow-up with your primary care doctor.  Return for any new or worsening complaints.

## 2023-06-12 NOTE — PROGRESS NOTES
Patient presented to ED c/o lightheadedness and decreased UOP. Labs and imaging reviewed. There is evidence of UTI and given his sx as well as leukocytosis, MD suspected pyelonephritis. Given rocephin. Culture pending.  Ct head negative. Will discharge home. F/u with pcp.Return precautions given.  Discharged on keflex  INR MISSED since 5/17/23, r/s to tomorrow 6/13/23

## 2023-06-13 ENCOUNTER — TELEPHONE (OUTPATIENT)
Dept: FAMILY MEDICINE | Facility: CLINIC | Age: 68
End: 2023-06-13
Payer: MEDICARE

## 2023-06-13 NOTE — TELEPHONE ENCOUNTER
----- Message from Key Singh sent at 6/13/2023  2:10 PM CDT -----  Regarding: patient call back  Type: Patient Call Back    Who called: Andrea Arita     What is the request in detail: Needs to know if doctor can sign death certificate for cremation.     Can the clinic reply by [facility]? No     Would the patient rather a call back or a response via [facility]? Call     Best call back number: [phone]

## 2023-06-29 ENCOUNTER — ANTI-COAG VISIT (OUTPATIENT)
Dept: CARDIOLOGY | Facility: CLINIC | Age: 68
End: 2023-06-29
Payer: MEDICARE

## 2023-06-29 DIAGNOSIS — D68.52 PROTHROMBIN GENE MUTATION: ICD-10-CM

## 2023-06-29 DIAGNOSIS — Z86.718 HISTORY OF DEEP VEIN THROMBOSIS (DVT) OF LOWER EXTREMITY: ICD-10-CM

## 2023-06-29 DIAGNOSIS — I82.401 DVT, LOWER EXTREMITY, RECURRENT, RIGHT: ICD-10-CM

## 2023-06-29 DIAGNOSIS — R76.0 LUPUS ANTICOAGULANT POSITIVE: Primary | ICD-10-CM

## 2024-05-06 NOTE — PATIENT INSTRUCTIONS
[facility]  [facility]  Rachel Carvajal MD, [facility]  [phone]         May 6, 2024    Colonoscopy Procedure Note  Efren Maya  :  1962  [facility] Medical Record Number: [id number]    Indications:   Severe GI blood loss anemia, no prior colonoscopy, hx of partial colon resection for diverticulitis  PCP:  Chiquis Louis MD  Anesthesia/Sedation: TIVA  Endoscopist:  Dr. Rachel Carvajal  Complications:  None  Estimated Blood Loss:  None    Permit:  The indications, risks, benefits and alternatives were reviewed with the patient or their decision maker who was provided an opportunity to ask questions and all questions were answered.  The specific risks of colonoscopy with conscious sedation were reviewed, including but not limited to anesthetic complication, bleeding, adverse drug reaction, missed lesion, infection, IV site reactions, and intestinal perforation which would lead to the need for surgical repair.  Alternatives to colonoscopy including radiographic imaging, observation without testing, or laboratory testing were reviewed including the limitations of those alternatives.  After considering the options and having all their questions answered, the patient or their decision maker provided both verbal and written consent to proceed.        Procedure in Detail:  After obtaining informed consent, positioning of the patient in the left lateral decubitus position, and conduction of a pre-procedure pause or \"time out\" the endoscope was introduced into the anus and advanced to the terminal ileum.  The quality of the colonic preparation was good.  A careful inspection was made as the colonoscope was withdrawn, findings and interventions are described below.    Findings:   -Normal terminal ileum  -end to end colocolonic anastomosis is noted at 25 cm from the anus, suture material noted.   - Normal colon mucosa throughout, no    Putting on Compression Stockings     Turn the stocking inside-out, then fit it over your toes and heel.          Roll the stocking up your leg.            Once stockings are on, make sure the top of the stocking is about two fingers width below the crease of the knee (or the groin if you wear thigh-high stockings).          Use equipment, such as a stocking frieda, or wear rubber gloves to make it easier to put on compression stockings.         Elastic compression stockings are prescribed to treat many vein problems. Wearing them may be the most important thing you do to manage your symptoms. The stockings fit tightly around your ankle, gradually reducing in pressure as they go up your legs. This helps keep blood flowing to your heart. As a result, swelling is reduced. Your healthcare provider will prescribe stockings at a safe pressure for you. He or she will also tell you how often to wear and remove the stockings. Follow these instructions closely. Also, do not buy or wear compression stockings without first seeing your healthcare provider.  Tips for wear and care  To wear stockings safely and to get the most benefit:  · Wear the length prescribed by your healthcare provider.  · Pull them to the designated height and no farther. Dont let them bunch at the top. This can restrict blood flow and increase swelling.  · Wear the stockings for the amount of time your healthcare provider recommends. Replace them when they start to feel loose. This will likely be every 3 to 6 months.  · Remove them as your healthcare provider directs. When removed, wash your legs. Then check your legs and feet for sores. Call your healthcare provider if you find a sore. Dont put the stockings back on unless your healthcare provider directs.   · Wash the stockings as instructed. They may need to be hand-washed.  Date Last Reviewed: 5/1/2016  © 1559-1946 [employer]. [address] [place] [address], [place] [address]. All rights  reserved. This information is not intended as a substitute for professional medical care. Always follow your healthcare professional's instructions.        Tips for Using Less Salt    Most people with heart problems need to eat less salt (sodium). Reducing the amount of salt you eat may help control your blood pressure. The higher your blood pressure, the greater your risk for heart disease, stroke, blindness, and kidney problems.  At the store  · Make low-salt choices by reading labels carefully. Look for the total amount of sodium per serving.  · Use more fresh food. Buy more fruits and vegetables. Select lean meats, fish, and poultry.  · Use fewer frozen, canned, and packaged foods which often contain a lot of sodium.  · Use plain frozen vegetables without sauces or toppings. These products are often low- or no-sodium.  · Opt for reduced-sodium or no-salt-added versions of canned vegetables and soups.  In the kitchen  · Don't add salt to food when you're cooking. Season with flavorings such as onion, garlic, pepper, salt-free herbal blends, and lemon or lime juice.  · Use a cookbook containing low-salt recipes. It can give you ideas for tasty meals that are healthy for your heart.  · Sprinkle salt-free herbal blends on vegetables and meat.  · Drain and rinse canned foods, such as canned beans and vegetables, before cooking or eating.  Eating out  · Tell the  you're on a low-salt diet. Ask questions about the menu.  · Order fish, chicken, and meat broiled, baked, poached, or grilled without salt, butter, or breading.  · Use lemon, pepper, and salt-free herb mixes to add flavor.  · Choose plain steamed rice, boiled noodles, and baked or boiled potatoes. Top potatoes with chives and a little sour cream.     Beware! Salt goes by many other names. Limit foods with these words listed as ingredients: salt, sodium, soy sauce, baking soda, baking powder, MSG, monosodium, Na (the chemical symbol for sodium). Some  antacids are also high in salt.   Date Last Reviewed: 6/19/2015  © 3333-4423 [employer]. [address] [place] [address], [place] [address]. All rights reserved. This information is not intended as a substitute for professional medical care. Always follow your healthcare professional's instructions.        Low-Salt Diet  This diet removes foods that are high in salt. It also limits the amount of salt you use when cooking. It is most often used for people with high blood pressure, edema (fluid retention), and kidney, liver, or heart disease.  Table salt contains the mineral sodium. Your body needs sodium to work normally. But too much sodium can make your health problems worse. Your healthcare provider is recommending a low-salt (also called low-sodium) diet for you. Your total daily allowance of salt is 1,500 to 2,300 milligrams (mg). It is less than 1 teaspoon of table salt. This means you can have only about 500 to 700 mg of sodium at each meal. People with certain health problems should limit salt intake to the lower end of the recommended range.    When you cook, dont add much salt. If you can cook without using salt, even better. Dont add salt to your food at the table.  When shopping, read food labels. Salt is often called sodium on the label. Choose foods that are salt-free, low salt, or very low salt. Note that foods with reduced salt may not lower your salt intake enough.    Beans, potatoes, and pasta  Ok: Dry beans, split peas, lentils, potatoes, rice, macaroni, pasta, spaghetti without added salt  Avoid: Potato chips, tortilla chips, and similar products  Breads and cereals  Ok: Low-sodium breads, rolls, cereals, and cakes; low-salt crackers, matzo crackers  Avoid: Salted crackers, pretzels, popcorn, Chadian toast, pancakes, muffins  Dairy  Ok: Milk, chocolate milk, hot chocolate mix, low-salt cheeses, and yogurt  Avoid: Processed cheese and cheese spreads; Roquefort, Camembert, and cottage cheese;  buttermilk, instant breakfast drink  Desserts  Ok: Ice cream, frozen yogurt, juice bars, gelatin, cookies and pies, sugar, honey, jelly, hard candy  Avoid: Most pies, cakes and cookies prepared or processed with salt; instant pudding  Drinks  Ok: Tea, coffee, fizzy (carbonated) drinks, juices  Avoid: Flavored coffees, electrolyte replacement drinks, sports drinks  Meats  Ok: All fresh meat, fish, poultry, low-salt tuna, eggs, egg substitute  Avoid: Smoked, pickled, brine-cured, or salted meats and fish. This includes woods, chipped beef, corned beef, hot dogs, deli meats, ham, kosher meats, salt pork, sausage, canned tuna, salted codfish, smoked salmon, herring, sardines, or anchovies.  Seasonings and spices  Ok: Most seasonings are okay. Good substitutes for salt include: fresh herb blends, hot sauce, lemon, garlic, walsh, vinegar, dry mustard, parsley, cilantro, horseradish, tomato paste, regular margarine, mayonnaise, unsalted butter, cream cheese, vegetable oil, cream, low-salt salad dressing and gravy.  Avoid: Regular ketchup, relishes, pickles, soy sauce, teriyaki sauce, Worcestershire sauce, BBQ sauce, tartar sauce, meat tenderizer, chili sauce, regular gravy, regular salad dressing, salted butter  Soups  Ok: Low-salt soups and broths made with allowed foods  Avoid: Bouillon cubes, soups with smoked or salted meats, regular soup and broth  Vegetables  Ok: Most vegetables are okay; also low-salt tomato and vegetable juices  Avoid: Sauerkraut and other brine-soaked vegetables; pickles and other pickled vegetables; tomato juice, olives  Date Last Reviewed: 8/1/2016  © 7487-2869 [employer]. [address] [place] [address]. All rights reserved. This information is not intended as a substitute for professional medical care. Always follow your healthcare professional's instructions.        Low-Salt Choices  Eating salt (sodium) can make your body retain too much water. Excess water makes  your heart work harder. Canned, packaged, and frozen foods are easy to prepare, but they are often high in sodium. Here are some ideas for low-salt foods you can easily prepare yourself.    For breakfast  · Fruit or 100% fruit juice  · Whole-wheat bread or an English muffin. Compare sodium content on labels.  · Low-fat milk or yogurt  · Unsalted eggs  · Shredded wheat  · Corn tortillas  · Unsalted steamed rice  · Regular (not instant) hot cereal, made without salt  Stay away from:  · Sausage, woods, and ham  · Flour tortillas  · Packaged muffins, pancakes, and biscuits  · Instant hot cereals  · Cottage cheese  For lunch and dinner  · Fresh fish, chicken, turkey, or meat--baked, broiled, or roasted without salt  · Dry beans, cooked without salt  · Tofu, stir-fried without salt  · Unsalted fresh fruit and vegetables, or frozen or canned fruit and vegetables with no added salt  Stay away from:  · Lunch or deli meat that is cured or smoked  · Cheese  · Tomato juice and catsup  · Canned vegetables, soups, and fish not labeled as no-salt-added or reduced sodium  · Packaged gravies and sauces  · Olives, pickles, and relish  · Bottled salad dressings  For snacks and desserts  · Yogurt  · Unsalted, air popped popcorn  · Unsalted nuts or seeds  Stay away from:  · Pies and cakes  · Packaged dessert mixes  · Pizza  · Canned and packaged puddings  · Pretzels, chips, crackers, and nuts--unless the label says unsalted  Date Last Reviewed: 6/17/2015  © 7469-2767 [employer]. [address] [place] [address], [place] [address]. All rights reserved. This information is not intended as a substitute for professional medical care. Always follow your healthcare professional's instructions.

## 2025-05-02 NOTE — PROGRESS NOTES
Discharge instructions given to patient and family at bedside. Patient and family verbalized understanding and states willingness to comply. Saline lock removed. Tele monitoring removed.    Yes

## (undated) DEVICE — SEE MEDLINE ITEM 157117

## (undated) DEVICE — MAT QUICK 40X30 FLOOR FLUID LF

## (undated) DEVICE — CANISTER SUCTION 2 LTR

## (undated) DEVICE — CATH SELF-CATH FEMALE 14FR 6IN

## (undated) DEVICE — TUBING FOR LABORIE PUMP

## (undated) DEVICE — CATH BLADDER/URETERAL 7FR

## (undated) DEVICE — SEE MEDLINE ITEM 152502

## (undated) DEVICE — CATH URETHRAL RED RUBBER 18FR

## (undated) DEVICE — GLOVE BIOGEL PI MICRO SZ 7

## (undated) DEVICE — GAUZE SPONGE 4X4 12PLY

## (undated) DEVICE — ELECTRODE NEOTRODE II

## (undated) DEVICE — CATH ABD 7FR

## (undated) DEVICE — Device

## (undated) DEVICE — GLOVE SURGICAL LATEX SZ 6.5

## (undated) DEVICE — SOL NS 1000CC

## (undated) DEVICE — COVER SNAP KAP 26IN